# Patient Record
Sex: MALE | Race: WHITE | Employment: OTHER | URBAN - METROPOLITAN AREA
[De-identification: names, ages, dates, MRNs, and addresses within clinical notes are randomized per-mention and may not be internally consistent; named-entity substitution may affect disease eponyms.]

---

## 2017-01-27 ENCOUNTER — HOSPITAL ENCOUNTER (OUTPATIENT)
Dept: ULTRASOUND IMAGING | Age: 62
Discharge: HOME | End: 2017-01-27
Attending: SPECIALIST | Admitting: SPECIALIST

## 2017-01-27 DIAGNOSIS — K74.60 LIVER CIRRHOSIS SECONDARY TO NASH (HCC): ICD-10-CM

## 2017-01-27 DIAGNOSIS — K75.81 LIVER CIRRHOSIS SECONDARY TO NASH (HCC): ICD-10-CM

## 2017-07-10 ENCOUNTER — HOSPITAL ENCOUNTER (OUTPATIENT)
Dept: OTHER | Age: 62
Discharge: HOME | End: 2017-07-10
Attending: INTERNAL MEDICINE | Admitting: INTERNAL MEDICINE

## 2017-07-10 LAB
ALBUMIN SERPL-MCNC: 3.8 GM/DL (ref 3.4–5)
ALP BLD-CCNC: 83 IU/L (ref 40–129)
ALT SERPL-CCNC: 23 U/L (ref 10–40)
ANION GAP SERPL CALCULATED.3IONS-SCNC: 14 MMOL/L (ref 4–16)
AST SERPL-CCNC: 29 IU/L (ref 15–37)
BILIRUB SERPL-MCNC: 1.6 MG/DL (ref 0–1)
BUN BLDV-MCNC: 12 MG/DL (ref 6–23)
CALCIUM SERPL-MCNC: 9.1 MG/DL (ref 8.3–10.6)
CHLORIDE BLD-SCNC: 106 MMOL/L (ref 99–110)
CO2: 27 MMOL/L (ref 21–32)
CREAT SERPL-MCNC: 0.7 MG/DL (ref 0.9–1.3)
GFR AFRICAN AMERICAN: >60 ML/MIN/1.73M2
GFR NON-AFRICAN AMERICAN: >60 ML/MIN/1.73M2
GLUCOSE BLD-MCNC: 140 MG/DL (ref 70–140)
LACTATE DEHYDROGENASE: 202 IU/L (ref 120–246)
POTASSIUM SERPL-SCNC: 3.7 MMOL/L (ref 3.5–5.1)
SODIUM BLD-SCNC: 147 MMOL/L (ref 135–145)
TOTAL PROTEIN: 6.3 GM/DL (ref 6.4–8.2)

## 2017-07-11 LAB — CEA: 2.4 NG/ML

## 2017-08-07 ENCOUNTER — HOSPITAL ENCOUNTER (OUTPATIENT)
Dept: ULTRASOUND IMAGING | Age: 62
Discharge: HOME | End: 2017-08-07
Attending: SPECIALIST | Admitting: SPECIALIST

## 2017-08-07 DIAGNOSIS — K74.60 CIRRHOSIS OF LIVER (HCC): ICD-10-CM

## 2017-08-07 DIAGNOSIS — K75.81 LIVER CIRRHOSIS SECONDARY TO NASH (NONALCOHOLIC STEATOHEPATITIS) (HCC): ICD-10-CM

## 2017-08-07 DIAGNOSIS — K74.60 LIVER CIRRHOSIS SECONDARY TO NASH (NONALCOHOLIC STEATOHEPATITIS) (HCC): ICD-10-CM

## 2018-01-25 ENCOUNTER — HOSPITAL ENCOUNTER (OUTPATIENT)
Dept: ULTRASOUND IMAGING | Age: 63
Discharge: HOME | End: 2018-01-25
Attending: SPECIALIST | Admitting: SPECIALIST

## 2018-01-25 DIAGNOSIS — K74.60 LIVER CIRRHOSIS SECONDARY TO NASH (HCC): ICD-10-CM

## 2018-01-25 DIAGNOSIS — K75.81 LIVER CIRRHOSIS SECONDARY TO NASH (HCC): ICD-10-CM

## 2018-03-09 ENCOUNTER — HOSPITAL ENCOUNTER (OUTPATIENT)
Dept: SURGERY | Age: 63
Discharge: HOME | End: 2018-03-09
Attending: SPECIALIST | Admitting: SPECIALIST

## 2018-03-09 VITALS
RESPIRATION RATE: 16 BRPM | BODY MASS INDEX: 34.25 KG/M2 | HEART RATE: 56 BPM | TEMPERATURE: 97.1 F | SYSTOLIC BLOOD PRESSURE: 118 MMHG | OXYGEN SATURATION: 98 % | HEIGHT: 68 IN | DIASTOLIC BLOOD PRESSURE: 73 MMHG | WEIGHT: 226 LBS

## 2018-03-09 RX ORDER — SODIUM CHLORIDE, SODIUM LACTATE, POTASSIUM CHLORIDE, CALCIUM CHLORIDE 600; 310; 30; 20 MG/100ML; MG/100ML; MG/100ML; MG/100ML
INJECTION, SOLUTION INTRAVENOUS CONTINUOUS
Status: CANCELLED | OUTPATIENT
Start: 2018-03-09

## 2018-03-09 RX ORDER — SODIUM CHLORIDE 9 MG/ML
INJECTION, SOLUTION INTRAVENOUS CONTINUOUS
Status: DISCONTINUED | OUTPATIENT
Start: 2018-03-09 | End: 2018-03-10 | Stop reason: HOSPADM

## 2018-03-09 RX ADMIN — SODIUM CHLORIDE: 9 INJECTION, SOLUTION INTRAVENOUS at 09:04

## 2018-03-09 ASSESSMENT — PAIN - FUNCTIONAL ASSESSMENT: PAIN_FUNCTIONAL_ASSESSMENT: 0-10

## 2018-03-09 ASSESSMENT — PAIN SCALES - GENERAL
PAINLEVEL_OUTOF10: 0
PAINLEVEL_OUTOF10: 0

## 2018-03-09 NOTE — PROGRESS NOTES
1123 denies pain or nausea. Head of bed up. Call light in reach, wife at bedside  1128 soda provided  1135 denies needs  1146 discharge instructions reviewed.  Verbalized understanding  1152 dr Donna Sandoval provided update at bedside  [phone] discharged to car via wheelchair

## 2018-03-09 NOTE — H&P
Original H &P in soft chart. I have examined the patient immediately before the procedure and there is no change in the previous history  and physical exam, which has been reviewed. There is no history of sleep apnea, snoring, or stridor. There has been no  previous adverse experience with sedation/anesthesia. There is no increased risk for aspiration of gastric contents. The patient has been instructed that all resuscitative measures (during the operative and immediate perioperative period) will be instituted in the unlikely event that they will be needed.     ASA Class: 3  AIRWAY Class: 1

## 2018-09-07 ENCOUNTER — HOSPITAL ENCOUNTER (OUTPATIENT)
Dept: ULTRASOUND IMAGING | Age: 63
Discharge: HOME | End: 2018-09-07
Attending: SPECIALIST | Admitting: SPECIALIST

## 2018-09-07 DIAGNOSIS — K75.81 LIVER CIRRHOSIS SECONDARY TO NASH (NONALCOHOLIC STEATOHEPATITIS) (HCC): ICD-10-CM

## 2018-09-07 DIAGNOSIS — K74.60 LIVER CIRRHOSIS SECONDARY TO NASH (NONALCOHOLIC STEATOHEPATITIS) (HCC): ICD-10-CM

## 2019-03-13 ENCOUNTER — HOSPITAL ENCOUNTER (OUTPATIENT)
Dept: ULTRASOUND IMAGING | Age: 64
Discharge: HOME | End: 2019-03-13
Payer: MEDICARE

## 2019-03-13 DIAGNOSIS — K75.81 LIVER CIRRHOSIS SECONDARY TO NASH (NONALCOHOLIC STEATOHEPATITIS) (HCC): ICD-10-CM

## 2019-03-13 DIAGNOSIS — K74.60 LIVER CIRRHOSIS SECONDARY TO NASH (NONALCOHOLIC STEATOHEPATITIS) (HCC): ICD-10-CM

## 2019-03-13 PROCEDURE — 76705 ECHO EXAM OF ABDOMEN: CPT

## 2019-04-13 PROBLEM — Z95.828 PORT-A-CATH IN PLACE: Status: ACTIVE | Noted: 2019-04-13

## 2019-09-12 ENCOUNTER — HOSPITAL ENCOUNTER (OUTPATIENT)
Age: 64
Discharge: HOME | End: 2019-09-12
Payer: MEDICARE

## 2019-09-12 ENCOUNTER — HOSPITAL ENCOUNTER (OUTPATIENT)
Dept: ULTRASOUND IMAGING | Age: 64
Discharge: HOME | End: 2019-09-12
Payer: MEDICARE

## 2019-09-12 DIAGNOSIS — K75.81 LIVER CIRRHOSIS SECONDARY TO NASH (NONALCOHOLIC STEATOHEPATITIS) (HCC): ICD-10-CM

## 2019-09-12 DIAGNOSIS — K74.60 LIVER CIRRHOSIS SECONDARY TO NASH (NONALCOHOLIC STEATOHEPATITIS) (HCC): ICD-10-CM

## 2019-09-12 LAB
ALBUMIN SERPL-MCNC: 3.5 GM/DL (ref 3.4–5)
ALP BLD-CCNC: 94 IU/L (ref 40–128)
ALT SERPL-CCNC: 18 U/L (ref 10–40)
ANION GAP SERPL CALCULATED.3IONS-SCNC: 8 MMOL/L (ref 4–16)
AST SERPL-CCNC: 28 IU/L (ref 15–37)
BILIRUB SERPL-MCNC: 0.8 MG/DL (ref 0–1)
BUN BLDV-MCNC: 13 MG/DL (ref 6–23)
C-REACTIVE PROTEIN, HIGH SENSITIVITY: 0.3 MG/L
CALCIUM SERPL-MCNC: 9.4 MG/DL (ref 8.3–10.6)
CHLORIDE BLD-SCNC: 105 MMOL/L (ref 99–110)
CO2: 29 MMOL/L (ref 21–32)
CREAT SERPL-MCNC: 0.8 MG/DL (ref 0.9–1.3)
GFR AFRICAN AMERICAN: >60 ML/MIN/1.73M2
GFR NON-AFRICAN AMERICAN: >60 ML/MIN/1.73M2
GLUCOSE BLD-MCNC: 116 MG/DL (ref 70–99)
INR BLD: 1.21 INDEX
POTASSIUM SERPL-SCNC: 4.3 MMOL/L (ref 3.5–5.1)
PROTHROMBIN TIME: 13.8 SECONDS (ref 9.12–12.5)
SODIUM BLD-SCNC: 142 MMOL/L (ref 135–145)
TOTAL PROTEIN: 5.9 GM/DL (ref 6.4–8.2)

## 2019-09-12 PROCEDURE — 85610 PROTHROMBIN TIME: CPT

## 2019-09-12 PROCEDURE — 36415 COLL VENOUS BLD VENIPUNCTURE: CPT

## 2019-09-12 PROCEDURE — 80053 COMPREHEN METABOLIC PANEL: CPT

## 2019-09-12 PROCEDURE — 86140 C-REACTIVE PROTEIN: CPT

## 2019-09-12 PROCEDURE — 82105 ALPHA-FETOPROTEIN SERUM: CPT

## 2019-09-12 PROCEDURE — 76705 ECHO EXAM OF ABDOMEN: CPT

## 2019-09-14 LAB
MS ALPHA-FETOPROTEIN: 3 NG/ML (ref 0–9)
MS ALPHA-FETOPROTEIN: NORMAL NG/ML (ref 0–9)

## 2019-11-05 ENCOUNTER — HOSPITAL ENCOUNTER (OUTPATIENT)
Age: 64
Setting detail: SPECIMEN
Discharge: HOME | End: 2019-11-05
Payer: MEDICARE

## 2019-11-05 LAB
BASOPHILS ABSOLUTE: 0 K/CU MM
BASOPHILS RELATIVE PERCENT: 1 % (ref 0–1)
DIFFERENTIAL TYPE: ABNORMAL
EOSINOPHILS ABSOLUTE: 0 K/CU MM
EOSINOPHILS RELATIVE PERCENT: 1 % (ref 0–3)
FERRITIN: 79 NG/ML (ref 30–400)
FOLATE: 12.8 NG/ML (ref 3.1–17.5)
HCT VFR BLD CALC: 37.3 % (ref 42–52)
HEMOGLOBIN: 12.3 GM/DL (ref 13.5–18)
IRON: 104 UG/DL (ref 59–158)
LYMPHOCYTES ABSOLUTE: 0.7 K/CU MM
LYMPHOCYTES RELATIVE PERCENT: 20 % (ref 24–44)
MCH RBC QN AUTO: 30.9 PG (ref 27–31)
MCHC RBC AUTO-ENTMCNC: 33 % (ref 32–36)
MCV RBC AUTO: 93.7 FL (ref 78–100)
MONOCYTES ABSOLUTE: 0.4 K/CU MM
MONOCYTES RELATIVE PERCENT: 10 % (ref 0–4)
PCT TRANSFERRIN: 38 % (ref 10–44)
PDW BLD-RTO: 13.1 % (ref 11.7–14.9)
PLATELET # BLD: 96 K/CU MM (ref 140–440)
PMV BLD AUTO: 9.8 FL (ref 7.5–11.1)
RBC # BLD: 3.98 M/CU MM (ref 4.6–6.2)
SEGMENTED NEUTROPHILS ABSOLUTE COUNT: 2.5 K/CU MM
SEGMENTED NEUTROPHILS RELATIVE PERCENT: 68 % (ref 36–66)
TOTAL IRON BINDING CAPACITY: 271 UG/DL (ref 250–450)
TSH HIGH SENSITIVITY: 0.61 UIU/ML (ref 0.27–4.2)
UNSATURATED IRON BINDING CAPACITY: 167 UG/DL (ref 110–370)
VITAMIN B-12: >2000 PG/ML (ref 211–911)
WBC # BLD: 3.6 K/CU MM (ref 4–10.5)

## 2019-11-05 PROCEDURE — 85007 BL SMEAR W/DIFF WBC COUNT: CPT

## 2019-11-05 PROCEDURE — 85027 COMPLETE CBC AUTOMATED: CPT

## 2019-11-05 PROCEDURE — 82746 ASSAY OF FOLIC ACID SERUM: CPT

## 2019-11-05 PROCEDURE — 83540 ASSAY OF IRON: CPT

## 2019-11-05 PROCEDURE — 83550 IRON BINDING TEST: CPT

## 2019-11-05 PROCEDURE — 82728 ASSAY OF FERRITIN: CPT

## 2019-11-05 PROCEDURE — 82607 VITAMIN B-12: CPT

## 2019-11-05 PROCEDURE — 84443 ASSAY THYROID STIM HORMONE: CPT

## 2019-11-05 PROCEDURE — 84165 PROTEIN E-PHORESIS SERUM: CPT

## 2019-11-07 LAB
ALBUMIN ELP: 3.6 GM/DL (ref 3.2–5.6)
ALPHA-1-GLOBULIN: 0.3 GM/DL (ref 0.1–0.4)
ALPHA-2-GLOBULIN: 0.5 GM/DL (ref 0.4–1.2)
BETA GLOBULIN: 0.9 GM/DL (ref 0.5–1.3)
GAMMA GLOBULIN: 1.3 GM/DL (ref 0.5–1.6)
SPEP INTERPRETATION: NORMAL
TOTAL PROTEIN: 6.5 GM/DL (ref 6.4–8.2)

## 2020-01-13 ENCOUNTER — OFFICE VISIT (OUTPATIENT)
Dept: ORTHOPEDIC SURGERY | Age: 65
End: 2020-01-13
Payer: MEDICARE

## 2020-01-13 VITALS — BODY MASS INDEX: 33.65 KG/M2 | HEIGHT: 68 IN | WEIGHT: 222 LBS | RESPIRATION RATE: 16 BRPM

## 2020-01-13 PROCEDURE — 99201 PR OFFICE OUTPATIENT NEW 10 MINUTES: CPT | Performed by: PHYSICIAN ASSISTANT

## 2020-01-13 ASSESSMENT — ENCOUNTER SYMPTOMS
EYES NEGATIVE: 1
RESPIRATORY NEGATIVE: 1

## 2020-01-13 NOTE — PROGRESS NOTES
the injured extremity. Gen/Psych:Examination reveals a pleasant individual in no acute distress. The patient is oriented to time, place and person. The patient's mood and affect are appropriate. Patient appears well nourished. Body habitus is overweight     Lymph:  No lymphedema in bilateral lower extremities     Skin intact in bilateral lower extremities with no ulcerations, lesions, rash, erythema. Vascular: There are minimal varicosities in bilateral lower extremities, sensation intact to light touch over bilateral lower extremities. left leg/knee exam:  Leg alignment:     Varus   Quadriceps/hamstring atrophy:   no  Knee effusion:    mild   Knee erythema:   no  ROM:     3-120°  Varus laxity at 0 and 30 deg's: no  Valguslaxity at 0 and 30 deg's: no  Recurvatum:    no  Tenderness at:   Medial joint line     Bilateral Knee strength is 5/5 flexion and extension  There is + L2-S1 motor and sensory function in bilateral lower extremities    Outside record review: historical medical records and ED records     Imaging:  left knee x-rays, four views,were obtained and reviewed and show severe degenerative changes within the left knee with bone-on-bone articulation the medial compartment, subchondral sclerosis, osteophyte formation and varus alignment of the knee. NO fractures or dislocations are seen in the knee      Impression:  left knee DJD-severe      Plan:  Natural history and expected course discussed. Questions answered.   Patient Instructions   Follow-up with Dr. Chano Golden to discuss possible left total knee arthroplasty

## 2020-01-14 ASSESSMENT — ENCOUNTER SYMPTOMS: ABDOMINAL PAIN: 1

## 2020-01-21 ENCOUNTER — OFFICE VISIT (OUTPATIENT)
Dept: ORTHOPEDIC SURGERY | Age: 65
End: 2020-01-21
Payer: MEDICARE

## 2020-01-21 VITALS — RESPIRATION RATE: 16 BRPM | BODY MASS INDEX: 34.4 KG/M2 | HEIGHT: 68 IN | WEIGHT: 227 LBS

## 2020-01-21 PROCEDURE — 99214 OFFICE O/P EST MOD 30 MIN: CPT | Performed by: ORTHOPAEDIC SURGERY

## 2020-01-21 NOTE — PROGRESS NOTES
Patient is here as a new patient follow up per FRANK Martines to discuss left knee pain and is interested in possibly a left total knee arthroplasty as this is a chronic ongoing issue and is just persistently worsening. He rates his pain an 8/10 today. The pain will be severe after increased activity. He does have a history of a left knee arthroscopy a few years ago and a few injections in the past per Dr Edgar Martines with minimal relief. XR LEFT KNEE 1/13/2020  Impression   1. Severe medial and patellofemoral and moderate lateral compartment   degenerative changes with varus angulation.    2. Small effusion.

## 2020-01-21 NOTE — PATIENT INSTRUCTIONS
Will proceed with left total knee arthroplasty   No meds list provided   Consent will be signed day of surgery   Obtain clearance per Dr Navin Oneal hematology -office will send a letter to this office in regards to postoperative blood-thinners   Follow up for surgery as scheduled-as discussed     Call office with any questions ([address] [facility] surgery scheduler)

## 2020-01-23 ENCOUNTER — ANESTHESIA EVENT (OUTPATIENT)
Dept: OPERATING ROOM | Age: 65
DRG: 470 | End: 2020-01-23
Payer: MEDICARE

## 2020-01-23 PROBLEM — M17.0 BILATERAL PRIMARY OSTEOARTHRITIS OF KNEE: Status: ACTIVE | Noted: 2020-01-23

## 2020-01-23 NOTE — PROGRESS NOTES
1/21/2020   Chief Complaint   Patient presents with    Knee Pain     left        History of Present Illness:                             Doreen Nassar is a 59 y.o. male who presents today for bilateral left worse than right knee pain. His symptoms have been getting progressively worse over the last 10 years or more. He is currently very limited on a daily basis because of constant aching pain in bilateral knees and dysfunction with stiffness and difficulty walking because of his knees. He has been through injections in the past which did not provide him any significant relief. He is not interested in any further injections at this time. He has previously had knee arthroscopy on his left knee. He has previously tried anti-inflammatory medications and physical therapy however these have not been successful in dealing with his knee pain and dysfunction. Medical History  Patient's medications, allergies, past medical, surgical, social and family histories were reviewed and updated as appropriate. Patient is here as a new patient follow up per PA Meriden to discuss left knee pain and is interested in possibly a left total knee arthroplasty as this is a chronic ongoing issue and is just persistently worsening. He rates his pain an 8/10 today. The pain will be severe after increased activity. He does have a history of a left knee arthroscopy a few years ago and a few injections in the past per Dr Trev Valdez with minimal relief.             Past Medical History:   Diagnosis Date    Blood transfusion reaction     Cancer ([facility]     whipple    Esophageal varices ([facility] 12-    Hypertension      No family history on file.   Social History     Socioeconomic History    Marital status:      Spouse name: None    Number of children: None    Years of education: None    Highest education level: None   Occupational History    None   Social Needs    Financial resource strain: None   Percy-Mayra insecurity:     Worry: None     Inability: None    Transportation needs:     Medical: None     Non-medical: None   Tobacco Use    Smoking status: Never Smoker    Smokeless tobacco: Never Used   Substance and Sexual Activity    Alcohol use: No    Drug use: No    Sexual activity: None   Lifestyle    Physical activity:     Days per week: None     Minutes per session: None    Stress: None   Relationships    Social connections:     Talks on phone: None     Gets together: None     Attends [employer] service: None     Active member of club or organization: None     Attends meetings of clubs or organizations: None     Relationship status: None    Intimate partner violence:     Fear of current or ex partner: None     Emotionally abused: None     Physically abused: None     Forced sexual activity: None   Other Topics Concern    None   Social History Narrative    None     Current Outpatient Medications   Medication Sig Dispense Refill    lisinopril (PRINIVIL;ZESTRIL) 2.5 MG tablet Take 2.5 mg by mouth daily      nadolol (CORGARD) 40 MG tablet TAKE ONE TABLET BY MOUTH ONCE DAILY 30 tablet 11    vitamin C (ASCORBIC ACID) 500 MG tablet Take 500 mg by mouth daily      ferrous sulfate 325 (65 FE) MG tablet Take 325 mg by mouth daily (with breakfast)      calcium carbonate 600 MG TABS tablet Take 1 tablet by mouth daily.  vitamin B-12 (CYANOCOBALAMIN) 1000 MCG tablet Take 2,000 mcg by mouth daily. No current facility-administered medications for this visit.       Allergies   Allergen Reactions    Erythromycin      vomitting    Adhesive Tape     Lorazepam Itching    Zolpidem Tartrate Itching       Review of Systems:   Review of Systems                                            Examination:  General Exam:  Vitals: Resp 16   Ht 5' 8\" (1.727 m)   Wt 227 lb (103 kg)   BMI 34.52 kg/m²    Physical Exam     Diagnostic testing:  X-rays reviewed in office, I independently reviewed the films in the office

## 2020-01-23 NOTE — ANESTHESIA PRE PROCEDURE
BILITOT 1.0 01/24/2020    ALKPHOS 97 01/24/2020    AST 28 01/24/2020    ALT 20 01/24/2020    LABGLOM >60 01/24/2020    GFRAA >60 01/24/2020         Anesthesia Evaluation  Patient summary reviewed and Nursing notes reviewed history of anesthetic complications (slow to wake): Airway: Mallampati: III  TM distance: >3 FB   Neck ROM: full  Mouth opening: > = 3 FB Dental:          Pulmonary: breath sounds clear to auscultation                            ROS comment: Non smoker      PAT Airway Exam:  Head/Neck movement:  full  Thyromental Distance: >3 FB  History of difficult intubation:  None  Mallampati Classification:  Class II  Teeth: U/L dentures   Able to lie flat     LUNGS:  No increased work of breathing, good air exchange, clear to auscultation bilaterally, no crackles or wheezing   Cardiovascular:  Exercise tolerance: good (>4 METS),   (+) hypertension (on beta blocker ):,       ECG reviewed  Rhythm: regular             Beta Blocker:  Dose within 24 Hrs      ROS comment:  CARDIOVASCULAR:  Normal apical impulse, regular rate and rhythm, normal S1 and S2, no S3 or S4, and no murmur noted    CP or SOB: NO   Exercise: No     EKG:NSR  1/24/2020     Neuro/Psych:   (+) neuromuscular disease (poor balance, weak knees. 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 Esophageal varices, 2014. NL LFT's, 1/24/2020): coagulopathy from hepatic dysfunction, esophageal varices, morbid obesity         ROS comment:  Duodenal cancer s/p pancreaticoduodenectomy, ([place]), 2011    Hx colon polyps, diverticuli. Endo/Other:    (+) blood dyscrasia (Hx leukocytopenia, anemia, thrombopenia. PAT labs: WBC: 3.8, HGB: 13.2, PLT: 82K, 1/24/20202): anemia and thrombocytopenia, arthritis (anson knees, ): OA., malignancy/cancer (duodenal CA). Pt had PAT visit.         ROS comment: S/p hernia repair  S/p rotator cuff repair    Port placed 2011, replaced 2019 Abdominal:   (+) obese, Vascular: negative vascular ROS. Anesthesia Plan      general and regional     ASA 4     (Risks benefits of geta and adductor block discussed with pt and family )  Induction: intravenous. MIPS: Postoperative opioids intended. Anesthetic plan and risks discussed with patient. Plan discussed with CRNA. Attending anesthesiologist reviewed and agrees with Pre Eval content            ROSALVA Diehl - CNP Chart reviewed, pt. Interviewed and examined. Anesthesia Evaluation will follow by a certified practitioner prior to surgery.   1/23/2020

## 2020-01-24 ENCOUNTER — HOSPITAL ENCOUNTER (OUTPATIENT)
Dept: PREADMISSION TESTING | Age: 65
Discharge: HOME | End: 2020-01-28
Payer: MEDICARE

## 2020-01-24 VITALS
BODY MASS INDEX: 33.65 KG/M2 | DIASTOLIC BLOOD PRESSURE: 79 MMHG | RESPIRATION RATE: 16 BRPM | WEIGHT: 222 LBS | HEART RATE: 60 BPM | OXYGEN SATURATION: 97 % | HEIGHT: 68 IN | SYSTOLIC BLOOD PRESSURE: 152 MMHG | TEMPERATURE: 97.6 F

## 2020-01-24 LAB
ALBUMIN SERPL-MCNC: 3.8 GM/DL (ref 3.4–5)
ALP BLD-CCNC: 97 IU/L (ref 40–128)
ALT SERPL-CCNC: 20 U/L (ref 10–40)
ANION GAP SERPL CALCULATED.3IONS-SCNC: 9 MMOL/L (ref 4–16)
AST SERPL-CCNC: 28 IU/L (ref 15–37)
BACTERIA: NEGATIVE /HPF
BASOPHILS ABSOLUTE: 0 K/CU MM
BASOPHILS RELATIVE PERCENT: 0.5 % (ref 0–1)
BILIRUB SERPL-MCNC: 1 MG/DL (ref 0–1)
BILIRUBIN URINE: NEGATIVE MG/DL
BLOOD, URINE: NEGATIVE
BUN BLDV-MCNC: 14 MG/DL (ref 6–23)
CALCIUM OXALATE CRYSTALS: ABNORMAL /HPF
CALCIUM SERPL-MCNC: 9.3 MG/DL (ref 8.3–10.6)
CHLORIDE BLD-SCNC: 103 MMOL/L (ref 99–110)
CLARITY: ABNORMAL
CO2: 28 MMOL/L (ref 21–32)
COLOR: YELLOW
CREAT SERPL-MCNC: 0.8 MG/DL (ref 0.9–1.3)
DIFFERENTIAL TYPE: ABNORMAL
EKG ATRIAL RATE: 60 BPM
EKG DIAGNOSIS: NORMAL
EKG P AXIS: 25 DEGREES
EKG P-R INTERVAL: 174 MS
EKG Q-T INTERVAL: 434 MS
EKG QRS DURATION: 86 MS
EKG QTC CALCULATION (BAZETT): 434 MS
EKG R AXIS: 13 DEGREES
EKG T AXIS: 45 DEGREES
EKG VENTRICULAR RATE: 60 BPM
EOSINOPHILS ABSOLUTE: 0.2 K/CU MM
EOSINOPHILS RELATIVE PERCENT: 3.9 % (ref 0–3)
ERYTHROCYTE SEDIMENTATION RATE: 16 MM/HR (ref 0–20)
GFR AFRICAN AMERICAN: >60 ML/MIN/1.73M2
GFR NON-AFRICAN AMERICAN: >60 ML/MIN/1.73M2
GLUCOSE BLD-MCNC: 167 MG/DL (ref 70–99)
GLUCOSE, URINE: NEGATIVE MG/DL
HCT VFR BLD CALC: 41.6 % (ref 42–52)
HEMOGLOBIN: 13.2 GM/DL (ref 13.5–18)
IMMATURE NEUTROPHIL %: 0.3 % (ref 0–0.43)
KETONES, URINE: NEGATIVE MG/DL
LEUKOCYTE ESTERASE, URINE: NEGATIVE
LYMPHOCYTES ABSOLUTE: 0.6 K/CU MM
LYMPHOCYTES RELATIVE PERCENT: 15.7 % (ref 24–44)
MCH RBC QN AUTO: 30.4 PG (ref 27–31)
MCHC RBC AUTO-ENTMCNC: 31.7 % (ref 32–36)
MCV RBC AUTO: 95.9 FL (ref 78–100)
MONOCYTES ABSOLUTE: 0.3 K/CU MM
MONOCYTES RELATIVE PERCENT: 8.9 % (ref 0–4)
MUCUS: ABNORMAL HPF
NITRITE URINE, QUANTITATIVE: NEGATIVE
NUCLEATED RBC %: 0 %
PDW BLD-RTO: 13.2 % (ref 11.7–14.9)
PH, URINE: 5 (ref 5–8)
PLATELET # BLD: 82 K/CU MM (ref 140–440)
PMV BLD AUTO: 10.1 FL (ref 7.5–11.1)
POTASSIUM SERPL-SCNC: 4.6 MMOL/L (ref 3.5–5.1)
PROTEIN UA: 30 MG/DL
RBC # BLD: 4.34 M/CU MM (ref 4.6–6.2)
RBC URINE: 7 /HPF (ref 0–3)
SEGMENTED NEUTROPHILS ABSOLUTE COUNT: 2.7 K/CU MM
SEGMENTED NEUTROPHILS RELATIVE PERCENT: 70.7 % (ref 36–66)
SODIUM BLD-SCNC: 140 MMOL/L (ref 135–145)
SPECIFIC GRAVITY UA: 1.02 (ref 1–1.03)
SQUAMOUS EPITHELIAL: <1 /HPF
TOTAL IMMATURE NEUTOROPHIL: 0.01 K/CU MM
TOTAL NUCLEATED RBC: 0 K/CU MM
TOTAL PROTEIN: 6.6 GM/DL (ref 6.4–8.2)
TRICHOMONAS: ABNORMAL /HPF
UROBILINOGEN, URINE: 2 MG/DL (ref 0.2–1)
WBC # BLD: 3.8 K/CU MM (ref 4–10.5)
WBC UA: 2 /HPF (ref 0–2)

## 2020-01-24 PROCEDURE — 85652 RBC SED RATE AUTOMATED: CPT

## 2020-01-24 PROCEDURE — 81001 URINALYSIS AUTO W/SCOPE: CPT

## 2020-01-24 PROCEDURE — 93005 ELECTROCARDIOGRAM TRACING: CPT | Performed by: ORTHOPAEDIC SURGERY

## 2020-01-24 PROCEDURE — 36415 COLL VENOUS BLD VENIPUNCTURE: CPT

## 2020-01-24 PROCEDURE — 93010 ELECTROCARDIOGRAM REPORT: CPT | Performed by: INTERNAL MEDICINE

## 2020-01-24 PROCEDURE — 85025 COMPLETE CBC W/AUTO DIFF WBC: CPT

## 2020-01-24 PROCEDURE — 80053 COMPREHEN METABOLIC PANEL: CPT

## 2020-01-24 PROCEDURE — 87086 URINE CULTURE/COLONY COUNT: CPT

## 2020-01-24 ASSESSMENT — PAIN - FUNCTIONAL ASSESSMENT: PAIN_FUNCTIONAL_ASSESSMENT: PREVENTS OR INTERFERES SOME ACTIVE ACTIVITIES AND ADLS

## 2020-01-24 ASSESSMENT — PAIN DESCRIPTION - LOCATION: LOCATION: KNEE

## 2020-01-24 ASSESSMENT — PAIN DESCRIPTION - ORIENTATION: ORIENTATION: LEFT

## 2020-01-24 ASSESSMENT — PAIN DESCRIPTION - DESCRIPTORS: DESCRIPTORS: SHARP

## 2020-01-24 ASSESSMENT — PAIN DESCRIPTION - PROGRESSION: CLINICAL_PROGRESSION: GRADUALLY WORSENING

## 2020-01-24 ASSESSMENT — PAIN DESCRIPTION - ONSET: ONSET: ON-GOING

## 2020-01-24 ASSESSMENT — PAIN SCALES - GENERAL: PAINLEVEL_OUTOF10: 8

## 2020-01-24 ASSESSMENT — PAIN DESCRIPTION - FREQUENCY: FREQUENCY: CONTINUOUS

## 2020-01-24 ASSESSMENT — PAIN DESCRIPTION - PAIN TYPE: TYPE: ACUTE PAIN

## 2020-01-25 LAB
CULTURE: NORMAL
Lab: NORMAL
SPECIMEN: NORMAL

## 2020-01-31 ENCOUNTER — TELEPHONE (OUTPATIENT)
Dept: ORTHOPEDIC SURGERY | Age: 65
End: 2020-01-31

## 2020-01-31 NOTE — TELEPHONE ENCOUNTER
Called patient's insurance.  No prior Raquel Feathers is needed for cpt code: 63603  Ref# [id number]

## 2020-02-03 ENCOUNTER — HOSPITAL ENCOUNTER (OUTPATIENT)
Dept: PHYSICAL THERAPY | Age: 65
Setting detail: THERAPIES SERIES
Discharge: HOME | End: 2020-02-03
Payer: MEDICARE

## 2020-02-03 PROCEDURE — 97535 SELF CARE MNGMENT TRAINING: CPT

## 2020-02-03 PROCEDURE — 97161 PT EVAL LOW COMPLEX 20 MIN: CPT

## 2020-02-03 PROCEDURE — 97110 THERAPEUTIC EXERCISES: CPT

## 2020-02-03 ASSESSMENT — PAIN DESCRIPTION - PROGRESSION: CLINICAL_PROGRESSION: GRADUALLY WORSENING

## 2020-02-03 ASSESSMENT — PAIN DESCRIPTION - DESCRIPTORS: DESCRIPTORS: ACHING;STABBING

## 2020-02-03 ASSESSMENT — PAIN DESCRIPTION - ORIENTATION: ORIENTATION: LEFT

## 2020-02-03 ASSESSMENT — PAIN SCALES - GENERAL: PAINLEVEL_OUTOF10: 8

## 2020-02-03 ASSESSMENT — PAIN - FUNCTIONAL ASSESSMENT: PAIN_FUNCTIONAL_ASSESSMENT: PREVENTS OR INTERFERES WITH MANY ACTIVE NOT PASSIVE ACTIVITIES

## 2020-02-03 ASSESSMENT — PAIN DESCRIPTION - FREQUENCY: FREQUENCY: CONTINUOUS

## 2020-02-03 ASSESSMENT — PAIN DESCRIPTION - LOCATION: LOCATION: NECK

## 2020-02-03 ASSESSMENT — PAIN DESCRIPTION - PAIN TYPE: TYPE: CHRONIC PAIN

## 2020-02-03 NOTE — PROGRESS NOTES
Physical Therapy  Initial Assessment  Date: 2/3/2020  Patient Name: Josafat Barry  MRN: [id number]  : 1955     Treatment Diagnosis: pre op TKA L LE    Restrictions       Subjective   General  Chart Reviewed: Yes  Patient assessed for rehabilitation services?: Yes  Referring Practitioner: Elton Garza  Diagnosis: primary osteoarthritis of left knee  PT Visit Information  PT Insurance Information: med mutual medi  Subjective  Subjective: Pt is a 59year old male with PMH including sx and neuropathy who presents to therapy pre op L TKA. Pt states he has had knee pain about twenty years and is finally qualified for sx. He wanted surgery years ago but not qualified due to weight. States he cannot walk as much a he wants, although he does force himself to be active due to pain. He is able to bike and does this for recreation in summer months. R knee also planned to be replaced but he would like to work as volunteer for [employer] this summer before planning that sx. He is limited in ROM by pain and feels that the knee on left gives out on him due to the pain. He has fallen several times in past year and requires his UE to lift his L LE into a vehicle due to pain. Does not sleep well at night.    Pain Screening  Patient Currently in Pain: Yes  Pain Assessment  Pain Assessment: 0-10  Pain Level: 8  Pain Type: Chronic pain  Pain Location: Neck  Pain Orientation: Left  Pain Descriptors: Aching;Stabbing  Pain Frequency: Continuous  Clinical Progression: Gradually worsening  Functional Pain Assessment: Prevents or interferes with many active not passive activities  Vital Signs  Patient Currently in Pain: Yes    Vision/Hearing  Vision  Vision: Impaired  Hearing  Hearing: Within functional limits    Orientation  Orientation  Overall Orientation Status: Within Functional Limits    Social/Functional History  Social/Functional History  Lives With: Spouse  Type of Home: House  Home Layout: One level  Home Access: Stairs to enter with rails  Entrance Stairs - Number of Steps: 2  ADL Assistance: Independent  Homemaking Assistance: Independent  Homemaking Responsibilities: Yes  Ambulation Assistance: Independent  Transfer Assistance: Independent  Active : Yes  Mode of Transportation: Car    Objective     Observation/Palpation  Posture: Fair  Observation: flexed posture and lacks terminal knee extension B LE    AROM LLE (degrees)  LLE General AROM: missing 3deg knee extension through 94deg flexion     Strength RLE  R Hip Flexion: 4-/5  R Hip ABduction: 4-/5  R Hip ADduction: 4-/5  R Knee Flexion: 4-/5  R Knee Extension: 4-/5  R Ankle Dorsiflexion: 4/5  R Ankle Plantar flexion: 4/5  Strength LLE  L Hip Flexion: 4-/5  L Hip ABduction: 4-/5  L Hip ADduction: 4-/5  L Knee Flexion: 4-/5  L Knee Extension: 4-/5  L Ankle Dorsiflexion: 4/5  L Ankle Plantar Flexion: 4/5  Tone RLE  RLE Tone: Normotonic  Tone LLE  LLE Tone: Normotonic     Sensation  Overall Sensation Status: Impaired  Bed mobility  Supine to Sit: Independent  Sit to Supine: Independent  Transfers  Sit to Stand: Independent  Stand to sit: Independent  Bed to Chair: Independent       Ambulation  Ambulation?: Yes  Ambulation 1  Surface: carpet  Device: No Device  Assistance: Independent  Quality of Gait: antalgic    Distance: 50ft x 2        Assessment   Conditions Requiring Skilled Therapeutic Intervention  Body structures, Functions, Activity limitations: Decreased functional mobility ; Decreased high-level IADLs;Decreased posture;Decreased ADL status; Decreased endurance;Decreased ROM; Decreased strength; Increased pain;Decreased balance  Assessment: Pt is limited by pain for range of motion L LE as well as for strength testing of LE. Pt has poor overall LE strength and range of motion. Presents with genu varum at rest. Will benefit from planned knee replacement to decrease pain and improve function.  Will benefit from HEP this date as pt is d/c from therapy this visit to HEP. Pt will benefit from TE as HEP to go into surgery with good ability to contract muscles. Will benefit from skilled services after sx. Patient agrees with established plan of care and assisted in the development of their short term and long term goals. Patient had no adverse reaction with initial treatment and there are no barriers to learning. Demonstrates no mental or cognitive disorder.    Treatment Diagnosis: pre op TKA L LE  Prognosis: Good  Decision Making: Low Complexity  Barriers to Learning: none   REQUIRES PT FOLLOW UP: No  Activity Tolerance  Activity Tolerance: Patient limited by pain         Plan   Plan  Times per week: 1 visit only  Current Treatment Recommendations: Strengthening, ADL/Self-care Training, Gait Training, Manual Therapy - Joint Manipulation, ROM, Cognitive Reorientation, Balance Training, Neuromuscular Re-education, Modalities, Endurance Training, Functional Mobility Training, Manual Therapy - Soft Tissue Mobilization, Home Exercise Program, Pain Management, Cognitive/Perceptual Training, IADL Training, Stair training    OutComes Score   KOOS-PS 21/28    Goals  Short term goals  Time Frame for Short term goals: eval only  Short term goal 1: verbalize understanding of HEP -MET 2/3/20  Patient Goals   Patient goals : have a successful sx       Therapy Time   Individual Concurrent Group Co-treatment   Time In 1040         Time Out 1142         Minutes 62

## 2020-02-03 NOTE — PLAN OF CARE
Instruction in HEP      [x] Vasopneumatic     [x] Manual Therapy               [] Aquatic Therapy       Other:    ? Frequency/Duration:  # Days per week: [x] 1 day # Weeks: [] 1 week [] 5 weeks     [] 2 days? [] 2 weeks [] 6 weeks     [] 3 days   [] 3 weeks [] 7 weeks     [] 4 days   [] 4 weeks [] 8 weeks         [] 9 weeks [] 10 weeks         [] 11 weeks [] 12 weeks    Rehab Potential/Progress: [] Excellent [x] Good [] Fair  [] Poor     Goals:      Short term goals  Time Frame for Short term goals: eval only  Short term goal 1: verbalize understanding of HEP -MET 2/3/20  Short term goal 2: d/c this visit. Electronically signed by:           2/3/2020, 11:50 AM        If you have any questions or concerns, please don't hesitate to call.   Thank you for your referral.      Physician Signature:________________________________Date:_________ TIME: _____  By signing above, therapists plan is approved by physician

## 2020-02-03 NOTE — FLOWSHEET NOTE
Outpatient Physical Therapy  [place]           [x] Phone: [phone]   Fax: [phone]  [place]           [] Phone: [phone]   Fax: [phone]        Physical Therapy Daily Treatment Note  Date:  2/3/2020    Patient Name:  Buster Stein    :  1955  MRN: [id number]  Restrictions/Precautions:    Diagnosis:   Diagnosis: primary osteoarthritis of left knee  Date of Injury/Surgery:   Treatment Diagnosis: Treatment Diagnosis: pre op TKA L LE    Insurance/Certification information: PT Insurance Information: med mutual medi   Referring Physician:  Referring Practitioner: Caleb Jordan  Next Doctor Visit:    Plan of care signed (Y/N):  pending  Outcome Measure:  KOOS-PS  Visit# / total visits:     Pain level: 8/10   Goals:       Short term goals  Time Frame for Short term goals: eval only  Short term goal 1: verbalize understanding of HEP -MET 2/3/20       Summary of Evaluation: Assessment: Pt is limited by pain for range of motion L LE as well as for strength testing of LE. Pt has poor overall LE strength and range of motion. Presents with genu varum at rest. Will benefit from planned knee replacement to decrease pain and improve function. Will benefit from HEP this date as pt is d/c from therapy this visit to HEP. Pt will benefit from TE as HEP to go into surgery with good ability to contract muscles. Will benefit from skilled services after sx. Subjective:  See eval         Any changes in Ambulatory Summary Sheet?   None        Objective:  See eval           Exercises: (No more than 4 columns)   Exercise/Equipment Date Date Date      2/3/20     WARM UP                     TABLE      Seated knee flexion X 10reps PTB     Supine ball squeeze 2sets x 10reps PTB     Supine abduction 2sets x 10reps PTB     SLR 2sets x 10reps            STANDING      Calf raise X 20reps                                              PROPRIOCEPTION                                    MODALITIES Other Therapeutic Activities/Education:  Patient received education on their current pathology and how their condition effects them with their functional activities. Patient understood discussion and questions were answered. Patient understands their activity limitations and understands rational for treatment progression. Education on PT role, POC, goals. Education on knee replacement, anatomy, physiology. Education on sx and weight bearing status. Education on therapy post surgical. Education on HEP and compliance. education on use of AD. Home Exercise Program:  Issued, practiced and pt demo ability to perform 2/3/20        Manual Treatments:        Modalities:        Communication with other providers:  pending      Assessment:  (Response towards treatment session) (Pain Rating)    Assessment: Pt is limited by pain for range of motion L LE as well as for strength testing of LE. Pt has poor overall LE strength and range of motion. Presents with genu varum at rest. Will benefit from planned knee replacement to decrease pain and improve function. Will benefit from HEP this date as pt is d/c from therapy this visit to HEP. Pt will benefit from TE as HEP to go into surgery with good ability to contract muscles. Will benefit from skilled services after sx. Plan for Next Session:  d/c this visit. Time In / Time Out:     8312-0931      If [facility] Please Indicate Time In/Out  CPT Code Time in Time out                                                              Timed Code/Total Treatment Minutes:  45min/62min. 1 PT eval low 17min. 1 TE 15min. 2 ADL train 30min.        Next Progress Note due:  pending      Plan of Care Interventions:  [x] Therapeutic Exercise  [x] Modalities:  [x] Therapeutic Activity     [] Ultrasound  [] Estim  [x] Gait Training      [] Cervical Traction [] Lumbar Traction  [x] Neuromuscular Re-education    [x] Cold/hotpack [] Iontophoresis   [x] Instruction in HEP      [x] Vasopneumatic   [] Dry Needling    [x] Manual Therapy               [] Aquatic Therapy              Electronically signed by:           2/3/2020, 11:51 AM

## 2020-02-17 ENCOUNTER — HOSPITAL ENCOUNTER (OUTPATIENT)
Age: 65
Setting detail: OBSERVATION
Discharge: HOME HEALTH | DRG: 470 | End: 2020-02-19
Attending: ORTHOPAEDIC SURGERY | Admitting: ORTHOPAEDIC SURGERY
Payer: MEDICARE

## 2020-02-17 ENCOUNTER — ANESTHESIA (OUTPATIENT)
Dept: OPERATING ROOM | Age: 65
DRG: 470 | End: 2020-02-17
Payer: MEDICARE

## 2020-02-17 ENCOUNTER — APPOINTMENT (OUTPATIENT)
Dept: GENERAL RADIOLOGY | Age: 65
DRG: 470 | End: 2020-02-17
Attending: ORTHOPAEDIC SURGERY
Payer: MEDICARE

## 2020-02-17 VITALS
TEMPERATURE: 97.2 F | SYSTOLIC BLOOD PRESSURE: 154 MMHG | DIASTOLIC BLOOD PRESSURE: 93 MMHG | OXYGEN SATURATION: 98 % | RESPIRATION RATE: 14 BRPM

## 2020-02-17 LAB
BASOPHILS ABSOLUTE: 0 K/CU MM
BASOPHILS RELATIVE PERCENT: 1 % (ref 0–1)
DIFFERENTIAL TYPE: ABNORMAL
EOSINOPHILS ABSOLUTE: 0.2 K/CU MM
EOSINOPHILS RELATIVE PERCENT: 4.2 % (ref 0–3)
HCT VFR BLD CALC: 40.4 % (ref 42–52)
HEMOGLOBIN: 13.4 GM/DL (ref 13.5–18)
IMMATURE NEUTROPHIL %: 0.2 % (ref 0–0.43)
INR BLD: 1.1 INDEX
LYMPHOCYTES ABSOLUTE: 0.6 K/CU MM
LYMPHOCYTES RELATIVE PERCENT: 15.6 % (ref 24–44)
MCH RBC QN AUTO: 30.5 PG (ref 27–31)
MCHC RBC AUTO-ENTMCNC: 33.2 % (ref 32–36)
MCV RBC AUTO: 92 FL (ref 78–100)
MONOCYTES ABSOLUTE: 0.4 K/CU MM
MONOCYTES RELATIVE PERCENT: 10.1 % (ref 0–4)
NUCLEATED RBC %: 0 %
PDW BLD-RTO: 13.2 % (ref 11.7–14.9)
PLATELET # BLD: 72 K/CU MM (ref 140–440)
PMV BLD AUTO: 9.4 FL (ref 7.5–11.1)
PROTHROMBIN TIME: 13.3 SECONDS (ref 11.7–14.5)
RBC # BLD: 4.39 M/CU MM (ref 4.6–6.2)
SEGMENTED NEUTROPHILS ABSOLUTE COUNT: 2.8 K/CU MM
SEGMENTED NEUTROPHILS RELATIVE PERCENT: 68.9 % (ref 36–66)
TOTAL IMMATURE NEUTOROPHIL: 0.01 K/CU MM
TOTAL NUCLEATED RBC: 0 K/CU MM
WBC # BLD: 4.1 K/CU MM (ref 4–10.5)

## 2020-02-17 PROCEDURE — 2709999900 HC NON-CHARGEABLE SUPPLY: Performed by: ORTHOPAEDIC SURGERY

## 2020-02-17 PROCEDURE — 2500000003 HC RX 250 WO HCPCS: Performed by: ORTHOPAEDIC SURGERY

## 2020-02-17 PROCEDURE — G0378 HOSPITAL OBSERVATION PER HR: HCPCS

## 2020-02-17 PROCEDURE — 73560 X-RAY EXAM OF KNEE 1 OR 2: CPT

## 2020-02-17 PROCEDURE — 6360000002 HC RX W HCPCS: Performed by: NURSE ANESTHETIST, CERTIFIED REGISTERED

## 2020-02-17 PROCEDURE — C1713 ANCHOR/SCREW BN/BN,TIS/BN: HCPCS | Performed by: ORTHOPAEDIC SURGERY

## 2020-02-17 PROCEDURE — 3700000001 HC ADD 15 MINUTES (ANESTHESIA): Performed by: ORTHOPAEDIC SURGERY

## 2020-02-17 PROCEDURE — 97110 THERAPEUTIC EXERCISES: CPT

## 2020-02-17 PROCEDURE — 27447 TOTAL KNEE ARTHROPLASTY: CPT | Performed by: ORTHOPAEDIC SURGERY

## 2020-02-17 PROCEDURE — 3E0T3BZ INTRODUCTION OF ANESTHETIC AGENT INTO PERIPHERAL NERVES AND PLEXI, PERCUTANEOUS APPROACH: ICD-10-PCS | Performed by: ORTHOPAEDIC SURGERY

## 2020-02-17 PROCEDURE — 2720000010 HC SURG SUPPLY STERILE: Performed by: ORTHOPAEDIC SURGERY

## 2020-02-17 PROCEDURE — 2500000003 HC RX 250 WO HCPCS

## 2020-02-17 PROCEDURE — 1200000000 HC SEMI PRIVATE

## 2020-02-17 PROCEDURE — 97530 THERAPEUTIC ACTIVITIES: CPT

## 2020-02-17 PROCEDURE — 6370000000 HC RX 637 (ALT 250 FOR IP): Performed by: ORTHOPAEDIC SURGERY

## 2020-02-17 PROCEDURE — 3600000005 HC SURGERY LEVEL 5 BASE: Performed by: ORTHOPAEDIC SURGERY

## 2020-02-17 PROCEDURE — 2580000003 HC RX 258: Performed by: ORTHOPAEDIC SURGERY

## 2020-02-17 PROCEDURE — C1776 JOINT DEVICE (IMPLANTABLE): HCPCS | Performed by: ORTHOPAEDIC SURGERY

## 2020-02-17 PROCEDURE — 76942 ECHO GUIDE FOR BIOPSY: CPT | Performed by: ANESTHESIOLOGY

## 2020-02-17 PROCEDURE — 6360000002 HC RX W HCPCS: Performed by: ORTHOPAEDIC SURGERY

## 2020-02-17 PROCEDURE — 3600000015 HC SURGERY LEVEL 5 ADDTL 15MIN: Performed by: ORTHOPAEDIC SURGERY

## 2020-02-17 PROCEDURE — 7100000000 HC PACU RECOVERY - FIRST 15 MIN: Performed by: ORTHOPAEDIC SURGERY

## 2020-02-17 PROCEDURE — 6360000002 HC RX W HCPCS: Performed by: ANESTHESIOLOGY

## 2020-02-17 PROCEDURE — 3700000000 HC ANESTHESIA ATTENDED CARE: Performed by: ORTHOPAEDIC SURGERY

## 2020-02-17 PROCEDURE — 85025 COMPLETE CBC W/AUTO DIFF WBC: CPT

## 2020-02-17 PROCEDURE — 2580000003 HC RX 258: Performed by: ANESTHESIOLOGY

## 2020-02-17 PROCEDURE — 97116 GAIT TRAINING THERAPY: CPT

## 2020-02-17 PROCEDURE — 7100000001 HC PACU RECOVERY - ADDTL 15 MIN: Performed by: ORTHOPAEDIC SURGERY

## 2020-02-17 PROCEDURE — 2580000003 HC RX 258

## 2020-02-17 PROCEDURE — 97162 PT EVAL MOD COMPLEX 30 MIN: CPT

## 2020-02-17 PROCEDURE — 0SRD0J9 REPLACEMENT OF LEFT KNEE JOINT WITH SYNTHETIC SUBSTITUTE, CEMENTED, OPEN APPROACH: ICD-10-PCS | Performed by: ORTHOPAEDIC SURGERY

## 2020-02-17 PROCEDURE — 6360000002 HC RX W HCPCS

## 2020-02-17 PROCEDURE — 85610 PROTHROMBIN TIME: CPT

## 2020-02-17 PROCEDURE — 2500000003 HC RX 250 WO HCPCS: Performed by: NURSE ANESTHETIST, CERTIFIED REGISTERED

## 2020-02-17 DEVICE — SCREW BNE L25MM DIA2.5MM KNEE FULL THRD HEX FEM PERSONA: Type: IMPLANTABLE DEVICE | Site: KNEE | Status: FUNCTIONAL

## 2020-02-17 DEVICE — IMPL KNEE PERSONA LFT FEM PS STND SZ 8: Type: IMPLANTABLE DEVICE | Site: KNEE | Status: FUNCTIONAL

## 2020-02-17 DEVICE — EXTENSION STEM SZ G 5DEG L TIBL KNEE CEM NAT TIB: Type: IMPLANTABLE DEVICE | Site: KNEE | Status: FUNCTIONAL

## 2020-02-17 DEVICE — CEMENT BNE 40GM HI VISC RADPQ FOR REV SURG: Type: IMPLANTABLE DEVICE | Site: KNEE | Status: FUNCTIONAL

## 2020-02-17 DEVICE — COMPONENT PAT DIA35MM THK9MM KNEE POLY CEM CONVENTIONAL: Type: IMPLANTABLE DEVICE | Site: KNEE | Status: FUNCTIONAL

## 2020-02-17 DEVICE — COMPONENT ARTC SURF PS 6-9 GH 10 MM LT TIB FIX BEAR: Type: IMPLANTABLE DEVICE | Site: KNEE | Status: FUNCTIONAL

## 2020-02-17 RX ORDER — LANOLIN ALCOHOL/MO/W.PET/CERES
2000 CREAM (GRAM) TOPICAL DAILY
Status: DISCONTINUED | OUTPATIENT
Start: 2020-02-17 | End: 2020-02-19 | Stop reason: HOSPADM

## 2020-02-17 RX ORDER — ONDANSETRON 2 MG/ML
4 INJECTION INTRAMUSCULAR; INTRAVENOUS
Status: DISCONTINUED | OUTPATIENT
Start: 2020-02-17 | End: 2020-02-17 | Stop reason: HOSPADM

## 2020-02-17 RX ORDER — FENTANYL CITRATE 50 UG/ML
25 INJECTION, SOLUTION INTRAMUSCULAR; INTRAVENOUS EVERY 5 MIN PRN
Status: DISCONTINUED | OUTPATIENT
Start: 2020-02-17 | End: 2020-02-17 | Stop reason: HOSPADM

## 2020-02-17 RX ORDER — PHENYLEPHRINE HYDROCHLORIDE 10 MG/ML
INJECTION INTRAVENOUS PRN
Status: DISCONTINUED | OUTPATIENT
Start: 2020-02-17 | End: 2020-02-17 | Stop reason: SDUPTHER

## 2020-02-17 RX ORDER — ROPIVACAINE HYDROCHLORIDE 5 MG/ML
INJECTION, SOLUTION EPIDURAL; INFILTRATION; PERINEURAL
Status: COMPLETED | OUTPATIENT
Start: 2020-02-17 | End: 2020-02-17

## 2020-02-17 RX ORDER — PROPOFOL 10 MG/ML
INJECTION, EMULSION INTRAVENOUS PRN
Status: DISCONTINUED | OUTPATIENT
Start: 2020-02-17 | End: 2020-02-17 | Stop reason: SDUPTHER

## 2020-02-17 RX ORDER — SENNA AND DOCUSATE SODIUM 50; 8.6 MG/1; MG/1
1 TABLET, FILM COATED ORAL 2 TIMES DAILY
Status: DISCONTINUED | OUTPATIENT
Start: 2020-02-17 | End: 2020-02-19 | Stop reason: HOSPADM

## 2020-02-17 RX ORDER — FENTANYL CITRATE 50 UG/ML
50 INJECTION, SOLUTION INTRAMUSCULAR; INTRAVENOUS EVERY 5 MIN PRN
Status: DISCONTINUED | OUTPATIENT
Start: 2020-02-17 | End: 2020-02-17 | Stop reason: HOSPADM

## 2020-02-17 RX ORDER — KETOROLAC TROMETHAMINE 30 MG/ML
INJECTION, SOLUTION INTRAMUSCULAR; INTRAVENOUS
Status: COMPLETED | OUTPATIENT
Start: 2020-02-17 | End: 2020-02-17

## 2020-02-17 RX ORDER — ASCORBIC ACID 500 MG
500 TABLET ORAL DAILY
Status: DISCONTINUED | OUTPATIENT
Start: 2020-02-17 | End: 2020-02-19 | Stop reason: HOSPADM

## 2020-02-17 RX ORDER — BUPIVACAINE HYDROCHLORIDE AND EPINEPHRINE 5; 5 MG/ML; UG/ML
INJECTION, SOLUTION EPIDURAL; INTRACAUDAL; PERINEURAL
Status: COMPLETED | OUTPATIENT
Start: 2020-02-17 | End: 2020-02-17

## 2020-02-17 RX ORDER — LISINOPRIL 2.5 MG/1
2.5 TABLET ORAL DAILY
Status: DISCONTINUED | OUTPATIENT
Start: 2020-02-17 | End: 2020-02-19 | Stop reason: HOSPADM

## 2020-02-17 RX ORDER — CEFAZOLIN SODIUM 2 G/50ML
2 SOLUTION INTRAVENOUS EVERY 8 HOURS
Status: COMPLETED | OUTPATIENT
Start: 2020-02-17 | End: 2020-02-18

## 2020-02-17 RX ORDER — ROCURONIUM BROMIDE 10 MG/ML
INJECTION, SOLUTION INTRAVENOUS PRN
Status: DISCONTINUED | OUTPATIENT
Start: 2020-02-17 | End: 2020-02-17 | Stop reason: SDUPTHER

## 2020-02-17 RX ORDER — NADOLOL 20 MG/1
40 TABLET ORAL DAILY
Status: DISCONTINUED | OUTPATIENT
Start: 2020-02-17 | End: 2020-02-19 | Stop reason: HOSPADM

## 2020-02-17 RX ORDER — OXYCODONE HYDROCHLORIDE 5 MG/1
5 TABLET ORAL EVERY 4 HOURS PRN
Status: DISCONTINUED | OUTPATIENT
Start: 2020-02-17 | End: 2020-02-19 | Stop reason: HOSPADM

## 2020-02-17 RX ORDER — FERROUS SULFATE 325(65) MG
325 TABLET ORAL
Status: DISCONTINUED | OUTPATIENT
Start: 2020-02-18 | End: 2020-02-19 | Stop reason: HOSPADM

## 2020-02-17 RX ORDER — ONDANSETRON 2 MG/ML
INJECTION INTRAMUSCULAR; INTRAVENOUS PRN
Status: DISCONTINUED | OUTPATIENT
Start: 2020-02-17 | End: 2020-02-17 | Stop reason: SDUPTHER

## 2020-02-17 RX ORDER — CALCIUM CARBONATE 500(1250)
500 TABLET ORAL DAILY
Status: DISCONTINUED | OUTPATIENT
Start: 2020-02-17 | End: 2020-02-19 | Stop reason: HOSPADM

## 2020-02-17 RX ORDER — HYDRALAZINE HYDROCHLORIDE 20 MG/ML
5 INJECTION INTRAMUSCULAR; INTRAVENOUS EVERY 10 MIN PRN
Status: DISCONTINUED | OUTPATIENT
Start: 2020-02-17 | End: 2020-02-17 | Stop reason: HOSPADM

## 2020-02-17 RX ORDER — SODIUM CHLORIDE, SODIUM LACTATE, POTASSIUM CHLORIDE, CALCIUM CHLORIDE 600; 310; 30; 20 MG/100ML; MG/100ML; MG/100ML; MG/100ML
INJECTION, SOLUTION INTRAVENOUS
Status: COMPLETED
Start: 2020-02-17 | End: 2020-02-17

## 2020-02-17 RX ORDER — SODIUM CHLORIDE, SODIUM LACTATE, POTASSIUM CHLORIDE, CALCIUM CHLORIDE 600; 310; 30; 20 MG/100ML; MG/100ML; MG/100ML; MG/100ML
INJECTION, SOLUTION INTRAVENOUS CONTINUOUS
Status: DISCONTINUED | OUTPATIENT
Start: 2020-02-17 | End: 2020-02-19 | Stop reason: HOSPADM

## 2020-02-17 RX ORDER — DEXAMETHASONE SODIUM PHOSPHATE 4 MG/ML
INJECTION, SOLUTION INTRA-ARTICULAR; INTRALESIONAL; INTRAMUSCULAR; INTRAVENOUS; SOFT TISSUE PRN
Status: DISCONTINUED | OUTPATIENT
Start: 2020-02-17 | End: 2020-02-17 | Stop reason: SDUPTHER

## 2020-02-17 RX ORDER — SODIUM CHLORIDE 0.9 % (FLUSH) 0.9 %
10 SYRINGE (ML) INJECTION EVERY 12 HOURS SCHEDULED
Status: DISCONTINUED | OUTPATIENT
Start: 2020-02-17 | End: 2020-02-19 | Stop reason: HOSPADM

## 2020-02-17 RX ORDER — LIDOCAINE HYDROCHLORIDE 20 MG/ML
INJECTION, SOLUTION INTRAVENOUS PRN
Status: DISCONTINUED | OUTPATIENT
Start: 2020-02-17 | End: 2020-02-17 | Stop reason: SDUPTHER

## 2020-02-17 RX ORDER — KETOROLAC TROMETHAMINE 30 MG/ML
INJECTION, SOLUTION INTRAMUSCULAR; INTRAVENOUS PRN
Status: DISCONTINUED | OUTPATIENT
Start: 2020-02-17 | End: 2020-02-17 | Stop reason: SDUPTHER

## 2020-02-17 RX ORDER — SODIUM CHLORIDE 0.9 % (FLUSH) 0.9 %
10 SYRINGE (ML) INJECTION PRN
Status: DISCONTINUED | OUTPATIENT
Start: 2020-02-17 | End: 2020-02-19 | Stop reason: HOSPADM

## 2020-02-17 RX ORDER — SODIUM CHLORIDE, SODIUM LACTATE, POTASSIUM CHLORIDE, CALCIUM CHLORIDE 600; 310; 30; 20 MG/100ML; MG/100ML; MG/100ML; MG/100ML
INJECTION, SOLUTION INTRAVENOUS CONTINUOUS
Status: DISCONTINUED | OUTPATIENT
Start: 2020-02-17 | End: 2020-02-17 | Stop reason: SDUPTHER

## 2020-02-17 RX ORDER — LABETALOL 20 MG/4 ML (5 MG/ML) INTRAVENOUS SYRINGE
5 EVERY 10 MIN PRN
Status: DISCONTINUED | OUTPATIENT
Start: 2020-02-17 | End: 2020-02-17 | Stop reason: HOSPADM

## 2020-02-17 RX ORDER — CEFAZOLIN SODIUM 2 G/50ML
2 SOLUTION INTRAVENOUS ONCE
Status: COMPLETED | OUTPATIENT
Start: 2020-02-17 | End: 2020-02-17

## 2020-02-17 RX ORDER — DOCUSATE SODIUM 100 MG/1
100 CAPSULE, LIQUID FILLED ORAL 2 TIMES DAILY
Status: DISCONTINUED | OUTPATIENT
Start: 2020-02-17 | End: 2020-02-19 | Stop reason: HOSPADM

## 2020-02-17 RX ORDER — FENTANYL CITRATE 50 UG/ML
INJECTION, SOLUTION INTRAMUSCULAR; INTRAVENOUS PRN
Status: DISCONTINUED | OUTPATIENT
Start: 2020-02-17 | End: 2020-02-17 | Stop reason: SDUPTHER

## 2020-02-17 RX ORDER — OXYCODONE HYDROCHLORIDE 10 MG/1
10 TABLET ORAL EVERY 4 HOURS PRN
Status: DISCONTINUED | OUTPATIENT
Start: 2020-02-17 | End: 2020-02-19 | Stop reason: HOSPADM

## 2020-02-17 RX ORDER — ASPIRIN 81 MG/1
81 TABLET ORAL 2 TIMES DAILY
Status: DISCONTINUED | OUTPATIENT
Start: 2020-02-17 | End: 2020-02-18 | Stop reason: ALTCHOICE

## 2020-02-17 RX ORDER — ONDANSETRON 2 MG/ML
4 INJECTION INTRAMUSCULAR; INTRAVENOUS EVERY 6 HOURS PRN
Status: DISCONTINUED | OUTPATIENT
Start: 2020-02-17 | End: 2020-02-19 | Stop reason: HOSPADM

## 2020-02-17 RX ORDER — TRANEXAMIC ACID 100 MG/ML
INJECTION, SOLUTION INTRAVENOUS PRN
Status: DISCONTINUED | OUTPATIENT
Start: 2020-02-17 | End: 2020-02-17 | Stop reason: SDUPTHER

## 2020-02-17 RX ADMIN — CEFAZOLIN SODIUM 2 G: 2 SOLUTION INTRAVENOUS at 07:55

## 2020-02-17 RX ADMIN — SODIUM CHLORIDE, POTASSIUM CHLORIDE, SODIUM LACTATE AND CALCIUM CHLORIDE: 600; 310; 30; 20 INJECTION, SOLUTION INTRAVENOUS at 07:07

## 2020-02-17 RX ADMIN — FENTANYL CITRATE 50 MCG: 50 INJECTION INTRAMUSCULAR; INTRAVENOUS at 08:23

## 2020-02-17 RX ADMIN — TRANEXAMIC ACID 1000 MG: 1 INJECTION, SOLUTION INTRAVENOUS at 08:02

## 2020-02-17 RX ADMIN — FENTANYL CITRATE 50 MCG: 50 INJECTION, SOLUTION INTRAMUSCULAR; INTRAVENOUS at 10:25

## 2020-02-17 RX ADMIN — SENNOSIDES AND DOCUSATE SODIUM 1 TABLET: 8.6; 5 TABLET ORAL at 21:21

## 2020-02-17 RX ADMIN — ROCURONIUM BROMIDE 50 MG: 10 INJECTION INTRAVENOUS at 07:50

## 2020-02-17 RX ADMIN — SODIUM CHLORIDE, POTASSIUM CHLORIDE, SODIUM LACTATE AND CALCIUM CHLORIDE: 600; 310; 30; 20 INJECTION, SOLUTION INTRAVENOUS at 16:04

## 2020-02-17 RX ADMIN — CYANOCOBALAMIN TAB 1000 MCG 2000 MCG: 1000 TAB at 13:48

## 2020-02-17 RX ADMIN — SENNOSIDES AND DOCUSATE SODIUM 1 TABLET: 8.6; 5 TABLET ORAL at 13:47

## 2020-02-17 RX ADMIN — PHENYLEPHRINE HYDROCHLORIDE 100 MCG: 10 INJECTION INTRAVENOUS at 07:59

## 2020-02-17 RX ADMIN — KETOROLAC TROMETHAMINE 15 MG: 30 INJECTION, SOLUTION INTRAMUSCULAR; INTRAVENOUS at 09:48

## 2020-02-17 RX ADMIN — TRANEXAMIC ACID 1000 MG: 1 INJECTION, SOLUTION INTRAVENOUS at 09:36

## 2020-02-17 RX ADMIN — FENTANYL CITRATE 50 MCG: 50 INJECTION INTRAMUSCULAR; INTRAVENOUS at 07:30

## 2020-02-17 RX ADMIN — SODIUM CHLORIDE, POTASSIUM CHLORIDE, SODIUM LACTATE AND CALCIUM CHLORIDE: 600; 310; 30; 20 INJECTION, SOLUTION INTRAVENOUS at 09:51

## 2020-02-17 RX ADMIN — LISINOPRIL 2.5 MG: 2.5 TABLET ORAL at 13:47

## 2020-02-17 RX ADMIN — FENTANYL CITRATE 50 MCG: 50 INJECTION, SOLUTION INTRAMUSCULAR; INTRAVENOUS at 10:19

## 2020-02-17 RX ADMIN — ROPIVACAINE HYDROCHLORIDE 20 ML: 5 INJECTION, SOLUTION EPIDURAL; INFILTRATION; PERINEURAL at 08:02

## 2020-02-17 RX ADMIN — PROPOFOL 150 MG: 10 INJECTION, EMULSION INTRAVENOUS at 07:50

## 2020-02-17 RX ADMIN — CALCIUM 500 MG: 500 TABLET ORAL at 13:46

## 2020-02-17 RX ADMIN — DOCUSATE SODIUM 100 MG: 100 CAPSULE, LIQUID FILLED ORAL at 21:21

## 2020-02-17 RX ADMIN — OXYCODONE HYDROCHLORIDE 5 MG: 5 TABLET ORAL at 13:54

## 2020-02-17 RX ADMIN — DEXAMETHASONE SODIUM PHOSPHATE 8 MG: 4 INJECTION, SOLUTION INTRAMUSCULAR; INTRAVENOUS at 07:55

## 2020-02-17 RX ADMIN — DOCUSATE SODIUM 100 MG: 100 CAPSULE, LIQUID FILLED ORAL at 13:47

## 2020-02-17 RX ADMIN — FENTANYL CITRATE 50 MCG: 50 INJECTION INTRAMUSCULAR; INTRAVENOUS at 07:50

## 2020-02-17 RX ADMIN — ONDANSETRON 4 MG: 2 INJECTION INTRAMUSCULAR; INTRAVENOUS at 09:48

## 2020-02-17 RX ADMIN — CEFAZOLIN SODIUM 2 G: 2 SOLUTION INTRAVENOUS at 16:06

## 2020-02-17 RX ADMIN — LIDOCAINE HYDROCHLORIDE 100 MG: 20 INJECTION, SOLUTION INTRAVENOUS at 07:50

## 2020-02-17 RX ADMIN — SUGAMMADEX 200 MG: 100 INJECTION, SOLUTION INTRAVENOUS at 09:51

## 2020-02-17 RX ADMIN — OXYCODONE HYDROCHLORIDE AND ACETAMINOPHEN 500 MG: 500 TABLET ORAL at 13:48

## 2020-02-17 RX ADMIN — FENTANYL CITRATE 50 MCG: 50 INJECTION INTRAMUSCULAR; INTRAVENOUS at 08:38

## 2020-02-17 ASSESSMENT — PULMONARY FUNCTION TESTS
PIF_VALUE: 22
PIF_VALUE: 22
PIF_VALUE: 23
PIF_VALUE: 22
PIF_VALUE: 23
PIF_VALUE: 20
PIF_VALUE: 22
PIF_VALUE: 15
PIF_VALUE: 21
PIF_VALUE: 22
PIF_VALUE: 23
PIF_VALUE: 21
PIF_VALUE: 21
PIF_VALUE: 18
PIF_VALUE: 22
PIF_VALUE: 20
PIF_VALUE: 25
PIF_VALUE: 19
PIF_VALUE: 20
PIF_VALUE: 22
PIF_VALUE: 23
PIF_VALUE: 22
PIF_VALUE: 22
PIF_VALUE: 5
PIF_VALUE: 22
PIF_VALUE: 23
PIF_VALUE: 11
PIF_VALUE: 21
PIF_VALUE: 22
PIF_VALUE: 22
PIF_VALUE: 3
PIF_VALUE: 23
PIF_VALUE: 22
PIF_VALUE: 23
PIF_VALUE: 22
PIF_VALUE: 23
PIF_VALUE: 22
PIF_VALUE: 0
PIF_VALUE: 23
PIF_VALUE: 1
PIF_VALUE: 22
PIF_VALUE: 23
PIF_VALUE: 23
PIF_VALUE: 21
PIF_VALUE: 22
PIF_VALUE: 21
PIF_VALUE: 0
PIF_VALUE: 22
PIF_VALUE: 23
PIF_VALUE: 21
PIF_VALUE: 22
PIF_VALUE: 22
PIF_VALUE: 15
PIF_VALUE: 23
PIF_VALUE: 23
PIF_VALUE: 20
PIF_VALUE: 22
PIF_VALUE: 23
PIF_VALUE: 23
PIF_VALUE: 18
PIF_VALUE: 18
PIF_VALUE: 23
PIF_VALUE: 22
PIF_VALUE: 22
PIF_VALUE: 23
PIF_VALUE: 19
PIF_VALUE: 22
PIF_VALUE: 23
PIF_VALUE: 20
PIF_VALUE: 23
PIF_VALUE: 10
PIF_VALUE: 23
PIF_VALUE: 23
PIF_VALUE: 20
PIF_VALUE: 23
PIF_VALUE: 22
PIF_VALUE: 20
PIF_VALUE: 22
PIF_VALUE: 22
PIF_VALUE: 1
PIF_VALUE: 24
PIF_VALUE: 23
PIF_VALUE: 22
PIF_VALUE: 21
PIF_VALUE: 21
PIF_VALUE: 19
PIF_VALUE: 23
PIF_VALUE: 21
PIF_VALUE: 23
PIF_VALUE: 6
PIF_VALUE: 24
PIF_VALUE: 22
PIF_VALUE: 22
PIF_VALUE: 12
PIF_VALUE: 22
PIF_VALUE: 22
PIF_VALUE: 23
PIF_VALUE: 4
PIF_VALUE: 0
PIF_VALUE: 23
PIF_VALUE: 20
PIF_VALUE: 23
PIF_VALUE: 22
PIF_VALUE: 23
PIF_VALUE: 23
PIF_VALUE: 22
PIF_VALUE: 1
PIF_VALUE: 20
PIF_VALUE: 22
PIF_VALUE: 21
PIF_VALUE: 2
PIF_VALUE: 22
PIF_VALUE: 23
PIF_VALUE: 23
PIF_VALUE: 19
PIF_VALUE: 22
PIF_VALUE: 15
PIF_VALUE: 23
PIF_VALUE: 20
PIF_VALUE: 23
PIF_VALUE: 20
PIF_VALUE: 0
PIF_VALUE: 23
PIF_VALUE: 22
PIF_VALUE: 24
PIF_VALUE: 22
PIF_VALUE: 23

## 2020-02-17 ASSESSMENT — PAIN SCALES - GENERAL
PAINLEVEL_OUTOF10: 3
PAINLEVEL_OUTOF10: 5
PAINLEVEL_OUTOF10: 7
PAINLEVEL_OUTOF10: 3
PAINLEVEL_OUTOF10: 3
PAINLEVEL_OUTOF10: 2
PAINLEVEL_OUTOF10: 3
PAINLEVEL_OUTOF10: 7

## 2020-02-17 ASSESSMENT — PAIN DESCRIPTION - ORIENTATION
ORIENTATION: ANTERIOR
ORIENTATION: LEFT
ORIENTATION: ANTERIOR
ORIENTATION: LEFT
ORIENTATION: LEFT

## 2020-02-17 ASSESSMENT — PAIN DESCRIPTION - LOCATION
LOCATION: KNEE

## 2020-02-17 ASSESSMENT — PAIN DESCRIPTION - ONSET: ONSET: ON-GOING

## 2020-02-17 ASSESSMENT — PAIN DESCRIPTION - FREQUENCY
FREQUENCY: CONTINUOUS

## 2020-02-17 ASSESSMENT — PAIN - FUNCTIONAL ASSESSMENT
PAIN_FUNCTIONAL_ASSESSMENT: 0-10
PAIN_FUNCTIONAL_ASSESSMENT: ACTIVITIES ARE NOT PREVENTED

## 2020-02-17 ASSESSMENT — PAIN DESCRIPTION - DESCRIPTORS
DESCRIPTORS: DULL;ACHING
DESCRIPTORS: ACHING
DESCRIPTORS: DISCOMFORT
DESCRIPTORS: DISCOMFORT
DESCRIPTORS: ACHING

## 2020-02-17 ASSESSMENT — PAIN DESCRIPTION - PAIN TYPE
TYPE: SURGICAL PAIN

## 2020-02-17 ASSESSMENT — ENCOUNTER SYMPTOMS
NAUSEA: 0
VOMITING: 0
SHORTNESS OF BREATH: 0
COUGH: 0
ABDOMINAL PAIN: 0

## 2020-02-17 ASSESSMENT — PAIN DESCRIPTION - PROGRESSION: CLINICAL_PROGRESSION: GRADUALLY WORSENING

## 2020-02-17 NOTE — PROGRESS NOTES
1008: Arrived to PACU from OR. Monitors applied, alarms on. Report obtained from [facility] and Lesa Wood CRNA. 1019: Medicated for left knee discomfort. Reassurance given. 1040: X-rays taken. Turned and repositioned in bed. Warm blankets on. Heels off bed, ice chips given, wife updated. 1050: Dozing. 1115: Transported to room 4017. Wife at bedside.

## 2020-02-17 NOTE — PROGRESS NOTES
front wheeled walker)      Gait Training:  Cues were given for safety, sequence, device management, balance, posture, awareness, path. Therapeutic Activity Training:   Therapeutic activity training was instructed today. Cues were given for safety, sequence, UE/LE placement, awareness, and balance. Activities performed today included bed mobility training, sup-sit, sit-stand. Therapeutic Exercise:  Cues were given for technique, safety, recruitment, and rationale. Cues were verbal and/or tactile. Pt completed 2 sets of 10 reps of BLE ankle pumps and quad sets. Plan   Plan  Times per week: 7 BID  Current Treatment Recommendations: Strengthening, ROM, Balance Training, Functional Mobility Training, Transfer Training, ADL/Self-care Training, Gait Training, IADL Training, Stair training, Patient/Caregiver Education & Training, Equipment Evaluation, Education, & procurement, Pain Management, Neuromuscular Re-education, Endurance Training, Home Exercise Program, Positioning, Safety Education & Training  Safety Devices  Type of devices: All fall risk precautions in place, Left in chair, Call light within reach, Chair alarm in place, Nurse notified, Gait belt      AM-PAC Score  AM-PAC Inpatient Mobility Raw Score : 22 (02/17/20 1601)  AM-PAC Inpatient T-Scale Score : 53.28 (02/17/20 1601)  Mobility Inpatient CMS 0-100% Score: 20.91 (02/17/20 1601)  Mobility Inpatient CMS G-Code Modifier : Maya Peres (02/17/20 1601)          Goals  Long term goals  Long term goal 1: In one week, pt will complete all transfers independently  Long term goal 2: In one week, pt will ambulate 400 feet with modified independence with LRAD  Long term goal 3: In one week, pt will independently ascend/descend 3 steps with a handrail   Long term goal 4:  In one week, pt will independently complete 3 sets of 10 reps of BLE AROM exercises in available and allowed ROM       Time In: 1435  Time Out: 1530  Total Treatment Time: 55  Timed Code

## 2020-02-17 NOTE — OP NOTE
femoral  components were impacted in place. Excess cement was removed, the trial  10 mm PS liner was inserted. The knee was brought out into full  extension while the cement hardened and then the 35 mm polyethylene  patella was cemented into place as well. After the cement fully _____  the knee was taken through a full range of motion and trialed again and  there was excellent stability, range of motion with a 10 mm liner and  the 10 mm PS articular surface was then implanted and seated well in the  tibial tray. The knee was thoroughly irrigated with pulse lavage. The tourniquet was  deflated. Bleeding was well controlled. The medial parapatellar  arthrotomy was closed with running #1 Stratafix suture and augmented  with interrupted #1 Ethibond sutures and deep subcutaneous layers were  closed with buried 2-0 Vicryl and skin closed with staples. Sterile  dressing was applied with Xeroform, 4x8's, ABD, the ice therapy machine  and an Ace wrap. He was extubated and taken to PACU in stable  condition. All sponge and needle counts were correct. POSTOPERATIVE PLAN:  He will be admitted to hospital for pain control,  physical therapy and medical monitoring. He will be on aspirin for DVT  prophylaxis.         Rodney Dobbs MD    D: 02/17/2020 10:29:45       T: 02/17/2020 10:35:06     [email or web address]  Job#: [id number]     Doc#: [id number]    CC:

## 2020-02-18 LAB
BASOPHILS ABSOLUTE: 0 K/CU MM
BASOPHILS RELATIVE PERCENT: 0.1 % (ref 0–1)
DIFFERENTIAL TYPE: ABNORMAL
EOSINOPHILS ABSOLUTE: 0 K/CU MM
EOSINOPHILS RELATIVE PERCENT: 0 % (ref 0–3)
ESTIMATED AVERAGE GLUCOSE: 120 MG/DL
HBA1C MFR BLD: 5.8 % (ref 4.2–6.3)
HCT VFR BLD CALC: 33.8 % (ref 42–52)
HEMOGLOBIN: 11.2 GM/DL (ref 13.5–18)
IMMATURE NEUTROPHIL %: 0.2 % (ref 0–0.43)
LYMPHOCYTES ABSOLUTE: 0.7 K/CU MM
LYMPHOCYTES RELATIVE PERCENT: 7.9 % (ref 24–44)
MCH RBC QN AUTO: 30.9 PG (ref 27–31)
MCHC RBC AUTO-ENTMCNC: 33.1 % (ref 32–36)
MCV RBC AUTO: 93.1 FL (ref 78–100)
MONOCYTES ABSOLUTE: 0.8 K/CU MM
MONOCYTES RELATIVE PERCENT: 9.6 % (ref 0–4)
NUCLEATED RBC %: 0 %
PDW BLD-RTO: 13.2 % (ref 11.7–14.9)
PLATELET # BLD: 69 K/CU MM (ref 140–440)
PMV BLD AUTO: 10.2 FL (ref 7.5–11.1)
RBC # BLD: 3.63 M/CU MM (ref 4.6–6.2)
SEGMENTED NEUTROPHILS ABSOLUTE COUNT: 7.1 K/CU MM
SEGMENTED NEUTROPHILS RELATIVE PERCENT: 82.2 % (ref 36–66)
TOTAL IMMATURE NEUTOROPHIL: 0.02 K/CU MM
TOTAL NUCLEATED RBC: 0 K/CU MM
WBC # BLD: 8.6 K/CU MM (ref 4–10.5)

## 2020-02-18 PROCEDURE — 85025 COMPLETE CBC W/AUTO DIFF WBC: CPT

## 2020-02-18 PROCEDURE — 99024 POSTOP FOLLOW-UP VISIT: CPT | Performed by: ORTHOPAEDIC SURGERY

## 2020-02-18 PROCEDURE — 1200000000 HC SEMI PRIVATE

## 2020-02-18 PROCEDURE — 83036 HEMOGLOBIN GLYCOSYLATED A1C: CPT

## 2020-02-18 PROCEDURE — 97110 THERAPEUTIC EXERCISES: CPT

## 2020-02-18 PROCEDURE — 97166 OT EVAL MOD COMPLEX 45 MIN: CPT

## 2020-02-18 PROCEDURE — 97116 GAIT TRAINING THERAPY: CPT

## 2020-02-18 PROCEDURE — 2580000003 HC RX 258: Performed by: ORTHOPAEDIC SURGERY

## 2020-02-18 PROCEDURE — 97530 THERAPEUTIC ACTIVITIES: CPT

## 2020-02-18 PROCEDURE — 6360000002 HC RX W HCPCS: Performed by: ORTHOPAEDIC SURGERY

## 2020-02-18 PROCEDURE — 6370000000 HC RX 637 (ALT 250 FOR IP): Performed by: ORTHOPAEDIC SURGERY

## 2020-02-18 PROCEDURE — 97535 SELF CARE MNGMENT TRAINING: CPT

## 2020-02-18 PROCEDURE — G0378 HOSPITAL OBSERVATION PER HR: HCPCS

## 2020-02-18 RX ORDER — ASPIRIN 81 MG/1
81 TABLET, CHEWABLE ORAL 2 TIMES DAILY
Status: DISCONTINUED | OUTPATIENT
Start: 2020-02-18 | End: 2020-02-19 | Stop reason: HOSPADM

## 2020-02-18 RX ADMIN — SENNOSIDES AND DOCUSATE SODIUM 1 TABLET: 8.6; 5 TABLET ORAL at 09:52

## 2020-02-18 RX ADMIN — SODIUM CHLORIDE, POTASSIUM CHLORIDE, SODIUM LACTATE AND CALCIUM CHLORIDE: 600; 310; 30; 20 INJECTION, SOLUTION INTRAVENOUS at 02:52

## 2020-02-18 RX ADMIN — OXYCODONE HYDROCHLORIDE 10 MG: 10 TABLET ORAL at 13:03

## 2020-02-18 RX ADMIN — OXYCODONE HYDROCHLORIDE AND ACETAMINOPHEN 500 MG: 500 TABLET ORAL at 09:51

## 2020-02-18 RX ADMIN — DOCUSATE SODIUM 100 MG: 100 CAPSULE, LIQUID FILLED ORAL at 09:51

## 2020-02-18 RX ADMIN — SODIUM CHLORIDE, PRESERVATIVE FREE 10 ML: 5 INJECTION INTRAVENOUS at 09:52

## 2020-02-18 RX ADMIN — CYANOCOBALAMIN TAB 1000 MCG 2000 MCG: 1000 TAB at 09:51

## 2020-02-18 RX ADMIN — ASPIRIN 81 MG 81 MG: 81 TABLET ORAL at 09:52

## 2020-02-18 RX ADMIN — SENNOSIDES AND DOCUSATE SODIUM 1 TABLET: 8.6; 5 TABLET ORAL at 20:40

## 2020-02-18 RX ADMIN — SODIUM CHLORIDE, PRESERVATIVE FREE 10 ML: 5 INJECTION INTRAVENOUS at 20:40

## 2020-02-18 RX ADMIN — ASPIRIN 81 MG 81 MG: 81 TABLET ORAL at 20:40

## 2020-02-18 RX ADMIN — CALCIUM 500 MG: 500 TABLET ORAL at 09:51

## 2020-02-18 RX ADMIN — FERROUS SULFATE TAB 325 MG (65 MG ELEMENTAL FE) 325 MG: 325 (65 FE) TAB at 09:51

## 2020-02-18 RX ADMIN — DOCUSATE SODIUM 100 MG: 100 CAPSULE, LIQUID FILLED ORAL at 20:39

## 2020-02-18 RX ADMIN — CEFAZOLIN SODIUM 2 G: 2 SOLUTION INTRAVENOUS at 00:00

## 2020-02-18 RX ADMIN — LISINOPRIL 2.5 MG: 2.5 TABLET ORAL at 09:51

## 2020-02-18 RX ADMIN — NADOLOL 40 MG: 20 TABLET ORAL at 09:51

## 2020-02-18 ASSESSMENT — PAIN SCALES - GENERAL
PAINLEVEL_OUTOF10: 7
PAINLEVEL_OUTOF10: 1

## 2020-02-18 NOTE — PROGRESS NOTES
Physical Therapy    Physical Therapy Treatment Note  Name: Crosby Councilman MRN: [id number] :   1955   Date:  2020   Admission Date: 2020 Room:  [address] [place] [address]   Restrictions/Precautions:  Restrictions/Precautions  Restrictions/Precautions: General Precautions, Weight Bearing Lower Extremity Weight Bearing Restrictions  Left Lower Extremity Weight Bearing: Weight Bearing As Tolerated     Communication with other providers:  Per nurse ok to tx  Subjective:  Patient states: Motivated and agreeable to tx  Pain:   Location, Type, Intensity (0/10 to 10/10):  4  Objective:    Observation:  Alert and oriented. Wife present and supportive. Treatment, including education/measures:  Sup to sit sba  Sit<=>stand from bed, commode, wc sba and cues for hand placement. Pt was independent with mark anthony care in sitting and super vision to stand at sink to wash hands. amb with rw 200' sba  Up/down 5 steps with both hands on  right hand rail cga and cues for safety. Up/down curb steps with cga and cues. Ex in sup:  10 reps x 2 quad sets  10 reps x 2 heel slides  10 reps x 2 abd/add  10 reps x 2 saqs. Safety  Patient left safely in the sup, with call light/phone in reach with alarm applied. Gait belt was used for transfers and gait. Assessment / Impression:       Patient's tolerance of treatment:  good  Adverse Reaction: na  Significant change in status and impact:  na  Barriers to improvement:  Strength and safety  Plan for Next Session:    Cont.  POC  Time in:  1330  Time out:  1440  Timed treatment minutes:  70  Total treatment time:  79    Previously filed items:  Social/Functional History  Lives With: Spouse  Type of Home: House  Home Layout: One level  Home Access: Stairs to enter with rails  Entrance Stairs - Number of Steps: 2  Bathroom Shower/Tub: Tub/Shower unit  Bathroom Toilet: Standard  Home Equipment: Rolling walker  ADL Assistance: Independent  Homemaking Assistance: Independent  Ambulation Assistance: Independent  Transfer Assistance: Independent  Active : Yes  Occupation: Retired  Leisure & Hobbies: walk; yardwork; ride bike     Long term goals  Long term goal 1: In one week, pt will complete all transfers independently  Long term goal 2: In one week, pt will ambulate 400 feet with modified independence with LRAD  Long term goal 3: In one week, pt will independently ascend/descend 3 steps with a handrail   Long term goal 4:  In one week, pt will independently complete 3 sets of 10 reps of BLE AROM exercises in available and allowed ROM    Electronically signed by:    Shonda Nicolas PTA  2/18/2020, 1:26 PM

## 2020-02-18 NOTE — PROGRESS NOTES
Occupational [address] [place] [address] CARE OCCUPATIONAL THERAPY EVALUATION    Davion Foot, 1955, 4017/4017-A, 2/18/2020    Discharge Recommendation: Home with initial 24 hour supervision/assistance      History:  [place]:  There were no encounter diagnoses. Subjective:  Patient states: \"I want to go for a walk! \"  Pain: Pt denied pain this date  Restrictions: General Precautions, Fall Risk, WBAT Lt LE    Home Setup/Prior level of function:  Social/Functional History  Lives With: Spouse  Type of Home: House  Home Layout: One level  Home Access: Stairs to enter with rails  Entrance Stairs - Number of Steps: 2  Bathroom Shower/Tub: Tub/Shower unit  Bathroom Toilet: Standard  Home Equipment: Rolling walker  ADL Assistance: Independent  Homemaking Assistance: Independent  Ambulation Assistance: Independent  Transfer Assistance: Independent  Active : Yes  Occupation: Retired  Leisure & Hobbies: walk; yardwork; ride bike    Examination:  · Observation: Standing up with [address] upon OT arrival. Pt pleasant and agreeable to OT evaluation.   · Vision: WFL (Glasses)   · Hearing: [employer]  · Vitals: Stable vitals throughout session    Body Systems and functions:  · ROM: WFL BL UEs  · Strength: 5/5 BL UEs all major muscle groups    · Sensation: WFL  · Tone: Normal  · Coordination: WFL  · Perception: WNL    Activities of Daily Living (ADLs):  · Feeding: Independent   · Grooming: SBA (Pt performed oral hygiene task of brushing standing at sink, Pt performed grooming task of washing face standing at sink, Pt performed grooming task of combing hair standing at sink)   · UB bathing: SBA (Seated)   · LB bathing: SBA (Seated)   · UB dressing: Independent   · LB dressing: Min A (Pt donned BL socks SBA by leaning forward and flexing at hips, Anticipate some assistance threading Lt LE due to swelling/bandage, Anticipate light dynamic standing balance during pant management to hips)  · Toileting: SBA     Cognitive

## 2020-02-19 VITALS
BODY MASS INDEX: 33.65 KG/M2 | OXYGEN SATURATION: 93 % | WEIGHT: 222 LBS | HEIGHT: 68 IN | HEART RATE: 62 BPM | RESPIRATION RATE: 18 BRPM | TEMPERATURE: 98.5 F | SYSTOLIC BLOOD PRESSURE: 127 MMHG | DIASTOLIC BLOOD PRESSURE: 62 MMHG

## 2020-02-19 PROCEDURE — 6370000000 HC RX 637 (ALT 250 FOR IP): Performed by: ORTHOPAEDIC SURGERY

## 2020-02-19 PROCEDURE — 97116 GAIT TRAINING THERAPY: CPT

## 2020-02-19 PROCEDURE — 99024 POSTOP FOLLOW-UP VISIT: CPT | Performed by: ORTHOPAEDIC SURGERY

## 2020-02-19 PROCEDURE — 97530 THERAPEUTIC ACTIVITIES: CPT

## 2020-02-19 PROCEDURE — 97110 THERAPEUTIC EXERCISES: CPT

## 2020-02-19 PROCEDURE — G0378 HOSPITAL OBSERVATION PER HR: HCPCS

## 2020-02-19 RX ORDER — ASPIRIN 81 MG/1
81 TABLET, CHEWABLE ORAL 2 TIMES DAILY
Qty: 60 TABLET | Refills: 0 | Status: SHIPPED | OUTPATIENT
Start: 2020-02-19 | End: 2021-03-01 | Stop reason: ALTCHOICE

## 2020-02-19 RX ORDER — OXYCODONE HYDROCHLORIDE 5 MG/1
5 TABLET ORAL EVERY 4 HOURS PRN
Qty: 30 TABLET | Refills: 0 | Status: SHIPPED | OUTPATIENT
Start: 2020-02-19 | End: 2020-02-26

## 2020-02-19 RX ORDER — PSEUDOEPHEDRINE HCL 30 MG
100 TABLET ORAL 2 TIMES DAILY PRN
Qty: 30 CAPSULE | Refills: 1 | Status: SHIPPED | OUTPATIENT
Start: 2020-02-19 | End: 2021-03-01

## 2020-02-19 RX ADMIN — ASPIRIN 81 MG 81 MG: 81 TABLET ORAL at 13:18

## 2020-02-19 RX ADMIN — FERROUS SULFATE TAB 325 MG (65 MG ELEMENTAL FE) 325 MG: 325 (65 FE) TAB at 13:19

## 2020-02-19 RX ADMIN — LISINOPRIL 2.5 MG: 2.5 TABLET ORAL at 13:19

## 2020-02-19 RX ADMIN — OXYCODONE HYDROCHLORIDE 10 MG: 10 TABLET ORAL at 07:47

## 2020-02-19 RX ADMIN — NADOLOL 40 MG: 20 TABLET ORAL at 13:18

## 2020-02-19 RX ADMIN — CALCIUM 500 MG: 500 TABLET ORAL at 13:18

## 2020-02-19 RX ADMIN — OXYCODONE HYDROCHLORIDE AND ACETAMINOPHEN 500 MG: 500 TABLET ORAL at 13:19

## 2020-02-19 RX ADMIN — OXYCODONE HYDROCHLORIDE 10 MG: 10 TABLET ORAL at 13:27

## 2020-02-19 RX ADMIN — CYANOCOBALAMIN TAB 1000 MCG 2000 MCG: 1000 TAB at 13:20

## 2020-02-19 RX ADMIN — DOCUSATE SODIUM 100 MG: 100 CAPSULE, LIQUID FILLED ORAL at 13:19

## 2020-02-19 ASSESSMENT — PAIN DESCRIPTION - ONSET: ONSET: SUDDEN

## 2020-02-19 ASSESSMENT — PAIN DESCRIPTION - FREQUENCY: FREQUENCY: INTERMITTENT

## 2020-02-19 ASSESSMENT — PAIN DESCRIPTION - ORIENTATION: ORIENTATION: LEFT

## 2020-02-19 ASSESSMENT — PAIN SCALES - GENERAL
PAINLEVEL_OUTOF10: 7
PAINLEVEL_OUTOF10: 7

## 2020-02-19 ASSESSMENT — PAIN DESCRIPTION - PROGRESSION: CLINICAL_PROGRESSION: RAPIDLY WORSENING

## 2020-02-19 ASSESSMENT — PAIN DESCRIPTION - LOCATION: LOCATION: KNEE

## 2020-02-19 ASSESSMENT — PAIN DESCRIPTION - PAIN TYPE: TYPE: ACUTE PAIN

## 2020-02-19 ASSESSMENT — PAIN DESCRIPTION - DESCRIPTORS: DESCRIPTORS: ACHING

## 2020-02-19 NOTE — PROGRESS NOTES
Patient discharged per orders, education follow up and meds reviewed, paper work signed and sent with patient, wife at bedside all questions answered. Iv out.

## 2020-02-19 NOTE — PROGRESS NOTES
INTERNAL MEDICINE PROGRESS NOTE        Claudis Silence   [birth year]   Primary Care Physician:  Rico Simpson MD  Admit Date: 2/17/2020     Subjective:   Pt is doing better today. Denies chest pain, SOB, nausea, vomiting, abdominal pain. Remainder of ROS is unremarkable. Meds, labs and other notes reviewed. Objective:   /62   Pulse 62   Temp 98.5 °F (36.9 °C) (Oral)   Resp 18   Ht 5' 8\" (1.727 m)   Wt 222 lb (100.7 kg)   SpO2 93%   BMI 33.75 kg/m²  No results for input(s): POCGLU in the last 72 hours. I/O last 3 completed shifts: In: 370 [P.O.:360; I.V.:10]  Out: 1850 [Urine:1850]  No intake/output data recorded. Neck: no adenopathy and supple, symmetrical, trachea midline  Lungs: clear to auscultation bilaterally  Heart: regular rate and rhythm and S1, S2 normal  Abdomen: soft, non-tender; bowel sounds normal; no masses,  no organomegaly  Extremities: extremities normal, atraumatic, no cyanosis or edema. Post op dressing left knee,  No S/S DVT. Neurologic: Grossly normal    Data Review  CBC with Differential:    Recent Labs     02/17/20  0655 02/18/20  0550   WBC 4.1 8.6   RBC 4.39* 3.63*   HGB 13.4* 11.2*   HCT 40.4* 33.8*   PLT 72* 69*   MCV 92.0 93.1   MCH 30.5 30.9   MCHC 33.2 33.1   RDW 13.2 13.2   SEGSPCT 68.9* 82.2*   LYMPHOPCT 15.6* 7.9*   MONOPCT 10.1* 9.6*   BASOPCT 1.0 0.1   MONOSABS 0.4 0.8   LYMPHSABS 0.6 0.7   EOSABS 0.2 0.0   BASOSABS 0.0 0.0   DIFFTYPE AUTOMATED DIFFERENTIAL AUTOMATED DIFFERENTIAL     CMP:  No results for input(s): NA, K, CL, CO2, BUN, CREATININE, GFRAA, AGRATIO, LABGLOM, GLUCOSE, PROT, LABALBU, CALCIUM, BILITOT, ALKPHOS, AST, ALT in the last 72 hours.     Invalid input(s): GLU  PT/INR:    Recent Labs     02/17/20  0655   PROTIME 13.3   INR 1.10     Meds:    aspirin  81 mg Oral BID    calcium elemental  500 mg Oral Daily    ferrous sulfate  325 mg Oral Daily with breakfast    lisinopril  2.5 mg Oral Daily    nadolol  40 mg Oral Daily    vitamin B-12

## 2020-02-19 NOTE — PROGRESS NOTES
Physical Therapy    Physical Therapy Treatment Note  Name: Shelly Raza MRN: [id number] :   1955   Date:  2020   Admission Date: 2020 Room:  [address] [place] [address]-   Restrictions/Precautions:  Restrictions/Precautions  Restrictions/Precautions: General Precautions, Weight Bearing Lower Extremity Weight Bearing Restrictions  Left Lower Extremity Weight Bearing: Weight Bearing As Tolerated     Communication with other providers: per nurse ok to tx  Subjective:  Patient states: Motivated and agreeable to tx  Pain:   Location, Type, Intensity (0/10 to 10/10):  Rates pain at end of tx 2-3  Objective:    Observation:  Alert and oriented. Wife present and very supportive. Treatment, including education/measures:  Sit<=>stand sba  amb with rw 200' with sba  Ex in long sitting and sitting:  10 reps quad sets. 15 degrees of getting knee straight  10 reps aps  10 reps heel slides in sup   10 reps laqs 15 degrees from getting knee straight  10 reps heel slides in sitting AROM knee flex 84 degrees  Safety  Patient left safely in the chair, with call light/phone in reach with alarm applied. Gait belt was used for transfers and gait.   Assessment / Impression:       Patient's tolerance of treatment:  good   Adverse Reaction: na  Significant change in status and impact:  na  Barriers to improvement:  none  Plan for Next Session:    Pt reports discharging home soon but will continue ex program.  Time in:  0930  Time out:  1010  Timed treatment minutes:  40  Total treatment time:  40    Previously filed items:  Social/Functional History  Lives With: Spouse  Type of Home: House  Home Layout: One level  Home Access: Stairs to enter with rails  Entrance Stairs - Number of Steps: 2  Bathroom Shower/Tub: Tub/Shower unit  Bathroom Toilet: Standard  Home Equipment: Rolling walker  ADL Assistance: Independent  Homemaking Assistance: Independent  Ambulation Assistance: Independent  Transfer Assistance: Independent  Active :

## 2020-02-19 NOTE — PROGRESS NOTES
Physical Therapy  Pt declined afternoon tx reports discharging home soon and just received pain meds.

## 2020-02-20 NOTE — ANESTHESIA POSTPROCEDURE EVALUATION
Department of Anesthesiology  Postprocedure Note    Patient: Can Jessica  MRN: [id number]  YOB: 1955  Date of evaluation: 2/20/2020  Time:  8:46 AM     Procedure Summary     Date:  02/17/20 Room / Location:  [address]    Anesthesia Start:  0740 Anesthesia Stop:  3875    Procedure:  KNEE TOTAL ARTHROPLASTY LEFT (Left Knee) Diagnosis:  (primary osteoarthritis of left knee)    Surgeon:  Mary Dove MD Responsible Provider:  Judith Ocampo MD    Anesthesia Type:  general, regional ASA Status:  4          Anesthesia Type: general, regional    Joel Phase I: Joel Score: 9    Joel Phase II:      Last vitals: Reviewed and per EMR flowsheets.        Anesthesia Post Evaluation    Patient location during evaluation: PACU  Patient participation: complete - patient participated  Level of consciousness: awake  Pain score: 2  Airway patency: patent  Nausea & Vomiting: no nausea and no vomiting  Complications: no  Cardiovascular status: hemodynamically unstable  Respiratory status: acceptable  Hydration status: euvolemic

## 2020-02-23 ENCOUNTER — HOSPITAL ENCOUNTER (EMERGENCY)
Age: 65
Discharge: HOME | End: 2020-02-23
Attending: EMERGENCY MEDICINE
Payer: MEDICARE

## 2020-02-23 ENCOUNTER — APPOINTMENT (OUTPATIENT)
Dept: ULTRASOUND IMAGING | Age: 65
End: 2020-02-23
Payer: MEDICARE

## 2020-02-23 VITALS
DIASTOLIC BLOOD PRESSURE: 73 MMHG | WEIGHT: 220 LBS | HEART RATE: 63 BPM | SYSTOLIC BLOOD PRESSURE: 135 MMHG | BODY MASS INDEX: 33.34 KG/M2 | HEIGHT: 68 IN | OXYGEN SATURATION: 96 % | RESPIRATION RATE: 18 BRPM | TEMPERATURE: 98.1 F

## 2020-02-23 LAB
ANION GAP SERPL CALCULATED.3IONS-SCNC: 11 MMOL/L (ref 4–16)
BASOPHILS ABSOLUTE: 0 K/CU MM
BASOPHILS RELATIVE PERCENT: 0.6 % (ref 0–1)
BUN BLDV-MCNC: 14 MG/DL (ref 6–23)
CALCIUM SERPL-MCNC: 9.6 MG/DL (ref 8.3–10.6)
CHLORIDE BLD-SCNC: 100 MMOL/L (ref 99–110)
CO2: 26 MMOL/L (ref 21–32)
CREAT SERPL-MCNC: 0.7 MG/DL (ref 0.9–1.3)
DIFFERENTIAL TYPE: ABNORMAL
EOSINOPHILS ABSOLUTE: 0.2 K/CU MM
EOSINOPHILS RELATIVE PERCENT: 3.7 % (ref 0–3)
GFR AFRICAN AMERICAN: >60 ML/MIN/1.73M2
GFR NON-AFRICAN AMERICAN: >60 ML/MIN/1.73M2
GLUCOSE BLD-MCNC: 120 MG/DL (ref 70–99)
HCT VFR BLD CALC: 38.6 % (ref 42–52)
HEMOGLOBIN: 12.3 GM/DL (ref 13.5–18)
IMMATURE NEUTROPHIL %: 0.4 % (ref 0–0.43)
LYMPHOCYTES ABSOLUTE: 0.6 K/CU MM
LYMPHOCYTES RELATIVE PERCENT: 11.2 % (ref 24–44)
MCH RBC QN AUTO: 30.8 PG (ref 27–31)
MCHC RBC AUTO-ENTMCNC: 31.9 % (ref 32–36)
MCV RBC AUTO: 96.7 FL (ref 78–100)
MONOCYTES ABSOLUTE: 0.7 K/CU MM
MONOCYTES RELATIVE PERCENT: 13 % (ref 0–4)
NUCLEATED RBC %: 0 %
PDW BLD-RTO: 13.8 % (ref 11.7–14.9)
PLATELET # BLD: 116 K/CU MM (ref 140–440)
PMV BLD AUTO: 9.8 FL (ref 7.5–11.1)
POTASSIUM SERPL-SCNC: 4.5 MMOL/L (ref 3.5–5.1)
RBC # BLD: 3.99 M/CU MM (ref 4.6–6.2)
SEGMENTED NEUTROPHILS ABSOLUTE COUNT: 3.6 K/CU MM
SEGMENTED NEUTROPHILS RELATIVE PERCENT: 71.1 % (ref 36–66)
SODIUM BLD-SCNC: 137 MMOL/L (ref 135–145)
TOTAL IMMATURE NEUTOROPHIL: 0.02 K/CU MM
TOTAL NUCLEATED RBC: 0 K/CU MM
WBC # BLD: 5.1 K/CU MM (ref 4–10.5)

## 2020-02-23 PROCEDURE — 93971 EXTREMITY STUDY: CPT

## 2020-02-23 PROCEDURE — 36415 COLL VENOUS BLD VENIPUNCTURE: CPT

## 2020-02-23 PROCEDURE — 85025 COMPLETE CBC W/AUTO DIFF WBC: CPT

## 2020-02-23 PROCEDURE — 96365 THER/PROPH/DIAG IV INF INIT: CPT

## 2020-02-23 PROCEDURE — 2580000003 HC RX 258: Performed by: PHYSICIAN ASSISTANT

## 2020-02-23 PROCEDURE — 6370000000 HC RX 637 (ALT 250 FOR IP): Performed by: PHYSICIAN ASSISTANT

## 2020-02-23 PROCEDURE — 80048 BASIC METABOLIC PNL TOTAL CA: CPT

## 2020-02-23 PROCEDURE — 99284 EMERGENCY DEPT VISIT MOD MDM: CPT

## 2020-02-23 PROCEDURE — 87040 BLOOD CULTURE FOR BACTERIA: CPT

## 2020-02-23 PROCEDURE — 6360000002 HC RX W HCPCS: Performed by: PHYSICIAN ASSISTANT

## 2020-02-23 RX ORDER — CEPHALEXIN 500 MG/1
500 CAPSULE ORAL 4 TIMES DAILY
Qty: 40 CAPSULE | Refills: 0 | Status: SHIPPED | OUTPATIENT
Start: 2020-02-23 | End: 2020-03-04

## 2020-02-23 RX ORDER — OXYCODONE HYDROCHLORIDE AND ACETAMINOPHEN 5; 325 MG/1; MG/1
2 TABLET ORAL ONCE
Status: COMPLETED | OUTPATIENT
Start: 2020-02-23 | End: 2020-02-23

## 2020-02-23 RX ADMIN — OXYCODONE HYDROCHLORIDE AND ACETAMINOPHEN 2 TABLET: 5; 325 TABLET ORAL at 15:22

## 2020-02-23 RX ADMIN — CEFTRIAXONE SODIUM 1 G: 1 INJECTION, POWDER, FOR SOLUTION INTRAMUSCULAR; INTRAVENOUS at 17:12

## 2020-02-23 ASSESSMENT — PAIN DESCRIPTION - LOCATION: LOCATION: LEG

## 2020-02-23 ASSESSMENT — PAIN DESCRIPTION - PAIN TYPE: TYPE: ACUTE PAIN

## 2020-02-23 ASSESSMENT — PAIN SCALES - GENERAL
PAINLEVEL_OUTOF10: 7
PAINLEVEL_OUTOF10: 7

## 2020-02-23 ASSESSMENT — PAIN DESCRIPTION - ORIENTATION: ORIENTATION: LEFT

## 2020-02-23 NOTE — ED PROVIDER NOTES
[place]      PCP: Leida Santos MD    [facility]    Chief Complaint   Patient presents with    Leg Pain    Leg Swelling     pt had TKR to L on monday. c/o redness and swelling to LLE. swelling and redness noted. area warm to touch. Of note, this patient was also evaluated by the attending physician, Laure Hunter. HPI    Halima Paiz is a 59 y.o. male who presents with complaints of left lower leg pain. Patient had total knee replacement on Monday and now reports that he today is having increased pain and swelling with erythema to the left lower leg. He denies fever. REVIEW OF SYSTEMS    Constitutional:  Denies fever, chills, weight loss or weakness   HENT:  Denies sore throat or ear pain   Cardiovascular:  Denies chest pain, palpitations   Respiratory:  Denies cough or shortness of breath    GI:  Denies abdominal pain, nausea, vomiting, or diarrhea  :  Denies any urinary symptoms or vaginal symptoms.    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 Imelda Teran  2052    umbilical x2    OTHER SURGICAL HISTORY  Drug use: No    Sexual activity: Yes     Partners: Female   Lifestyle    Physical activity:     Days per week: None     Minutes per session: None    Stress: None   Relationships    Social connections:     Talks on phone: None     Gets together: None     Attends [employer] service: None     Active member of club or organization: None     Attends meetings of clubs or organizations: None     Relationship status: None    Intimate partner violence:     Fear of current or ex partner: None     Emotionally abused: None     Physically abused: None     Forced sexual activity: None   Other Topics Concern    None   Social History Narrative    None     Family History   Problem Relation Age of Onset    Vision Loss Mother     High Blood Pressure Mother     Arthritis Mother     Asthma Mother     Cancer Father     No Known Problems Sister     Arthritis Brother     High Blood Pressure Brother     Asthma Brother     Other Sister         Rare Blood disease    No Known Problems Sister     No Known Problems Daughter     No Known Problems Daughter          PHYSICAL EXAM    VITAL SIGNS: /73   Pulse 63   Temp 98.1 °F (36.7 °C) (Oral)   Resp 18   Ht 5' 8\" (1.727 m)   Wt 220 lb (99.8 kg)   SpO2 96%   BMI 33.45 kg/m²    Constitutional:  Well developed, Well nourished  HENT:  Normocephalic, Atraumatic, PERRL. EOMI. Sclera clear. Conjunctiva normal, No discharge. Neck/Lymphatics: supple, no JVD, no swollen nodes  Cardiovascular:  regular 23rd rate and rhythm no murmurs/rubs/gallops. Respiratory:  Nonlabored breathing. Normal breath sounds, No wheezing  Abdomen: Bowel sounds normal, Soft, No tenderness, no masses. Musculoskeletal:    There thigh tenderness mild erythema and swelling warm to touch b  No cyanosis. No cool or pale-appearing limb. Distal cap refill and pulses intact bilateral upper and lower extremities  Bilateral upper  extremity ROM intact without pain or obvious deficit.   Left lower ED COURSE & MEDICAL DECISION MAKING       Patient remained stable condition throughout his ED course. Laboratory data revealed mild anemia otherwise normal.  Chemistry mild elevation in his glucose and his creatinine was a little low. The ultrasound revealed no evidence of DVT in the left lower extremity. He was given a gram of Rocephin at this time. I spoke with Dr. Cheryl Green, orthopedic surgeon who did his total knee replacement and we went over his labs and he agreed that the Rocephin was good. He will be discharged with a prescription for Keflex at this time. He is to follow-up with Dr. Cheryl Green on Tuesday and return to the ED should he spike a temperature or any other problems. I discussed discharge plans with he and his wife they understood and agreed. Patient agrees to return emergency department if symptoms worsen or any new symptoms develop. Vital signs and nursing notes reviewed during ED course. Clinical  IMPRESSION    1. Leg swelling    2. Postoperative pain              Comment: Please note this report has been produced using speech recognition software and may contain errors related to that system including errors in grammar, punctuation, and spelling, as well as words and phrases that may be inappropriate. If there are any questions or concerns please feel free to contact the dictating provider for clarification.         [address] [facility] [place]  02/23/20 9556

## 2020-02-23 NOTE — ED NOTES
171 paged Dr Krishna Maher. [place]  02/23/20 171  1723 Dr Krishna Maher returned call.      [place]  02/23/20 1728

## 2020-02-23 NOTE — ED NOTES
Reviewed discharge instructions and follow up care appointments with patient. Answered all questions and concerns. Patient in wheelchair to car out of ED.      Sharmila Addison RN  02/23/20 0227

## 2020-02-26 ENCOUNTER — HOSPITAL ENCOUNTER (OUTPATIENT)
Dept: PHYSICAL THERAPY | Age: 65
Setting detail: THERAPIES SERIES
Discharge: HOME | End: 2020-02-26
Payer: MEDICARE

## 2020-02-26 PROCEDURE — 97016 VASOPNEUMATIC DEVICE THERAPY: CPT

## 2020-02-26 PROCEDURE — 97110 THERAPEUTIC EXERCISES: CPT

## 2020-02-26 PROCEDURE — 97161 PT EVAL LOW COMPLEX 20 MIN: CPT

## 2020-02-26 NOTE — FLOWSHEET NOTE
Outpatient Physical Therapy  [place]           [x] Phone: [phone]   Fax: [phone]  [place]           [] Phone: [phone]   Fax: [phone]        Physical Therapy Daily Treatment Note  Date:  2020    Patient Name:  Lyle Kan    :  1955  MRN: [id number]  Restrictions/Precautions:    Diagnosis:     L TKA  Date of Injury/Surgery:  20  Treatment Diagnosis:    same  Insurance/Certification information:     Referring Physician:   Dr. Donna Stone  Next Doctor Visit:    Plan of care signed (Y/N):  n  Outcome Measure:   Visit# / total visits:  2 /10 initially  Pain level: 6/10   Goals:           6 weeks  1. I in home program.  2.  Up/down stair reciprocal pattern  3.  amb without assistive device x 30mins, no more than trace antalgia  4. Knee ROM supine 0-120  5. SLR without lag. Summary of Evaluation    Pt is s/p L TKA 20, he is healing and recovering pretty well. Some knee stiffness present, Knee ROM supine -7deg extension, 60 flexion. He went to ER on  and was prescribed some antibiotics. Amb is with FWW, using a step to pattern. Subjective:  See eval         Any changes in Ambulatory Summary Sheet?   None        Objective:  See eval     Exercises:  Exercise/Equipment Date 20 Date Date           WARM UP         Nu step      Bike      TABLE      Heel slides      Extension prop      SLR      QS      bridge         STANDING      Step up/down      Hip 4 way      Stair knee flexion stretch/lunge      Partial squats or GTS                             PROPRIOCEPTION      March on foam      SLS                        MODALITIES                        Other Therapeutic Activities/Education:        Home Exercise Program:        Manual Treatments:        Modalities:        Communication with other providers:        Assessment:       End session pain: /10      Plan for Next Session:        Time In / Time Out:     1360/1521        Timed Code/Total Treatment Minutes:    25/55      Next Progress Note due:        Plan of Care Interventions:  [x] Therapeutic Exercise  [] Modalities:  [x] Therapeutic Activity     [] Ultrasound  [] Estim  [x] Gait Training      [] Cervical Traction [] Lumbar Traction  [x] Neuromuscular Re-education    [] Cold/hotpack [] Iontophoresis   [x] Instruction in HEP      [x] Vasopneumatic   [] Dry Needling    [x] Manual Therapy               [] Aquatic Therapy              Electronically signed by:  Shannan Alvarez PT 2/26/2020, 3:28 PM

## 2020-02-28 ENCOUNTER — HOSPITAL ENCOUNTER (OUTPATIENT)
Dept: PHYSICAL THERAPY | Age: 65
Setting detail: THERAPIES SERIES
Discharge: HOME | End: 2020-02-28
Payer: MEDICARE

## 2020-02-28 LAB
CULTURE: NORMAL
CULTURE: NORMAL
Lab: NORMAL
Lab: NORMAL
SPECIMEN: NORMAL
SPECIMEN: NORMAL

## 2020-02-28 PROCEDURE — 97110 THERAPEUTIC EXERCISES: CPT

## 2020-02-28 PROCEDURE — 97016 VASOPNEUMATIC DEVICE THERAPY: CPT

## 2020-02-28 PROCEDURE — 97112 NEUROMUSCULAR REEDUCATION: CPT

## 2020-02-28 NOTE — FLOWSHEET NOTE
Outpatient Physical Therapy  [place]           [x] Phone: [phone]   Fax: [phone]  [place]           [] Phone: [phone]   Fax: [phone]        Physical Therapy Daily Treatment Note  Date:  2020    Patient Name:  Quique Gregory    :  1955  MRN: [id number]  Restrictions/Precautions:    Diagnosis:     L TKA  Date of Injury/Surgery:  20  Treatment Diagnosis:    same  Insurance/Certification information:     Referring Physician:   Dr. Cheryl Green  Next Doctor Visit:    Plan of care signed (Y/N):  n  Outcome Measure:   Visit# / total visits:  2 /10 initially  Pain level: 7/10   Goals:           6 weeks  1. I in home program.  2.  Up/down stair reciprocal pattern  3.  amb without assistive device x 30mins, no more than trace antalgia  4. Knee ROM supine 0-120  5. SLR without lag. Summary of Evaluation    Pt is s/p L TKA 20, he is healing and recovering pretty well. Some knee stiffness present, Knee ROM supine -7deg extension, 60 flexion. He went to ER on  and was prescribed some antibiotics. Amb is with FWW, using a step to pattern. Subjective:  See eval         Any changes in Ambulatory Summary Sheet?   None        Objective:  AAROM 76°     Exercises:  Exercise/Equipment Date 20 #2 Date Date           WARM UP         Nu step Seat 13 arms13 lv1 5 min     Bike      TABLE      Heel slides 2x5 reps      Extension prop 3 min on bolster     SLR 2 5 reps     QS 10 reps     bridge         STANDING      Step up/down      Hip 4 way      Stair knee flexion stretch/lunge      Partial squats or GTS                             PROPRIOCEPTION      March on foam      SLS                        MODALITIES      Vaso L knee 15 min                 Other Therapeutic Activities/Education:        Home Exercise Program:        Manual Treatments:        Modalities:        Communication with other providers:        Assessment: pt reports feeling better after Vaso pain down to 6 from 7/10      Plan for Next Session: Cont PT per above interventions      Time In / Time Out:     145-230        Timed Code/Total Treatment Minutes:    30/45  TE15, NE15, Vaso15       Next Progress Note due:        Plan of Care Interventions:  [x] Therapeutic Exercise  [] Modalities:  [x] Therapeutic Activity     [] Ultrasound  [] Estim  [x] Gait Training      [] Cervical Traction [] Lumbar Traction  [x] Neuromuscular Re-education    [] Cold/hotpack [] Iontophoresis   [x] Instruction in HEP      [x] Vasopneumatic   [] Dry Needling    [x] Manual Therapy               [] Aquatic Therapy              Electronically signed by:  Yesenia Morfin PTA 2/28/2020, 10:39 AM

## 2020-03-02 RX ORDER — OXYCODONE HYDROCHLORIDE 5 MG/1
5 TABLET ORAL EVERY 4 HOURS PRN
Qty: 30 TABLET | Refills: 0 | OUTPATIENT
Start: 2020-03-02 | End: 2020-03-09

## 2020-03-02 NOTE — TELEPHONE ENCOUNTER
Patient calling asking for another refill on pain medication, he is doing physical therapy and has infection with his wound.

## 2020-03-03 ENCOUNTER — OFFICE VISIT (OUTPATIENT)
Dept: ORTHOPEDIC SURGERY | Age: 65
End: 2020-03-03

## 2020-03-03 VITALS — BODY MASS INDEX: 33.49 KG/M2 | WEIGHT: 221 LBS | RESPIRATION RATE: 16 BRPM | HEIGHT: 68 IN

## 2020-03-03 PROCEDURE — 99024 POSTOP FOLLOW-UP VISIT: CPT | Performed by: ORTHOPAEDIC SURGERY

## 2020-03-03 RX ORDER — OXYCODONE HYDROCHLORIDE 5 MG/1
5 TABLET ORAL EVERY 6 HOURS PRN
Qty: 28 TABLET | Refills: 0 | Status: SHIPPED | OUTPATIENT
Start: 2020-03-03 | End: 2020-03-10

## 2020-03-03 NOTE — PROGRESS NOTES
Patient here for a 2 week postop check on his left total knee arthroplasty, DOS: 2/17/2020. He has been in OT/PT and been doing HEP as well. He comes in using a walker to help ambulate and rates his pain a 5/10 but pain is mainly in his shin due to an infection. He was seen at the ED for pain and swelling in LLE and an US was completed to r/o a DVT. US was negative, see below. He was treated with keflex and is still taking as directed. No new issues.      ED VL DUP LLE 2/23/2020  Impression   No evidence of DVT in the left lower extremity.

## 2020-03-04 ENCOUNTER — HOSPITAL ENCOUNTER (OUTPATIENT)
Dept: PHYSICAL THERAPY | Age: 65
Setting detail: THERAPIES SERIES
Discharge: HOME | End: 2020-03-04
Payer: MEDICARE

## 2020-03-04 PROCEDURE — 97110 THERAPEUTIC EXERCISES: CPT

## 2020-03-04 PROCEDURE — 97016 VASOPNEUMATIC DEVICE THERAPY: CPT

## 2020-03-04 PROCEDURE — 97112 NEUROMUSCULAR REEDUCATION: CPT

## 2020-03-04 NOTE — FLOWSHEET NOTE
Stair knee flexion stretch/lunge      Partial squats or GTS                             PROPRIOCEPTION      March on foam      SLS                        MODALITIES      Vaso L knee 15 min L knee 15 min                Other Therapeutic Activities/Education:        Home Exercise Program:        Manual Treatments:        Modalities:        Communication with other providers:        Assessment:4/10 pain after [facility] for Next Session: Cont PT per above interventions      Time In / Time Out:     100-155        Timed Code/Total Treatment Minutes:    40/55  TE25, NE15, Vaso15      Next Progress Note due:        Plan of Care Interventions:  [x] Therapeutic Exercise  [] Modalities:  [x] Therapeutic Activity     [] Ultrasound  [] Estim  [x] Gait Training      [] Cervical Traction [] Lumbar Traction  [x] Neuromuscular Re-education    [] Cold/hotpack [] Iontophoresis   [x] Instruction in HEP      [x] Vasopneumatic   [] Dry Needling    [x] Manual Therapy               [] Aquatic Therapy              Electronically signed by:  Tamika Ashley, HONORIO 3/4/2020, 9:59 AM

## 2020-03-06 ENCOUNTER — HOSPITAL ENCOUNTER (OUTPATIENT)
Dept: PHYSICAL THERAPY | Age: 65
Setting detail: THERAPIES SERIES
Discharge: HOME | End: 2020-03-06
Payer: MEDICARE

## 2020-03-06 PROCEDURE — 97110 THERAPEUTIC EXERCISES: CPT

## 2020-03-06 PROCEDURE — 97116 GAIT TRAINING THERAPY: CPT

## 2020-03-06 PROCEDURE — 97112 NEUROMUSCULAR REEDUCATION: CPT

## 2020-03-06 PROCEDURE — 97016 VASOPNEUMATIC DEVICE THERAPY: CPT

## 2020-03-06 NOTE — FLOWSHEET NOTE
done with str cane                    PROPRIOCEPTION      March on foam   2x10   SLS                        MODALITIES      Vaso L knee 15 min L knee 15 min L knee 15 min               Other Therapeutic Activities/Education:        Home Exercise Program:        Manual Treatments:        Modalities:        Communication with other providers:        Assessment:4/10 pain after [facility] for Next Session: Cont PT per above interventions      Time In / Time Out:145-145-240       Timed Code/Total Treatment Minutes:    40/55  TE15, NE10, Vaso15, gt15      Next Progress Note due:        Plan of Care Interventions:  [x] Therapeutic Exercise  [] Modalities:  [x] Therapeutic Activity     [] Ultrasound  [] Estim  [x] Gait Training      [] Cervical Traction [] Lumbar Traction  [x] Neuromuscular Re-education    [] Cold/hotpack [] Iontophoresis   [x] Instruction in HEP      [x] Vasopneumatic   [] Dry Needling    [x] Manual Therapy               [] Aquatic Therapy              Electronically signed by:  Blaine Cm PTA 3/6/2020, 8:07 AM

## 2020-03-11 ENCOUNTER — HOSPITAL ENCOUNTER (OUTPATIENT)
Dept: PHYSICAL THERAPY | Age: 65
Setting detail: THERAPIES SERIES
Discharge: HOME | End: 2020-03-11
Payer: MEDICARE

## 2020-03-11 PROCEDURE — 97112 NEUROMUSCULAR REEDUCATION: CPT

## 2020-03-11 PROCEDURE — 97110 THERAPEUTIC EXERCISES: CPT

## 2020-03-11 PROCEDURE — 97016 VASOPNEUMATIC DEVICE THERAPY: CPT

## 2020-03-13 ENCOUNTER — HOSPITAL ENCOUNTER (OUTPATIENT)
Dept: PHYSICAL THERAPY | Age: 65
Setting detail: THERAPIES SERIES
Discharge: HOME | End: 2020-03-13
Payer: MEDICARE

## 2020-03-13 PROCEDURE — 97110 THERAPEUTIC EXERCISES: CPT

## 2020-03-13 PROCEDURE — 97530 THERAPEUTIC ACTIVITIES: CPT

## 2020-03-13 PROCEDURE — 97016 VASOPNEUMATIC DEVICE THERAPY: CPT

## 2020-03-13 NOTE — PROGRESS NOTES
3/3/2020   Chief Complaint   Patient presents with    Post-Op Check     s/p L-total knee arthroplasty 2/17/2020        History of Present Illness:                             Rahul See is a 59 y.o. male who presents today for follow-up of his left knee. He was seen in the emergency room because of pain and swelling but was found to be negative for DVT. He has noticed some redness in the lower calf region and has been treated with Keflex for a cellulitis. He denies any swelling or redness or drainage from the incision site. He has been doing his physical therapy and states that his knee is improving      Patient here for a 2 week postop check on his left total knee arthroplasty, DOS: 2/17/2020. He has been in OT/PT and been doing HEP as well. He comes in using a walker to help ambulate and rates his pain a 5/10 but pain is mainly in his shin due to an infection. He was seen at the ED for pain and swelling in LLE and an US was completed to r/o a DVT. US was negative, see below. He was treated with keflex and is still taking as directed. No new issues.      ED VL DUP LLE 2/23/2020  Impression   No evidence of DVT in the left lower extremity.           Medical History  Patient's medications, allergies, past medical, surgical, social and family histories were reviewed and updated as appropriate.     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      Sulfamethoxazole-Trimethoprim (BACTRIM PO) Take by mouth 2 times daily      nadolol (CORGARD) 40 MG tablet TAKE ONE TABLET BY MOUTH ONCE DAILY 30 tablet 11     No current facility-administered medications for this visit. Allergies   Allergen Reactions    Erythromycin      vomitting    Adhesive Tape     Lorazepam Itching    Zolpidem Itching    Zolpidem Tartrate Itching       Review of Systems:   Review of Systems                                            Examination:  General Exam:  Vitals: Resp 16   Ht 5' 8\" (1.727 m)   Wt 221 lb (100.2 kg)   BMI 33.60 kg/m²    Physical Exam   Left lower extremity:  Incision is intact with no drainage or induration or redness. Staples were removed. Range of motion is present from full extension to 95 degrees of flexion. He is ambulating well today. There is erythema and tenderness overlying the soft tissues of the lower one third of the leg most significant anteriorly over the tibia. This is consistent with a mild cellulitis. Diagnostic testing:  X-rays reviewed in office, I independently reviewed the films in the office today:   X-ray images show a well positioned and aligned total knee replacement with no complications    Office Procedures:  No orders of the defined types were placed in this encounter. Assessment and Plan  1. Left knee replacement    I explained the patient that it appears his a surgical site is healing well with no evidence of infection however there is some redness and tenderness lower in his leg consistent with possible cellulitis. I recommended that he continue with his antibiotic therapy and was he was recently prescribed Bactrim by his PCP. I have recommended that he continue with his physical therapy for rehabilitation of the knee and we will keep a close eye on his incision. He will call if there are any concerns of fevers, chills, or drainage or increased swelling of the knee.     I would like him to

## 2020-03-13 NOTE — FLOWSHEET NOTE
10ct x10 reps 10ct towel under calf x15 reps 10ct towel under calf   bridge     2x10 2x10 3x10 3x10   STANDING        Step up/down  4\" step 2x10 reps 4\" step 2x10 4\" step 2x10 4\" step 2x15   Hip 4 way   x10 each way x10 each way x10 each way   Stair knee flexion stretch/lunge        Partial squats or GTS           Gt with str cane 150ft. All walking in dept done with str cane                          PROPRIOCEPTION        March on foam   2x10 2x10 one uE 2x10 one uE   SLS                                MODALITIES        Vaso L knee 15 min L knee 15 min L knee 15 min L knee 15 min L knee 15 min                 Other Therapeutic Activities/Education:        Home Exercise Program:        Manual Treatments:        Modalities:        Communication with other providers:        Assessment:4/10 pain after [facility]: Cont PT per above interventions      Time In / Time Out:100-200      Timed Code/Total Treatment Minutes:    [address], TA[address].  Vaso15      Next Progress Note due:        Plan of Care Interventions:  [x] Therapeutic Exercise  [] Modalities:  [x] Therapeutic Activity     [] Ultrasound  [] Estim  [x] Gait Training      [] Cervical Traction [] Lumbar Traction  [x] Neuromuscular Re-education    [] Cold/hotpack [] Iontophoresis   [x] Instruction in HEP      [x] Vasopneumatic   [] Dry Needling    [x] Manual Therapy               [] Aquatic Therapy              Electronically signed by:  Taco Azul PTA 3/13/2020, 10:32 AM

## 2020-03-16 ENCOUNTER — HOSPITAL ENCOUNTER (OUTPATIENT)
Dept: ULTRASOUND IMAGING | Age: 65
Discharge: HOME | End: 2020-03-16
Payer: MEDICARE

## 2020-03-16 PROCEDURE — 76705 ECHO EXAM OF ABDOMEN: CPT

## 2020-03-17 ENCOUNTER — OFFICE VISIT (OUTPATIENT)
Dept: ORTHOPEDIC SURGERY | Age: 65
End: 2020-03-17

## 2020-03-17 VITALS
HEIGHT: 68 IN | RESPIRATION RATE: 15 BRPM | HEART RATE: 93 BPM | BODY MASS INDEX: 32.88 KG/M2 | OXYGEN SATURATION: 96 % | WEIGHT: 216.93 LBS

## 2020-03-17 PROCEDURE — 99024 POSTOP FOLLOW-UP VISIT: CPT | Performed by: ORTHOPAEDIC SURGERY

## 2020-03-17 NOTE — PROGRESS NOTES
3/17/2020   Chief Complaint   Patient presents with    Post-Op Check     post op left TKA DOS 2/17/2020        History of Present Illness:                             Eliza Bean is a 59 y.o. male returns today for follow-up of his left total knee replacement. He is noticed resolution of the pain and redness at the distal aspect of his anterior shin. He states that there is been no issues with his healing incision at the left knee. He has been doing physical therapy and feels that his knee has responded well with good strength and range of motion. He denies any fevers or chills. His therapist is pleased with his range of motion and he is walking well today with a cane. Patient is here today for Post op check of the Left TKA DOS 2/17/2020. Patient states pain today is 2/10. Patient states pain is stiff still but is doing well. He is currently doing physical therapy which has helped him. Patient states that he has been using his cane from time to time to help him with his balance. Patient denies any new injuries or accidents     Medical History  Patient's medications, allergies, past medical, surgical, social and family histories were reviewed and updated as appropriate. Review of Systems:   Review of Systems                                            Examination:  General Exam:  Vitals: Pulse 93   Resp 15   Ht 5' 7.99\" (1.727 m)   Wt 216 lb 14.9 oz (98.4 kg)   SpO2 96%   BMI 32.99 kg/m²    Physical Exam   Left lower extremity:  Well-healed surgical incision. No erythema or drainage or induration. Mild global swelling present.   Range of motion is present and smooth and painless from 3 degrees short of full extension to 120 degrees of flexion    Diagnostic testing:  X-rays reviewed in office, I independently reviewed the films in the office today:     Xr Knee Left (3 Views)    Result Date: 3/17/2020  3 views of the left knee show excellent position and alignment of the total knee replacement with no evidence of complication or fracture. Office Procedures:  No orders of the defined types were placed in this encounter. Assessment and Plan  1. Left total knee replacement    I have recommended he continue with his physical therapy and continue advancing as much as possible at the guidance of his therapist.  We discussed to continue with his home exercise program and stretching to maximize his range of motion. I would like to see him back in 6 weeks for check of his progress. He will return sooner if there are any concerns or questions.     Electronically signed by Juan A Rocha MD on 3/17/2020 at 10:51 AM

## 2020-03-17 NOTE — PATIENT INSTRUCTIONS
Continue weight bearing as tolerated  continue to work on ROM of the left knee  Follow up in 6 weeks

## 2020-03-18 ENCOUNTER — HOSPITAL ENCOUNTER (OUTPATIENT)
Dept: PHYSICAL THERAPY | Age: 65
Setting detail: THERAPIES SERIES
Discharge: HOME | End: 2020-03-18
Payer: MEDICARE

## 2020-03-18 PROCEDURE — 97110 THERAPEUTIC EXERCISES: CPT

## 2020-03-18 PROCEDURE — 97016 VASOPNEUMATIC DEVICE THERAPY: CPT

## 2020-03-18 NOTE — FLOWSHEET NOTE
step 2x10 4\" step 2x10 4\" step 2x15    Hip 4 way x10 each way x10 each way x10 each way    Stair knee flexion stretch/lunge    Knee flexion stretch on step   Partial squats or GTS        Gt with str cane 150ft. All walking in dept done with str cane   No cane for amb. PROPRIOCEPTION       March on foam 2x10 2x10 one uE 2x10 one uE    SLS                            MODALITIES       Vaso L knee 15 min L knee 15 min L knee 15 min L knee 10 min                Other Therapeutic Activities/Education:        Home Exercise Program:        Manual Treatments:  Gentle patellar and scar mob. Modalities:  Vaso low compression, 10min, 34 deg to knee. Communication with other providers:        Assessment:4/10 pain after [facility] Next Session: Cont PT per above interventions      Time In / Time Out:1302/1352      Timed Code/Total Treatment Minutes:    40/50      Next Progress Note due:  Likely d/c if motion still good.         Plan of Care Interventions:  [x] Therapeutic Exercise  [] Modalities:  [x] Therapeutic Activity     [] Ultrasound  [] Estim  [x] Gait Training      [] Cervical Traction [] Lumbar Traction  [x] Neuromuscular Re-education    [] Cold/hotpack [] Iontophoresis   [x] Instruction in HEP      [x] Vasopneumatic   [] Dry Needling    [x] Manual Therapy               [] Aquatic Therapy              Electronically signed by:  Geannie Severs, PT 3/18/2020, 1:02 PM

## 2020-03-20 ENCOUNTER — HOSPITAL ENCOUNTER (OUTPATIENT)
Dept: PHYSICAL THERAPY | Age: 65
Setting detail: THERAPIES SERIES
Discharge: HOME | End: 2020-03-20
Payer: MEDICARE

## 2020-03-20 PROCEDURE — 97530 THERAPEUTIC ACTIVITIES: CPT

## 2020-03-20 PROCEDURE — 97110 THERAPEUTIC EXERCISES: CPT

## 2020-03-20 PROCEDURE — 97112 NEUROMUSCULAR REEDUCATION: CPT

## 2020-03-20 NOTE — FLOWSHEET NOTE
Outpatient Physical Therapy  [place]           [x] Phone: [phone]   Fax: [phone]  [place]           [] Phone: [phone]   Fax: [phone]        Physical Therapy Daily Treatment Note  Date:  3/20/2020    Patient Name:  Senait Rachel    :  1955  MRN: [id number]  Restrictions/Precautions:    Diagnosis:     L TKA  Date of Injury/Surgery:  20  Treatment Diagnosis:    same  Insurance/Certification information:     Referring Physician:   Dr. HOFFMAN [facility]  Next Doctor Visit:    Plan of care signed (Y/N):  yes  Outcome Measure:  at eval and  at discharge  Visit# / total visits:  9 /10 re-count 3/   Pain level: 1-2/10   Goals:           6 weeks  1. I in home program. MET  2. Up/down stair reciprocal pattern Progressing w/ B HR  3.  amb without assistive device x 30mins, no more than trace antalgia MET  4. Knee ROM supine 0-120 Progressing ( 0-115°)  5. SLR without lag. MET       Summary of Evaluation    Pt is s/p L TKA 20, he is healing and recovering pretty well. Some knee stiffness present, Knee ROM supine -7deg extension, 60 flexion. He went to ER on  and was prescribed some antibiotics. Amb is with FWW, using a step to pattern. Subjective: Pt stated that his pain was about 1-2/10 today. Pt stated that today was his last visit. Any changes in Ambulatory Summary Sheet?           Objective: Goal check      L knee flex AROM 115 °, L knee ext -2°  4/5 Quads  4+/5 hamstrings  Hip abd 4-/5     Exercises:  Exercise/Equipment Date 3/6/20 #4 Date 3/11/20 #5 Date 3/13/20 #6 3/18/20 #7 3/20/2020 # 9              WARM UP           Nu step Seat 13 arms13 lv1 8 min Seat 13 arms13 lv1 8 min Seat 11 arms13 lv1 9 min  L-3 x 10'    Bike   [employer]  bike 4 min, seat16 X 10min progressively moving seat closer X 10min progressively moving seat closer   TABLE        Heel slides 2x10 2x10 2x10 To tolerance 10x2   Extension prop 3 min on bolster 3 min o bolster 4min on bolster With gentle mobilization 2 mins x 3    SLR 2x10 2x10 2x10  10x2   QS x10 10ct x10 reps 10ct towel under calf x15 reps 10ct towel under calf  10x2 5\"    bridge    2x10 3x10 3x10  10x2   STANDING        Step up/down 4\" step 2x10 4\" step 2x10 4\" step 2x15  4\" 10x2   Hip 4 way x10 each way x10 each way x10 each way     Stair knee flexion stretch/lunge    Knee flexion stretch on step    Partial squats or GTS         Gt with str cane 150ft. All walking in dept done with str cane   No cane for amb. Sit to stand      23\" x 10 no UEs    16\" chair 2x                 PROPRIOCEPTION        March on foam 2x10 2x10 one uE 2x10 one uE     SLS                                MODALITIES        Vaso L knee 15 min L knee 15 min L knee 15 min L knee 10 min                  Other Therapeutic Activities/Education:  Ambulated up and down 1 flight of steps needed 2 hand rails due to weakness in R knee. Home Exercise Program:        Manual Treatments:  Gentle patellar and scar mob. Modalities:  Vaso low compression, 10min, 34 deg to knee. Communication with other providers:        Assessment:   Pt tolerated treatment well. Pt is I with HEP and will be having R TKR as soon as he is able. Pt to continue on his own at this time. Pt will be discharged. Plan for Next Session: Cont PT per above interventions      Time In / Time Out: 1300/1455      Timed Code/Total Treatment Minutes:  2TE ( 25') 1 TA ( 15') 1 neuro (15')       Next Progress Note due:  Likely d/c if motion still good.         Plan of Care Interventions:  [x] Therapeutic Exercise  [] Modalities:  [x] Therapeutic Activity     [] Ultrasound  [] Estim  [x] Gait Training      [] Cervical Traction [] Lumbar Traction  [x] Neuromuscular Re-education    [] Cold/hotpack [] Iontophoresis   [x] Instruction in HEP      [x] Vasopneumatic   [] Dry Needling    [x] Manual Therapy               [] Aquatic Therapy              Electronically signed by:

## 2020-04-21 ENCOUNTER — OFFICE VISIT (OUTPATIENT)
Dept: ORTHOPEDIC SURGERY | Age: 65
End: 2020-04-21

## 2020-04-21 VITALS — WEIGHT: 216.93 LBS | HEIGHT: 68 IN | BODY MASS INDEX: 32.88 KG/M2

## 2020-04-21 PROCEDURE — 99024 POSTOP FOLLOW-UP VISIT: CPT | Performed by: ORTHOPAEDIC SURGERY

## 2020-04-21 NOTE — PATIENT INSTRUCTIONS
Follow up in one year for reevaluation of the left knee and to discuss the right knee  continue to work on ROM of the left knee.

## 2020-04-21 NOTE — PROGRESS NOTES
Davion Laguna is a 59 y.o. male evaluated via telephone on 4/21/2020. Consent:  He and/or health care decision maker is aware that that he may receive a bill for this telephone service, depending on his insurance coverage, and has provided verbal consent to proceed: Yes      Documentation:  I communicated with the patient and/or health care decision maker about left knee replacement. Details of this discussion including any medical advice provided: He is doing well with his recovery process. He has no complaints of pain at this time. He feels that his knee has good strength and range of motion and he is pleased with his activity level and his home exercise program.    Virtual phone visit with the patient today. We are doing a post op check of the left total knee arthroplasty DOS 2/17/2020. Patient states pain today is 0/10. Patient states that he has been doing his own physical therapy at home. He states that he has been riding his bike with no pain. Patient states that the incision is healed and has no drainage, redness or swelling of the knee. Patient denies any new injury or accident     I recommended that he advance his activities as tolerated. He will follow-up 1 year following his surgery  for repeat x-rays. I affirm this is a Patient Initiated Episode with an Established Patient who has not had a related appointment within my department in the past 7 days or scheduled within the next 24 hours.     Total Time: minutes: 5-10 minutes    Note: not billable if this call serves to triage the patient into an appointment for the relevant concern      Savanna Qiu

## 2020-04-21 NOTE — PROGRESS NOTES
Virtual phone visit with the patient today. We are doing a post op check of the left total knee arthroplasty DOS 2/17/2020. Patient states pain today is 0/10. Patient states that he has been doing his own physical therapy at home. He states that he has been riding his bike with no pain. Patient states that the incision is healed and has no drainage, redness or swelling of the knee.  Patient denies any new injury or accident

## 2020-04-23 PROBLEM — D72.819 DECREASED WHITE BLOOD CELL COUNT, UNSPECIFIED: Status: ACTIVE | Noted: 2020-04-23

## 2020-04-23 PROBLEM — R53.83 OTHER FATIGUE: Status: ACTIVE | Noted: 2020-04-23

## 2020-04-23 PROBLEM — C17.0 MALIGNANT NEOPLASM OF DUODENUM (HCC): Status: ACTIVE | Noted: 2020-04-23

## 2020-04-23 PROBLEM — D69.59 OTHER SECONDARY THROMBOCYTOPENIA: Status: ACTIVE | Noted: 2020-04-23

## 2020-05-08 NOTE — DISCHARGE SUMMARY
Outpatient Physical Therapy        [x] Phone: [phone]   Fax: [phone]  Physician:          From: Manolo Mansfield PT     Patient: Liliana Oden                    : 1955    Diagnosis:     L TKA  Date of Injury/Surgery:  20  Treatment Diagnosis:    same     Date: 2020    []  Progress Note                [x]  Discharge Note    Total Visits to date:9       Cancels/No-shows to date:       Subjective:3/20/20:   Pt stated that his pain was about 1-2/10 today. Pt stated that today was his last visit. Prominent Objective Findings:   L knee flex AROM 115 °, L knee ext -2°  4/5 Quads  4+/5 hamstrings  Hip abd 4-/5            Goal Status:  [] Achieved [x] Partially Achieved  [] Not Achieved      I in home program. MET  2.  Up/down stair reciprocal pattern Progressing w/ B HR  3.  amb without assistive device x 30mins, no more than trace antalgia MET  4.  Knee ROM supine 0-120 Progressing ( 0-115°)  5.  SLR without lag. MET     Changes to goals:    Planned Services:  []Plan of Care Interventions:  [x]? Therapeutic Exercise                     []? Modalities:  [x]? Therapeutic Activity                                   []? Ultrasound              []? Estim  [x]? Gait Training                                              []? Cervical Traction    []? Lumbar Traction  [x]? Neuromuscular Re-education                   []? Cold/hotpack          []? Iontophoresis           [x]? Instruction in HEP                                      [x]? Vasopneumatic      []? Dry Needling    [x]? Manual Therapy                                                  []? Aquatic Therapy              [x] Patient  discharged- 3/20/20- per patient request    Electronically signed by:  Manolo Mansfield PT, 2020, 1:34 PM    If you have any questions or concerns, please don't hesitate to call.   Thank you for your referral.

## 2020-06-12 ENCOUNTER — HOSPITAL ENCOUNTER (OUTPATIENT)
Dept: INFUSION THERAPY | Age: 65
Discharge: HOME | End: 2020-06-12
Payer: MEDICARE

## 2020-06-12 LAB
ALBUMIN SERPL-MCNC: 3.5 GM/DL (ref 3.4–5)
ALP BLD-CCNC: 99 IU/L (ref 40–128)
ALT SERPL-CCNC: 18 U/L (ref 10–40)
ANION GAP SERPL CALCULATED.3IONS-SCNC: 8 MMOL/L (ref 4–16)
AST SERPL-CCNC: 30 IU/L (ref 15–37)
BASOPHILS ABSOLUTE: 0 K/CU MM
BASOPHILS RELATIVE PERCENT: 0.3 % (ref 0–1)
BILIRUB SERPL-MCNC: 0.8 MG/DL (ref 0–1)
BUN BLDV-MCNC: 12 MG/DL (ref 6–23)
CALCIUM SERPL-MCNC: 9.2 MG/DL (ref 8.3–10.6)
CHLORIDE BLD-SCNC: 108 MMOL/L (ref 99–110)
CO2: 23 MMOL/L (ref 21–32)
CREAT SERPL-MCNC: 0.7 MG/DL (ref 0.9–1.3)
DIFFERENTIAL TYPE: ABNORMAL
EOSINOPHILS ABSOLUTE: 0.1 K/CU MM
EOSINOPHILS RELATIVE PERCENT: 2.6 % (ref 0–3)
GFR AFRICAN AMERICAN: >60 ML/MIN/1.73M2
GFR NON-AFRICAN AMERICAN: >60 ML/MIN/1.73M2
GLUCOSE BLD-MCNC: 129 MG/DL (ref 70–99)
HCT VFR BLD CALC: 38.9 % (ref 42–52)
HEMOGLOBIN: 13 GM/DL (ref 13.5–18)
LYMPHOCYTES ABSOLUTE: 0.5 K/CU MM
LYMPHOCYTES RELATIVE PERCENT: 17 % (ref 24–44)
MCH RBC QN AUTO: 31.8 PG (ref 27–31)
MCHC RBC AUTO-ENTMCNC: 33.4 % (ref 32–36)
MCV RBC AUTO: 95.1 FL (ref 78–100)
MONOCYTES ABSOLUTE: 0.3 K/CU MM
MONOCYTES RELATIVE PERCENT: 10.8 % (ref 0–4)
PDW BLD-RTO: 13.8 % (ref 11.7–14.9)
PLATELET # BLD: 71 K/CU MM (ref 140–440)
PMV BLD AUTO: 9.6 FL (ref 7.5–11.1)
POTASSIUM SERPL-SCNC: 4.4 MMOL/L (ref 3.5–5.1)
RBC # BLD: 4.09 M/CU MM (ref 4.6–6.2)
SEGMENTED NEUTROPHILS ABSOLUTE COUNT: 2.1 K/CU MM
SEGMENTED NEUTROPHILS RELATIVE PERCENT: 69.3 % (ref 36–66)
SODIUM BLD-SCNC: 139 MMOL/L (ref 135–145)
TOTAL PROTEIN: 6.2 GM/DL (ref 6.4–8.2)
WBC # BLD: 3.1 K/CU MM (ref 4–10.5)

## 2020-06-12 PROCEDURE — 85025 COMPLETE CBC W/AUTO DIFF WBC: CPT

## 2020-06-12 PROCEDURE — 80053 COMPREHEN METABOLIC PANEL: CPT

## 2020-06-12 PROCEDURE — 36415 COLL VENOUS BLD VENIPUNCTURE: CPT

## 2020-06-19 ENCOUNTER — HOSPITAL ENCOUNTER (OUTPATIENT)
Dept: INFUSION THERAPY | Age: 65
Discharge: HOME | End: 2020-06-19
Payer: MEDICARE

## 2020-06-19 PROCEDURE — 99211 OFF/OP EST MAY X REQ PHY/QHP: CPT

## 2020-07-09 LAB
ALBUMIN/GLOBULIN RATIO: 1.5 RATIO (ref 0.8–2.6)
ALBUMIN: 3.3 G/DL (ref 3.5–5.2)
ALP BLD-CCNC: 97 U/L (ref 23–144)
ALT SERPL-CCNC: 18 U/L (ref 0–60)
AST SERPL-CCNC: 23 U/L (ref 0–55)
BASOPHILS ABSOLUTE: 0 K/MM3 (ref 0–0.3)
BASOPHILS RELATIVE PERCENT: 0.9 % (ref 0–2)
BILIRUB SERPL-MCNC: 0.8 MG/DL (ref 0–1.2)
BUN BLDV-MCNC: 12 MG/DL (ref 3–29)
BUN/CREAT BLD: 17 (ref 7–25)
CALCIUM SERPL-MCNC: 9.3 MG/DL (ref 8.5–10.5)
CHLORIDE BLD-SCNC: 102 MEQ/L (ref 96–110)
CO2: 27 MEQ/L (ref 19–32)
CREAT SERPL-MCNC: 0.7 MG/DL (ref 0.5–1.4)
DIFFERENTIAL TYPE: ABNORMAL
EOSINOPHILS ABSOLUTE: 0.1 K/MM3 (ref 0–0.6)
EOSINOPHILS RELATIVE PERCENT: 3.2 % (ref 0–7)
GFR AFRICAN AMERICAN: 116 MLS/MIN/1.73M2
GFR NON-AFRICAN AMERICAN: 100 MLS/MIN/1.73M2
GLOBULIN: 2.2 G/DL (ref 1.9–3.6)
GLUCOSE BLD-MCNC: 134 MG/DL (ref 70–99)
HCT VFR BLD CALC: 37.3 % (ref 41–50)
HEMOGLOBIN: 12.7 G/DL (ref 13.8–17.2)
LYMPHOCYTES ABSOLUTE: 0.5 K/MM3 (ref 0.9–4.1)
LYMPHOCYTES RELATIVE PERCENT: 16.6 % (ref 18–47)
MCH RBC QN AUTO: 32 PG (ref 27–33)
MCHC RBC AUTO-ENTMCNC: 34.1 G/DL (ref 32–36)
MCV RBC AUTO: 94 FL (ref 80–100)
MONOCYTES ABSOLUTE: 0.3 K/MM3 (ref 0.2–1.1)
MONOCYTES RELATIVE PERCENT: 10.7 % (ref 0–14)
NEUTROPHILS ABSOLUTE: 2.1 K/MM3 (ref 1.5–7.8)
NEUTROPHILS RELATIVE PERCENT: 68.6 % (ref 40–75)
PDW BLD-RTO: 14 % (ref 9–15)
PLATELET # BLD: 78 K/MM3 (ref 130–400)
POTASSIUM SERPL-SCNC: 4.2 MEQ/L (ref 3.4–5.3)
RBC # BLD: 3.97 M/MM3 (ref 4.4–5.8)
SODIUM BLD-SCNC: 141 MEQ/L (ref 135–148)
STATUS: ABNORMAL
TOTAL PROTEIN: 5.5 G/DL (ref 6–8.3)
WBC: 3.1 K/MM3 (ref 3.8–10.8)

## 2020-08-21 ENCOUNTER — CLINICAL DOCUMENTATION (OUTPATIENT)
Dept: ONCOLOGY | Age: 65
End: 2020-08-21

## 2020-09-23 NOTE — PROGRESS NOTES
Patient Name: Carie Montoya  Patient : 1955  Patient MRN: [id number]     Primary Oncologist: Miguel Rodas MD  Referring Provider: Viridiana Hines MD     Date of Service: 2020      Chief Complaint:   Chief Complaint   Patient presents with    Follow-up     He came in for follow-up visit. Patient Active Problem List:     Splenomegaly     NAFLD (nonalcoholic fatty liver disease)     Carcinoma of duodenum (HCC)     Liver cirrhosis secondary to MONGE (nonalcoholic steatohepatitis) (HCC)     Port-A-Cath in place     Bilateral primary osteoarthritis of knee     Arthritis of both knees     Leukopenia     Primary malignant neoplasm of small intestine     Thrombocytopenic disorder     Fatigue     HPI:  Mr. Maikol Pate is a pleasant 72year old  male patient with underlying liver cirrhosis/MONGE with esophageal varices followed by Dr Jane Valderrama, who was referred back for evaluation of pancytopenia. 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 He had mismatch MMR. He received adjuvant FOLFOX from 2011 through 2012 followed by RT till 2012. CT scan in May 2014 showed no recurrent disease. US in 2014 showed 17 cm spleen, previously 16 cm with hepatic steatosis. Platelet was 80 - 566 since  through . CEA was 2.4. He was seen by Dr Jane Valderrama in 2019. AFP in 2019 was 4.6. On 2019, calcium 9.4, LFTs were unremarkable . Creatinine was 0.8. Total bilirubin was 0.8. Alpha-fetoprotein was 3. INR was 1.21. In 2019 CMP was grossly unremarkable except for glucose 125. WBC was 2.1, RBC count 3.68, hemoglobin 11.7, hematocrit 34.1, MCV 92.6, platelets 64. At the time he just got over flu. Due to slight worsening pancytopenia I offered bone marrow study.   For the time being he is agreeable to have anemic work-up and repeat blood test.  I will see him back in office in 6 weeks. If pancytopenia continues to get worse, he may consider bone marrow study. Labs in November 2019 were reviewed. He may have left knee surgery. I advise to discuss with surgeon about pancytopenia. He may need platelet transfusion before surgery. Labs in June 2020 were reviewed. He had left TKR in 9526 without complication. He did not receive blood product transfusion. He plans to have right knee replacement in fall 2020. On September 25, 2020 he came in for follow up visit. He will follow up with Dr Álvaro Bella in February 2021, and Right TKR will be in 2021. CBC in June and July 2020 was reviewed. He has stable thrombocytopenia. I will recheck labs in February 2021. He denied any bruises. He denies any chest pain or shortness of breath. He denied NVD. No melena or hematuria. No headache or dizziness. No depression. PAST MEDICAL HISTORY:  Hypertension  obstructive sleep apnea  GERD  hiatal hernia  hyperlipidemia  hemorrhoids  renal stones  history of colonic polyps  low B12 levels on B12 shots. Colonoscopy in March 2014 showed multiple polyps, Bx showed tubular adenoma. EGD on 12/11/14 by Dr. Jovana Babb showed esophageal varices, suggestive of Cirrhosis no other positive findings. c/w [place]  Abdominal ultrasound on November 11, 2015, and compared with the CT of 2014 and ultrasound July 2015 confirmed the enlargement of spleen at 16.6 cm, evidence of cholecystectomy, and nonvisualization of the pancreas because of bowel gas, otherwise negative. The liver showed a normal echogenicity without evidence of any intrahepatic biliary dilatation. PAST SURGICAL HISTORY:  Right knee surgery in 2002  arthroscopic surgery in 2003 on the left knee  umbilical hernia surgery in 2006  rotator cuff surgery in September of 2009  Garald Lapine surgery in September of 2011. FAMILY HISTORY:  Brother had lung cancer, twin brother had skin cancer.   One uncle had lung cancer, one aunt had lung cancer, father had lung cancer. SOCIAL HISTORY:  He is  for the past 25 years. He has five children, two of his own and three stepchildren. He is a nonsmoker, nondrinker. MEDICATION:  Reviewed as per chart. ALLERGIES:  Reviewed as per chart. Review of Systems: \"Per interval history; otherwise 10 point ROS is negative. \"     Vital Signs:  BP (!) 152/65 (Site: Left Upper Arm, Position: Sitting, Cuff Size: Medium Adult)   Pulse 60   Temp 97.4 °F (36.3 °C) (Temporal)   Ht 5' 7\" (1.702 m)   Wt 212 lb 12.8 oz (96.5 kg)   SpO2 98%   BMI 33.33 kg/m²     Physical Exam:  CONSTITUTIONAL: awake, alert, cooperative, no apparent distress   EYES: pupils equal, round and reactive to light, sclera clear and conjunctiva normal  ENT: Normocephalic, without obvious abnormality, atraumatic  NECK: supple, symmetrical, no jugular venous distension and no carotid bruits   HEMATOLOGIC/LYMPHATIC: no cervical, supraclavicular or axillary lymphadenopathy   LUNGS: no increased work of breathing and clear to auscultation   CARDIOVASCULAR: regular rate and rhythm, normal S1 and S2, no murmur noted  ABDOMEN: normal bowel sounds x 4, soft, non-distended, non-tender, no masses palpated, no hepatosplenomegaly   MUSCULOSKELETAL: full range of motion noted, tone is normal  NEUROLOGIC: awake, alert, oriented to name, place and time. Motor skills grossly intact. SKIN: No jaundice. appears intact. No petechial rash. EXTREMITIES: no LE edema. No cyanosis.     Labs:  Hematology:  Lab Results   Component Value Date    WBC 3.1 (L) 07/09/2020    RBC 3.97 (L) 07/09/2020    HGB 12.7 (L) 07/09/2020    HCT 37.3 (L) 07/09/2020    MCV 94.0 07/09/2020    MCH 32.0 07/09/2020    MCHC 34.1 07/09/2020    RDW 14.0 07/09/2020    PLT 78 (L) 07/09/2020    MPV 9.6 06/12/2020    BANDSPCT 18 (H) 03/14/2016    SEGSPCT 69.3 (H) 06/12/2020    EOSRELPCT 3.2 07/09/2020    BASOPCT 0.9 07/09/2020    LYMPHOPCT 16.6 (L) 07/09/2020    MONOT Platelet was 71. CBC in June and July 2020 was reviewed. I will monitor CBC for now. If pancytopenia continues to get worse, I would consider bone marrow study. 2.  Anemic work-up in November 2019 was grossly unremarkable. 3. I recommend to keep age-appropriate cancer screening up-to-date. We discussed about healthy diet and lifestyle. Colonoscopy was in 2017. 4.  He had left knee replacement in 0737 without complication. He plans to have right knee replacement in 2021. I recommend to have CBC checked prior to the right knee replacement. Return to clinic in 5 months or sooner. All of his questions have been answered for today. Recent imaging and labs were reviewed and discussed with the patient.       Electronically signed by Min Hauser MD on 9/23/20 at 10:30 AM EDT

## 2020-09-25 ENCOUNTER — HOSPITAL ENCOUNTER (OUTPATIENT)
Dept: INFUSION THERAPY | Age: 65
Discharge: HOME | End: 2020-09-25
Payer: MEDICARE

## 2020-09-25 ENCOUNTER — OFFICE VISIT (OUTPATIENT)
Dept: ONCOLOGY | Age: 65
End: 2020-09-25
Payer: MEDICARE

## 2020-09-25 VITALS
SYSTOLIC BLOOD PRESSURE: 152 MMHG | HEART RATE: 60 BPM | WEIGHT: 212.8 LBS | TEMPERATURE: 97.4 F | HEIGHT: 67 IN | BODY MASS INDEX: 33.4 KG/M2 | OXYGEN SATURATION: 98 % | DIASTOLIC BLOOD PRESSURE: 65 MMHG

## 2020-09-25 PROCEDURE — 99213 OFFICE O/P EST LOW 20 MIN: CPT | Performed by: INTERNAL MEDICINE

## 2020-09-25 ASSESSMENT — PATIENT HEALTH QUESTIONNAIRE - PHQ9
SUM OF ALL RESPONSES TO PHQ QUESTIONS 1-9: 0
1. LITTLE INTEREST OR PLEASURE IN DOING THINGS: 0
SUM OF ALL RESPONSES TO PHQ9 QUESTIONS 1 & 2: 0
2. FEELING DOWN, DEPRESSED OR HOPELESS: 0
SUM OF ALL RESPONSES TO PHQ QUESTIONS 1-9: 0

## 2020-09-25 NOTE — PROGRESS NOTES
MA Rooming Questions  Patient: Daron Wong  MRN: [id number]    Date: 9/25/2020        1. Do you have any new issues?   no         2. Do you need any refills on medications?    no    3. Have you had any imaging done since your last visit?   no    4. Have you been hospitalized or seen in the emergency room since your last visit here?   no    5. Did the patient have a depression screening completed today?  Yes    PHQ-9 Total Score: 0 (9/25/2020  9:52 AM)       PHQ-9 Given to (if applicable):               PHQ-9 Score (if applicable):                     [] Positive     []  Negative              Does question #9 need addressed (if applicable)                     [] Yes    []  No               Kedar Cao

## 2020-10-15 ENCOUNTER — HOSPITAL ENCOUNTER (OUTPATIENT)
Dept: ULTRASOUND IMAGING | Age: 65
Discharge: HOME | End: 2020-10-15
Payer: MEDICARE

## 2020-10-15 LAB
INR BLD: 1.2 (ref 0.9–1.1)
PT: 15 SEC (ref 11.7–13.9)

## 2020-10-15 PROCEDURE — 76705 ECHO EXAM OF ABDOMEN: CPT

## 2021-01-22 NOTE — PROGRESS NOTES
Patient Name: Chantell Montes  Patient : 1955  Patient MRN: [id number]     Primary Oncologist: Negrita Garcia MD  Referring Provider: Princess Elpidio MD     Date of Service: 2021      Chief Complaint:   Chief Complaint   Patient presents with    Follow-up     He came in for follow-up visit. Patient Active Problem List:     Splenomegaly     NAFLD (nonalcoholic fatty liver disease)     Carcinoma of duodenum (HCC)     Liver cirrhosis secondary to MONGE (nonalcoholic steatohepatitis) ([facility] [address]     Port-A-Cath in place     Bilateral primary osteoarthritis of knee     Arthritis of both knees     Leukopenia     Primary malignant neoplasm of small intestine     Thrombocytopenic disorder     Fatigue     HPI:  Wing Cancino is a pleasant 72year old  male patient with underlying liver cirrhosis/MONGE with esophageal varices followed by Dr Katie House, who was referred back for evaluation of pancytopenia. 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 He had mismatch MMR. He received adjuvant FOLFOX from 2011 through 2012 followed by RT till 2012. CT scan in May 2014 showed no recurrent disease. US in 2014 showed 17 cm spleen, previously 16 cm with hepatic steatosis. Platelet was 80 - 394 since  through . CEA was 2.4. He was seen by Dr Katie House in 2019. AFP in 2019 was 4.6. On 2019, calcium 9.4, LFTs were unremarkable . Creatinine was 0.8. Total bilirubin was 0.8. Alpha-fetoprotein was 3. INR was 1.21. In 2019 CMP was grossly unremarkable except for glucose 125. WBC was 2.1, RBC count 3.68, hemoglobin 11.7, hematocrit 34.1, MCV 92.6, platelets 64. At the time he just got over flu. If pancytopenia continues to get worse, he may consider bone marrow study. Labs in 2019 were reviewed.   Labs in 2020 were reviewed. He had left TKR in 0387 without complication. He did not receive blood product transfusion. He plans to have right knee replacement in fall 2020. On September 25, 2020 he came in for follow up visit. He will follow up with Dr Marianne Rasmussen in February 2021, and Right TKR will be in 2021. CBC in June and July 2020 was reviewed. He has stable thrombocytopenia. On February 2, 2021 when he came in for follow-up visit. Follow-up visit with orthopedic surgeon Dr. Bobo Mari on February 23, 2021 to discuss about right knee surgery. He had flu shot in 2020. He will also follow-up with GI every 6 months. I will recheck labs today. He denied any bruises. He denies any chest pain or shortness of breath. He denied nausea, vomiting or diarrhea. No melena or hematuria. No headache or dizziness. No depression. PAST MEDICAL HISTORY:  Hypertension  obstructive sleep apnea  GERD  hiatal hernia  hyperlipidemia  hemorrhoids  renal stones  history of colonic polyps  low B12 levels on B12 shots. Colonoscopy in March 2014 showed multiple polyps, Bx showed tubular adenoma. EGD on 12/11/14 by Dr. Jannie Patel showed esophageal varices, suggestive of Cirrhosis no other positive findings. c/w [place]  Abdominal ultrasound on November 11, 2015, and compared with the CT of 2014 and ultrasound July 2015 confirmed the enlargement of spleen at 16.6 cm, evidence of cholecystectomy, and nonvisualization of the pancreas because of bowel gas, otherwise negative. The liver showed a normal echogenicity without evidence of any intrahepatic biliary dilatation. PAST SURGICAL HISTORY:  Right knee surgery in 2002  arthroscopic surgery in 2003 on the left knee  umbilical hernia surgery in 2006  rotator cuff surgery in September of 2009  Ashley Pare surgery in September of 2011. FAMILY HISTORY:  Brother had lung cancer, twin brother had skin cancer. One uncle had lung cancer, one aunt had lung cancer, father had lung cancer.      SOCIAL HISTORY:  He is  for the past 25 years. He has five children, two of his own and three stepchildren. He is a nonsmoker, nondrinker. Review of Systems: \"Per interval history; otherwise 10 point ROS is negative. \"     Vital Signs:  /71 (Site: Right Upper Arm, Position: Sitting, Cuff Size: Medium Adult)   Pulse 62   Temp 97.3 °F (36.3 °C) (Infrared)   Resp 16   Ht 5' 7\" (1.702 m)   Wt 217 lb 6.4 oz (98.6 kg)   SpO2 97%   BMI 34.05 kg/m²     Physical Exam:  CONSTITUTIONAL: awake, alert, cooperative, no apparent distress   EYES: pupils equal, round and reactive to light, sclera clear and conjunctiva normal  ENT: Normocephalic, without obvious abnormality, atraumatic  NECK: supple, symmetrical, no jugular venous distension and no carotid bruits   HEMATOLOGIC/LYMPHATIC: no cervical, supraclavicular or axillary lymphadenopathy   LUNGS: no increased work of breathing and clear to auscultation   CARDIOVASCULAR: regular rate and rhythm, normal S1 and S2, no murmur noted  ABDOMEN: normal bowel sounds x 4, soft, non-distended, non-tender, no masses palpated, no hepatosplenomegaly   MUSCULOSKELETAL: full range of motion noted, tone is normal  NEUROLOGIC: awake, alert, oriented to name, place and time. Motor skills grossly intact. SKIN: No jaundice. appears intact. No petechial rash. EXTREMITIES: + LE edema. No cyanosis.     Labs:  Hematology:  Lab Results   Component Value Date    WBC 3.1 (L) 07/09/2020    RBC 3.97 (L) 07/09/2020    HGB 12.7 (L) 07/09/2020    HCT 37.3 (L) 07/09/2020    MCV 94.0 07/09/2020    MCH 32.0 07/09/2020    MCHC 34.1 07/09/2020    RDW 14.0 07/09/2020    PLT 78 (L) 07/09/2020    MPV 9.6 06/12/2020    BANDSPCT 18 (H) 03/14/2016    SEGSPCT 69.3 (H) 06/12/2020    EOSRELPCT 3.2 07/09/2020    BASOPCT 0.9 07/09/2020    LYMPHOPCT 16.6 (L) 07/09/2020    MONOPCT 10.7 07/09/2020    BANDABS 0.59 03/14/2016    SEGSABS 2.1 06/12/2020    EOSABS 0.1 07/09/2020    BASOSABS 0.0 07/09/2020 LYMPHSABS 0.5 (L) 07/09/2020    MONOSABS 0.3 07/09/2020    DIFFTYPE AUTOMATIC METHOD 07/09/2020    ANISOCYTOSIS 2+ 08/02/2011    POLYCHROM 1+ 08/04/2011    PLTM FEW 03/14/2016     Lab Results   Component Value Date    ESR 16 01/24/2020     Chemistry:  Lab Results   Component Value Date     07/09/2020    K 4.2 07/09/2020     07/09/2020    CO2 27 07/09/2020    BUN 12 07/09/2020    CREATININE 0.7 07/09/2020    GLUCOSE 134 (H) 07/09/2020    CALCIUM 9.3 07/09/2020    PROT 5.5 (L) 07/09/2020    LABALBU 3.3 (L) 07/09/2020    BILITOT 0.8 07/09/2020    ALKPHOS 97 07/09/2020    AST 23 07/09/2020    ALT 18 07/09/2020    LABGLOM 100 07/09/2020    GFRAA 116 07/09/2020    GLOB 2.2 07/09/2020    POCGLU 107 (H) 10/14/2011     Lab Results   Component Value Date     07/10/2017     Lab Results   Component Value Date    TSHHS 0.609 11/05/2019     Immunology:  Lab Results   Component Value Date    PROT 5.5 (L) 07/09/2020    SPEP  11/05/2019     INTERPRETATION - Within normal limits. Heather Gant MD    ALBUMINELP 3.6 11/05/2019    LABALPH 0.3 11/05/2019    LABALPH 0.5 11/05/2019    LABBETA 0.9 11/05/2019    GAMGLOB 1.3 11/05/2019     Coagulation Panel:  Lab Results   Component Value Date    PROTIME 15.0 (H) 10/15/2020    INR 1.2 (H) 10/15/2020    APTT 36.8 08/02/2011     Anemia Panel:  Lab Results   Component Value Date    NIUVPOIE97 >2000 (H) 11/05/2019    FOLATE 12.8 11/05/2019     Tumor Markers:  Lab Results   Component Value Date    CEA 2.4 07/10/2017     30.1 11/06/2014    PSA 0.59 02/11/2015      Observations:  PHQ-9 Total Score: 0 (2/2/2021  9:05 AM)      Assessment & Plan:    1. He has pancytopenia which could be related to hypersplenism due to underlying liver cirrhosis/[place]. He is followed by Dr. Chelsey Gallegos. Labs in December 2019 were reviewed. CBC in June 2020 was grossly stable for him. Platelet was 71. CBC in June and July 2020 was reviewed. Check CBC today. I will monitor CBC for now.   If pancytopenia continues to get worse, I would consider bone marrow study. 2.  Anemic work-up in November 2019 was grossly unremarkable. He has anemia of chronic disease. 3. I recommend to keep age-appropriate cancer screening up-to-date. We discussed about healthy diet and lifestyle. Colonoscopy was in 2017. Had flu shot. 4.  He had left knee replacement in 8814 without complication. He plans to have right knee replacement in 2021. I recommend to have CBC checked prior to the right knee replacement. 5.  He has liver cirrhosis. Has been followed by GI. He has mild edema to lower extremity related to liver cirrhosis. Return to clinic in 6 months or sooner. All of his questions have been answered for today. Recent imaging and labs were reviewed and discussed with the patient.       Electronically signed by Deanna Padilla MD on 9/23/20 at 10:30 AM EDT

## 2021-02-02 ENCOUNTER — HOSPITAL ENCOUNTER (OUTPATIENT)
Dept: INFUSION THERAPY | Age: 66
Discharge: HOME | End: 2021-02-02
Payer: MEDICARE

## 2021-02-02 ENCOUNTER — OFFICE VISIT (OUTPATIENT)
Dept: ONCOLOGY | Age: 66
End: 2021-02-02
Payer: MEDICARE

## 2021-02-02 VITALS
RESPIRATION RATE: 16 BRPM | TEMPERATURE: 97.3 F | SYSTOLIC BLOOD PRESSURE: 132 MMHG | DIASTOLIC BLOOD PRESSURE: 71 MMHG | HEART RATE: 62 BPM | HEIGHT: 67 IN | WEIGHT: 217.4 LBS | OXYGEN SATURATION: 97 % | BODY MASS INDEX: 34.12 KG/M2

## 2021-02-02 DIAGNOSIS — D69.59 OTHER SECONDARY THROMBOCYTOPENIA: Primary | ICD-10-CM

## 2021-02-02 DIAGNOSIS — D69.59 OTHER SECONDARY THROMBOCYTOPENIA: ICD-10-CM

## 2021-02-02 LAB
ALBUMIN SERPL-MCNC: 3.3 GM/DL (ref 3.4–5)
ALP BLD-CCNC: 87 IU/L (ref 40–128)
ALT SERPL-CCNC: 19 U/L (ref 10–40)
ANION GAP SERPL CALCULATED.3IONS-SCNC: 7 MMOL/L (ref 4–16)
AST SERPL-CCNC: 31 IU/L (ref 15–37)
BASOPHILS ABSOLUTE: 0 K/CU MM
BASOPHILS RELATIVE PERCENT: 0.3 % (ref 0–1)
BILIRUB SERPL-MCNC: 1.4 MG/DL (ref 0–1)
BUN BLDV-MCNC: 13 MG/DL (ref 6–23)
CALCIUM SERPL-MCNC: 8.5 MG/DL (ref 8.3–10.6)
CHLORIDE BLD-SCNC: 107 MMOL/L (ref 99–110)
CO2: 26 MMOL/L (ref 21–32)
CREAT SERPL-MCNC: 0.8 MG/DL (ref 0.9–1.3)
DIFFERENTIAL TYPE: ABNORMAL
EOSINOPHILS ABSOLUTE: 0.1 K/CU MM
EOSINOPHILS RELATIVE PERCENT: 3.2 % (ref 0–3)
GFR AFRICAN AMERICAN: >60 ML/MIN/1.73M2
GFR NON-AFRICAN AMERICAN: >60 ML/MIN/1.73M2
GLUCOSE BLD-MCNC: 179 MG/DL (ref 70–99)
HCT VFR BLD CALC: 38.3 % (ref 42–52)
HEMOGLOBIN: 13 GM/DL (ref 13.5–18)
LYMPHOCYTES ABSOLUTE: 0.5 K/CU MM
LYMPHOCYTES RELATIVE PERCENT: 13 % (ref 24–44)
MCH RBC QN AUTO: 32.5 PG (ref 27–31)
MCHC RBC AUTO-ENTMCNC: 33.9 % (ref 32–36)
MCV RBC AUTO: 95.8 FL (ref 78–100)
MONOCYTES ABSOLUTE: 0.4 K/CU MM
MONOCYTES RELATIVE PERCENT: 10.1 % (ref 0–4)
PDW BLD-RTO: 13.9 % (ref 11.7–14.9)
PLATELET # BLD: 72 K/CU MM (ref 140–440)
PMV BLD AUTO: 9.2 FL (ref 7.5–11.1)
POTASSIUM SERPL-SCNC: 4.1 MMOL/L (ref 3.5–5.1)
RBC # BLD: 4 M/CU MM (ref 4.6–6.2)
SEGMENTED NEUTROPHILS ABSOLUTE COUNT: 2.5 K/CU MM
SEGMENTED NEUTROPHILS RELATIVE PERCENT: 73.4 % (ref 36–66)
SODIUM BLD-SCNC: 140 MMOL/L (ref 135–145)
TOTAL PROTEIN: 5.7 GM/DL (ref 6.4–8.2)
WBC # BLD: 3.5 K/CU MM (ref 4–10.5)

## 2021-02-02 PROCEDURE — 99213 OFFICE O/P EST LOW 20 MIN: CPT | Performed by: INTERNAL MEDICINE

## 2021-02-02 PROCEDURE — 36415 COLL VENOUS BLD VENIPUNCTURE: CPT

## 2021-02-02 PROCEDURE — 85025 COMPLETE CBC W/AUTO DIFF WBC: CPT

## 2021-02-02 PROCEDURE — 80053 COMPREHEN METABOLIC PANEL: CPT

## 2021-02-02 PROCEDURE — 99211 OFF/OP EST MAY X REQ PHY/QHP: CPT

## 2021-02-02 ASSESSMENT — PATIENT HEALTH QUESTIONNAIRE - PHQ9: 2. FEELING DOWN, DEPRESSED OR HOPELESS: 0

## 2021-02-11 ENCOUNTER — OFFICE VISIT (OUTPATIENT)
Dept: ORTHOPEDIC SURGERY | Age: 66
End: 2021-02-11
Payer: MEDICARE

## 2021-02-11 VITALS
BODY MASS INDEX: 34.06 KG/M2 | HEART RATE: 79 BPM | WEIGHT: 217 LBS | HEIGHT: 67 IN | OXYGEN SATURATION: 99 % | RESPIRATION RATE: 15 BRPM

## 2021-02-11 DIAGNOSIS — M17.11 PRIMARY OSTEOARTHRITIS OF RIGHT KNEE: Primary | ICD-10-CM

## 2021-02-11 DIAGNOSIS — Z96.652 S/P TOTAL KNEE ARTHROPLASTY, LEFT: ICD-10-CM

## 2021-02-11 PROCEDURE — 99214 OFFICE O/P EST MOD 30 MIN: CPT | Performed by: ORTHOPAEDIC SURGERY

## 2021-02-11 NOTE — PROGRESS NOTES
Patient presents to the office today for his 1 year post op check of the left TKA DOS 2/17/20 and Pre-op of the right TKA. Pt states his left knee is a 0/10. Pt states his right knee is 9/10. Pt states he is in constant pain in the right knee. Pain is globally in the knee. Pt states he has tried a knee brace, ibuprofen, tylenol, ice and heat with no relief. Pt states he is ready to discuss a total knee replacement of the right knee.

## 2021-02-11 NOTE — PATIENT INSTRUCTIONS
Continue weight-bearing as tolerated. Continue range of motion exercises as instructed. Ice and elevate as needed. Tylenol or Motrin for pain. We will schedule surgery at soonest convenience. If you have any questions regarding your surgery please call our office and ask to speak with Dyan.  [phone]

## 2021-02-12 ASSESSMENT — ENCOUNTER SYMPTOMS
VOMITING: 0
WHEEZING: 0
COLOR CHANGE: 0
EYE PAIN: 0
SHORTNESS OF BREATH: 0
CHEST TIGHTNESS: 0
EYE REDNESS: 0

## 2021-02-12 NOTE — PROGRESS NOTES
2/11/2021   Chief Complaint   Patient presents with    1 Year Follow Up     left TKA DOS 2/17/20    Pre-op Exam     right TKA         History of Present Illness:                             Dara Beavers is a 72 y.o. male who presents today for evaluation of his worsening right knee pain and follow-up of his left total knee replacement. He is very pleased with the progress and improvement he is seen in the left knee following knee replacement surgery. He has no problems or complaints and has excellent strength and range of motion at the left knee. He feels that his alignment is greatly improved as well. He has now had progression of his symptoms in the right knee which are similar to what he experienced in the left knee prior to surgery. He has deep aching stiffness in the knee and a progressive worsening varus alignment. He gets a swelling and stiffness and catching and popping. He is afraid that the knee may give out and cause him to fall. He would like to proceed with knee replacement surgery on the right. Patient presents to the office today for his 1 year post op check of the left TKA DOS 2/17/20 and Pre-op of the right TKA. Pt states his left knee is a 0/10. Pt states his right knee is 9/10. Pt states he is in constant pain in the right knee. Pain is globally in the knee. Pt states he has tried a knee brace, ibuprofen, tylenol, ice and heat with no relief. Pt states he is ready to discuss a total knee replacement of the right knee. Medical History  Patient's medications, allergies, past medical, surgical, social and family histories were reviewed and updated as appropriate.     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    Esophageal varices ([facility] [address] 12/11/2014    HLD (hyperlipidemia)     Hypertension     Follows with PCP    Neuropathy     Bilat feet    Osteoarthritis     Bilat knees     Family History   Problem Relation Age of Onset    Vision Loss Mother     High Blood Pressure Mother     Arthritis Mother     Asthma Mother     Cancer Father     No Known Problems Sister     Arthritis Brother     High Blood Pressure Brother     Asthma Brother     Other Sister         Rare Blood disease    No Known Problems Sister     No Known Problems Daughter     No Known Problems Daughter      Social History     Socioeconomic History    Marital status:      Spouse name: None    Number of children: None    Years of education: None    Highest education level: None   Occupational History    None   Social Needs    Financial resource strain: None    Food insecurity     Worry: None     Inability: None    Transportation needs     Medical: None     Non-medical: None   Tobacco Use    Smoking status: Never Smoker    Smokeless tobacco: Never Used   Substance and Sexual Activity    Alcohol use: No    Drug use: No    Sexual activity: Yes     Partners: Female   Lifestyle    Physical activity     Days per week: None     Minutes per session: None    Stress: None   Relationships    Social connections     Talks on phone: None     Gets together: None     Attends L[employer] service: None     Active member of club or organization: None     Attends meetings of clubs or organizations: None     Relationship status: None    Intimate partner violence     Fear of current or ex partner: None     Emotionally abused: None     Physically abused: None     Forced sexual activity: None   Other Topics Concern    None   Social History Narrative    None     Current Outpatient Medications   Medication Sig Dispense Refill    nadolol (CORGARD) 40 MG tablet TAKE ONE TABLET BY MOUTH ONCE DAILY 30 tablet 11    Sulfamethoxazole-Trimethoprim (BACTRIM PO) Take by mouth 2 times daily      aspirin 81 MG chewable tablet Take 1 tablet by mouth 2 times daily 60 tablet 0    docusate sodium (COLACE, DULCOLAX) 100 MG CAPS Take 100 mg by mouth 2 times daily as needed for Constipation 30 capsule 1    lisinopril (PRINIVIL;ZESTRIL) 2.5 MG tablet Take 2.5 mg by mouth daily      vitamin C (ASCORBIC ACID) 500 MG tablet Take 500 mg by mouth daily      ferrous sulfate 325 (65 FE) MG tablet Take 325 mg by mouth daily (with breakfast)      calcium carbonate 600 MG TABS tablet Take 1 tablet by mouth daily.  vitamin B-12 (CYANOCOBALAMIN) 1000 MCG tablet Take 2,000 mcg by mouth daily. No current facility-administered medications for this visit. Allergies   Allergen Reactions    Erythromycin      vomitting    Adhesive Tape     Lorazepam Itching    Macrolides And Ketolides     Zolpidem Itching    Zolpidem Tartrate Itching       Review of Systems:   Review of Systems   Constitutional: Negative for chills and fever. HENT: Negative for congestion and sneezing. Eyes: Negative for pain and redness. Respiratory: Negative for chest tightness, shortness of breath and wheezing. Cardiovascular: Negative for chest pain and palpitations. Gastrointestinal: Negative for vomiting. Musculoskeletal: Positive for arthralgias. Skin: Negative for color change and rash. Neurological: Negative for weakness and numbness. Psychiatric/Behavioral: Negative for agitation. The patient is not nervous/anxious. Examination:  General Exam:  Vitals: Pulse 79   Resp 15   Ht 5' 7\" (1.702 m)   Wt 217 lb (98.4 kg)   SpO2 99%   BMI 33.99 kg/m²    Physical Exam  Vitals signs and nursing note reviewed. Constitutional:       Appearance: Normal appearance. HENT:      Head: Normocephalic and atraumatic. Eyes:      Conjunctiva/sclera: Conjunctivae normal.      Pupils: Pupils are equal, round, and reactive to light. Neck:      Musculoskeletal: Normal range of motion.    Pulmonary:      Effort: Pulmonary effort is normal.   Musculoskeletal:      Right hip: He exhibits normal range of motion, normal strength, no tenderness, no bony tenderness, no swelling, no crepitus and no deformity. Left hip: Normal.      Left knee: He exhibits normal range of motion, no swelling, no effusion, no ecchymosis, no deformity, no laceration, normal alignment, no LCL laxity, normal meniscus and no MCL laxity. No tenderness found. No medial joint line and no lateral joint line tenderness noted. Comments: Right Lower Extremity:    There is moderate to severe tenderness to palpation diffusely throughout the knee, most significant along the medial joint line. There is a moderate knee joint effusion present and mild global swelling anteriorly. Mild restricted range of motion at the knee with approximately 5 degrees lack of full extension and knee flexion up to 120 degrees with pain at the extremes of motion. There is mild crepitation during active range of motion. There is moderate to severe varus knee alignment. There is 5 out of 5 strength with knee flexion and extension. There is no instability to varus or valgus stress testing and anterior and posterior drawer testing. Sensation is intact to light touch throughout the lower extremity. There is positive medial Kobi's test with tenderness to palpation and pain along the medial joint line. Skin is intact. Pulses are intact    No pain with active range of motion of the hip. Strength and range of motion of the hip are intact. No tenderness to palpation at the hip. Left lower extremity:  Well-healed midline scar at the knee. Range of motion present 0 to 125 degrees. Excellent alignment and stability at the knee. Strength is 5 out of 5 with flexion and extension. No tenderness to palpation. No swelling. Skin:     General: Skin is warm and dry. Neurological:      Mental Status: He is alert and oriented to person, place, and time.    Psychiatric:         Mood and Affect: Mood normal.         Behavior: Behavior normal. Diagnostic testing:  X-rays reviewed in office, I independently reviewed the films in the office today:   Xr Knee Left (3 Views)    Result Date: 2/11/2021  3 views of the left knee show well aligned total knee replacement with good bone cement interface and no complications or fractures or loosening. Xr Knee Right (3 Views)    Result Date: 2/11/2021  4 views of the Right Knee: There is evidence of severe degenerative changes present in all 3 compartments of the knee most significant along the medial compartment where there is advanced joint space collapse and bone on bone articulation with prominent osteophyte formation. There is subchondral sclerosis present in medial compartment with cortical irregularities on both sides of the joint and mild tibial erosion. There is a moderately severe varus alignment present. There are prominent arthritic changes in the patellofemoral joint with joint space narrowing and osteophyte formation. Normal bone density is present. Mild soft tissue swelling present at the knee joint. No fractures or loose bodies identified. Impression: Severe varus tricompartmental arthritis       Office Procedures:  No orders of the defined types were placed in this encounter. Assessment and Plan  1. Right knee advanced primary osteoarthritis    2. History of previous left total knee replacement    He has done extremely well following left total knee replacement surgery. The left knee is functioning well and he is interested now in addressing his right knee. We discussed the severity of the degenerative findings on both on the x-rays and physical exam.  The patient feels that they have exhausted conservative measures. The patient has previously tried injections, physical therapy, over-the-counter pain medications, and activity modification. The pain level is severe and bothers the patient on a daily basis, including interrupting sleep at night.   Activities of daily living are difficult to perform. The patient has trouble getting dressed, getting up from a seated position, climbing stairs, and has fear of falling. The pain and dysfunction at the knee are severely limiting at this stage and the patient would like to consider surgical intervention. I have recommended surgical intervention for a total knee replacement. We discussed the risks, benefits, and alternatives to surgery. We discussed the intended benefits from a well-functioning replacement and the anticipated recovery process. We discussed potential risks and complications including but not limited to DVT/PE, infection, stiffness, loosening, and fracture. We discussed pain control, physical therapy, and anticoagulation during the perioperative timeframe. The patient would like to proceed with knee replacement surgery and will be enrolled in the joint replacement class    Plan will be to proceed with a robotic total knee replacement. And he will be on aspirin postoperatively for DVT prophylaxis. The patient was counseled at length about the risks of lizbet Covid-19 during their perioperative period and any recovery window from their procedure. The patient was made aware that lizbet Covid-19  may worsen their prognosis for recovering from their procedure  and lend to a higher morbidity and/or mortality risk. All material risks, benefits, and reasonable alternatives including postponing the procedure were discussed. The patient does wish to proceed with the procedure at this time.         Electronically signed by Gray Palacios MD on 2/12/2021 at 8:15 AM

## 2021-02-18 ENCOUNTER — TELEPHONE (OUTPATIENT)
Dept: ORTHOPEDIC SURGERY | Age: 66
End: 2021-02-18

## 2021-02-18 NOTE — TELEPHONE ENCOUNTER
Nuris Garcia called and left message that he has not heard from scheduling for his CT scan prior to robotic TKA that is scheduled on 3/8/2021. Can you please put in order and contact patient.

## 2021-02-19 DIAGNOSIS — M17.11 PRIMARY OSTEOARTHRITIS OF RIGHT KNEE: Primary | ICD-10-CM

## 2021-02-22 ENCOUNTER — TELEPHONE (OUTPATIENT)
Dept: ORTHOPEDIC SURGERY | Age: 66
End: 2021-02-22

## 2021-02-22 ENCOUNTER — HOSPITAL ENCOUNTER (OUTPATIENT)
Dept: CT IMAGING | Age: 66
Discharge: HOME | End: 2021-02-22
Payer: MEDICARE

## 2021-02-22 DIAGNOSIS — M17.11 PRIMARY OSTEOARTHRITIS OF RIGHT KNEE: ICD-10-CM

## 2021-02-22 PROCEDURE — 73700 CT LOWER EXTREMITY W/O DYE: CPT

## 2021-02-22 NOTE — TELEPHONE ENCOUNTER
Called patient's insurance spoke with Quincy Christine no prior Giovanna Sapp is needed for cpt code 77517 as long as it is a 23 hour observation.  It has a 1 and 1 day allowed  Ref# [id number]

## 2021-02-23 ENCOUNTER — HOSPITAL ENCOUNTER (OUTPATIENT)
Dept: INFUSION THERAPY | Age: 66
Discharge: HOME | End: 2021-02-23
Payer: MEDICARE

## 2021-02-23 ENCOUNTER — CLINICAL DOCUMENTATION (OUTPATIENT)
Dept: ONCOLOGY | Age: 66
End: 2021-02-23

## 2021-02-23 DIAGNOSIS — D69.59 OTHER SECONDARY THROMBOCYTOPENIA: Primary | ICD-10-CM

## 2021-02-23 DIAGNOSIS — D69.59 OTHER SECONDARY THROMBOCYTOPENIA: ICD-10-CM

## 2021-02-23 LAB
ALBUMIN SERPL-MCNC: 3.3 GM/DL (ref 3.4–5)
ALP BLD-CCNC: 98 IU/L (ref 40–128)
ALT SERPL-CCNC: 22 U/L (ref 10–40)
ANION GAP SERPL CALCULATED.3IONS-SCNC: 7 MMOL/L (ref 4–16)
AST SERPL-CCNC: 32 IU/L (ref 15–37)
BASOPHILS ABSOLUTE: 0 K/CU MM
BASOPHILS RELATIVE PERCENT: 0.5 % (ref 0–1)
BILIRUB SERPL-MCNC: 0.7 MG/DL (ref 0–1)
BUN BLDV-MCNC: 8 MG/DL (ref 6–23)
CALCIUM SERPL-MCNC: 8.6 MG/DL (ref 8.3–10.6)
CHLORIDE BLD-SCNC: 106 MMOL/L (ref 99–110)
CO2: 27 MMOL/L (ref 21–32)
CREAT SERPL-MCNC: 0.7 MG/DL (ref 0.9–1.3)
DIFFERENTIAL TYPE: ABNORMAL
EOSINOPHILS ABSOLUTE: 0.1 K/CU MM
EOSINOPHILS RELATIVE PERCENT: 3.1 % (ref 0–3)
GFR AFRICAN AMERICAN: >60 ML/MIN/1.73M2
GFR NON-AFRICAN AMERICAN: >60 ML/MIN/1.73M2
GLUCOSE BLD-MCNC: 186 MG/DL (ref 70–99)
HCT VFR BLD CALC: 38.1 % (ref 42–52)
HEMOGLOBIN: 12.8 GM/DL (ref 13.5–18)
LYMPHOCYTES ABSOLUTE: 0.5 K/CU MM
LYMPHOCYTES RELATIVE PERCENT: 13.5 % (ref 24–44)
MCH RBC QN AUTO: 32.6 PG (ref 27–31)
MCHC RBC AUTO-ENTMCNC: 33.6 % (ref 32–36)
MCV RBC AUTO: 96.9 FL (ref 78–100)
MONOCYTES ABSOLUTE: 0.4 K/CU MM
MONOCYTES RELATIVE PERCENT: 10.5 % (ref 0–4)
PDW BLD-RTO: 14.5 % (ref 11.7–14.9)
PLATELET # BLD: 78 K/CU MM (ref 140–440)
PMV BLD AUTO: 9.5 FL (ref 7.5–11.1)
POC INR: 1 INDEX
POTASSIUM SERPL-SCNC: 4.3 MMOL/L (ref 3.5–5.1)
PROTHROMBIN TIME, POC: 12.5 SECONDS (ref 10–14.3)
RBC # BLD: 3.93 M/CU MM (ref 4.6–6.2)
SEGMENTED NEUTROPHILS ABSOLUTE COUNT: 2.8 K/CU MM
SEGMENTED NEUTROPHILS RELATIVE PERCENT: 72.4 % (ref 36–66)
SODIUM BLD-SCNC: 140 MMOL/L (ref 135–145)
TOTAL PROTEIN: 5.7 GM/DL (ref 6.4–8.2)
WBC # BLD: 3.9 K/CU MM (ref 4–10.5)

## 2021-02-23 PROCEDURE — 80053 COMPREHEN METABOLIC PANEL: CPT

## 2021-02-23 PROCEDURE — 85025 COMPLETE CBC W/AUTO DIFF WBC: CPT

## 2021-02-23 PROCEDURE — 99999 POCT PT/INR: CPT | Performed by: INTERNAL MEDICINE

## 2021-02-23 PROCEDURE — 85610 PROTHROMBIN TIME: CPT

## 2021-02-23 PROCEDURE — 36415 COLL VENOUS BLD VENIPUNCTURE: CPT

## 2021-02-24 ENCOUNTER — CLINICAL DOCUMENTATION (OUTPATIENT)
Dept: ONCOLOGY | Age: 66
End: 2021-02-24

## 2021-02-24 ENCOUNTER — ANESTHESIA EVENT (OUTPATIENT)
Dept: OPERATING ROOM | Age: 66
End: 2021-02-24
Payer: MEDICARE

## 2021-02-24 NOTE — ANESTHESIA PRE PROCEDURE
Department of Anesthesiology  Preprocedure Note       Name:  Cristian Mendoza   Age:  72 y.o.  :  1955                                          MRN:  [id number]         Date:  2021      Surgeon: Soheila Doan):  Rosa Harmon MD    Procedure: RIGHT KNEE TOTAL ARTHROPLASTY ROBOTIC (Right )    Medications prior to admission:   Prior to Admission medications    Medication Sig Start Date End Date Taking? Authorizing Provider   nadolol (CORGARD) 40 MG tablet TAKE ONE TABLET BY MOUTH ONCE DAILY 10/5/20   Iman Baez MD   Sulfamethoxazole-Trimethoprim (BACTRIM PO) Take by mouth 2 times daily    Historical Provider, MD   aspirin 81 MG chewable tablet Take 1 tablet by mouth 2 times daily 20   Rosa Harmon MD   docusate sodium (COLACE, DULCOLAX) 100 MG CAPS Take 100 mg by mouth 2 times daily as needed for Constipation 20   Rosa Harmon MD   lisinopril (PRINIVIL;ZESTRIL) 2.5 MG tablet Take 2.5 mg by mouth daily    Historical Provider, MD   vitamin C (ASCORBIC ACID) 500 MG tablet Take 500 mg by mouth daily    Historical Provider, MD   ferrous sulfate 325 (65 FE) MG tablet Take 325 mg by mouth daily (with breakfast)    Historical Provider, MD   calcium carbonate 600 MG TABS tablet Take 1 tablet by mouth daily. Historical Provider, MD   vitamin B-12 (CYANOCOBALAMIN) 1000 MCG tablet Take 2,000 mcg by mouth daily.     Historical Provider, MD       Current medications:    Current Outpatient Medications   Medication Sig Dispense Refill    nadolol (CORGARD) 40 MG tablet TAKE ONE TABLET BY MOUTH ONCE DAILY 30 tablet 11    Sulfamethoxazole-Trimethoprim (BACTRIM PO) Take by mouth 2 times daily      aspirin 81 MG chewable tablet Take 1 tablet by mouth 2 times daily 60 tablet 0    docusate sodium (COLACE, DULCOLAX) 100 MG CAPS Take 100 mg by mouth 2 times daily as needed for Constipation 30 capsule 1    lisinopril (PRINIVIL;ZESTRIL) 2.5 MG tablet Take 2.5 mg by mouth daily      vitamin C (ASCORBIC ACID) 500 MG tablet Take 500 mg by mouth daily      ferrous sulfate 325 (65 FE) MG tablet Take 325 mg by mouth daily (with breakfast)      calcium carbonate 600 MG TABS tablet Take 1 tablet by mouth daily.  vitamin B-12 (CYANOCOBALAMIN) 1000 MCG tablet Take 2,000 mcg by mouth daily. No current facility-administered medications for this visit. 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 steatohepatitis) (HCC) K75.81, K74.60    Port-A-Cath in place Z95.828    Bilateral primary osteoarthritis of knee M17.0    Arthritis of both knees M17.0    Leukopenia D72.819    Primary malignant neoplasm of small intestine C17.0    Thrombocytopenic disorder D69.59    Fatigue R53.83       Past Medical History:        Diagnosis Date    Anemia 2011    Cancer ([address]     whipple    Cirrhosis ([facility] [address] Dx: 2011    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 Alvia Sandhoff  [id number]    umbilical x2    OTHER SURGICAL HISTORY      pacreaticoduodenectomy (whipple)    TOTAL KNEE ARTHROPLASTY Left 2/17/2020    KNEE TOTAL ARTHROPLASTY LEFT performed by Kimmie Castro MD at [address]  12/11/2014    Normal exam per pt       Social History:    Social History     Tobacco Use    Smoking status: Never Smoker    Smokeless tobacco: Never Used   Substance Use Topics    Alcohol use: No                                Counseling given: Not Answered      Vital Signs (Current): There were no vitals filed for this visit. BP Readings from Last 3 Encounters:   02/02/21 132/71   09/25/20 (!) 152/65   03/09/20 114/74       NPO Status:                                                                                 BMI:   Wt Readings from Last 3 Encounters:   02/11/21 217 lb (98.4 kg)   02/02/21 217 lb 6.4 oz (98.6 kg)   10/05/20 217 lb (98.4 kg)     There is no height or weight on file to calculate BMI. CBC  Lab Results   Component Value Date/Time    WBC 3.5 (L) 03/01/2021 10:00 AM    HGB 13.1 (L) 03/01/2021 10:00 AM    HCT 41.0 (L) 03/01/2021 10:00 AM    PLT 87 (L) 03/01/2021 10:00 AM       CMP  Lab Results   Component Value Date     03/01/2021    K 3.9 03/01/2021     03/01/2021    CO2 30 03/01/2021    BUN 13 03/01/2021    CREATININE 0.8 (L) 03/01/2021    GLUCOSE 117 (H) 03/01/2021    CALCIUM 8.9 03/01/2021    PROT 5.6 (L) 03/01/2021    LABALBU 3.2 (L) 03/01/2021    BILITOT 1.3 (H) 03/01/2021    ALKPHOS 93 03/01/2021    AST 24 03/01/2021    ALT 18 03/01/2021    LABGLOM >60 03/01/2021    GFRAA >60 03/01/2021    GLOB 2.2 07/09/2020         Anesthesia Evaluation  Patient summary reviewed and Nursing notes reviewed history of anesthetic complications (slow to wake):    Airway: Mallampati: III  TM distance: >3 FB   Neck ROM: full  Mouth opening: > = 3 FB Dental:    (+) edentulous      Pulmonary: breath sounds clear to auscultation                            ROS comment: Non smoker      PAT Airway Exam:  Head/Neck movement:  full  Thyromental Distance: >3 FB  History of difficult intubation:  None  Mallampati Classification:  Class II  Teeth: U/L dentures   Able to lie flat      COVID 19 screening  Denies recent fever or known COVID 19 exposure. Instructed to quarantine until surgery and report any new respiratory or fever symptoms to surgeon. Aware COVID testing must be completed before DOS. COVID infection:  NO    COVID vaccination: NO   COVID swab:  3/1/2021   Cardiovascular:  Exercise tolerance: good (>4 METS),   (+) hypertension (on beta blocker ):,       ECG reviewed  Rhythm: regular                   ROS comment:  CARDIOVASCULAR:  Normal apical impulse, regular rate and rhythm, normal S1 and S2, no S3 or S4, and no murmur noted    CP or SOB: NO   Exercise: No     EKG: 3/1/2021  Sinus bradycardia, HR 53  Otherwise normal ECG   When compared with ECG of 24-JAN-2020 11:00,      Neuro/Psych:   (+) neuromuscular disease (poor balance, weak knees. FALL RISK ):,              ROS comment: Mini-cog evaluation performed per anesthesia protocol,    > age 72. Scored: 4/5  Copy on chart for review. GI/Hepatic/Renal:   (+) liver disease (Cirrohosis 2/2 MONGE. Esophageal varices, 2014. last EGD 2019 ): coagulopathy from hepatic dysfunction, esophageal varices, morbid obesity         ROS comment:  Duodenal cancer s/p pancreaticoduodenectomy, (Whipple), 2011. Treated with chemo, followed by Dr. Efrain Jo. Hx colon polyps, diverticuli    Followed by Dr. Brian Bucio  Last AFP:  9/`19/19--- 3.0        Last liver imaging:   3/16/20-- US-- cirrhosis w/o mass   Hepatitis A immunity: Yes - vaccine 2015  Hepatitis B immunity: Yes- vaccine 2015  . Endo/Other:    (+) blood dyscrasia (Hx leukocytopenia, anemia, thrombopenia. Evaluated and monitored by Dr. Efrain Jo. last visit 2/2021, cleared for sugery. PAT labs, 3/1/2021  WBC: 3.5, HGB: 13.1, HCT: 41.0, PLT 87K  stable when comparied to 2/23/2021 labs. PLT recheck DOS ordered. ): arthritis (anson knees, s/p left TKA 2/2020): OA., malignancy/cancer (duodenal CA). Pt had PAT visit.         ROS comment: S/p hernia repair  S/p rotator cuff repair    Port placed 2011, replaced 2019 Abdominal:   (+) obese,         Vascular: negative vascular ROS. Anesthesia Plan      general     ASA 4     (Risks benefits of geta and adductor block discussed with pt and family )  Induction: intravenous. MIPS: Postoperative opioids intended. Anesthetic plan and risks discussed with patient. Plan discussed with CRNA. Attending anesthesiologist reviewed and agrees with Pre Eval content            ROSALVA Diehl - CNP Chart reviewed, pt. Interviewed and examined. Anesthesia Evaluation will follow by a certified practitioner prior to surgery.   2/24/2021

## 2021-02-24 NOTE — PROGRESS NOTES
INR results    Lab Results   Component Value Date    PROTIME 12.5 02/23/2021    INR 1.0 02/23/2021       Lakhwinder Evens Martinez is currently on no mg of Coumadin daily. Per Dr. Bismark Chavez in [place] for knee replacement surgery   Patient verbally understood.

## 2021-03-01 ENCOUNTER — HOSPITAL ENCOUNTER (OUTPATIENT)
Dept: PREADMISSION TESTING | Age: 66
Discharge: HOME | End: 2021-03-05
Payer: MEDICARE

## 2021-03-01 VITALS
OXYGEN SATURATION: 96 % | WEIGHT: 217 LBS | DIASTOLIC BLOOD PRESSURE: 84 MMHG | BODY MASS INDEX: 32.89 KG/M2 | SYSTOLIC BLOOD PRESSURE: 129 MMHG | RESPIRATION RATE: 16 BRPM | HEIGHT: 68 IN | HEART RATE: 62 BPM | TEMPERATURE: 97 F

## 2021-03-01 DIAGNOSIS — M17.11 PRIMARY OSTEOARTHRITIS OF RIGHT KNEE: ICD-10-CM

## 2021-03-01 LAB
ALBUMIN SERPL-MCNC: 3.2 GM/DL (ref 3.4–5)
ALP BLD-CCNC: 93 IU/L (ref 40–129)
ALT SERPL-CCNC: 18 U/L (ref 10–40)
ANION GAP SERPL CALCULATED.3IONS-SCNC: 5 MMOL/L (ref 4–16)
AST SERPL-CCNC: 24 IU/L (ref 15–37)
BACTERIA: NEGATIVE /HPF
BASOPHILS ABSOLUTE: 0 K/CU MM
BASOPHILS RELATIVE PERCENT: 0.6 % (ref 0–1)
BILIRUB SERPL-MCNC: 1.3 MG/DL (ref 0–1)
BILIRUBIN URINE: NEGATIVE MG/DL
BLOOD, URINE: NEGATIVE
BUN BLDV-MCNC: 13 MG/DL (ref 6–23)
CALCIUM OXALATE CRYSTALS: ABNORMAL /HPF
CALCIUM SERPL-MCNC: 8.9 MG/DL (ref 8.3–10.6)
CHLORIDE BLD-SCNC: 105 MMOL/L (ref 99–110)
CLARITY: ABNORMAL
CO2: 30 MMOL/L (ref 21–32)
COLOR: ABNORMAL
CREAT SERPL-MCNC: 0.8 MG/DL (ref 0.9–1.3)
DIFFERENTIAL TYPE: ABNORMAL
EKG ATRIAL RATE: 53 BPM
EKG DIAGNOSIS: NORMAL
EKG P AXIS: 24 DEGREES
EKG P-R INTERVAL: 184 MS
EKG Q-T INTERVAL: 456 MS
EKG QRS DURATION: 88 MS
EKG QTC CALCULATION (BAZETT): 427 MS
EKG R AXIS: 14 DEGREES
EKG T AXIS: 29 DEGREES
EKG VENTRICULAR RATE: 53 BPM
EOSINOPHILS ABSOLUTE: 0.1 K/CU MM
EOSINOPHILS RELATIVE PERCENT: 4 % (ref 0–3)
ERYTHROCYTE SEDIMENTATION RATE: 14 MM/HR (ref 0–20)
GFR AFRICAN AMERICAN: >60 ML/MIN/1.73M2
GFR NON-AFRICAN AMERICAN: >60 ML/MIN/1.73M2
GLUCOSE BLD-MCNC: 117 MG/DL (ref 70–99)
GLUCOSE, URINE: NEGATIVE MG/DL
HCT VFR BLD CALC: 41 % (ref 42–52)
HEMOGLOBIN: 13.1 GM/DL (ref 13.5–18)
HYALINE CASTS: 0 /LPF
IMMATURE NEUTROPHIL %: 0.3 % (ref 0–0.43)
KETONES, URINE: NEGATIVE MG/DL
LEUKOCYTE ESTERASE, URINE: NEGATIVE
LYMPHOCYTES ABSOLUTE: 0.5 K/CU MM
LYMPHOCYTES RELATIVE PERCENT: 13.8 % (ref 24–44)
MCH RBC QN AUTO: 31.6 PG (ref 27–31)
MCHC RBC AUTO-ENTMCNC: 32 % (ref 32–36)
MCV RBC AUTO: 99 FL (ref 78–100)
MONOCYTES ABSOLUTE: 0.4 K/CU MM
MONOCYTES RELATIVE PERCENT: 10.6 % (ref 0–4)
MUCUS: ABNORMAL HPF
NITRITE URINE, QUANTITATIVE: NEGATIVE
NUCLEATED RBC %: 0 %
PDW BLD-RTO: 13.8 % (ref 11.7–14.9)
PH, URINE: 5 (ref 5–8)
PLATELET # BLD: 87 K/CU MM (ref 140–440)
PMV BLD AUTO: 9.6 FL (ref 7.5–11.1)
POTASSIUM SERPL-SCNC: 3.9 MMOL/L (ref 3.5–5.1)
PROTEIN UA: NEGATIVE MG/DL
RBC # BLD: 4.14 M/CU MM (ref 4.6–6.2)
RBC URINE: 9 /HPF (ref 0–3)
SEGMENTED NEUTROPHILS ABSOLUTE COUNT: 2.5 K/CU MM
SEGMENTED NEUTROPHILS RELATIVE PERCENT: 70.7 % (ref 36–66)
SODIUM BLD-SCNC: 140 MMOL/L (ref 135–145)
SPECIFIC GRAVITY UA: 1.02 (ref 1–1.03)
TOTAL IMMATURE NEUTOROPHIL: 0.01 K/CU MM
TOTAL NUCLEATED RBC: 0 K/CU MM
TOTAL PROTEIN: 5.6 GM/DL (ref 6.4–8.2)
TRICHOMONAS: ABNORMAL /HPF
UROBILINOGEN, URINE: NEGATIVE MG/DL (ref 0.2–1)
WBC # BLD: 3.5 K/CU MM (ref 4–10.5)
WBC UA: 2 /HPF (ref 0–2)

## 2021-03-01 PROCEDURE — 85025 COMPLETE CBC W/AUTO DIFF WBC: CPT

## 2021-03-01 PROCEDURE — 81001 URINALYSIS AUTO W/SCOPE: CPT

## 2021-03-01 PROCEDURE — 85652 RBC SED RATE AUTOMATED: CPT

## 2021-03-01 PROCEDURE — U0002 COVID-19 LAB TEST NON-CDC: HCPCS

## 2021-03-01 PROCEDURE — 80053 COMPREHEN METABOLIC PANEL: CPT

## 2021-03-01 PROCEDURE — 87086 URINE CULTURE/COLONY COUNT: CPT

## 2021-03-01 PROCEDURE — 93005 ELECTROCARDIOGRAM TRACING: CPT | Performed by: ORTHOPAEDIC SURGERY

## 2021-03-01 PROCEDURE — 36415 COLL VENOUS BLD VENIPUNCTURE: CPT

## 2021-03-01 PROCEDURE — 93010 ELECTROCARDIOGRAM REPORT: CPT | Performed by: INTERNAL MEDICINE

## 2021-03-01 PROCEDURE — C9803 HOPD COVID-19 SPEC COLLECT: HCPCS

## 2021-03-01 RX ORDER — SODIUM CHLORIDE, SODIUM LACTATE, POTASSIUM CHLORIDE, CALCIUM CHLORIDE 600; 310; 30; 20 MG/100ML; MG/100ML; MG/100ML; MG/100ML
INJECTION, SOLUTION INTRAVENOUS CONTINUOUS
Status: CANCELLED | OUTPATIENT
Start: 2021-03-08

## 2021-03-02 ENCOUNTER — HOSPITAL ENCOUNTER (OUTPATIENT)
Dept: PHYSICAL THERAPY | Age: 66
Setting detail: THERAPIES SERIES
Discharge: HOME | End: 2021-03-02
Payer: MEDICARE

## 2021-03-02 LAB
CULTURE: NORMAL
Lab: NORMAL
SARS-COV-2: NOT DETECTED
SOURCE: NORMAL
SPECIMEN: NORMAL

## 2021-03-02 PROCEDURE — 97110 THERAPEUTIC EXERCISES: CPT

## 2021-03-02 PROCEDURE — 97161 PT EVAL LOW COMPLEX 20 MIN: CPT

## 2021-03-02 ASSESSMENT — PAIN SCALES - GENERAL: PAINLEVEL_OUTOF10: 6

## 2021-03-02 NOTE — FLOWSHEET NOTE
Outpatient Physical Therapy  [place]           [x] Phone: [phone]   Fax: [phone]  [facility]           [] Phone: [phone]   Fax: [phone]        Physical Therapy Daily Treatment Note  Date:  3/2/2021    Patient Name:  Bassem Stewart    :  1955  MRN: [id number]  Restrictions/Precautions:    Diagnosis:   Diagnosis: R DJD/OA  Date of Injury/Surgery:   Treatment Diagnosis: Treatment Diagnosis: R knee DJD/OA    Insurance/Certification information: PT Insurance Information: Med mutual   Referring Physician:  Referring Practitioner: Dr. Alejandra De Los Santos  Next Doctor Visit:    Plan of care signed (Y/N):  n  Outcome Measure:   Visit# / total visits:   1/  Pain level: 8/10   Goals:        set after surgery. Summary of Evaluation: Assessment: Pt presents to PT preoperatively for R TKA scheduled 3/8/21. History of L TKA approximately one year ago. His plans are to d/c to home following surgery and participate in outpatient therpay. At home, he has a cane, FWW, shower chair. Entry is via 2 steps with a handrail to a single floor layout, bathroom has standard height toilet and tub/shower. He was instructed in basic post op exercises, all questions answered. Pt is comfortable and confident given his past LTKA and from today's appointment. Subjective:  See eval         Any changes in Ambulatory Summary Sheet? None        Objective:  See eval     Prior to today's treatment session, patient was screened for signs and symptoms related to COVID-19 including but not limited to verbally answering questions related to feeling ill, cough, or SOB, along with taking temperature via forehead thermometer. Patient presented with all negative signs and symptoms and had no fever >100 degrees Fahrenheit this date.        Exercises: (No more than 4 columns)   Exercise/Equipment Date Date Date           WARM UP                     TABLE                                       STANDING PROPRIOCEPTION                                    MODALITIES                      Other Therapeutic Activities/Education:        Home Exercise Program:  Instruct in basic post op exercise, QS, ankle pump, gluteal sets, SLR, heel slides sitting and supine, extension prop sitting and supine. Manual Treatments:        Modalities:        Communication with other providers:        Assessment:  (Response towards treatment session) (Pain Rating)    Assessment: Pt presents to PT preoperatively for R TKA scheduled 3/8/21. History of L TKA approximately one year ago. His plans are to d/c to home following surgery and participate in outpatient therpay. At home, he has a cane, FWW, shower chair. Entry is via 2 steps with a handrail to a single floor layout, bathroom has standard height toilet and tub/shower. He was instructed in basic post op exercises, all questions answered. Pt is comfortable and confident given his past LTKA and from today's appointment. Pt viewed joint camp.      Plan for Next Session:        Time In / Time Out:  1008/1055           Timed Code/Total Treatment Minutes:        Next Progress Note due:        Plan of Care Interventions:  [x] Therapeutic Exercise  [] Modalities:  [x] Therapeutic Activity     [] Ultrasound  [] Estim  [x] Gait Training      [] Cervical Traction [] Lumbar Traction  [x] Neuromuscular Re-education    [] Cold/hotpack [] Iontophoresis   [x] Instruction in HEP      [] Vasopneumatic   [] Dry Needling    [] Manual Therapy               [] Aquatic Therapy              Electronically signed by:  Clarisa Simpson PT 3/2/2021, 12:43 PM

## 2021-03-02 NOTE — PROGRESS NOTES
Physical Therapy  Initial Assessment  Date: 3/2/2021  Patient Name: Marcial Collins  MRN: [id number]  : 1955     Treatment Diagnosis: R knee DJD/OA    Restrictions       Subjective   General  Additional Pertinent Hx: R TKA scheduled 3/8/21. L TKA done about a year ago. Referring Practitioner: Dr. Connie Gates  Referral Date : 21  Diagnosis: R DJD/OA  PT Visit Information  PT Insurance Information: [employer]  Subjective  Subjective: R knee pain, constantly  Pain Screening  Patient Currently in Pain: Yes  Pain Assessment  Pain Assessment: 0-10  Pain Level: 6(up to 9)  Vital Signs  Patient Currently in Pain: Yes    Vision/Hearing  Vision  Vision: Impaired  Hearing  Hearing: Within functional limits    Orientation  Orientation  Overall Orientation Status: Within Functional Limits    Social/Functional History  Social/Functional History  Lives With: Spouse  Type of Home: House  Home Layout: One level  Home Access: Stairs to enter with rails  Entrance Stairs - Number of Steps: 2  Bathroom Shower/Tub: Tub/Shower unit; Shower chair with back  Bathroom Toilet: Standard  Home Equipment: Rolling walker;Cane  ADL Assistance: Independent  Homemaking Assistance: Independent  Homemaking Responsibilities: Yes  Ambulation Assistance: Independent  Transfer Assistance: Independent  Active : Yes  Mode of Transportation: Car  Occupation: Retired  Leisure & Hobbies: fishing,    Objective     Observation/Palpation  Observation: R>L varus    AROM RLE (degrees)  RLE General AROM: lacking 5 deg ext, ~100 deg flexion    Strength RLE  Comment: myotomes intact  Strength LLE  Comment: myotomes intact             Assessment   Conditions Requiring Skilled Therapeutic Intervention  Assessment: Pt presents to PT preoperatively for R TKA scheduled 3/8/21. History of L TKA approximately one year ago. His plans are to d/c to home following surgery and participate in outpatient therpay. At home, he has a cane, FWW, shower chair. Entry is via 2 steps with a handrail to a single floor layout, bathroom has standard height toilet and tub/shower. He was instructed in basic post op exercises, all questions answered. Pt is comfortable and confident given his past LTKA and from today's appointment.   Treatment Diagnosis: R knee DJD/OA  REQUIRES PT FOLLOW UP: Yes(after surgery)            Goals   set after surgery       Chandler Hope, PT

## 2021-03-02 NOTE — CARE COORDINATION
CM attempted to call pt to discuss discharge plan regarding upcoming surgery with no answer at this time. No answer left due to no identification on voicemail.

## 2021-03-08 ENCOUNTER — HOSPITAL ENCOUNTER (OUTPATIENT)
Age: 66
Discharge: HOME | End: 2021-03-10
Attending: ORTHOPAEDIC SURGERY | Admitting: ORTHOPAEDIC SURGERY
Payer: MEDICARE

## 2021-03-08 ENCOUNTER — APPOINTMENT (OUTPATIENT)
Dept: GENERAL RADIOLOGY | Age: 66
End: 2021-03-08
Attending: ORTHOPAEDIC SURGERY
Payer: MEDICARE

## 2021-03-08 ENCOUNTER — ANESTHESIA (OUTPATIENT)
Dept: OPERATING ROOM | Age: 66
End: 2021-03-08
Payer: MEDICARE

## 2021-03-08 VITALS
RESPIRATION RATE: 17 BRPM | DIASTOLIC BLOOD PRESSURE: 81 MMHG | TEMPERATURE: 96.6 F | OXYGEN SATURATION: 99 % | SYSTOLIC BLOOD PRESSURE: 141 MMHG

## 2021-03-08 DIAGNOSIS — Z96.651 S/P TOTAL KNEE ARTHROPLASTY, RIGHT: Primary | ICD-10-CM

## 2021-03-08 PROBLEM — M17.11 PRIMARY OSTEOARTHRITIS OF RIGHT KNEE: Status: ACTIVE | Noted: 2021-03-08

## 2021-03-08 LAB — PLATELET # BLD: 75 K/CU MM (ref 140–440)

## 2021-03-08 PROCEDURE — C1713 ANCHOR/SCREW BN/BN,TIS/BN: HCPCS | Performed by: ORTHOPAEDIC SURGERY

## 2021-03-08 PROCEDURE — 97166 OT EVAL MOD COMPLEX 45 MIN: CPT

## 2021-03-08 PROCEDURE — 97530 THERAPEUTIC ACTIVITIES: CPT

## 2021-03-08 PROCEDURE — 85049 AUTOMATED PLATELET COUNT: CPT

## 2021-03-08 PROCEDURE — 3600000019 HC SURGERY ROBOT ADDTL 15MIN: Performed by: ORTHOPAEDIC SURGERY

## 2021-03-08 PROCEDURE — 6360000002 HC RX W HCPCS: Performed by: NURSE ANESTHETIST, CERTIFIED REGISTERED

## 2021-03-08 PROCEDURE — 6360000002 HC RX W HCPCS: Performed by: ANESTHESIOLOGY

## 2021-03-08 PROCEDURE — 73560 X-RAY EXAM OF KNEE 1 OR 2: CPT

## 2021-03-08 PROCEDURE — 97162 PT EVAL MOD COMPLEX 30 MIN: CPT

## 2021-03-08 PROCEDURE — S2900 ROBOTIC SURGICAL SYSTEM: HCPCS | Performed by: ORTHOPAEDIC SURGERY

## 2021-03-08 PROCEDURE — 2580000003 HC RX 258: Performed by: ORTHOPAEDIC SURGERY

## 2021-03-08 PROCEDURE — 2500000003 HC RX 250 WO HCPCS: Performed by: NURSE ANESTHETIST, CERTIFIED REGISTERED

## 2021-03-08 PROCEDURE — 7100000000 HC PACU RECOVERY - FIRST 15 MIN: Performed by: ORTHOPAEDIC SURGERY

## 2021-03-08 PROCEDURE — 3700000000 HC ANESTHESIA ATTENDED CARE: Performed by: ORTHOPAEDIC SURGERY

## 2021-03-08 PROCEDURE — 6360000002 HC RX W HCPCS: Performed by: ORTHOPAEDIC SURGERY

## 2021-03-08 PROCEDURE — 2500000003 HC RX 250 WO HCPCS: Performed by: ORTHOPAEDIC SURGERY

## 2021-03-08 PROCEDURE — 2709999900 HC NON-CHARGEABLE SUPPLY: Performed by: ORTHOPAEDIC SURGERY

## 2021-03-08 PROCEDURE — 27447 TOTAL KNEE ARTHROPLASTY: CPT | Performed by: PHYSICIAN ASSISTANT

## 2021-03-08 PROCEDURE — 3700000001 HC ADD 15 MINUTES (ANESTHESIA): Performed by: ORTHOPAEDIC SURGERY

## 2021-03-08 PROCEDURE — 7100000001 HC PACU RECOVERY - ADDTL 15 MIN: Performed by: ORTHOPAEDIC SURGERY

## 2021-03-08 PROCEDURE — 6370000000 HC RX 637 (ALT 250 FOR IP): Performed by: ORTHOPAEDIC SURGERY

## 2021-03-08 PROCEDURE — C1776 JOINT DEVICE (IMPLANTABLE): HCPCS | Performed by: ORTHOPAEDIC SURGERY

## 2021-03-08 PROCEDURE — 3600000009 HC SURGERY ROBOT BASE: Performed by: ORTHOPAEDIC SURGERY

## 2021-03-08 PROCEDURE — 27447 TOTAL KNEE ARTHROPLASTY: CPT | Performed by: ORTHOPAEDIC SURGERY

## 2021-03-08 PROCEDURE — 97116 GAIT TRAINING THERAPY: CPT

## 2021-03-08 PROCEDURE — 2720000010 HC SURG SUPPLY STERILE: Performed by: ORTHOPAEDIC SURGERY

## 2021-03-08 PROCEDURE — 97535 SELF CARE MNGMENT TRAINING: CPT

## 2021-03-08 DEVICE — CEMENT BNE 40GM W/ GENT HI VISC RADPQ FOR REV SURG: Type: IMPLANTABLE DEVICE | Status: FUNCTIONAL

## 2021-03-08 DEVICE — BASEPLATE TIB SZ 5 KNEE TRITANIUM CEM PRI LO PROF TRIATHLON: Type: IMPLANTABLE DEVICE | Status: FUNCTIONAL

## 2021-03-08 DEVICE — IMPL PATELLA COMP TRIATH ASYMMETRC X3: Type: IMPLANTABLE DEVICE | Status: FUNCTIONAL

## 2021-03-08 DEVICE — TRIATHLON CR X3 TIBIAL INSERT: Type: IMPLANTABLE DEVICE | Status: FUNCTIONAL

## 2021-03-08 DEVICE — IMPLANTABLE DEVICE: Type: IMPLANTABLE DEVICE | Status: FUNCTIONAL

## 2021-03-08 RX ORDER — BUPIVACAINE HYDROCHLORIDE AND EPINEPHRINE 5; 5 MG/ML; UG/ML
INJECTION, SOLUTION EPIDURAL; INTRACAUDAL; PERINEURAL
Status: COMPLETED | OUTPATIENT
Start: 2021-03-08 | End: 2021-03-08

## 2021-03-08 RX ORDER — SODIUM CHLORIDE, SODIUM LACTATE, POTASSIUM CHLORIDE, CALCIUM CHLORIDE 600; 310; 30; 20 MG/100ML; MG/100ML; MG/100ML; MG/100ML
INJECTION, SOLUTION INTRAVENOUS CONTINUOUS
Status: DISCONTINUED | OUTPATIENT
Start: 2021-03-08 | End: 2021-03-08

## 2021-03-08 RX ORDER — NADOLOL 20 MG/1
40 TABLET ORAL DAILY
Status: DISCONTINUED | OUTPATIENT
Start: 2021-03-09 | End: 2021-03-10 | Stop reason: HOSPADM

## 2021-03-08 RX ORDER — SODIUM CHLORIDE 0.9 % (FLUSH) 0.9 %
10 SYRINGE (ML) INJECTION PRN
Status: DISCONTINUED | OUTPATIENT
Start: 2021-03-08 | End: 2021-03-10 | Stop reason: HOSPADM

## 2021-03-08 RX ORDER — ROCURONIUM BROMIDE 10 MG/ML
INJECTION, SOLUTION INTRAVENOUS PRN
Status: DISCONTINUED | OUTPATIENT
Start: 2021-03-08 | End: 2021-03-08 | Stop reason: SDUPTHER

## 2021-03-08 RX ORDER — KETOROLAC TROMETHAMINE 30 MG/ML
INJECTION, SOLUTION INTRAMUSCULAR; INTRAVENOUS
Status: COMPLETED | OUTPATIENT
Start: 2021-03-08 | End: 2021-03-08

## 2021-03-08 RX ORDER — HYDRALAZINE HYDROCHLORIDE 20 MG/ML
5 INJECTION INTRAMUSCULAR; INTRAVENOUS EVERY 10 MIN PRN
Status: DISCONTINUED | OUTPATIENT
Start: 2021-03-08 | End: 2021-03-08 | Stop reason: HOSPADM

## 2021-03-08 RX ORDER — FERROUS SULFATE 325(65) MG
325 TABLET ORAL
Status: DISCONTINUED | OUTPATIENT
Start: 2021-03-09 | End: 2021-03-10 | Stop reason: HOSPADM

## 2021-03-08 RX ORDER — DEXAMETHASONE SODIUM PHOSPHATE 4 MG/ML
INJECTION, SOLUTION INTRA-ARTICULAR; INTRALESIONAL; INTRAMUSCULAR; INTRAVENOUS; SOFT TISSUE PRN
Status: DISCONTINUED | OUTPATIENT
Start: 2021-03-08 | End: 2021-03-08 | Stop reason: SDUPTHER

## 2021-03-08 RX ORDER — PROPOFOL 10 MG/ML
INJECTION, EMULSION INTRAVENOUS PRN
Status: DISCONTINUED | OUTPATIENT
Start: 2021-03-08 | End: 2021-03-08 | Stop reason: SDUPTHER

## 2021-03-08 RX ORDER — KETOROLAC TROMETHAMINE 30 MG/ML
INJECTION, SOLUTION INTRAMUSCULAR; INTRAVENOUS PRN
Status: DISCONTINUED | OUTPATIENT
Start: 2021-03-08 | End: 2021-03-08 | Stop reason: SDUPTHER

## 2021-03-08 RX ORDER — FENTANYL CITRATE 50 UG/ML
50 INJECTION, SOLUTION INTRAMUSCULAR; INTRAVENOUS EVERY 5 MIN PRN
Status: DISCONTINUED | OUTPATIENT
Start: 2021-03-08 | End: 2021-03-08 | Stop reason: HOSPADM

## 2021-03-08 RX ORDER — ONDANSETRON 2 MG/ML
INJECTION INTRAMUSCULAR; INTRAVENOUS PRN
Status: DISCONTINUED | OUTPATIENT
Start: 2021-03-08 | End: 2021-03-08 | Stop reason: SDUPTHER

## 2021-03-08 RX ORDER — TRANEXAMIC ACID 100 MG/ML
1000 INJECTION, SOLUTION INTRAVENOUS
Status: ACTIVE | OUTPATIENT
Start: 2021-03-08 | End: 2021-03-08

## 2021-03-08 RX ORDER — PROMETHAZINE HYDROCHLORIDE 25 MG/1
12.5 TABLET ORAL EVERY 6 HOURS PRN
Status: DISCONTINUED | OUTPATIENT
Start: 2021-03-08 | End: 2021-03-10 | Stop reason: HOSPADM

## 2021-03-08 RX ORDER — PROMETHAZINE HYDROCHLORIDE 25 MG/ML
6.25 INJECTION, SOLUTION INTRAMUSCULAR; INTRAVENOUS
Status: DISCONTINUED | OUTPATIENT
Start: 2021-03-08 | End: 2021-03-08 | Stop reason: HOSPADM

## 2021-03-08 RX ORDER — ACETAMINOPHEN 325 MG/1
650 TABLET ORAL EVERY 6 HOURS
Status: DISCONTINUED | OUTPATIENT
Start: 2021-03-08 | End: 2021-03-10 | Stop reason: HOSPADM

## 2021-03-08 RX ORDER — FENTANYL CITRATE 50 UG/ML
INJECTION, SOLUTION INTRAMUSCULAR; INTRAVENOUS PRN
Status: DISCONTINUED | OUTPATIENT
Start: 2021-03-08 | End: 2021-03-08 | Stop reason: SDUPTHER

## 2021-03-08 RX ORDER — OXYCODONE HYDROCHLORIDE AND ACETAMINOPHEN 5; 325 MG/1; MG/1
2 TABLET ORAL EVERY 4 HOURS PRN
Status: DISCONTINUED | OUTPATIENT
Start: 2021-03-08 | End: 2021-03-10 | Stop reason: HOSPADM

## 2021-03-08 RX ORDER — DIPHENHYDRAMINE HYDROCHLORIDE 50 MG/ML
12.5 INJECTION INTRAMUSCULAR; INTRAVENOUS
Status: DISCONTINUED | OUTPATIENT
Start: 2021-03-08 | End: 2021-03-08 | Stop reason: HOSPADM

## 2021-03-08 RX ORDER — SODIUM CHLORIDE, SODIUM LACTATE, POTASSIUM CHLORIDE, CALCIUM CHLORIDE 600; 310; 30; 20 MG/100ML; MG/100ML; MG/100ML; MG/100ML
INJECTION, SOLUTION INTRAVENOUS CONTINUOUS
Status: DISCONTINUED | OUTPATIENT
Start: 2021-03-08 | End: 2021-03-09

## 2021-03-08 RX ORDER — LANOLIN ALCOHOL/MO/W.PET/CERES
2000 CREAM (GRAM) TOPICAL DAILY
Status: DISCONTINUED | OUTPATIENT
Start: 2021-03-08 | End: 2021-03-10 | Stop reason: HOSPADM

## 2021-03-08 RX ORDER — ONDANSETRON 2 MG/ML
4 INJECTION INTRAMUSCULAR; INTRAVENOUS
Status: DISCONTINUED | OUTPATIENT
Start: 2021-03-08 | End: 2021-03-08 | Stop reason: HOSPADM

## 2021-03-08 RX ORDER — OXYCODONE HYDROCHLORIDE AND ACETAMINOPHEN 5; 325 MG/1; MG/1
1 TABLET ORAL EVERY 4 HOURS PRN
Status: DISCONTINUED | OUTPATIENT
Start: 2021-03-08 | End: 2021-03-10 | Stop reason: HOSPADM

## 2021-03-08 RX ORDER — ASCORBIC ACID 500 MG
500 TABLET ORAL DAILY
Status: DISCONTINUED | OUTPATIENT
Start: 2021-03-08 | End: 2021-03-10 | Stop reason: HOSPADM

## 2021-03-08 RX ORDER — LIDOCAINE HYDROCHLORIDE 20 MG/ML
INJECTION, SOLUTION INTRAVENOUS PRN
Status: DISCONTINUED | OUTPATIENT
Start: 2021-03-08 | End: 2021-03-08 | Stop reason: SDUPTHER

## 2021-03-08 RX ORDER — HYDROCODONE BITARTRATE AND ACETAMINOPHEN 5; 325 MG/1; MG/1
2 TABLET ORAL
Status: DISCONTINUED | OUTPATIENT
Start: 2021-03-08 | End: 2021-03-08 | Stop reason: HOSPADM

## 2021-03-08 RX ORDER — SENNA PLUS 8.6 MG/1
1 TABLET ORAL DAILY PRN
Status: DISCONTINUED | OUTPATIENT
Start: 2021-03-08 | End: 2021-03-10 | Stop reason: HOSPADM

## 2021-03-08 RX ORDER — 0.9 % SODIUM CHLORIDE 0.9 %
500 INTRAVENOUS SOLUTION INTRAVENOUS
Status: DISCONTINUED | OUTPATIENT
Start: 2021-03-08 | End: 2021-03-08 | Stop reason: HOSPADM

## 2021-03-08 RX ORDER — CALCIUM CARBONATE 500(1250)
1 TABLET ORAL DAILY
Status: DISCONTINUED | OUTPATIENT
Start: 2021-03-08 | End: 2021-03-10 | Stop reason: HOSPADM

## 2021-03-08 RX ORDER — KETAMINE HCL 50MG/ML(1)
SYRINGE (ML) INTRAVENOUS PRN
Status: DISCONTINUED | OUTPATIENT
Start: 2021-03-08 | End: 2021-03-08 | Stop reason: SDUPTHER

## 2021-03-08 RX ORDER — HYDROCODONE BITARTRATE AND ACETAMINOPHEN 5; 325 MG/1; MG/1
1 TABLET ORAL
Status: DISCONTINUED | OUTPATIENT
Start: 2021-03-08 | End: 2021-03-08 | Stop reason: HOSPADM

## 2021-03-08 RX ORDER — LABETALOL HYDROCHLORIDE 5 MG/ML
5 INJECTION, SOLUTION INTRAVENOUS EVERY 10 MIN PRN
Status: DISCONTINUED | OUTPATIENT
Start: 2021-03-08 | End: 2021-03-08 | Stop reason: HOSPADM

## 2021-03-08 RX ORDER — PHENYLEPHRINE HCL IN 0.9% NACL 1 MG/10 ML
SYRINGE (ML) INTRAVENOUS PRN
Status: DISCONTINUED | OUTPATIENT
Start: 2021-03-08 | End: 2021-03-08 | Stop reason: SDUPTHER

## 2021-03-08 RX ORDER — TRANEXAMIC ACID 10 MG/ML
INJECTION, SOLUTION INTRAVENOUS PRN
Status: DISCONTINUED | OUTPATIENT
Start: 2021-03-08 | End: 2021-03-08 | Stop reason: SDUPTHER

## 2021-03-08 RX ORDER — MEPERIDINE HYDROCHLORIDE 25 MG/ML
12.5 INJECTION INTRAMUSCULAR; INTRAVENOUS; SUBCUTANEOUS EVERY 5 MIN PRN
Status: DISCONTINUED | OUTPATIENT
Start: 2021-03-08 | End: 2021-03-08 | Stop reason: HOSPADM

## 2021-03-08 RX ORDER — LISINOPRIL 2.5 MG/1
2.5 TABLET ORAL DAILY
Status: DISCONTINUED | OUTPATIENT
Start: 2021-03-08 | End: 2021-03-10 | Stop reason: HOSPADM

## 2021-03-08 RX ORDER — ONDANSETRON 2 MG/ML
4 INJECTION INTRAMUSCULAR; INTRAVENOUS EVERY 6 HOURS PRN
Status: DISCONTINUED | OUTPATIENT
Start: 2021-03-08 | End: 2021-03-10 | Stop reason: HOSPADM

## 2021-03-08 RX ORDER — GLYCOPYRROLATE 1 MG/5 ML
SYRINGE (ML) INTRAVENOUS PRN
Status: DISCONTINUED | OUTPATIENT
Start: 2021-03-08 | End: 2021-03-08 | Stop reason: SDUPTHER

## 2021-03-08 RX ORDER — HYDROMORPHONE HCL 110MG/55ML
0.5 PATIENT CONTROLLED ANALGESIA SYRINGE INTRAVENOUS EVERY 5 MIN PRN
Status: DISCONTINUED | OUTPATIENT
Start: 2021-03-08 | End: 2021-03-08 | Stop reason: HOSPADM

## 2021-03-08 RX ORDER — SODIUM CHLORIDE 0.9 % (FLUSH) 0.9 %
10 SYRINGE (ML) INJECTION EVERY 12 HOURS SCHEDULED
Status: DISCONTINUED | OUTPATIENT
Start: 2021-03-08 | End: 2021-03-10 | Stop reason: HOSPADM

## 2021-03-08 RX ADMIN — Medication 100 MCG: at 08:02

## 2021-03-08 RX ADMIN — SODIUM CHLORIDE, POTASSIUM CHLORIDE, SODIUM LACTATE AND CALCIUM CHLORIDE: 600; 310; 30; 20 INJECTION, SOLUTION INTRAVENOUS at 23:25

## 2021-03-08 RX ADMIN — LIDOCAINE HYDROCHLORIDE 100 MG: 20 INJECTION, SOLUTION INTRAVENOUS at 07:38

## 2021-03-08 RX ADMIN — CYANOCOBALAMIN TAB 1000 MCG 2000 MCG: 1000 TAB at 16:00

## 2021-03-08 RX ADMIN — ONDANSETRON 4 MG: 2 INJECTION INTRAMUSCULAR; INTRAVENOUS at 09:47

## 2021-03-08 RX ADMIN — ASPIRIN 325 MG: 325 TABLET, COATED ORAL at 16:00

## 2021-03-08 RX ADMIN — SODIUM CHLORIDE, PRESERVATIVE FREE 10 ML: 5 INJECTION INTRAVENOUS at 21:34

## 2021-03-08 RX ADMIN — KETOROLAC TROMETHAMINE 15 MG: 30 INJECTION, SOLUTION INTRAMUSCULAR; INTRAVENOUS at 09:58

## 2021-03-08 RX ADMIN — SODIUM CHLORIDE, POTASSIUM CHLORIDE, SODIUM LACTATE AND CALCIUM CHLORIDE: 600; 310; 30; 20 INJECTION, SOLUTION INTRAVENOUS at 10:46

## 2021-03-08 RX ADMIN — Medication 10 MG: at 08:50

## 2021-03-08 RX ADMIN — CALCIUM 500 MG: 500 TABLET ORAL at 16:00

## 2021-03-08 RX ADMIN — Medication 10 MG: at 09:25

## 2021-03-08 RX ADMIN — LISINOPRIL 2.5 MG: 2.5 TABLET ORAL at 16:00

## 2021-03-08 RX ADMIN — SUGAMMADEX 100 MG: 100 INJECTION, SOLUTION INTRAVENOUS at 09:51

## 2021-03-08 RX ADMIN — FENTANYL CITRATE 50 MCG: 50 INJECTION INTRAMUSCULAR; INTRAVENOUS at 08:15

## 2021-03-08 RX ADMIN — PROPOFOL 30 MG: 10 INJECTION, EMULSION INTRAVENOUS at 09:56

## 2021-03-08 RX ADMIN — ROCURONIUM BROMIDE 20 MG: 10 INJECTION INTRAVENOUS at 08:15

## 2021-03-08 RX ADMIN — Medication 0.2 MG: at 09:51

## 2021-03-08 RX ADMIN — HYDROMORPHONE HYDROCHLORIDE 0.5 MG: 2 INJECTION, SOLUTION INTRAMUSCULAR; INTRAVENOUS; SUBCUTANEOUS at 10:39

## 2021-03-08 RX ADMIN — HYDROMORPHONE HYDROCHLORIDE 0.5 MG: 2 INJECTION, SOLUTION INTRAMUSCULAR; INTRAVENOUS; SUBCUTANEOUS at 10:27

## 2021-03-08 RX ADMIN — CEFAZOLIN SODIUM 2 G: 10 INJECTION, POWDER, FOR SOLUTION INTRAVENOUS at 07:44

## 2021-03-08 RX ADMIN — SODIUM CHLORIDE, POTASSIUM CHLORIDE, SODIUM LACTATE AND CALCIUM CHLORIDE: 600; 310; 30; 20 INJECTION, SOLUTION INTRAVENOUS at 06:39

## 2021-03-08 RX ADMIN — Medication 100 MCG: at 07:52

## 2021-03-08 RX ADMIN — DEXAMETHASONE SODIUM PHOSPHATE 8 MG: 4 INJECTION, SOLUTION INTRAMUSCULAR; INTRAVENOUS at 07:44

## 2021-03-08 RX ADMIN — ROCURONIUM BROMIDE 50 MG: 10 INJECTION INTRAVENOUS at 07:40

## 2021-03-08 RX ADMIN — OXYCODONE HYDROCHLORIDE AND ACETAMINOPHEN 2 TABLET: 5; 325 TABLET ORAL at 21:34

## 2021-03-08 RX ADMIN — FENTANYL CITRATE 25 MCG: 50 INJECTION INTRAMUSCULAR; INTRAVENOUS at 09:53

## 2021-03-08 RX ADMIN — ACETAMINOPHEN 650 MG: 325 TABLET ORAL at 16:00

## 2021-03-08 RX ADMIN — Medication 0.3 MG: at 08:02

## 2021-03-08 RX ADMIN — TRANEXAMIC ACID 1000 MG: 10 INJECTION, SOLUTION INTRAVENOUS at 07:52

## 2021-03-08 RX ADMIN — CEFAZOLIN SODIUM 2000 MG: 10 INJECTION, POWDER, FOR SOLUTION INTRAVENOUS at 16:05

## 2021-03-08 RX ADMIN — Medication 20 MG: at 07:34

## 2021-03-08 RX ADMIN — OXYCODONE HYDROCHLORIDE AND ACETAMINOPHEN 1 TABLET: 5; 325 TABLET ORAL at 18:05

## 2021-03-08 RX ADMIN — PROPOFOL 120 MG: 10 INJECTION, EMULSION INTRAVENOUS at 07:38

## 2021-03-08 RX ADMIN — ACETAMINOPHEN 650 MG: 325 TABLET ORAL at 21:34

## 2021-03-08 RX ADMIN — SUGAMMADEX 100 MG: 100 INJECTION, SOLUTION INTRAVENOUS at 09:46

## 2021-03-08 RX ADMIN — FENTANYL CITRATE 50 MCG: 50 INJECTION INTRAMUSCULAR; INTRAVENOUS at 07:34

## 2021-03-08 RX ADMIN — CEFAZOLIN SODIUM 2000 MG: 10 INJECTION, POWDER, FOR SOLUTION INTRAVENOUS at 23:36

## 2021-03-08 RX ADMIN — OXYCODONE HYDROCHLORIDE AND ACETAMINOPHEN 500 MG: 500 TABLET ORAL at 16:00

## 2021-03-08 RX ADMIN — FENTANYL CITRATE 25 MCG: 50 INJECTION INTRAMUSCULAR; INTRAVENOUS at 10:00

## 2021-03-08 RX ADMIN — SODIUM CHLORIDE, POTASSIUM CHLORIDE, SODIUM LACTATE AND CALCIUM CHLORIDE: 600; 310; 30; 20 INJECTION, SOLUTION INTRAVENOUS at 09:35

## 2021-03-08 RX ADMIN — ROCURONIUM BROMIDE 20 MG: 10 INJECTION INTRAVENOUS at 08:52

## 2021-03-08 ASSESSMENT — PULMONARY FUNCTION TESTS
PIF_VALUE: 22
PIF_VALUE: 21
PIF_VALUE: 1
PIF_VALUE: 22
PIF_VALUE: 20
PIF_VALUE: 22
PIF_VALUE: 21
PIF_VALUE: 21
PIF_VALUE: 22
PIF_VALUE: 21
PIF_VALUE: 22
PIF_VALUE: 14
PIF_VALUE: 21
PIF_VALUE: 22
PIF_VALUE: 23
PIF_VALUE: 21
PIF_VALUE: 18
PIF_VALUE: 22
PIF_VALUE: 22
PIF_VALUE: 23
PIF_VALUE: 14
PIF_VALUE: 22
PIF_VALUE: 22
PIF_VALUE: 23
PIF_VALUE: 0
PIF_VALUE: 22
PIF_VALUE: 21
PIF_VALUE: 21
PIF_VALUE: 16
PIF_VALUE: 15
PIF_VALUE: 21
PIF_VALUE: 18
PIF_VALUE: 22
PIF_VALUE: 21
PIF_VALUE: 21
PIF_VALUE: 22
PIF_VALUE: 14
PIF_VALUE: 21
PIF_VALUE: 22
PIF_VALUE: 21
PIF_VALUE: 22
PIF_VALUE: 21
PIF_VALUE: 16
PIF_VALUE: 23
PIF_VALUE: 23
PIF_VALUE: 21
PIF_VALUE: 22
PIF_VALUE: 16
PIF_VALUE: 16
PIF_VALUE: 20
PIF_VALUE: 23
PIF_VALUE: 22
PIF_VALUE: 14
PIF_VALUE: 22
PIF_VALUE: 22
PIF_VALUE: 14
PIF_VALUE: 0
PIF_VALUE: 20
PIF_VALUE: 22
PIF_VALUE: 21
PIF_VALUE: 1
PIF_VALUE: 22
PIF_VALUE: 21
PIF_VALUE: 22
PIF_VALUE: 22
PIF_VALUE: 0
PIF_VALUE: 21
PIF_VALUE: 22
PIF_VALUE: 22
PIF_VALUE: 21
PIF_VALUE: 20
PIF_VALUE: 21
PIF_VALUE: 18

## 2021-03-08 ASSESSMENT — PAIN SCALES - GENERAL
PAINLEVEL_OUTOF10: 7
PAINLEVEL_OUTOF10: 4
PAINLEVEL_OUTOF10: 6
PAINLEVEL_OUTOF10: 7
PAINLEVEL_OUTOF10: 7
PAINLEVEL_OUTOF10: 3

## 2021-03-08 ASSESSMENT — PAIN DESCRIPTION - DESCRIPTORS: DESCRIPTORS: THROBBING

## 2021-03-08 ASSESSMENT — PAIN DESCRIPTION - FREQUENCY: FREQUENCY: CONTINUOUS

## 2021-03-08 ASSESSMENT — PAIN - FUNCTIONAL ASSESSMENT: PAIN_FUNCTIONAL_ASSESSMENT: 0-10

## 2021-03-08 ASSESSMENT — PAIN DESCRIPTION - ORIENTATION: ORIENTATION: RIGHT

## 2021-03-08 ASSESSMENT — PAIN DESCRIPTION - LOCATION: LOCATION: KNEE

## 2021-03-08 ASSESSMENT — PAIN DESCRIPTION - PAIN TYPE: TYPE: SURGICAL PAIN

## 2021-03-08 NOTE — H&P
2/11/2021        Chief Complaint   Patient presents with    1 Year Follow Up       left TKA DOS 2/17/20    Pre-op Exam       right TKA          History of Present Illness:                             Dre Reynolds is a 72 y.o. male who presents today for evaluation of his worsening right knee pain and follow-up of his left total knee replacement. He is very pleased with the progress and improvement he is seen in the left knee following knee replacement surgery. He has no problems or complaints and has excellent strength and range of motion at the left knee. He feels that his alignment is greatly improved as well.     He has now had progression of his symptoms in the right knee which are similar to what he experienced in the left knee prior to surgery. He has deep aching stiffness in the knee and a progressive worsening varus alignment. He gets a swelling and stiffness and catching and popping. He is afraid that the knee may give out and cause him to fall. He would like to proceed with knee replacement surgery on the right.     Patient presents to the office today for his 1 year post op check of the left TKA DOS 2/17/20 and Pre-op of the right TKA. Pt states his left knee is a 0/10. Pt states his right knee is 9/10. Pt states he is in constant pain in the right knee. Pain is globally in the knee. Pt states he has tried a knee brace, ibuprofen, tylenol, ice and heat with no relief.  Pt states he is ready to discuss a total knee replacement of the right knee.      Medical History  Patient's medications, allergies, past medical, surgical, social and family histories were reviewed and updated as appropriate.     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 Liliya Soto  1688    umbilical x2    OTHER SURGICAL HISTORY      pacreaticoduodenectomy (whipple)    TOTAL KNEE ARTHROPLASTY Left 02/17/2020    KNEE TOTAL ARTHROPLASTY LEFT performed by Axel Rodriguez MD at [address]  12/11/2014    Normal exam per pt         Family History         Family History   Problem Relation Age of Onset    Vision Loss Mother      High Blood Pressure Mother      Arthritis Mother      Asthma Mother      Cancer Father      No Known Problems Sister      Arthritis Brother      High Blood Pressure Brother      Asthma Brother      Other Sister           Rare Blood disease    No Known Problems Sister      No Known Problems Daughter      No Known Problems Daughter           Social History   Social History            Socioeconomic History    Marital status:        Spouse name: None    Number of children: None    Years of education: None    Highest education level: None   Occupational History    None   Social Needs    Financial resource strain: None    Food insecurity       Worry: None       Inability: None    Transportation needs       Medical: None       Non-medical: None   Tobacco Use    Smoking status: Never Smoker    Smokeless tobacco: Never Used   Substance and Sexual Activity    Alcohol use: No    Drug use: No    Sexual activity: Yes       Partners: Female   Lifestyle    Physical activity       Days per week: None       Minutes per session: None    Stress: None   Relationships    Social connections       Talks on phone: None       Gets together: None       Attends Buddhist service: None       Active member of club or organization: None       Attends meetings of clubs or organizations: None       Relationship status: None    Intimate partner violence       Fear of current or ex partner: None       Emotionally abused: None       Physically abused: None       Forced sexual activity: None   Other Topics Concern    None   Social History Narrative    None         Current Facility-Administered Medications          Current Outpatient Medications   Medication Sig Dispense Refill    nadolol (CORGARD) 40 MG tablet TAKE ONE TABLET BY MOUTH ONCE DAILY 30 tablet 11    Sulfamethoxazole-Trimethoprim (BACTRIM PO) Take by mouth 2 times daily        aspirin 81 MG chewable tablet Take 1 tablet by mouth 2 times daily 60 tablet 0    docusate sodium (COLACE, DULCOLAX) 100 MG CAPS Take 100 mg by mouth 2 times daily as needed for Constipation 30 capsule 1    lisinopril (PRINIVIL;ZESTRIL) 2.5 MG tablet Take 2.5 mg by mouth daily        vitamin C (ASCORBIC ACID) 500 MG tablet Take 500 mg by mouth daily        ferrous sulfate 325 (65 FE) MG tablet Take 325 mg by mouth daily (with breakfast)        calcium carbonate 600 MG TABS tablet Take 1 tablet by mouth daily.        vitamin B-12 (CYANOCOBALAMIN) 1000 MCG tablet Take 2,000 mcg by mouth daily.          No current facility-administered medications for this visit.                Allergies   Allergen Reactions    Erythromycin         vomitting    Adhesive Tape      Lorazepam Itching    Macrolides And Ketolides      Zolpidem Itching    Zolpidem Tartrate Itching         Review of Systems:   Review of Systems   Constitutional: Negative for chills and fever. HENT: Negative for congestion and sneezing. Eyes: Negative for pain and redness. Respiratory: Negative for chest tightness, shortness of breath and wheezing. Cardiovascular: Negative for chest pain and palpitations. Gastrointestinal: Negative for vomiting. Musculoskeletal: Positive for arthralgias.    Skin: Negative for color change and rash. Neurological: Negative for weakness and numbness. Psychiatric/Behavioral: Negative for agitation. The patient is not nervous/anxious.                                                  Examination:  General Exam:  Vitals: Pulse 79   Resp 15   Ht 5' 7\" (1.702 m)   Wt 217 lb (98.4 kg)   SpO2 99%   BMI 33.99 kg/m²    Physical Exam  Vitals signs and nursing note reviewed. Constitutional:       Appearance: Normal appearance. HENT:      Head: Normocephalic and atraumatic. Eyes:      Conjunctiva/sclera: Conjunctivae normal.      Pupils: Pupils are equal, round, and reactive to light. Neck:      Musculoskeletal: Normal range of motion. Pulmonary:      Effort: Pulmonary effort is normal.   Musculoskeletal:      Right hip: He exhibits normal range of motion, normal strength, no tenderness, no bony tenderness, no swelling, no crepitus and no deformity. Left hip: Normal.      Left knee: He exhibits normal range of motion, no swelling, no effusion, no ecchymosis, no deformity, no laceration, normal alignment, no LCL laxity, normal meniscus and no MCL laxity. No tenderness found. No medial joint line and no lateral joint line tenderness noted. Comments: Right Lower Extremity:    There is moderate to severe tenderness to palpation diffusely throughout the knee, most significant along the medial joint line. There is a moderate knee joint effusion present and mild global swelling anteriorly. Mild restricted range of motion at the knee with approximately 5 degrees lack of full extension and knee flexion up to 120 degrees with pain at the extremes of motion. There is mild crepitation during active range of motion. There is moderate to severe varus knee alignment. There is 5 out of 5 strength with knee flexion and extension. There is no instability to varus or valgus stress testing and anterior and posterior drawer testing.   Sensation is intact to light touch throughout the lower extremity. There is positive medial Kobi's test with tenderness to palpation and pain along the medial joint line. Skin is intact. Pulses are intact    No pain with active range of motion of the hip. Strength and range of motion of the hip are intact. No tenderness to palpation at the hip. Left lower extremity:  Well-healed midline scar at the knee. Range of motion present 0 to 125 degrees. Excellent alignment and stability at the knee. Strength is 5 out of 5 with flexion and extension. No tenderness to palpation. No swelling. Skin:     General: Skin is warm and dry. Neurological:      Mental Status: He is alert and oriented to person, place, and time. Psychiatric:         Mood and Affect: Mood normal.         Behavior: Behavior normal.               Diagnostic testing:  X-rays reviewed in office, I independently reviewed the films in the office today:   Xr Knee Left (3 Views)     Result Date: 2/11/2021  3 views of the left knee show well aligned total knee replacement with good bone cement interface and no complications or fractures or loosening.     Xr Knee Right (3 Views)     Result Date: 2/11/2021  4 views of the Right Knee: There is evidence of severe degenerative changes present in all 3 compartments of the knee most significant along the medial compartment where there is advanced joint space collapse and bone on bone articulation with prominent osteophyte formation. There is subchondral sclerosis present in medial compartment with cortical irregularities on both sides of the joint and mild tibial erosion. There is a moderately severe varus alignment present. There are prominent arthritic changes in the patellofemoral joint with joint space narrowing and osteophyte formation. Normal bone density is present. Mild soft tissue swelling present at the knee joint. No fractures or loose bodies identified.  Impression: Severe varus tricompartmental arthritis         Office Procedures:  No orders of the defined types were placed in this encounter.        Assessment and Plan  1. Right knee advanced primary osteoarthritis     2. History of previous left total knee replacement     He has done extremely well following left total knee replacement surgery. The left knee is functioning well and he is interested now in addressing his right knee.     We discussed the severity of the degenerative findings on both on the x-rays and physical exam.  The patient feels that they have exhausted conservative measures. The patient has previously tried injections, physical therapy, over-the-counter pain medications, and activity modification. The pain level is severe and bothers the patient on a daily basis, including interrupting sleep at night. Activities of daily living are difficult to perform. The patient has trouble getting dressed, getting up from a seated position, climbing stairs, and has fear of falling.     The pain and dysfunction at the knee are severely limiting at this stage and the patient would like to consider surgical intervention.     I have recommended surgical intervention for a total knee replacement. We discussed the risks, benefits, and alternatives to surgery. We discussed the intended benefits from a well-functioning replacement and the anticipated recovery process. We discussed potential risks and complications including but not limited to DVT/PE, infection, stiffness, loosening, and fracture. We discussed pain control, physical therapy, and anticoagulation during the perioperative timeframe.     The patient would like to proceed with knee replacement surgery and will be enrolled in the joint replacement class     Plan will be to proceed with a robotic total knee replacement.   And he will be on aspirin postoperatively for DVT prophylaxis.     The patient was counseled at length about the risks of lizbet Covid-19 during their perioperative period and any recovery window from their procedure.  The patient was made aware that lizbet Covid-19  may worsen their prognosis for recovering from their procedure  and lend to a higher morbidity and/or mortality risk.  All material risks, benefits, and reasonable alternatives including postponing the procedure were discussed. The patient does wish to proceed with the procedure at this time.     History and Physical exam note from the office visit is copied above from epic. I have reviewed the note and examined the patient and find no relevant changes.      I have reviewed with the patient and/or family the risks, benefits, and alternatives to the procedure as well as expected postoperative course    Nya Purdy MD

## 2021-03-08 NOTE — OP NOTE
Operative Note      Patient: Leonor Damian  YOB: 1955  MRN: [id number]    Date of Procedure: 3/8/2021    Pre-Op Diagnosis: PRIMARY OSTEOARTHRITIS OF RIGHT KNEE    Post-Op Diagnosis: Same       Procedure(s):  RIGHT KNEE TOTAL ARTHROPLASTY ROBOTIC    Surgeon(s):  Chelsi Kauffman MD    Assistant:   FRANK Monzon    Anesthesia: General    Estimated Blood Loss (mL): 008    Complications: None    Specimens:   * No specimens in log *    Implants:  Implant Name Type Inv. Item Serial No.  Lot No. LRB No. Used Action   CEMENT BNE 40GM W/ GENT HI VISC RADPQ FOR REV SURG  CEMENT BNE 40GM W/ GENT HI VISC RADPQ FOR REV SURG  [employer]- [id number] Right 1 Implanted   CEMENT BNE 40GM W/ GENT HI VISC RADPQ FOR REV SURG  CEMENT BNE 40GM W/ GENT HI VISC RADPQ FOR REV SURG  [employer]- [id number] Right 1 Implanted   IMPL PATELLA COMP TRIATH ASYMMETRC X3 Knee IMPL PATELLA COMP TRIATH ASYMMETRC X3  [employer]ITA: ORTHOPAEDICS-PMM [id number] Right 1 Implanted   BASEPLATE TIB SZ 5 KNEE TRITANIUM SHAE ALONSO LO PROF TRIATHLON  BASEPLATE TIB SZ 5 KNEE TRITANIUM SHAE ALONSO LO PROF TRIATHLON  [employer] [facility] [id number] Right 1 Implanted   COMPONENT FEM SZ 4 R ANT KNEE CRUCE RET SHAE REFERENCING  COMPONENT FEM SZ 4 R ANT KNEE CRUCE RET SHAE REFERENCING  [employer] [facility] [id number] Right 1 Implanted   TRIATHLON CR X3 TIBIAL INSERT  TRIATHLON CR X3 TIBIAL INSERT  [employer] [facility] [id number] Right 1 Implanted         Drains:   Closed/Suction Drain Right; Anterior Knee Accordion 10 Armenian (Active)   Dressing Status Clean;Dry; Intact 03/08/21 0954       Findings:     Detailed Description of Procedure:   Implants:  Namita triathlon cemented total knee replacement size 4  CR femoral component, size 5 tibial baseplate, size 10 CR articular surface liner, and a size 35 asymmetric polyethylene patella    The patient was identified in the preoperative area and the site was marked.   The patient was taken back to the operating room placed supine on the operative table. The above anesthesia was initiated. The lower extremity was prepped and draped in sterile fashion with a thigh tourniquet. Timeout was performed and preoperative antibiotics were given. Leg was exsanguinated with an Esmarch and tourniquet was inflated to 325 mmHg and deflated at the conclusion of the case during wound closure after less than 100 minutes of tourniquet time. A midline incision was made over the knee and dissection was carried down through subcutaneous tissues and bleeding was controlled with the Bovie. A medial parapatellar arthrotomy was performed to gain access to the knee joint. There was evidence of tricompartmental extensive degenerative joint disease. Release was performed along the deep fibers of the MCL at the medial tibia. ACL was resected. Portions of the fat pad were excised. Pins were placed at the femur and tibia for the robotic arrays. The center of the hip and ankle were identified with the robotic system. The blue probe was used to identify appropriate data points for identifying the anatomy of the femur and tibia. Using the robotic information, the knee was balanced in flexion and extension with appropriate positioning of the trial components using the computer program.    The robotic arm was brought into the field and bony cuts were made at the distal femur and proximal tibia. Excess bone was removed and the medial lateral meniscus were excised. The PCL was protected and intact. Trial of tibial component was placed and pinned in the appropriate rotation. Trial CR femoral component was impacted into place as well. Trial CR articular surface liners were placed in the knee and the knee was brought through range of motion and stress examination. The size 10 CR articular surface liner provided the knee with excellent stability full range of motion with no tightness in flexion.     The patella was resurfaced with a cutting guide. A size 35 asymmetric patella trial component was inserted. The knee was again taken through range of motion and there was excellent patellofemoral tracking. The trial components were removed. The knee was thoroughly irrigated with the pulse lavage. Cement was mixed and applied to the proximal tibia and distal femur. The size 5 tibial component was impacted into place along with the size 4 femoral component, and excess cement was removed. The trial liner was inserted and the knee was brought into full extension while the cement hardened. The size 35 asymmetric polyethylene patella was cemented into place as well. The knee was trialed again and the size 10 CR articular surface liner provided excellent stability and range of motion with improved alignment of the knee. The liner was then implanted. The knee was injected with 30 mL of half percent Marcaine with epinephrine and 30 mg of Toradol at the arthrotomy site and joint capsule. The tourniquet was deflated and bleeding was well controlled with the Bovie. The medial parapatellar arthrotomy was closed with a #1 strata fix suture. Deep subcutaneous layers were closed with buried 2-0 Vicryl. Skin was then closed with a running 3-0 strata fix subcuticular stitch followed by a Prineo Dermabond dressing. A sterile bandage was applied with 4 x 8's, ABD, sterile web roll, ice therapy pad and an Ace wrap. The patient was awakened and taken to PACU in stable condition. All sponge and needle counts were correct. The patient will be admitted to the hospital for pain control, physical therapy, and medical monitoring. The patient will be on chemical DVT prophylaxis and will be weightbearing as tolerated.     Electronically signed by Lisa Stafford MD on 3/8/2021 at 10:21 AM

## 2021-03-08 NOTE — PROGRESS NOTES
donning Rt sock, able to don Lt sock SBA seated EOB)  · Toileting: SBA (pt urinated in regular toilet while standing; min cues for safe body positioning and RW placement over toilet, able to manage clothing without assist and aim appropriately)    Cognitive and Psychosocial Functioning:  · Overall cognitive status: WNL   · Affect: Normal     Balance:   · Sitting: SBA in unsupported sitting EOB  · Standing: SBA with RW and with ADLs at sink    Functional Mobility:  · Bed Mobility: SBA supine to sitting EOB (HOB elevated, min cues for technique/utilizing bed rail)  · Transfers: CGA to and from bed and recliner (min cues for safe hand placement/extending Rt LE with controlled descent)  · Ambulation: CGA progressing to SBA with RW to/from bathroom for toileting + 100 ft in hallway; mildly antalgic gait, step-to pattern used, but safe and steady throughout      AM-PAC 6 click short form for inpatient daily activity:   How much help from another person does the patient currently need. .. Unable  Dep A Lot  Max A A Lot   Mod A A Little  Min A A Little   CGA  SBA None   Mod I  Indep  Sup   1. Putting on and taking off regular lower body clothing? [] 1    [] 2   [x] 2   [] 3   [] 3   [] 4      2. Bathing (including washing, rinsing, drying)? [] 1   [] 2   [] 2 [x] 3 [] 3 [] 4   3. Toileting, which includes using toilet, bedpan, or urinal? [] 1    [] 2   [] 2   [] 3   [] 3   [x] 4     4. Putting on and taking off regular upper body clothing? [] 1   [] 2   [] 2   [] 3   [] 3    [x] 4      5. Taking care of personal grooming such as brushing teeth? [] 1   [] 2    [] 2 [] 3    [] 3   [x] 4      6. Eating meals? [] 1   [] 2   [] 2   [] 3   [] 3   [x] 4      Raw Score:  21     [24=0% impaired(CH), 23=1-19%(CI), 20-22=20-39%(CJ), 15-19=40-59%(CK), 10-14=60-79%(CL), 7-9=80-99%(CM), 6=100%(CN)]     Treatment:  Self Care Training:   Cues were given for safety, sequence, UE/LE placement, visual cues, and balance.     Activities performed today included UB/LB dressing tasks, toileting, hand hygiene, facial hygiene at sink, education on safe RW placement at various locations in bathroom      Safety Measures: Gait belt used, Left in Chair, Alarm in place    Assessment:  Pt is a 72year old male with a past medical history of Anemia, Cancer ([facility] [address] Cirrhosis ([facility] [address] Duodenal cancer ([facility] [address] Esophageal varices ([facility] [address] History of blood transfusion, HLD (hyperlipidemia), Hypertension, Neuropathy, and Osteoarthritis. Pt admitted with Rt knee OA and underwent elective Rt TKA on 3-8 in the AM. Pt lives at home with his wife and at baseline is fully independent with ADLs, IADLs, mobility, and driving. Pt performed well this date, and from a self-care and mobility standpoint, is safe to return home at discharge with initial 24 hour supervision. Complexity: Moderate  Prognosis: Good  Plan: 2+x/week      Goals:  1. Pt will complete all aspects of bed mobility for EOB/OOB ADLs mod I with HOB flat  2. Pt will complete UB/LB bathing with supervision  3. Pt will complete all aspects of LB dressing with supervision  4. Pt will complete all functional transfers to and from bed, chair, toilet, shower chair mod I with use of grab bars PRN  5. Pt will ambulate HH distance to bathroom for toileting mod I with RW  6. Pt will complete all aspects of toileting task with supervision   7.  Pt will complete oral hygiene/grooming routine in standing at sink with supervision       Time:   Time in: 1526  Time out: 1559  Timed treatment minutes: 13  Total time: 33      Electronically signed by:    INES Ortiz/L, [place], [id number]

## 2021-03-08 NOTE — PROGRESS NOTES
Physical Therapy    Facility/Department: [address] [place] [address]  Initial Assessment    NAME: Estefania Graham  : 1955  MRN: [id number]    Date of Service: 3/8/2021    Discharge Recommendations:  24 hour supervision or assist, Outpatient PT       Functional Outcome Measure:    Acute Care Index of Function (ACIF):    Score: 0.82 (0.71 or greater = appropriate for home with outpatient PT and 24 hour supervision/assist)      Assessment   Assessment: Pt is a 72 y.o. male with medical history, surgical history, co-morbidities, and personal factors including Anemia, Cancer, Cirrhosis, Duodenal cancer, Esophageal varices, History of blood transfusion, HLD (hyperlipidemia), Hypertension, Neuropathy, Osteoarthritis, surgical history; Upper gastrointestinal endoscopy (2014); Cholecystectomy (); hernia repair (); Total knee arthroplasty (Left, 2020); Colonoscopy (2014); and Colonoscopy (2016) with admission for primary OA of right knee and s/p R TKA. Prior to admission, pt was independent with functional mobility and ADLs. Examination of body systems reveals decreased strength and decreased independence with functional mobility (requiring use of a front wheeled walker at this time)      Prognosis: Good  Decision Making: Medium Complexity  Clinical Presentation: evolving  PT Education: Goals;PT Role;General Safety;Weight-bearing Education;Plan of Care;Gait Training;Equipment; Functional Mobility Training;Transfer Training  REQUIRES PT FOLLOW UP: Yes  Activity Tolerance  Activity Tolerance: Patient Tolerated treatment well         Restrictions  Restrictions/Precautions  Restrictions/Precautions: General Precautions, Weight Bearing  Lower Extremity Weight Bearing Restrictions  Right Lower Extremity Weight Bearing: Weight Bearing As Tolerated  Vision/Hearing  Vision: Within Functional Limits  Hearing: Within functional limits     Subjective  General  Chart Reviewed: Yes  Patient assessed for rehabilitation services?: Yes  Family / Caregiver Present: Yes  Follows Commands: Within Functional Limits  Pain Screening  Patient Currently in Pain: Denies  Vital Signs  Patient Currently in Pain: Denies       Orientation  Orientation  Overall Orientation Status: Within Normal Limits  Social/Functional History  Social/Functional History  Lives With: Spouse  Type of Home: House  Home Layout: One level  Home Access: Stairs to enter with rails  Entrance Stairs - Number of Steps: 2  Bathroom Shower/Tub: Tub/Shower unit  Bathroom Toilet: Standard  Bathroom Equipment: Shower chair  Home Equipment: Rolling walker  ADL Assistance: Independent  Homemaking Assistance: Independent  Homemaking Responsibilities: Yes  Ambulation Assistance: (uses no AD)  Transfer Assistance: Independent  Active : Yes  Occupation: Retired  Type of occupation: ,   Leisure & Hobbies: Fishing  Cognition   Cognition  Overall Cognitive Status: WNL    Objective             Strength RLE  Comment: knee extension: at least 3+/5 observed functionally  Strength LLE  Strength LLE: WFL     Sensation  Overall Sensation Status: WNL  Bed mobility  Supine to Sit: Independent  Sit to Supine: Independent  Transfers  Sit to Stand: Independent  Stand to sit:  Independent  Ambulation  Ambulation?: Yes  Ambulation 1  Surface: level tile  Device: Rolling Walker  Assistance: Supervision;Stand by assistance  Quality of Gait: decreased gait speed, decreased step length bilaterally, decreased stance time on RLE, antalgic  Distance: 100 feet + 100 feet  Comments: with initial verbal and tactile cues for BLE placement, walker placement, and sequence throughout ambulation; with initial verbal cues for directions in order to successfully navigate hallway and return to correct room     Balance  Posture: Fair  Sitting - Static: Good  Sitting - Dynamic: Good  Standing - Static: Good  Standing - Dynamic: Good(with a front wheeled walker)      Gait Training:  Cues were given for safety, sequence, device management, balance, posture, awareness, path. Therapeutic Activity Training:   Therapeutic activity training was instructed today. Cues were given for safety, sequence, UE/LE placement, awareness, and balance. Activities performed today included bed mobility training, sup-sit, sit-stand. Plan   Plan  Times per week: 7 BID  Current Treatment Recommendations: Strengthening, ROM, Functional Mobility Training, Balance Training, Transfer Training, ADL/Self-care Training, Gait Training, Patient/Caregiver Education & Training, IADL Training, Stair training, Equipment Evaluation, Education, & procurement, Neuromuscular Re-education, Pain Management, Home Exercise Program, Positioning, Endurance Training, Safety Education & Training  Safety Devices  Type of devices: All fall risk precautions in place, Left in chair, Call light within reach, Chair alarm in place, Nurse notified, Gait belt      AM-PAC Score  AM-PAC Inpatient Mobility Raw Score : 22 (03/08/21 1656)  AM-PAC Inpatient T-Scale Score : 53.28 (03/08/21 1656)  Mobility Inpatient CMS 0-100% Score: 20.91 (03/08/21 1656)  Mobility Inpatient CMS G-Code Modifier : Ethyl Edwar (03/08/21 1656)          Goals  Long term goals  Time Frame for Long term goals :  In one week:  Long term goal 1: Pt will ambulate 400 feet with modified independence with LRAD  Long term goal 2: Pt will independently ascend/descend 6 steps with a handrail  Long term goal 3: Pt will independently complete 3 sets of 10 reps of BLE AROM exercises in available and allowed ROM       Time In: 1525  Time Out: 1605  Total Treatment Time: 40  Timed Code Treatment Minutes: [address] Marcelo, PT, DPT  License #: [id number]

## 2021-03-08 NOTE — PROGRESS NOTES
1015- pt rec'd from the OR and placed on pacu monitor with alarms on. [facility] and [facility] nurse. Pt arousing and following commands. Pt re-oriented to surroundings. resps even and unlabored. Pt able to flex with right foot and wiggle all right toes upon command. Pt denies pain. 1050- xrays done per orders. Pt tolerated well. 1115- pt resting with eyes closed. resps even and unlabored. Waiting on room assignment. Repositioned in bed.   1343- pt voiced a decrease in right knee pain. VSS. Pt transferred to room 1110 via bed and transporter. All valuables transferred with pt.

## 2021-03-09 PROBLEM — Z96.651 S/P TOTAL KNEE ARTHROPLASTY, RIGHT: Status: ACTIVE | Noted: 2021-03-09

## 2021-03-09 PROBLEM — M17.11 PRIMARY OSTEOARTHRITIS OF RIGHT KNEE: Status: RESOLVED | Noted: 2021-03-08 | Resolved: 2021-03-09

## 2021-03-09 PROBLEM — M17.0 BILATERAL PRIMARY OSTEOARTHRITIS OF KNEE: Status: RESOLVED | Noted: 2020-01-23 | Resolved: 2021-03-09

## 2021-03-09 LAB
BASOPHILS ABSOLUTE: 0 K/CU MM
BASOPHILS RELATIVE PERCENT: 0.2 % (ref 0–1)
DIFFERENTIAL TYPE: ABNORMAL
EOSINOPHILS ABSOLUTE: 0 K/CU MM
EOSINOPHILS RELATIVE PERCENT: 0.2 % (ref 0–3)
HCT VFR BLD CALC: 35.7 % (ref 42–52)
HEMOGLOBIN: 11.7 GM/DL (ref 13.5–18)
IMMATURE NEUTROPHIL %: 1 % (ref 0–0.43)
LYMPHOCYTES ABSOLUTE: 0.6 K/CU MM
LYMPHOCYTES RELATIVE PERCENT: 5.7 % (ref 24–44)
MCH RBC QN AUTO: 32.7 PG (ref 27–31)
MCHC RBC AUTO-ENTMCNC: 32.8 % (ref 32–36)
MCV RBC AUTO: 99.7 FL (ref 78–100)
MONOCYTES ABSOLUTE: 0.7 K/CU MM
MONOCYTES RELATIVE PERCENT: 6.6 % (ref 0–4)
NUCLEATED RBC %: 0 %
PDW BLD-RTO: 13.4 % (ref 11.7–14.9)
PLATELET # BLD: 101 K/CU MM (ref 140–440)
PMV BLD AUTO: 9.8 FL (ref 7.5–11.1)
RBC # BLD: 3.58 M/CU MM (ref 4.6–6.2)
SEGMENTED NEUTROPHILS ABSOLUTE COUNT: 9.1 K/CU MM
SEGMENTED NEUTROPHILS RELATIVE PERCENT: 86.3 % (ref 36–66)
TOTAL IMMATURE NEUTOROPHIL: 0.11 K/CU MM
TOTAL NUCLEATED RBC: 0 K/CU MM
WBC # BLD: 10.6 K/CU MM (ref 4–10.5)

## 2021-03-09 PROCEDURE — 97530 THERAPEUTIC ACTIVITIES: CPT

## 2021-03-09 PROCEDURE — 97535 SELF CARE MNGMENT TRAINING: CPT

## 2021-03-09 PROCEDURE — 6370000000 HC RX 637 (ALT 250 FOR IP): Performed by: ORTHOPAEDIC SURGERY

## 2021-03-09 PROCEDURE — 97116 GAIT TRAINING THERAPY: CPT

## 2021-03-09 PROCEDURE — 97110 THERAPEUTIC EXERCISES: CPT

## 2021-03-09 PROCEDURE — 94150 VITAL CAPACITY TEST: CPT

## 2021-03-09 PROCEDURE — 85025 COMPLETE CBC W/AUTO DIFF WBC: CPT

## 2021-03-09 PROCEDURE — 2580000003 HC RX 258: Performed by: ORTHOPAEDIC SURGERY

## 2021-03-09 PROCEDURE — 6360000002 HC RX W HCPCS: Performed by: ORTHOPAEDIC SURGERY

## 2021-03-09 PROCEDURE — 36415 COLL VENOUS BLD VENIPUNCTURE: CPT

## 2021-03-09 RX ORDER — OXYCODONE HYDROCHLORIDE AND ACETAMINOPHEN 5; 325 MG/1; MG/1
1 TABLET ORAL EVERY 6 HOURS PRN
Qty: 28 TABLET | Refills: 0 | Status: SHIPPED | OUTPATIENT
Start: 2021-03-09 | End: 2021-03-16

## 2021-03-09 RX ORDER — DOCUSATE SODIUM 100 MG/1
100 CAPSULE, LIQUID FILLED ORAL DAILY PRN
Qty: 30 CAPSULE | Refills: 0 | Status: SHIPPED | OUTPATIENT
Start: 2021-03-09 | End: 2021-08-02

## 2021-03-09 RX ADMIN — OXYCODONE HYDROCHLORIDE AND ACETAMINOPHEN 2 TABLET: 5; 325 TABLET ORAL at 08:28

## 2021-03-09 RX ADMIN — SODIUM CHLORIDE, PRESERVATIVE FREE 10 ML: 5 INJECTION INTRAVENOUS at 10:00

## 2021-03-09 RX ADMIN — ACETAMINOPHEN 650 MG: 325 TABLET ORAL at 02:16

## 2021-03-09 RX ADMIN — CYANOCOBALAMIN TAB 1000 MCG 2000 MCG: 1000 TAB at 10:00

## 2021-03-09 RX ADMIN — OXYCODONE HYDROCHLORIDE AND ACETAMINOPHEN 500 MG: 500 TABLET ORAL at 10:01

## 2021-03-09 RX ADMIN — HYDROMORPHONE HYDROCHLORIDE 0.5 MG: 1 INJECTION, SOLUTION INTRAMUSCULAR; INTRAVENOUS; SUBCUTANEOUS at 17:50

## 2021-03-09 RX ADMIN — CALCIUM 500 MG: 500 TABLET ORAL at 10:01

## 2021-03-09 RX ADMIN — SODIUM CHLORIDE, PRESERVATIVE FREE 10 ML: 5 INJECTION INTRAVENOUS at 21:17

## 2021-03-09 RX ADMIN — LISINOPRIL 2.5 MG: 2.5 TABLET ORAL at 10:01

## 2021-03-09 RX ADMIN — NADOLOL 40 MG: 20 TABLET ORAL at 10:01

## 2021-03-09 RX ADMIN — OXYCODONE HYDROCHLORIDE AND ACETAMINOPHEN 2 TABLET: 5; 325 TABLET ORAL at 12:58

## 2021-03-09 RX ADMIN — FERROUS SULFATE TAB 325 MG (65 MG ELEMENTAL FE) 325 MG: 325 (65 FE) TAB at 10:01

## 2021-03-09 RX ADMIN — ASPIRIN 325 MG: 325 TABLET, COATED ORAL at 10:01

## 2021-03-09 ASSESSMENT — PAIN DESCRIPTION - PAIN TYPE: TYPE: SURGICAL PAIN

## 2021-03-09 ASSESSMENT — PAIN SCALES - GENERAL
PAINLEVEL_OUTOF10: 3
PAINLEVEL_OUTOF10: 7
PAINLEVEL_OUTOF10: 7
PAINLEVEL_OUTOF10: 3

## 2021-03-09 ASSESSMENT — PAIN DESCRIPTION - FREQUENCY: FREQUENCY: CONTINUOUS

## 2021-03-09 ASSESSMENT — PAIN DESCRIPTION - DESCRIPTORS: DESCRIPTORS: THROBBING

## 2021-03-09 ASSESSMENT — PAIN - FUNCTIONAL ASSESSMENT: PAIN_FUNCTIONAL_ASSESSMENT: ACTIVITIES ARE NOT PREVENTED

## 2021-03-09 ASSESSMENT — PAIN DESCRIPTION - LOCATION: LOCATION: KNEE

## 2021-03-09 NOTE — CARE COORDINATION
Chart reviewed and call to pt room to initiate discharge plan. CM introduced self and explained role. Pt is alert and oriented. Pt lives at home w/ his wife who is able to drive and will be providing transport. Pt lives in single level home w/ 2 steps to enter. Pt states he has worked with PT while in the hospital on the steps and will be able to manage them when returning home. Pt states he has the following DME at home: cane, rolling walker and shower chair at home. Pt is scheduled for OP therapy on Thursday and plans to continue with therapy there. Pt declined any needs from CM at this time. CM available should any needs present at discharge.

## 2021-03-09 NOTE — ANESTHESIA POSTPROCEDURE EVALUATION
Department of Anesthesiology  Postprocedure Note    Patient: Ham Zavala  MRN: [id number]  YOB: 1955  Date of evaluation: 3/9/2021  Time:  6:49 AM     Procedure Summary     Date: 03/08/21 Room / Location: [address]    Anesthesia Start: 0730 Anesthesia Stop: 5275    Procedure: RIGHT KNEE TOTAL ARTHROPLASTY ROBOTIC (Right Knee) Diagnosis: (PRIMARY OSTEOARTHRITIS OF RIGHT KNEE)    Surgeons: Shreya Nation MD Responsible Provider: Zoran Hernandez MD    Anesthesia Type: general ASA Status: 4          Anesthesia Type: general    Joel Phase I: Joel Score: 9    Joel Phase II:      Last vitals: Reviewed and per EMR flowsheets.        Anesthesia Post Evaluation    Patient location during evaluation: PACU  Patient participation: complete - patient participated  Level of consciousness: awake  Pain score: 3  Airway patency: patent  Nausea & Vomiting: no vomiting and no nausea  Complications: no  Cardiovascular status: blood pressure returned to baseline and hemodynamically stable  Respiratory status: acceptable  Hydration status: stable

## 2021-03-09 NOTE — PROGRESS NOTES
Occupational Therapy    Attempted at [phone] 330-670 hrs c pt currently working c PT. Plan is to re-attempt later this AM.     Occupational Therapy Treatment Note  Name: Senait Ku MRN: [id number] :   1955   Date:  3/9/2021   Admission Date: 3/8/2021 Room:  [address] [place] [address]   Restrictions/Precautions:  Restrictions/Precautions  Restrictions/Precautions: General Precautions, Weight Bearing WBAT Rt LE    Communication with other providers:  Per chart review and Nurse Moni Cook, patient is appropriate for therapeutic intervention. RN Moni Cook notified of pt's breakthrough bleeding on gauze dressing at site of former Hemovac drain. Nurse arrived and replaced dressing. Subjective:  Patient states:  Pt agreeable to OT Tx session. Pain:   Location, Type, Intensity (0/10 to 10/10):  4/10, R knee    Objective:    Observation:  Pt received seated in bedside chair, spouse present. Objective Measures:  Dressing to R knee    Treatment, including education:  Therapeutic Activity Training:   Therapeutic activity training was instructed today. Cues were given for safety, sequence, UE/LE placement, awareness, and balance. Activities performed today included sit-stand, SPT. Sit<>stands: SBA c RW  Standing balance / tolerance; Sup static / SBA dynamic c RW, tolerated two stands >5 minutes  SPT: See Toilet Transfer  Functional Mobility: SBA c RW inside room and to / from bathroom, ~22 ft. Therapeutic Exercise:  Cues were given for technique, safety, recruitment, and rationale. Cues were verbal and/or tactile. Pt Sup + vc's for technique to perform BUE AROM exercises c 3# weight to include: shoulder flex/extension, internal/external rotation, chest presses, bicep curls, rowing, and supination/pronation x20 reps each + additional sets c pt self-pacing c rest breaks PRN. Self Care Training:   Cues were given for safety, sequence, UE/LE placement, visual cues, and balance.     Activities performed today included toileting, hand hygiene, and grooming. Toilet Transfer: SBA c RW + cue for safe body positioning / use of grab bar. Toileting: SBA for clothing mgmt before / after  Grooming: SBA c RW in stance at sink to perform hand hygiene. All therapeutic intervention performed c emphasis on BUE therapeutic exercises, toileting, dynamic balance / standing tolerance to inc strength, endurance and act tolerance for inc Indep c ADL tasks, func transfers / mobility. Safety  Patient safely in bedside chair at end of session, with call light/phone in reach, and nursing aware. Gait belt was used for func transfers / mobility. Spouse present. Pt declined use of alarm, verbalized understanding for call to staff for assistance when getting up. Assessment / Impression:        Patient's tolerance of treatment:  Well   Adverse Reaction: None  Significant change in status and impact:  Improving mobility  Barriers to improvement:  Pain, decreased balance / endurance    Plan for Next Session:    Continue per OT POC per patient's tolerance    Time in:  1030  Time out:  1123  Timed treatment minutes:  53  Total treatment time:  53    Electronically signed by:    FREDO Drew  3/9/2021, 8:57 AM    Previously filed values:    Goals:  1. Pt will complete all aspects of bed mobility for EOB/OOB ADLs mod I with HOB flat  2. Pt will complete UB/LB bathing with supervision  3. Pt will complete all aspects of LB dressing with supervision  4. Pt will complete all functional transfers to and from bed, chair, toilet, shower chair mod I with use of grab bars PRN  5. Pt will ambulate HH distance to bathroom for toileting mod I with RW  6. Pt will complete all aspects of toileting task with supervision   7.  Pt will complete oral hygiene/grooming routine in standing at sink with supervision

## 2021-03-09 NOTE — PROGRESS NOTES
INTERNAL MEDICINE PROGRESS NOTE        [facility]   [birth year]   Primary Care Physician:  Tommy Balderrama MD  Admit Date: 3/8/2021     Subjective:   Pt is doing better today. Denies chest pain, SOB, nausea, vomiting, abdominal pain. Remainder of ROS is unremarkable. Meds, labs and other notes reviewed. Objective:   BP (!) 113/56   Pulse 50   Temp 97.8 °F (36.6 °C) (Oral)   Resp 16   Ht 5' 8\" (1.727 m)   Wt 228 lb 11.2 oz (103.7 kg)   SpO2 96%   BMI 34.77 kg/m²  No results for input(s): POCGLU in the last 72 hours. I/O last 3 completed shifts: In: 8460 [P.O.:1400; I.V.:2888]  Out: 145 [Drains:95; Blood:50]  I/O this shift:  In: -   Out: 200 [Drains:200]    Neck: no adenopathy and supple, symmetrical, trachea midline  Lungs: clear to auscultation bilaterally  Heart: regular rate and rhythm and S1, S2 normal  Abdomen: soft, non-tender; bowel sounds normal; no masses,  no organomegaly  Extremities: extremities normal, atraumatic, no cyanosis or edema. Post op dressing on Rt lower ext  Neurologic: Grossly normal    Data Review  CBC with Differential:    Recent Labs     03/08/21  0637   PLT 75*     CMP:  No results for input(s): NA, K, CL, CO2, BUN, CREATININE, GFRAA, AGRATIO, LABGLOM, GLUCOSE, PROT, LABALBU, CALCIUM, BILITOT, ALKPHOS, AST, ALT in the last 72 hours. Invalid input(s): GLU  PT/INR:  No results for input(s): PROTIME, INR in the last 72 hours. Meds:    calcium elemental  1 tablet Oral Daily    ferrous sulfate  325 mg Oral Daily with breakfast    lisinopril  2.5 mg Oral Daily    nadolol  40 mg Oral Daily    vitamin B-12  2,000 mcg Oral Daily    vitamin C  500 mg Oral Daily    sodium chloride flush  10 mL Intravenous 2 times per day    acetaminophen  650 mg Oral Q6H    aspirin  325 mg Oral BID     PRN Meds: sodium chloride flush, HYDROmorphone **OR** HYDROmorphone, senna, promethazine **OR** ondansetron, oxyCODONE-acetaminophen, oxyCODONE-acetaminophen    Assessment/Plan:   1.

## 2021-03-09 NOTE — PROGRESS NOTES
Doing well postoperatively. Pain controlled  Ambulated well yesterday    Objective:     Patient Vitals for the past 4 hrs:   BP Temp Temp src Pulse Resp SpO2   03/09/21 0345 (!) 113/56 97.8 °F (36.6 °C) Oral 50 16 96 %         Physical Exam:     The patient is awake and alert  Resting comfortably in bed    Operative extremity:    The dressing is clean dry and intact. Incision is intact with Dermabond dressing. No erythema, drainage, or induration. Calf is soft and nontender. Sensation and motor function intact distally  Drain removed. LABS   CBC:   Recent Labs     03/08/21  0637   PLT 75*       Postoperative x-rays show well aligned prosthesis with no complications. Assessment and Plan     1. POD # 1 total knee replacement    1:  Physical therapy consult for mobilization   -Weight-bear as tolerated, progress as tolerated  2:  DVT prophylaxis   -Aspirin twice a day for 2 weeks  3:  Continue Pain Control   -Oral medications as needed, IV medication only for breakthrough pain  4. Medical management per hospitalist service  5:  D/C Planning:     -replace dressing over drain site, continue thigh high tedhose  -Discharge to home later today if patient is mobilizing well with physical therapy and pain is well controlled. -Follow-up in 2 weeks for x-rays and wound check.          Alka Vazquez MD

## 2021-03-09 NOTE — CONSULTS
[address] [place] [address], [address] [place] [address]                                  CONSULTATION    PATIENT NAME: Karen Okeefe                   :        1955  MED REC NO:   [id number]                          ROOM:       1110  ACCOUNT NO:   [de-identified]                           ADMIT DATE: 2021  PROVIDER:     Shyla Gayle MD    CONSULT DATE:  2021    CONSULTING PROVIDER:  Nataliia Munguia MD    REASON FOR CONSULTATION:  Medical followup. CHIEF COMPLAINT AND HISTORY OF PRESENT ILLNESS:  The patient is a  80-year-old  gentleman who underwent right total knee  arthroplasty by Dr. Kathrin Molina. I am seeing the patient postoperatively. At the time of my examination, he is sitting in the chair. He was  having his dinner. Postoperative pain is well controlled. Denies any  fever, chills, chest pain, shortness of breath, nausea, vomiting, or  abdominal pain. ALLERGIES:  ERYTHROMYCIN, LORAZEPAM, AMBIEN, and ADHESIVE TAPE. MEDICATIONS:  Prior to surgery include Corgard 40 mg daily, lisinopril  2.5 mg daily, vitamin C 500 mg, ferrous sulfate 325 mg, calcium 600 mg,  and vitamin B12. PAST MEDICAL HISTORY:  Hypertension, cirrhosis, hyperlipidemia,  esophageal varices, osteoarthritis, and duodenal cancer. PAST SURGICAL HISTORY:  Include umbilical hernia repair,  cholecystectomy, Whipple's procedure, and left total knee arthroplasty. SOCIAL HISTORY:  Negative for smoking, alcohol use, or drug use. FAMILY HISTORY:  Significant for hypertension and arthritis. PHYSICAL EXAMINATION:  VITAL SIGNS:  Temperature 97.7 degrees Fahrenheit, blood pressure  135/64, pulse 49, respiratory rate 18, oxygen saturation 95% on room  air. HEENT:  Conjunctivae normal.  Sclerae nonicteric. Oropharynx is moist.  NECK:  Supple. CHEST:  Clear to auscultation bilaterally. There is no wheezing, rales,  crackles, or rhonchi.   HEART: Rate and rhythm are regular. Normal S1 and S2.  ABDOMEN:  Soft and nontender. Positive bowel sounds. EXTREMITIES:  Postoperative dressing on the right lower extremity. No  calf tenderness. No swelling. NEUROLOGIC:  Nonfocal.    LABORATORY STUDIES AND X-RAY FINDINGS:  Sodium 140, potassium 3.9,  chloride 105, CO2 30, BUN 13, creatinine 0.8, glucose 117, and calcium  8.9. Bilirubin 1.3. Remainder of the liver function panel is normal.   On the CBC, white cell count is 3.5, hemoglobin 13.1, hematocrit 41.0,  platelet count 69,854. Differential count is normal.    Right knee x-ray, severe varus tricompartmental arthritis. Electrocardiogram:  Sinus bradycardia, rate up to 53 beats per minute. COVID testing prior to surgery was negative. Urine culture prior to surgery was negative. IMPRESSION:  1. Status post right total knee arthroplasty. 2.  Hypertension. 3.  Hyperlipidemia. 4.  Cirrhosis, esophageal varices, and thrombocytopenia. 5.  History of duodenal cancer with previous Whipple's procedure in  2011. PLAN:  This patient was seen and evaluated postoperatively. The patient  is medically stable. Continue current medications. The patient is  advised for incentive spirometry _____ while awake. He will have  aspirin and SCDs for DVT prophylaxis. All medicines reviewed and  reconciled. I will follow up with labs in the morning. I will continue  to follow the patient during this hospitalization.         Stephanie Cardona MD    D: 03/08/2021 18:32:27       T: 03/08/2021 21:31:35     [email or web address]  Job#: [id number]     Doc#: [id number]    CC:  MD Nya Lange MD

## 2021-03-09 NOTE — PROGRESS NOTES
Physical Therapy    Physical Therapy Treatment Note  Name: Tania Garcia MRN: [id number] :   1955   Date:  3/9/2021   Admission Date: 3/8/2021 Room:  [address] [place] [address]   Restrictions/Precautions:  Restrictions/Precautions  Restrictions/Precautions: General Precautions, Weight Bearing Lower Extremity Weight Bearing Restrictions  Right Lower Extremity Weight Bearing: Weight Bearing As Tolerated      s/p R TKA  Communication with other providers:  Per nurse ok to tx and gave pain meds at start of tx. Nurse notified at end of tx that pt bleeding from drain site. Subjective:  Patient states:  Pt is motivated and agreeable to tx. Pt reports having left knee replaced last feb. and did well. Pain:   Location, Type, Intensity (0/10 to 10/10):  4 at end of tx session. Objective:    Observation:  Alert and oriented  Treatment, including education/measures:  Sup to sit sba  Sit<=>stand from bed, chair, and commode sba  amb with rw 120' sba  Up/down 5 steps with bilateral rail cga/sba  Curb steps with rw cga   Pt given TKA booklet and reviewed safety and ex. Ex in sitting and long sittin reps aps  20 reps quad sets  20 reps glut sets  10 reps heel slides in long sitting with leg  assist  10 reps slrs with leg  assist  10 reps saqs  10 reps laqs  10 reps heel slides with towel under foot in sitting  Safety  Patient left safely in the chair, with call light/phone in reach with alarm applied. Gait belt and mask were used for transfers and gait. Assessment / Impression:       Patient's tolerance of treatment:  good   Adverse Reaction: na  Significant change in status and impact:  na  Barriers to improvement:  Pt having bleeding from drain site. Plan for Next Session:    Cont.  POC  Time in:  820  Time out:  920  Timed treatment minutes:  60  Total treatment time:  61    Previously filed items:  Social/Functional History  Lives With: Spouse  Type of Home: House  Home Layout: One level  Home Access: Stairs to enter with rails  Entrance Stairs - Number of Steps: 2  Bathroom Shower/Tub: Tub/Shower unit  Bathroom Toilet: Standard  Bathroom Equipment: Shower chair  Home Equipment: Rolling walker  ADL Assistance: Independent  Homemaking Assistance: Independent  Homemaking Responsibilities: Yes  Ambulation Assistance: (uses no AD)  Transfer Assistance: Independent  Active : Yes  Occupation: Retired  Type of occupation: ,   Leisure & Hobbies: Fishing     Long term goals  Time Frame for Long term goals :  In one week:  Long term goal 1: Pt will ambulate 400 feet with modified independence with LRAD  Long term goal 2: Pt will independently ascend/descend 6 steps with a handrail  Long term goal 3: Pt will independently complete 3 sets of 10 reps of BLE AROM exercises in available and allowed ROM    Electronically signed by:    Kandy Goetz PTA  3/9/2021, 8:08 AM

## 2021-03-10 VITALS
TEMPERATURE: 98.1 F | OXYGEN SATURATION: 95 % | RESPIRATION RATE: 16 BRPM | SYSTOLIC BLOOD PRESSURE: 120 MMHG | HEART RATE: 66 BPM | HEIGHT: 68 IN | DIASTOLIC BLOOD PRESSURE: 57 MMHG | BODY MASS INDEX: 34.66 KG/M2 | WEIGHT: 228.7 LBS

## 2021-03-10 PROCEDURE — 94761 N-INVAS EAR/PLS OXIMETRY MLT: CPT

## 2021-03-10 PROCEDURE — 6370000000 HC RX 637 (ALT 250 FOR IP): Performed by: ORTHOPAEDIC SURGERY

## 2021-03-10 PROCEDURE — 97116 GAIT TRAINING THERAPY: CPT

## 2021-03-10 PROCEDURE — 94150 VITAL CAPACITY TEST: CPT

## 2021-03-10 PROCEDURE — 2580000003 HC RX 258: Performed by: ORTHOPAEDIC SURGERY

## 2021-03-10 PROCEDURE — 97110 THERAPEUTIC EXERCISES: CPT

## 2021-03-10 RX ORDER — ASPIRIN 81 MG/1
81 TABLET ORAL 2 TIMES DAILY
Qty: 30 TABLET | Refills: 0 | Status: SHIPPED | OUTPATIENT
Start: 2021-03-10 | End: 2021-08-02

## 2021-03-10 RX ADMIN — NADOLOL 40 MG: 20 TABLET ORAL at 12:06

## 2021-03-10 RX ADMIN — LISINOPRIL 2.5 MG: 2.5 TABLET ORAL at 12:02

## 2021-03-10 RX ADMIN — CALCIUM 500 MG: 500 TABLET ORAL at 12:05

## 2021-03-10 RX ADMIN — OXYCODONE HYDROCHLORIDE AND ACETAMINOPHEN 500 MG: 500 TABLET ORAL at 12:04

## 2021-03-10 RX ADMIN — SODIUM CHLORIDE, PRESERVATIVE FREE 10 ML: 5 INJECTION INTRAVENOUS at 12:08

## 2021-03-10 RX ADMIN — FERROUS SULFATE TAB 325 MG (65 MG ELEMENTAL FE) 325 MG: 325 (65 FE) TAB at 12:05

## 2021-03-10 RX ADMIN — CYANOCOBALAMIN TAB 1000 MCG 2000 MCG: 1000 TAB at 12:04

## 2021-03-10 RX ADMIN — OXYCODONE HYDROCHLORIDE AND ACETAMINOPHEN 1 TABLET: 5; 325 TABLET ORAL at 04:40

## 2021-03-10 RX ADMIN — OXYCODONE HYDROCHLORIDE AND ACETAMINOPHEN 1 TABLET: 5; 325 TABLET ORAL at 10:46

## 2021-03-10 ASSESSMENT — PAIN SCALES - GENERAL: PAINLEVEL_OUTOF10: 5

## 2021-03-10 ASSESSMENT — PAIN DESCRIPTION - PROGRESSION: CLINICAL_PROGRESSION: NOT CHANGED

## 2021-03-10 NOTE — PROGRESS NOTES
INTERNAL MEDICINE PROGRESS NOTE        Uri Hearing   [birth year]   Primary Care Physician:  Madelyn Dewey MD  Admit Date: 3/8/2021     Subjective:   Pt is doing better today. Denies chest pain, SOB, nausea, vomiting, abdominal pain. Remainder of ROS is unremarkable. Meds, labs and other notes reviewed. Objective:   /63   Pulse 56   Temp 97.5 °F (36.4 °C) (Oral)   Resp 16   Ht 5' 8\" (1.727 m)   Wt 228 lb 11.2 oz (103.7 kg)   SpO2 96%   BMI 34.77 kg/m²  No results for input(s): POCGLU in the last 72 hours. I/O last 3 completed shifts:  In: -   Out: 200 [Drains:200]  No intake/output data recorded. Neck: no adenopathy and supple, symmetrical, trachea midline  Lungs: clear to auscultation bilaterally  Heart: regular rate and rhythm and S1, S2 normal  Abdomen: soft, non-tender; bowel sounds normal; no masses,  no organomegaly  Extremities: extremities normal, atraumatic, no cyanosis or edema. Post op dressing on Rt lower ext  Neurologic: Grossly normal    Data Review  CBC with Differential:    Recent Labs     03/08/21  0637 03/09/21  0909   WBC  --  10.6*   RBC  --  3.58*   HGB  --  11.7*   HCT  --  35.7*   PLT 75* 101*   MCV  --  99.7   MCH  --  32.7*   MCHC  --  32.8   RDW  --  13.4   SEGSPCT  --  86.3*   LYMPHOPCT  --  5.7*   MONOPCT  --  6.6*   BASOPCT  --  0.2   MONOSABS  --  0.7   LYMPHSABS  --  0.6   EOSABS  --  0.0   BASOSABS  --  0.0   DIFFTYPE  --  AUTOMATED DIFFERENTIAL     CMP:  No results for input(s): NA, K, CL, CO2, BUN, CREATININE, GFRAA, AGRATIO, LABGLOM, GLUCOSE, PROT, LABALBU, CALCIUM, BILITOT, ALKPHOS, AST, ALT in the last 72 hours. Invalid input(s): GLU  PT/INR:  No results for input(s): PROTIME, INR in the last 72 hours.   Meds:    calcium elemental  1 tablet Oral Daily    ferrous sulfate  325 mg Oral Daily with breakfast    lisinopril  2.5 mg Oral Daily    nadolol  40 mg Oral Daily    vitamin B-12  2,000 mcg Oral Daily    vitamin C  500 mg Oral Daily    sodium chloride flush  10 mL Intravenous 2 times per day    acetaminophen  650 mg Oral Q6H    aspirin  325 mg Oral BID     PRN Meds: sodium chloride flush, HYDROmorphone **OR** HYDROmorphone, senna, promethazine **OR** ondansetron, oxyCODONE-acetaminophen, oxyCODONE-acetaminophen    Assessment/Plan:   1. Status post right total knee arthroplasty on 03/08/2021.  2.  Hypertension. 3.  Hyperlipidemia. 4.  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 Plt count is 101k today. 5.  History of duodenal cancer with previous Whipple's procedure in  2011.   6. VTE prophylaxis, Aspirin and SCD.        Vivienne Briceno MD  3/10/2021 7:39 AM

## 2021-03-10 NOTE — PROGRESS NOTES
Physical Therapy    Physical Therapy Treatment Note  Name: Senait Ku MRN: [id number] :   1955   Date:  3/10/2021   Admission Date: 3/8/2021 Room:  [address] [place] [address]   Restrictions/Precautions:  Restrictions/Precautions  Restrictions/Precautions: General Precautions, Weight Bearing Lower Extremity Weight Bearing Restrictions  Right Lower Extremity Weight Bearing: Weight Bearing As Tolerated     Communication with other providers:  per nurse pt is ok to treat  Subjective:  Patient states:  He wants to walk and go home  Pain:   Location, Type, Intensity (0/10 to 10/10):  4-5/10 R knee  Objective:    Observation:  Pt was sitting up in the chair  Treatment, including education/measures:  Pt given total hip booklet and reviewed precautions and safety  AROM of Right knee in sitting 15-90  Sitting Exercises: Ankle pumps x 20  Glute sets x 20  LAQ's x 20 with leg   Marching x 20   Knee flexion with towel under his foot x 20   Therapeutic Exercise:  Therapeutic exercises were instructed today. Cues were given for technique, safety, recruitment, and rationale. Cues were verbal and/or tactile. Transfers  Scooting :Ind  Sit to stand : Ind  Stand to sit : Ind  Gait:  Pt amb with RW for 140 ft with SBA with slow proper sadi  Pt needed VC's for pathway and sequence  Pt amb up and down 3 steps with B HR and SBA using step to pattern   Safety  Patient left safely in the chair, with call light/phone in reach with alarm applied. Gait belt and mask were used for transfers and gait.   Assessment / Impression:     Patient's tolerance of treatment:  Good, very motivated   Adverse Reaction: none  Significant change in status and impact:  none  Barriers to improvement:  pain  Plan for Next Session:    Will cont to work towards pt's goals per his tolerance  Time in:  1036  Time out:  1130  Timed treatment minutes:  54  Total treatment time:  47  Previously filed items:  Social/Functional History  Lives With: Spouse  Type of Home: House  Home Layout: One level  Home Access: Stairs to enter with rails  Entrance Stairs - Number of Steps: 2  Bathroom Shower/Tub: Tub/Shower unit  Bathroom Toilet: Standard  Bathroom Equipment: Shower chair  Home Equipment: Rolling walker  ADL Assistance: Independent  Homemaking Assistance: Independent  Homemaking Responsibilities: Yes  Ambulation Assistance: (uses no AD)  Transfer Assistance: Independent  Active : Yes  Occupation: Retired  Type of occupation: ,   Leisure & Hobbies: Fishing     Long term goals  Time Frame for Long term goals : In one week:  Long term goal 1: Pt will ambulate 400 feet with modified independence with LRAD  Long term goal 2: Pt will independently ascend/descend 6 steps with a handrail  Long term goal 3: Pt will independently complete 3 sets of 10 reps of BLE AROM exercises in available and allowed ROM    Electronically signed by:     Lyle Mohamud PTA  3/10/2021, 12:08 PM

## 2021-03-10 NOTE — PROGRESS NOTES
Physical Therapy  Pt was just seen by Dr. Neida Ryan and is going home soon. Pt would like to do his ex and walking at home and has an appointment for therapy tomorrow afternoon. Thea Greene.  Katarina Arredondo PTA

## 2021-03-11 ENCOUNTER — HOSPITAL ENCOUNTER (OUTPATIENT)
Dept: PHYSICAL THERAPY | Age: 66
Setting detail: THERAPIES SERIES
Discharge: HOME | End: 2021-03-11
Payer: MEDICARE

## 2021-03-11 PROCEDURE — 97016 VASOPNEUMATIC DEVICE THERAPY: CPT

## 2021-03-11 PROCEDURE — 97161 PT EVAL LOW COMPLEX 20 MIN: CPT

## 2021-03-11 PROCEDURE — 97110 THERAPEUTIC EXERCISES: CPT

## 2021-03-11 ASSESSMENT — PAIN SCALES - GENERAL: PAINLEVEL_OUTOF10: 5

## 2021-03-11 ASSESSMENT — PAIN - FUNCTIONAL ASSESSMENT: PAIN_FUNCTIONAL_ASSESSMENT: PREVENTS OR INTERFERES WITH ALL ACTIVE AND SOME PASSIVE ACTIVITIES

## 2021-03-11 ASSESSMENT — PAIN DESCRIPTION - ORIENTATION: ORIENTATION: RIGHT

## 2021-03-11 ASSESSMENT — PAIN DESCRIPTION - PAIN TYPE: TYPE: SURGICAL PAIN

## 2021-03-11 ASSESSMENT — PAIN DESCRIPTION - LOCATION: LOCATION: KNEE

## 2021-03-11 NOTE — PROGRESS NOTES
Physical Therapy  Initial Assessment  Date: 3/11/2021  Patient Name: Veda Espinal  MRN: [id number]  : 1955     Treatment Diagnosis: R knee pain, stiffness, weakness, altered gait    Restrictions  Position Activity Restriction  Other position/activity restrictions: postop    Subjective   General  Chart Reviewed: Yes  Patient assessed for rehabilitation services?: Yes  Additional Pertinent Hx: L TKA 2020, R TKA 3/8/21  Referring Practitioner: Dr. Don Paez  Diagnosis: R TKA  PT Visit Information  PT Insurance Information: [employer]  [employer], #40 copay  Subjective  Subjective: Pain moderate to signif since surgery, hosp 2 nights, drain tube lateral knee came out Tues, not sure why had this, perhaps d/t robotic procedure. Sleep a litttle rough still. Pain Screening  Patient Currently in Pain: Yes  Pain Assessment  Pain Assessment: 0-10  Pain Level: 5(not too bad in hosp either)  Patient's Stated Pain Goal: No pain  Pain Type: Surgical pain  Pain Location: Knee  Pain Orientation: Right  Pain Descriptors: Aching  Pain Frequency: Continuous  Clinical Progression: Not changed  Functional Pain Assessment: Prevents or interferes with all active and some passive activities  Vital Signs  Patient Currently in Pain: Yes    Vision/Hearing  Vision  Vision: Within Functional Limits  Hearing  Hearing: Within functional limits    Orientation  Orientation  Overall Orientation Status: Within Normal Limits    Social/Functional History  Social/Functional History  Lives With: Spouse  Type of Home: House  Home Layout: One level  Home Access: Stairs to enter with rails  Bathroom Shower/Tub: Tub/Shower unit; Shower chair with back  [employer]: Standard  Home Equipment: Rolling walker;Cane  ADL Assistance: Independent  Homemaking Assistance: Independent  Homemaking Responsibilities: Yes  Ambulation Assistance: Independent(w/ walker)  Transfer Assistance: Independent  Active : Yes  Mode of Transportation: Strengthening, ROM, Balance Training, Functional Mobility Training, Gait Training, Stair training, Pain Management, Modalities, Manual Therapy - Joint Manipulation, Neuromuscular Re-education, Home Exercise Program, Patient/Caregiver Education & Training        OutComes Score     KOOS 68%     Goals  Short term goals  Time Frame for Short term goals: 4 weeks  Short term goal 1: Pt will be able to report pain < 4/10 at least 50% of the time  Short term goal 2: Pt will be able to ambulate w/ SPC w/o limp or pain at least 150 ft  Short term goal 3: Pt will have at least 100° flex for gait and stairs  Long term goals  Time Frame for Long term goals : 8 weeks  Long term goal 1: Pt will be able to walk w/o device w/o limp or pain at least 10 mins at a time  Long term goal 2: Pt will be independent w/ HEP and able to progress on his own  Long term goal 3: Pt will have improved KOOS by 10% or more  Long term goal 4: Pt will be able to return to walking on uneven surfaces. Patient Goals   Patient goals : get back to mowing grass, walking normally w/o device and on uneven surfaces.            Carol Manzanares, PT  PT, MPT, ATC      3/11/2021, 4:50 PM

## 2021-03-11 NOTE — PLAN OF CARE
Outpatient Physical Therapy           [facility]           [x] Phone: [phone]   Fax: [phone]  [facility]           [] Phone: [phone]   Fax: [phone]     To: Referring Practitioner: Dr. Cecilia Rachel    From: Barby Guallpa, PT     Patient: Ham Zavala        : 1955  Diagnosis: Diagnosis: R TKA   Treatment Diagnosis: Treatment Diagnosis: R knee pain, stiffness, weakness, altered gait   Date: 3/11/2021    Physical Therapy Certification/Re-Certification Form  Dear Dr. Cecilia Rachel,   The following patient has been evaluated for physical therapy services and for therapy to continue, insurance requires physician review of the treatment plan initially and every 90 days. Please review the attached evaluation and/or summary of the patient's plan of care, and verify that you agree therapy should continue by signing the attached document and sending it back to our office. Assessment:    Assessment: Pt is a 73 yo male who presents 3 days postop R TKA. He has Hx L TKA just over year ago and is doing well w/ this. He arrives using RW w/ altered WB and gait, limited ROM and strength in R LE w/ pain and swelling. These limitaitons are affecting his ability to walk, sleep and perform his usual functional activity. He will benefit from PT to help restore ROM, strength and function w/o pain. He was walking w/o device before surgery but pain has been progressing for a year or more now. Patient agrees with established plan of care and assisted in the development of their short term and long term goals. Patient had no adverse reaction with initial treatment and there are no barriers to learning. Demonstrates no mental or cognitive disorder.       Plan of Care/Treatment to date:  [x] Therapeutic Exercise  [x] Modalities:  [x] Therapeutic Activity     [] Ultrasound  [x] Electrical Stimulation  [x] Gait Training      [] Cervical Traction [] Lumbar Traction  [x] Neuromuscular Re-education    [x] Cold/hotpack [] Iontophoresis   [x] Instruction in HEP      [x] Vasopneumatic    [] Dry Needling  [x] Manual Therapy               [] Aquatic Therapy       Other:          Frequency/Duration:  # Days per week: [] 1 day # Weeks: [] 1 week [] 5 weeks     [x] 2 days   [] 2 weeks [] 6 weeks     [] 3 days   [] 3 weeks [] 7 weeks     [] 4 days   [] 4 weeks [x] 8 weeks         [] 9 weeks [] 10 weeks         [] 11 weeks [] 12 weeks    Rehab Potential/Progress: [] Excellent [x] Good [] Fair  [] Poor     Goals:      Short term goals  Time Frame for Short term goals: 4 weeks  Short term goal 1: Pt will be able to report pain < 4/10 at least 50% of the time  Short term goal 2: Pt will be able to ambulate w/ SPC w/o limp or pain at least 150 ft  Short term goal 3: Pt will have at least 100° flex for gait and stairs  Long term goals  Time Frame for Long term goals : 8 weeks  Long term goal 1: Pt will be able to walk w/o device w/o limp or pain at least 10 mins at a time  Long term goal 2: Pt will be independent w/ HEP and able to progress on his own  Long term goal 3: Pt will have improved KOOS by 10% or more  Long term goal 4: Pt will be able to return to walking on uneven surfaces. Electronically signed by:  Mike Dumont, PT, MPT, ATC  3/11/2021, 4:55 PM    3/11/2021, 4:56 PM  If you have any questions or concerns, please don't hesitate to call.   Thank you for your referral.      Physician Signature:________________________________Date:_________ TIME: _____  By signing above, therapists plan is approved by physician

## 2021-03-11 NOTE — FLOWSHEET NOTE
Outpatient Physical Therapy  [facility]           [x] Phone: [phone]   Fax: [phone]  [facility]           [] Phone: [phone]   Fax: [phone]        Physical Therapy Daily Treatment Note  Date:  3/11/2021    Patient Name:  Mira Rushing    :  1955  MRN: [id number]  Restrictions/Precautions: Other position/activity restrictions: postop  Diagnosis:   Diagnosis: R TKA  Date of Injury/Surgery:   Treatment Diagnosis: Treatment Diagnosis: R knee pain, stiffness, weakness, altered gait    Insurance/Certification information: PT Insurance Information: Med mutual  Chela Deskom, #40 copay   Referring Physician:  Referring Practitioner: Dr. Edwin Davis  Next Doctor Visit:    Plan of care signed (Y/N):  Pending cosign   Outcome Measure: KOOS 68%  Visit# / total visits:   -  Pain level: 5/10   Goals:       Short term goals  Time Frame for Short term goals: 4 weeks  Short term goal 1: Pt will be able to report pain < 4/10 at least 50% of the time  Short term goal 2: Pt will be able to ambulate w/ SPC w/o limp or pain at least 150 ft  Short term goal 3: Pt will have at least 100° flex for gait and stairs  Long term goals  Time Frame for Long term goals : 8 weeks  Long term goal 1: Pt will be able to walk w/o device w/o limp or pain at least 10 mins at a time  Long term goal 2: Pt will be independent w/ HEP and able to progress on his own  Long term goal 3: Pt will have improved KOOS by 10% or more  Long term goal 4: Pt will be able to return to walking on uneven surfaces. Summary of Evaluation: Assessment: Pt is a 73 yo male who presents 3 days postop R TKA. He has Hx L TKA just over year ago and is doing well w/ this. He arrives using RW w/ altered WB and gait, limited ROM and strength in R LE w/ pain and swelling. These limitaitons are affecting his ability to walk, sleep and perform his usual functional activity. He will benefit from PT to help restore ROM, strength and function w/o pain.   He was walking w/o device before surgery but pain has been progressing for a year or more now. Subjective:  See eval         Any changes in Ambulatory Summary Sheet? None        Objective:  See eval   Prior to today's treatment session, patient was screened for signs and symptoms related to COVID-19 including but not limited to verbally answering questions related to feeling ill, cough, or SOB, along with taking temperature via forehead thermometer. Patient presented with all negative signs and symptoms and had no fever >100 degrees Fahrenheit this date. Exercises: (No more than 4 columns)   Exercise/Equipment 3/11/21  #1 Date Date           WARM UP      Nustep     NEXT     Rec bike  ---          TABLE      Manual As below     Quad set 10x5\"     SLR  10x     Heel slide Man 10x     Ext prop Man 30\" x2                 Seated:  Knee ext  Knee flex  A 10x  A 10x              STANDING                                                     PROPRIOCEPTION                                    MODALITIES      Vaso  15'               Other Therapeutic Activities/Education:  3/11/2021: Patient received education on their current pathology and how their condition effects them with their functional activities. Patient understood discussion and questions were answered. Patient understands their activity limitations and understands rational for treatment progression. Home Exercise Program:  Issued, practiced and pt demo ability to perform 3/11/2021         Manual Treatments:  PROM flex and ext       Modalities:  Vaso to R knee, pt recumb w/ leg on pillow 15'       Communication with other providers:  POC set for cosign 3/11/21      Assessment:  Pt is a 71 yo male who presents 3 days postop R TKA. He has Hx L TKA just over year ago and is doing well w/ this. He arrives using RW w/ altered WB and gait, limited ROM and strength in R LE w/ pain and swelling.   These limitaitons are affecting his ability to walk, sleep and perform his usual functional activity. He will benefit from PT to help restore ROM, strength and function w/o pain. He was walking w/o device before surgery but pain has been progressing for a year or more now.       Plan for Next Session: work on ROM, gait, strength to jessica, pain and edema control      Time In / Time Out:   1430/1515          Timed Code/Total Treatment Minutes:  15/45  1 TE 15', 1 Vaso 15', 1 Eval 15'      Next Progress Note due:  visit 10      Plan of Care Interventions:  [x] Therapeutic Exercise  [x] Modalities:  [x] Therapeutic Activity     [] Ultrasound  [x] Estim  [x] Gait Training      [] Cervical Traction [] Lumbar Traction  [x] Neuromuscular Re-education    [x] Cold/hotpack [] Iontophoresis   [x] Instruction in HEP      [x] Vasopneumatic   [] Dry Needling    [x] Manual Therapy               [] Aquatic Therapy              Electronically signed by:  Massimo Carmichael, PT, MPT, ATC  3/11/2021, 4:51 PM     3/11/2021, 4:55 PM

## 2021-03-17 ENCOUNTER — HOSPITAL ENCOUNTER (OUTPATIENT)
Dept: PHYSICAL THERAPY | Age: 66
Setting detail: THERAPIES SERIES
Discharge: HOME | End: 2021-03-17
Payer: MEDICARE

## 2021-03-17 PROCEDURE — 97140 MANUAL THERAPY 1/> REGIONS: CPT

## 2021-03-17 PROCEDURE — 97110 THERAPEUTIC EXERCISES: CPT

## 2021-03-17 PROCEDURE — 97016 VASOPNEUMATIC DEVICE THERAPY: CPT

## 2021-03-17 NOTE — FLOWSHEET NOTE
was walking w/o device before surgery but pain has been progressing for a year or more now. Subjective:  Walked about 250 ft x 2 outside the other day, may have over done it. Any changes in Ambulatory Summary Sheet? None        Objective:  87 deg seated knee flex AROM. -10 deg knee ext. Quad set with minimal patella excursion     Prior to today's treatment session, patient was screened for signs and symptoms related to COVID-19 including but not limited to verbally answering questions related to feeling ill, cough, or SOB, along with taking temperature via forehead thermometer. Patient presented with all negative signs and symptoms and had no fever >100 degrees Fahrenheit this date. Exercises: (No more than 4 columns)   Exercise/Equipment 3/11/21  #1 Date  3-17-21 Date           WARM UP      Nustep    Seat 11 NEXT Level 6 x 10 mins    Rec bike  ---          TABLE      Manual As below Hamstring stretch x 1min    Quad set 10x5\" 10 x 5 sec hold. SLR  10x 10 x 2    Heel slide Man 10x     Ext prop Man 30\" x2 Roll under knee x 1 min                Seated:  Knee ext  Knee flex  A 10x  A 10x X 10             STANDING                                                     PROPRIOCEPTION                                    MODALITIES      Vaso  15' X 15              Other Therapeutic Activities/Education:  3/11/2021: Patient received education on their current pathology and how their condition effects them with their functional activities. Patient understood discussion and questions were answered. Patient understands their activity limitations and understands rational for treatment progression. Educated against pillow/blanket under back of knee to prevent knee flex contracture. Educated on upward patellar movement with quad sets.       Home Exercise Program:  Issued, practiced and pt demo ability to perform 3/11/2021         Manual Treatments:  PROM flex and ext , hamstring stretch      Modalities:  Vaso to R knee, pt recumb w/ leg on pillow 15'       Communication with other providers:  POC set for cosign 3/11/21      Assessment:  Presents with antalgic gait and decreased stance on RLE, tolerated progression of exs and stretches with reports of decreased stiffness jocy after Nustep.     Plan for Next Session: work on ROM, gait, strength to jessica, pain and edema control      Time In / Time Out:    4667-9373=53         Timed Code/Total Treatment Minutes:  38/53= 2/30 te, 1/8 man,  1/15 vaso      Next Progress Note due:  visit 10      Plan of Care Interventions:  [x] Therapeutic Exercise  [x] Modalities:  [x] Therapeutic Activity     [] Ultrasound  [x] Estim  [x] Gait Training      [] Cervical Traction [] Lumbar Traction  [x] Neuromuscular Re-education    [x] Cold/hotpack [] Iontophoresis   [x] Instruction in HEP      [x] Vasopneumatic   [] Dry Needling    [x] Manual Therapy               [] Aquatic Therapy              Electronically signed by:  Marjorie Rodríguez, PTA [id number] 3/17/2021, 12:21 PM

## 2021-03-19 ENCOUNTER — HOSPITAL ENCOUNTER (OUTPATIENT)
Dept: PHYSICAL THERAPY | Age: 66
Setting detail: THERAPIES SERIES
Discharge: HOME | End: 2021-03-19
Payer: MEDICARE

## 2021-03-19 PROCEDURE — 97110 THERAPEUTIC EXERCISES: CPT

## 2021-03-19 PROCEDURE — 97016 VASOPNEUMATIC DEVICE THERAPY: CPT

## 2021-03-19 PROCEDURE — 97140 MANUAL THERAPY 1/> REGIONS: CPT

## 2021-03-19 NOTE — FLOWSHEET NOTE
Outpatient Physical Therapy  [place]           [x] Phone: [phone]   Fax: [phone]  [facility]           [] Phone: [phone]   Fax: [phone]        Physical Therapy Daily Treatment Note  Date:  3/19/2021    Patient Name:  Hailee Irizarry    :  1955  MRN: [id number]  Restrictions/Precautions: Other position/activity restrictions: postop  Diagnosis:   Diagnosis: R TKA  Date of Injury/Surgery:   Treatment Diagnosis: Treatment Diagnosis: R knee pain, stiffness, weakness, altered gait    Insurance/Certification information: PT Insurance Information: [employer]  Flora Runner, #40 copay   Referring Physician:  Referring Practitioner: Dr. Charles Rocha  Next Doctor Visit:    Plan of care signed (Y/N):  Pending cosign   Outcome Measure: KOOS 68%  Visit# / total visits:   3/12-  Pain level: 0/10   Goals:       Short term goals  Time Frame for Short term goals: 4 weeks  Short term goal 1: Pt will be able to report pain < 4/10 at least 50% of the time  Short term goal 2: Pt will be able to ambulate w/ SPC w/o limp or pain at least 150 ft  Short term goal 3: Pt will have at least 100° flex for gait and stairs  Long term goals  Time Frame for Long term goals : 8 weeks  Long term goal 1: Pt will be able to walk w/o device w/o limp or pain at least 10 mins at a time  Long term goal 2: Pt will be independent w/ HEP and able to progress on his own  Long term goal 3: Pt will have improved KOOS by 10% or more  Long term goal 4: Pt will be able to return to walking on uneven surfaces. Summary of Evaluation: Assessment: Pt is a 73 yo male who presents 3 days postop R TKA. He has Hx L TKA just over year ago and is doing well w/ this. He arrives using RW w/ altered WB and gait, limited ROM and strength in R LE w/ pain and swelling. These limitaitons are affecting his ability to walk, sleep and perform his usual functional activity. He will benefit from PT to help restore ROM, strength and function w/o pain.   He was walking w/o device before surgery but pain has been progressing for a year or more now. Subjective:  Pt reports he is having a really good day - no pain currently!!    Any changes in Ambulatory Summary Sheet? None      Objective:  100 deg seated knee flex AROM and 105 deg seated knee flexion PROM - 3/19/21  5 deg lack from full knee extension in supine - 3/19/21     Prior to today's treatment session, patient was screened for signs and symptoms related to COVID-19 including but not limited to verbally answering questions related to feeling ill, cough, or SOB, along with taking temperature via forehead thermometer. Patient presented with all negative signs and symptoms and had no fever >100 degrees Fahrenheit this date. Exercises: (No more than 4 columns)   Exercise/Equipment 3/11/21  #1 Date  3-17-21 Date  3/19/21 #3           WARM UP      Nustep    Seat 11 NEXT Level 6 x 10 mins L6 x 6'   Rec bike  ---          TABLE      Manual As below Hamstring stretch x 1min See below   Quad set 10x5\" 10 x 5 sec hold. 5 x 5\" hold   SLR  10x 10 x 2    Heel slide Man 10x     Ext prop Man 30\" x2 Roll under knee x 1 min 1 X 60\"               Seated:  Knee ext  Knee flex  A 10x  A 10x X 10 AROM 1x5            STANDING                                                     PROPRIOCEPTION                                    MODALITIES      Vaso  15' X 15 15'             Other Therapeutic Activities/Education:  3/11/2021: Patient received education on their current pathology and how their condition effects them with their functional activities. Patient understood discussion and questions were answered. Patient understands their activity limitations and understands rational for treatment progression. Educated against pillow/blanket under back of knee to prevent knee flex contracture. Educated on upward patellar movement with quad sets.       Home Exercise Program:  Issued, practiced and pt demo ability to perform 3/11/2021         Manual Treatments:  PROM/MOBS to the right knee joint complex with TOR/MFR to related hypertonicity      Modalities:  Vaso to R knee, pt supine for 15' at low pressure       Communication with other providers:  POC set for cosign 3/11/21      Assessment:  Pt progressing with his ROM - now up to 100 degrees flexion and only lacking 5 degrees from full extension. Pt continues to ambulate with walker. Pt remains swollen. Pt instructed with the importance of full extension for normal gait pattern. Pt with 4/10 complaints of pain after session today.       Plan for Next Session: work on ROM, gait, strength to jessica, pain and edema control      Time In / Time Out:    1426/1525      Timed Code/Total Treatment Minutes:  MAN X 25' X 2;   TE X 19' X 1;   GR X 15' X 1      Next Progress Note due:  visit 10      Plan of Care Interventions:  [x] Therapeutic Exercise  [x] Modalities:  [x] Therapeutic Activity     [] Ultrasound  [x] Estim  [x] Gait Training      [] Cervical Traction [] Lumbar Traction  [x] Neuromuscular Re-education    [x] Cold/hotpack [] Iontophoresis   [x] Instruction in HEP      [x] Vasopneumatic   [] Dry Needling    [x] Manual Therapy               [] Aquatic Therapy              Electronically signed by:  Bola Blackwell II, PTA [id number]     3/19/2021, 7:47 AM

## 2021-03-22 ENCOUNTER — TELEPHONE (OUTPATIENT)
Dept: ORTHOPEDIC SURGERY | Age: 66
End: 2021-03-22

## 2021-03-22 DIAGNOSIS — Z96.652 S/P TOTAL KNEE ARTHROPLASTY, LEFT: Primary | ICD-10-CM

## 2021-03-22 RX ORDER — OXYCODONE HYDROCHLORIDE AND ACETAMINOPHEN 5; 325 MG/1; MG/1
1 TABLET ORAL EVERY 6 HOURS PRN
Qty: 28 TABLET | Refills: 0 | Status: SHIPPED | OUTPATIENT
Start: 2021-03-22 | End: 2021-03-29

## 2021-03-24 ENCOUNTER — HOSPITAL ENCOUNTER (OUTPATIENT)
Dept: PHYSICAL THERAPY | Age: 66
Setting detail: THERAPIES SERIES
Discharge: HOME | End: 2021-03-24
Payer: MEDICARE

## 2021-03-24 PROCEDURE — 97110 THERAPEUTIC EXERCISES: CPT

## 2021-03-24 PROCEDURE — 97140 MANUAL THERAPY 1/> REGIONS: CPT

## 2021-03-24 PROCEDURE — 97016 VASOPNEUMATIC DEVICE THERAPY: CPT

## 2021-03-24 PROCEDURE — 97530 THERAPEUTIC ACTIVITIES: CPT

## 2021-03-24 NOTE — FLOWSHEET NOTE
Outpatient Physical Therapy  [place]           [x] Phone: [phone]   Fax: [phone]  [place]           [] Phone: [phone]   Fax: [phone]        Physical Therapy Daily Treatment Note  Date:  3/24/2021    Patient Name:  Dhaval Thacker    :  1955  MRN: [id number]  Restrictions/Precautions: Other position/activity restrictions: postop  Diagnosis:   Diagnosis: R TKA  Date of Injury/Surgery:   Treatment Diagnosis: Treatment Diagnosis: R knee pain, stiffness, weakness, altered gait    Insurance/Certification information: PT Insurance Information: Med mutual  Estefany Escalante, #40 copay   Referring Physician:  Referring Practitioner: Dr. Rafael Stratton  Next Doctor Visit:    Plan of care signed (Y/N):  yes  Outcome Measure: KOOS 68%  Visit# / total visits:   -  Pain level: 3/10   Goals:       Short term goals  Time Frame for Short term goals: 4 weeks  Short term goal 1: Pt will be able to report pain < 4/10 at least 50% of the time  Short term goal 2: Pt will be able to ambulate w/ SPC w/o limp or pain at least 150 ft  Short term goal 3: Pt will have at least 100° flex for gait and stairs  Long term goals  Time Frame for Long term goals : 8 weeks  Long term goal 1: Pt will be able to walk w/o device w/o limp or pain at least 10 mins at a time  Long term goal 2: Pt will be independent w/ HEP and able to progress on his own  Long term goal 3: Pt will have improved KOOS by 10% or more  Long term goal 4: Pt will be able to return to walking on uneven surfaces. Summary of Evaluation: Assessment: Pt is a 71 yo male who presents 3 days postop R TKA. He has Hx L TKA just over year ago and is doing well w/ this. He arrives using RW w/ altered WB and gait, limited ROM and strength in R LE w/ pain and swelling. These limitaitons are affecting his ability to walk, sleep and perform his usual functional activity. He will benefit from PT to help restore ROM, strength and function w/o pain.   He was walking w/o device before surgery but pain has been progressing for a year or more now. Subjective:  Pt stated that his knee pain was about 3/10. Pt stated that [place] prior to coming to therapy. Pt stated that he sees Dr. Osmel Pollard tomorrow. Any changes in Ambulatory Summary Sheet? None      Objective:  Prior to today's treatment session, patient was screened for signs and symptoms related to COVID-19 including but not limited to verbally answering questions related to feeling ill, cough, or SOB, along with taking temperature via forehead thermometer. Patient presented with all negative signs and symptoms and had no fever >100 degrees Fahrenheit this date. Ambulated into clinic with SPC in R UE .SPC was too high so PTA adjusted it and instructed pt to use it in his L UE    Noted swelling along entire R LE. Pt even got a indention in thigh from where sweat pants was rolled up. PTA asked Geno Party to check patient. Geno Party informed him to let Dr. Osmel Pollard tomorrow. Ok to continue with PT as per Geno Party. start of treatment   85° of knee flexion   After treatment   100° of knee flexion           Exercises: (No more than 4 columns)   Exercise/Equipment 3/11/21  #1 Date  3-17-21 Date  3/19/21 #3 3/24/2021 #4            WARM UP       Nustep    Seat 11 NEXT Level 6 x 10 mins L6 x 6' X 5'    Rec bike  ---            TABLE       Manual As below Hamstring stretch x 1min See below See below   Quad set 10x5\" 10 x 5 sec hold.  5 x 5\" hold 10x2 5\"    SAQ    10x2 5\"    SLR  10x 10 x 2  10x2   Heel slide Man 10x   RSB 10x2   manually   Ext prop Man 30\" x2 Roll under knee x 1 min 1 X 60\"    LAQ    10x2 5\"           Seated:  Knee ext  Knee flex  A 10x  A 10x X 10 AROM 1x5              STANDING       marching    10x2   Hamstring curl     10x2    mini squat     10x2                                    PROPRIOCEPTION                                          MODALITIES       Vaso  15' X 15 15' 15' Other Therapeutic Activities/Education:  3/11/2021: Patient received education on their current pathology and how their condition effects them with their functional activities. Patient understood discussion and questions were answered. Patient understands their activity limitations and understands rational for treatment progression. Educated against pillow/blanket under back of knee to prevent knee flex contracture. Educated on upward patellar movement with quad sets. Home Exercise Program:  Issued, practiced and pt demo ability to perform 3/11/2021         Manual Treatments:  PROM/MOBS to the right knee, edema massage x 15'       Modalities:  Vaso to R knee, pt supine for 15' at low pressure       Communication with other providers:  POC set for cosign 3/11/21      Assessment:  Pt tolerated treatment fairly well today. Pt was able to get more ROM after manual. Pt able to ambulate with cane better after put in L UE and lowered. Pt rated pain at 4/10 after vaso. Pt will continue to benefit from more ROM and strengthening.        Plan for Next Session: work on ROM, gait, strength to jessica, pain and edema control      Time In / Time Out: 1435/1540      Timed Code/Total Treatment Minutes:  40'/ 54' 1 man (15') 1 TE (15') 1 TA (10') 1 vaso(15')       Next Progress Note due:  visit 10      Plan of Care Interventions:  [x] Therapeutic Exercise  [x] Modalities:  [x] Therapeutic Activity     [] Ultrasound  [x] Estim  [x] Gait Training      [] Cervical Traction [] Lumbar Traction  [x] Neuromuscular Re-education    [x] Cold/hotpack [] Iontophoresis   [x] Instruction in HEP      [x] Vasopneumatic   [] Dry Needling    [x] Manual Therapy               [] Aquatic Therapy              Electronically signed by:  Lowell Lamar PTA     3/24/2021, 12:48 PM        3/24/2021,5:06 PM

## 2021-03-25 ENCOUNTER — OFFICE VISIT (OUTPATIENT)
Dept: ORTHOPEDIC SURGERY | Age: 66
End: 2021-03-25

## 2021-03-25 VITALS
WEIGHT: 228 LBS | HEIGHT: 68 IN | RESPIRATION RATE: 15 BRPM | HEART RATE: 78 BPM | OXYGEN SATURATION: 100 % | BODY MASS INDEX: 34.56 KG/M2

## 2021-03-25 DIAGNOSIS — M17.11 PRIMARY OSTEOARTHRITIS OF RIGHT KNEE: ICD-10-CM

## 2021-03-25 DIAGNOSIS — Z96.652 S/P TOTAL KNEE ARTHROPLASTY, LEFT: Primary | ICD-10-CM

## 2021-03-25 DIAGNOSIS — Z09 POSTOP CHECK: ICD-10-CM

## 2021-03-25 DIAGNOSIS — M25.561 CHRONIC PAIN OF RIGHT KNEE: ICD-10-CM

## 2021-03-25 DIAGNOSIS — G89.29 CHRONIC PAIN OF RIGHT KNEE: ICD-10-CM

## 2021-03-25 PROCEDURE — 99024 POSTOP FOLLOW-UP VISIT: CPT | Performed by: ORTHOPAEDIC SURGERY

## 2021-03-25 NOTE — PATIENT INSTRUCTIONS
Continue weight-bearing as tolerated. Continue range of motion exercises as instructed. Ice and elevate as needed at least 3-4 times a day   Continue to take percocet's for pain.   Follow up in 3 weeks

## 2021-03-28 NOTE — PROGRESS NOTES
Patient returns to the office today for post op check of the right TKA DOS 3/8/21. Pt states pain today is a /10. He presents to the office today using his cane. Pt states pain today is a 4/10. Pt states that he has had a lot of swelling in the right leg and knee. He has an ultrasound done by his PCP to check a blood clot. Pt states the ultrasound came back negative. He has noticed decrease in swelling with in the last few days.
nonpitting edema present throughout the lower leg. No calf tenderness and negative Homans' sign. No tenderness to palpation of the popliteal fossa or posterior thigh. Excellent alignment and range of motion and stability present throughout full range of motion of the knee. Sensation and motor function is intact distally. Diagnostic testing:  X-rays reviewed in office, I independently reviewed the films in the office today:     X-ray images show well aligned total knee replacement no complications or fractures. Office Procedures:  No orders of the defined types were placed in this encounter. Assessment and Plan  1. Right total knee replacement    We discussed the postoperative leg swelling and currently he is noticing daily improvement. We discussed signs symptoms of DVT which would require repeat Doppler ultrasound. Currently he is not having any calf pain or tightness and no chest pain or shortness of breath or trouble breathing. I have recommended that he continue with compressive wraps and Ace wraps today. We discussed elevation of the leg as well and possible cardiac or renal issues that may be responsible for some fluid retention. I recommend he continue to follow-up with his primary care physician regarding this issue. I have given him a refill of his pain medication today. He will continue with physical therapy. Follow-up in 3 to 4 weeks for check of his progress or sooner if there are any concerns.         Electronically signed by Enoc Haynes MD on 3/28/2021 at 3:47 PM

## 2021-03-30 ENCOUNTER — HOSPITAL ENCOUNTER (OUTPATIENT)
Dept: PHYSICAL THERAPY | Age: 66
Setting detail: THERAPIES SERIES
Discharge: HOME | End: 2021-03-30
Payer: MEDICARE

## 2021-03-30 PROCEDURE — 97530 THERAPEUTIC ACTIVITIES: CPT

## 2021-03-30 PROCEDURE — 97016 VASOPNEUMATIC DEVICE THERAPY: CPT

## 2021-03-30 PROCEDURE — 97110 THERAPEUTIC EXERCISES: CPT

## 2021-03-30 PROCEDURE — 97140 MANUAL THERAPY 1/> REGIONS: CPT

## 2021-03-30 NOTE — FLOWSHEET NOTE
restore ROM, strength and function w/o pain. He was walking w/o device before surgery but pain has been progressing for a year or more now. Subjective: Any changes in Ambulatory Summary Sheet? None      Objective:  Prior to today's treatment session, patient was screened for signs and symptoms related to COVID-19 including but not limited to verbally answering questions related to feeling ill, cough, or SOB, along with taking temperature via forehead thermometer. Patient presented with all negative signs and symptoms and had no fever >100 degrees Fahrenheit this date. After treatment   106° of knee flexion. Full extension post stretching. Pt w/ some mod edema still noted at knee. Can ambulate w/o cane w/ slow sadi and min unsteady. Exercises: (No more than 4 columns)   Exercise/Equipment 3/19/21 #3 3/24/2021 #4 3/30/21  #5             WARM UP       Nustep    Seat 11 L6 x 6' X 5'  Lev 6, S11 3', S10 2'    Rec bike              TABLE       Manual See below See below See below    Quad set 5 x 5\" hold 10x2 5\"  W/ SLR    SAQ  10x2 5\"  20x 2-3\" hold    SLR  10x2 10x2    Heel slide  RSB 10x2   manually Strap 10x10\"     Ext prop 1 X 60\"  --    LAQ  10x2 5\"  --    bridge  -- 10x2     Seated:  Knee ext  Knee flex  AROM 1x5  --  Man               STANDING       marching  10x2 1'    Hamstring curl   10x2  Alt 1'     mini squat   10x2 20x at TM     Flex stretch to step   - 10\" step 10x10\"    Anterior step up   - 6\" 15x                        PROPRIOCEPTION                                          MODALITIES       Vaso  15' 15' 10'               Other Therapeutic Activities/Education:  3/11/2021: Patient received education on their current pathology and how their condition effects them with their functional activities. Patient understood discussion and questions were answered. Patient understands their activity limitations and understands rational for treatment progression. Educated against pillow/blanket under back of knee to prevent knee flex contracture. Educated on upward patellar movement with quad sets. Home Exercise Program:  Issued, practiced and pt demo ability to perform 3/11/2021         Manual Treatments:  PROM/MOBS to the right knee, edema massage x 15'       Modalities:  Vaso to R knee, pt supine for 10' at low pressure       Communication with other providers:  POC set for cosign 3/11/21      Assessment:  Pt tolerated treatment fairly well today. Pt is making good progress w/ ROM and functional mobility. He continues w/ limited ROM and functional strength. Pt rated pain at 3.5/10 after vaso. Pt will continue to benefit from continued PT to help restore ROM, strength for PLOF.        Plan for Next Session: work on ROM, gait, strength to jessica, pain and edema control, (3/30: Schedule more Rx soon)      Time In / Time Out:  1400/1455      Timed Code/Total Treatment Minutes:  36/47     1 man (15') 1 TE (20') 1 TA (10') 1 vaso(10')       Next Progress Note due:  visit 10   ST 4/15      Plan of Care Interventions:  [x] Therapeutic Exercise  [x] Modalities:  [x] Therapeutic Activity     [] Ultrasound  [x] Estim  [x] Gait Training      [] Cervical Traction [] Lumbar Traction  [x] Neuromuscular Re-education    [x] Cold/hotpack [] Iontophoresis   [x] Instruction in HEP      [x] Vasopneumatic   [] Dry Needling    [x] Manual Therapy               [] Aquatic Therapy              Electronically signed by:  Carol Manzanares  PT, MPT, ATC     3/30/2021, 7:05 AM     3/30/2021, 3:56 PM

## 2021-04-01 ENCOUNTER — HOSPITAL ENCOUNTER (OUTPATIENT)
Dept: PHYSICAL THERAPY | Age: 66
Setting detail: THERAPIES SERIES
Discharge: HOME | End: 2021-04-01
Payer: MEDICARE

## 2021-04-01 PROCEDURE — 97140 MANUAL THERAPY 1/> REGIONS: CPT

## 2021-04-01 PROCEDURE — 97530 THERAPEUTIC ACTIVITIES: CPT

## 2021-04-01 PROCEDURE — 97110 THERAPEUTIC EXERCISES: CPT

## 2021-04-01 NOTE — FLOWSHEET NOTE
Outpatient Physical Therapy  [place]           [x] Phone: [phone]   Fax: [phone]  [facility]           [] Phone: [phone]   Fax: [phone]        Physical Therapy Daily Treatment Note  Date:  2021    Patient Name:  Day Moreno    :  1955  MRN: [id number]  Restrictions/Precautions: Other position/activity restrictions: postop  Diagnosis:   Diagnosis: R TKA  Date of Injury/Surgery:   Treatment Diagnosis: Treatment Diagnosis: R knee pain, stiffness, weakness, altered gait    Insurance/Certification information: PT Insurance Information: [employer]  [employer], $40 copay   Referring Physician:  Referring Practitioner: Dr. Rosina Patricia  Next Doctor Visit:    Plan of care signed (Y/N):  yes  Outcome Measure: KOOS 68%  Visit# / total visits:  -  Pain level: 3/10   Goals:       Short term goals  Time Frame for Short term goals: 4 weeks  Short term goal 1: Pt will be able to report pain < 4/10 at least 50% of the time  Partially met  Short term goal 2: Pt will be able to ambulate w/ SPC w/o limp or pain at least 150 ft   Met  Short term goal 3: Pt will have at least 100° flex for gait and stairs  Met w/ some stretching and assist   Long term goals  Time Frame for Long term goals : 8 weeks  Long term goal 1: Pt will be able to walk w/o device w/o limp or pain at least 10 mins at a time  Long term goal 2: Pt will be independent w/ HEP and able to progress on his own  Long term goal 3: Pt will have improved KOOS by 10% or more  Long term goal 4: Pt will be able to return to walking on uneven surfaces. Summary of Evaluation: Assessment: Pt is a 73 yo male who presents 3 days postop R TKA. He has Hx L TKA just over year ago and is doing well w/ this. He arrives using RW w/ altered WB and gait, limited ROM and strength in R LE w/ pain and swelling. These limitaitons are affecting his ability to walk, sleep and perform his usual functional activity.   He will benefit from PT to help restore ROM, strength and function w/o pain. He was walking w/o device before surgery but pain has been progressing for a year or more now. Subjective: Pt stated that his pain was about 3/10 today. Pt stated that his knee felt stiff today. Any changes in Ambulatory Summary Sheet? None      Objective:  Prior to today's treatment session, patient was screened for signs and symptoms related to COVID-19 including but not limited to verbally answering questions related to feeling ill, cough, or SOB, along with taking temperature via forehead thermometer. Patient presented with all negative signs and symptoms and had no fever >100 degrees Fahrenheit this date. After treatment    AAROM  Knee flexion =  110°   Full extension after stretching. Pt w/ some mod edema still noted at knee. Difficulty keeping knee straight with prone hip extension.           Exercises: (No more than 4 columns)   Exercise/Equipment 3/19/21 #3 3/24/2021 #4 3/30/21  #5 4/1/2021 #6            WARM UP       Nustep    Seat 11 L6 x 6' X 5'  Lev 6, S11 3', S10 2' Lev 6, S10 3', S9 2'   Rec bike              TABLE       Manual See below See below See below    Quad set 5 x 5\" hold 10x2 5\"  W/ SLR W/ SLR   SAQ  10x2 5\"  20x 2-3\" hold BB 10x2 5\"   Prone hip extension    10x    Prone quad stretch    10x   Man 30\" x4   S/L hip abduction    10x2   SLR  10x2 10x2 10x2   Heel slide  RSB 10x2   manually Strap 10x10\"  Strap 10x2 10\"    Ext prop 1 X 60\"  --    LAQ  10x2 5\"  -- 10x2 5\"    bridge  -- 10x2  10x2   Seated:  Knee ext  Knee flex  AROM 1x5  --  Man        Seated hamstring curl     RTB 10x2   STANDING        marching  10x2 1' 1' w B UE support   Hamstring curl   10x2  Alt 1'     mini squat   10x2 20x at TM  10x2  @ TM   Flex stretch to step   - 10\" step 10x10\"    Anterior step up   - 6\" 15x  6\" 10x2 1 UE support                       PROPRIOCEPTION                                          MODALITIES       Vaso  15' 15' 10' --

## 2021-04-05 ENCOUNTER — HOSPITAL ENCOUNTER (OUTPATIENT)
Dept: PHYSICAL THERAPY | Age: 66
Setting detail: THERAPIES SERIES
Discharge: HOME | End: 2021-04-05
Payer: MEDICARE

## 2021-04-05 PROCEDURE — 97530 THERAPEUTIC ACTIVITIES: CPT

## 2021-04-05 PROCEDURE — 97110 THERAPEUTIC EXERCISES: CPT

## 2021-04-05 PROCEDURE — 97140 MANUAL THERAPY 1/> REGIONS: CPT

## 2021-04-05 NOTE — FLOWSHEET NOTE
Outpatient Physical Therapy  [place]           [x] Phone: [phone]   Fax: [phone]  [place]           [] Phone: [phone]   Fax: [phone]        Physical Therapy Daily Treatment Note  Date:  2021    Patient Name:  Baron Fabian    :  1955  MRN: [id number]  Restrictions/Precautions: Other position/activity restrictions: postop  Diagnosis:   Diagnosis: R TKA  Date of Injury/Surgery:   Treatment Diagnosis: Treatment Diagnosis: R knee pain, stiffness, weakness, altered gait    Insurance/Certification information: PT Insurance Information: [employer]  BOMN, $40 copay   Referring Physician:  Referring Practitioner: Dr. Jacob Arcos  Next Doctor Visit:    Plan of care signed (Y/N):  yes  Outcome Measure: KOOS 68%  Visit# / total visits: -  Pain level: 3/10   Goals:       Short term goals  Time Frame for Short term goals: 4 weeks  Short term goal 1: Pt will be able to report pain < 4/10 at least 50% of the time  Partially met  Short term goal 2: Pt will be able to ambulate w/ SPC w/o limp or pain at least 150 ft   Met  Short term goal 3: Pt will have at least 100° flex for gait and stairs  Met w/ some stretching and assist   Long term goals  Time Frame for Long term goals : 8 weeks  Long term goal 1: Pt will be able to walk w/o device w/o limp or pain at least 10 mins at a time  Long term goal 2: Pt will be independent w/ HEP and able to progress on his own  Long term goal 3: Pt will have improved KOOS by 10% or more  Long term goal 4: Pt will be able to return to walking on uneven surfaces. Summary of Evaluation: Assessment: Pt is a 73 yo male who presents 3 days postop R TKA. He has Hx L TKA just over year ago and is doing well w/ this. He arrives using RW w/ altered WB and gait, limited ROM and strength in R LE w/ pain and swelling. These limitaitons are affecting his ability to walk, sleep and perform his usual functional activity.   He will benefit from PT to help restore ROM, strength and function w/o pain. He was walking w/o device before surgery but pain has been progressing for a year or more now. Subjective:  Pt rated pain at 3/10 today. Pt stated that he decided to stop using his cane. Pt stated that he felt good after his last visit. Any changes in Ambulatory Summary Sheet? None      Objective:  Prior to today's treatment session, patient was screened for signs and symptoms related to COVID-19 including but not limited to verbally answering questions related to feeling ill, cough, or SOB, along with taking temperature via forehead thermometer. Patient presented with all negative signs and symptoms and had no fever >100 degrees Fahrenheit this date. After treatment    AAROM  Knee flexion =  111°   Full extension after stretching.        Full revolutions on Bike    Exercises: (No more than 4 columns)   Exercise/Equipment 3/24/2021 #4 3/30/21  #5 4/1/2021 #6 4/5 2021 #7            WARM UP       Nustep    Seat 11 X 5'  Lev 6, S11 3', S10 2' Lev 6, S10 3', S9 2'    Rec bike    5' full revolutions fwd/ back          TABLE       Manual See below See below  See below   Quad set 10x2 5\"  W/ SLR W/ SLR W/ SLR   SAQ 10x2 5\"  20x 2-3\" hold BB 10x2 5\" BB 10x2 5\"   Prone hip extension   10x  10 x2   Prone quad stretch   10x   Man 30\" x4 15x   Man 30\" x4   S/L hip abduction   10x2 10x2   SLR 10x2 10x2 10x2 10x2   Heel slide RSB 10x2   manually Strap 10x10\"  Strap 10x2 10\"  Strap 10x2 10\"   Ext prop  --     LAQ 10x2 5\"  -- 10x2 5\"  10x2 5\"    bridge -- 10x2  10x2 10x2   Seated:  Knee ext  Knee flex   --  Man         Seated hamstring curl    RTB 10x2    STANDING        marching 10x2 1' 1' w B UE support    Hamstring curl  10x2  Alt 1'      mini squat  10x2 20x at TM  10x2  @ TM 10x2  @ TM   Flex stretch to step  - 10\" step 10x10\"     Anterior step up  - 6\" 15x  6\" 10x2 1 UE support                        PROPRIOCEPTION                                          MODALITIES Vaso  13' 10' --               Other Therapeutic Activities/Education:  3/11/2021: Patient received education on their current pathology and how their condition effects them with their functional activities. Patient understood discussion and questions were answered. Patient understands their activity limitations and understands rational for treatment progression. Educated against pillow/blanket under back of knee to prevent knee flex contracture. Educated on upward patellar movement with quad sets. Home Exercise Program:  Issued, practiced and pt demo ability to perform 3/11/2021         Manual Treatments:  PROM/MOBS to the right knee, edema massage x 10'       Modalities:  declined    Communication with other providers:  POC set for cosign 3/11/21      Assessment:  Pt tolerated treatment fairly well today. Pt is making good progress w/ ROM and strength. Pt rated pain at 2-3/10 after treatment. Pt will continue to benefit from continued PT to help restore ROM, strength for PLOF.        Plan for Next Session: work on ROM, gait, strength to jessica, pain and edema control,       Time In / Time Out:1405/1445      Timed Code/Total Treatment Minutes: 40/40'     1 man (10') 1 TE (20') 1 TA (10')       Next Progress Note due:  visit 10   ST 4/15      Plan of Care Interventions:  [x] Therapeutic Exercise  [x] Modalities:  [x] Therapeutic Activity     [] Ultrasound  [x] Estim  [x] Gait Training      [] Cervical Traction [] Lumbar Traction  [x] Neuromuscular Re-education    [x] Cold/hotpack [] Iontophoresis   [x] Instruction in HEP      [x] Vasopneumatic   [] Dry Needling    [x] Manual Therapy               [] Aquatic Therapy              Electronically signed by:  Mai Toure  PTA    4/5/2021, 1:09 PM         4/5/2021,5:12 PM

## 2021-04-09 ENCOUNTER — HOSPITAL ENCOUNTER (OUTPATIENT)
Dept: PHYSICAL THERAPY | Age: 66
Setting detail: THERAPIES SERIES
Discharge: HOME | End: 2021-04-09
Payer: MEDICARE

## 2021-04-09 PROCEDURE — 97110 THERAPEUTIC EXERCISES: CPT

## 2021-04-09 PROCEDURE — 97140 MANUAL THERAPY 1/> REGIONS: CPT

## 2021-04-09 NOTE — FLOWSHEET NOTE
Outpatient Physical Therapy  [place]           [x] Phone: [phone]   Fax: [phone]  [place]           [] Phone: [phone]   Fax: [phone]        Physical Therapy Daily Treatment Note  Date:  2021    Patient Name:  Day Moreno    :  1955  MRN: [id number]  Restrictions/Precautions: Other position/activity restrictions: postop  Diagnosis:   Diagnosis: R TKA  Date of Injury/Surgery:   Treatment Diagnosis: Treatment Diagnosis: R knee pain, stiffness, weakness, altered gait    Insurance/Certification information: PT Insurance Information: [employer]  BOMN, $40 copay   Referring Physician:  Referring Practitioner: Dr. Rosina Patricia  Next Doctor Visit:    Plan of care signed (Y/N):  yes  Outcome Measure: KOOS 68%  Visit# / total visits: -  Pain level: 3.5/10   Goals:       Short term goals  Time Frame for Short term goals: 4 weeks  Short term goal 1: Pt will be able to report pain < 4/10 at least 50% of the time  Partially met  Short term goal 2: Pt will be able to ambulate w/ SPC w/o limp or pain at least 150 ft   Met  Short term goal 3: Pt will have at least 100° flex for gait and stairs  Met w/ some stretching and assist   Long term goals  Time Frame for Long term goals : 8 weeks  Long term goal 1: Pt will be able to walk w/o device w/o limp or pain at least 10 mins at a time  Long term goal 2: Pt will be independent w/ HEP and able to progress on his own  Long term goal 3: Pt will have improved KOOS by 10% or more  Long term goal 4: Pt will be able to return to walking on uneven surfaces. Summary of Evaluation: Assessment: Pt is a 73 yo male who presents 3 days postop R TKA. He has Hx L TKA just over year ago and is doing well w/ this. He arrives using RW w/ altered WB and gait, limited ROM and strength in R LE w/ pain and swelling. These limitaitons are affecting his ability to walk, sleep and perform his usual functional activity.   He will benefit from PT to help restore ROM, strength and function w/o pain. He was walking w/o device before surgery but pain has been progressing for a year or more now. Subjective:  Pt reports he is feeling a little more sore than normal today. Feels more stiffness than normal - did have his second COVID 19 vaccine yesterday. Any changes in Ambulatory Summary Sheet? None      Objective:  Prior to today's treatment session, patient was screened for signs and symptoms related to COVID-19 including but not limited to verbally answering questions related to feeling ill, cough, or SOB, along with taking temperature via forehead thermometer. Patient presented with all negative signs and symptoms and had no fever >100 degrees Fahrenheit this date.      After treatment   AROM right knee flexion = 110° and AAROM right Knee flexion = 115° - 4/9/2021   AROM right knee extension = 2° hyperextension - 4/9/21       Full revolutions on Bike    Exercises: (No more than 4 columns)   Exercise/Equipment 3/30/21  #5 4/1/2021 #6 4/5 2021 #7 4/9/2021 #8            WARM UP       Nustep    Seat 11 Lev 6, S11 3', S10 2' Lev 6, S10 3', S9 2'     Rec bike   5' full revolutions fwd/ back 5' easy pedaling          TABLE       Manual See below  See below See below   Quad set W/ SLR W/ SLR W/ SLR    SAQ 20x 2-3\" hold BB 10x2 5\" BB 10x2 5\"    Prone hip extension  10x  10 x2    Prone quad stretch  10x   Man 30\" x4 15x   Man 30\" x4    S/L hip abduction  10x2 10x2    SLR 10x2 10x2 10x2    Heel slide Strap 10x10\"  Strap 10x2 10\"  Strap 10x2 10\"    Ext prop --      LAQ -- 10x2 5\"  10x2 5\"     bridge 10x2  10x2 10x2    Seated:  Knee ext  Knee flex  --  Man          Seated hamstring curl   RTB 10x2     STANDING        marching 1' 1' w B UE support     Hamstring curl  Alt 1'       mini squat  20x at TM  10x2  @ TM 10x2  @ TM    Flex stretch to step  10\" step 10x10\"      Anterior step up  6\" 15x  6\" 10x2 1 UE support      gastroc stretch    1x2' B               PROPRIOCEPTION MODALITIES       Vaso  10' --                Other Therapeutic Activities/Education:  3/11/2021: Patient received education on their current pathology and how their condition effects them with their functional activities. Patient understood discussion and questions were answered. Patient understands their activity limitations and understands rational for treatment progression. Educated against pillow/blanket under back of knee to prevent knee flex contracture. Educated on upward patellar movement with quad sets. Home Exercise Program:  Issued, practiced and pt demo ability to perform 3/11/2021       Manual Treatments:  PROM/MOBS to the right knee joint complex with TPR/MFR to related hypertonicity      Modalities:  declined    Communication with other providers:  POC set for cosign 3/11/21      Assessment:  Pt continues to make good progress with his ROM, now up to 110° AROM right knee flexion and 2° hyperextension! Pt able to freely pedal on recumbent bike today at presentation. Pt rated pain at 2-3/10 after treatment today. Pt will continue to benefit from continued PT to help restore ROM, strength for PLOF.        Plan for Next Session: work on ROM, gait, strength to jessica, pain and edema control,       Time In / Time Out:  1347/1427      Timed Code/Total Treatment Minutes:  MAN X 30' X 2;   TE x 10' X 1      Next Progress Note due:  visit 10   ST 4/15      Plan of Care Interventions:  [x] Therapeutic Exercise  [x] Modalities:  [x] Therapeutic Activity     [] Ultrasound  [x] Estim  [x] Gait Training      [] Cervical Traction [] Lumbar Traction  [x] Neuromuscular Re-education    [x] Cold/hotpack [] Iontophoresis   [x] Instruction in HEP      [x] Vasopneumatic   [] Dry Needling    [x] Manual Therapy               [] Aquatic Therapy              Electronically signed by:  Rafa Cam II, PTA [id number]     4/9/2021, 7:26 AM         4/9/2021,7:26 AM

## 2021-04-12 ENCOUNTER — OFFICE VISIT (OUTPATIENT)
Dept: GASTROENTEROLOGY | Age: 66
End: 2021-04-12
Payer: MEDICARE

## 2021-04-12 VITALS
HEART RATE: 64 BPM | HEIGHT: 68 IN | OXYGEN SATURATION: 99 % | SYSTOLIC BLOOD PRESSURE: 130 MMHG | WEIGHT: 188 LBS | DIASTOLIC BLOOD PRESSURE: 84 MMHG | BODY MASS INDEX: 28.49 KG/M2

## 2021-04-12 DIAGNOSIS — K74.60 CIRRHOSIS OF LIVER WITHOUT ASCITES, UNSPECIFIED HEPATIC CIRRHOSIS TYPE (HCC): Primary | ICD-10-CM

## 2021-04-12 PROCEDURE — 99213 OFFICE O/P EST LOW 20 MIN: CPT | Performed by: SPECIALIST

## 2021-04-12 NOTE — PROGRESS NOTES
SUBJECTIVE:  No problems- denies abdominal pain,nausea,vomiting, diarrhea, constipation,melena, hematochezia, or hematemesis. No weight loss. 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 alfts     ROS: non-contributory    OBJECTIVE:   PHYSICAL EXAM:    Vitals:  /84 (Site: Left Upper Arm, Position: Sitting, Cuff Size: Medium Adult)   Pulse 64   Ht 5' 8\" (1.727 m)   Wt 188 lb (85.3 kg)   SpO2 99%   BMI 28.59 kg/m²   CONSTITUTIONAL: alert  EYES:  pupils equal, round and reactive to light and sclera clear  LUNGS:  clear to auscultation  CARDIOVASCULAR:  regular rate and rhythm and no murmur noted  ABDOMEN:  normal bowel sounds, soft, non-distended, non-tender and no masses palpated, no hepatosplenomegally  NEUROLOGIC: no asterixis or focal deficit detected   EXTREMITIES: no clubbing, cyanosis, or edema    ASSESSMENT:    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

## 2021-04-13 ENCOUNTER — HOSPITAL ENCOUNTER (OUTPATIENT)
Dept: PHYSICAL THERAPY | Age: 66
Setting detail: THERAPIES SERIES
Discharge: HOME | End: 2021-04-13
Payer: MEDICARE

## 2021-04-13 PROCEDURE — 97110 THERAPEUTIC EXERCISES: CPT

## 2021-04-13 PROCEDURE — 97140 MANUAL THERAPY 1/> REGIONS: CPT

## 2021-04-13 PROCEDURE — 97530 THERAPEUTIC ACTIVITIES: CPT

## 2021-04-13 NOTE — FLOWSHEET NOTE
Outpatient Physical Therapy  [place]           [x] Phone: [phone]   Fax: [phone]  [place]           [] Phone: [phone]   Fax: [phone]        Physical Therapy Daily Treatment Note  Date:  2021    Patient Name:  Baron Fabian    :  1955  MRN: [id number]  Restrictions/Precautions: Other position/activity restrictions: postop  Diagnosis:   Diagnosis: R TKA  Date of Injury/Surgery:   Treatment Diagnosis: Treatment Diagnosis: R knee pain, stiffness, weakness, altered gait    Insurance/Certification information: PT Insurance Information: [email or web address]  BOMN, $40 copay   Referring Physician:  Referring Practitioner: Dr. Jacob Arcos  Next Doctor Visit:    Plan of care signed (Y/N):  yes  Outcome Measure: KOOS 68%  Visit# / total visits: -  Pain level: 2/10   Goals:       Short term goals  Time Frame for Short term goals: 4 weeks  Short term goal 1: Pt will be able to report pain < 4/10 at least 50% of the time  Partially met  Short term goal 2: Pt will be able to ambulate w/ SPC w/o limp or pain at least 150 ft   Met  Short term goal 3: Pt will have at least 100° flex for gait and stairs  Met w/ some stretching and assist   Long term goals  Time Frame for Long term goals : 8 weeks  Long term goal 1: Pt will be able to walk w/o device w/o limp or pain at least 10 mins at a time  Long term goal 2: Pt will be independent w/ HEP and able to progress on his own  Long term goal 3: Pt will have improved KOOS by 10% or more  Long term goal 4: Pt will be able to return to walking on uneven surfaces. Summary of Evaluation: Assessment: Pt is a 73 yo male who presents 3 days postop R TKA. He has Hx L TKA just over year ago and is doing well w/ this. He arrives using RW w/ altered WB and gait, limited ROM and strength in R LE w/ pain and swelling. These limitaitons are affecting his ability to walk, sleep and perform his usual functional activity.   He will benefit from PT to help restore condition effects them with their functional activities. Patient understood discussion and questions were answered. Patient understands their activity limitations and understands rational for treatment progression. Educated against pillow/blanket under back of knee to prevent knee flex contracture. Educated on upward patellar movement with quad sets. Home Exercise Program:  Issued, practiced and pt demo ability to perform 3/11/2021       Manual Treatments:  PROM/MOBS to the right knee x 10'       Modalities:  declined    Communication with other providers:  POC set for cosign 3/11/21      Assessment:  Pt tolerated treatment well. Pt was able to advance exercises today. Pt is making gains with strength and with ROM. Plan for discharge next visit as per patient re quest. Pt rated pain at 2.5/10 after treatment.    Plan for Next Session: work on ROM, gait, strength to jessica, pain and edema control,       Time In / Time Out:  5424/ 7215       Timed Code/Total Treatment Minutes: 54'/54'  1 TE  (21') 1 man ( 10') 2 TA ( 24')       Next Progress Note due:  visit 10   ST 4/15      Plan of Care Interventions:  [x] Therapeutic Exercise  [x] Modalities:  [x] Therapeutic Activity     [] Ultrasound  [x] Estim  [x] Gait Training      [] Cervical Traction [] Lumbar Traction  [x] Neuromuscular Re-education    [x] Cold/hotpack [] Iontophoresis   [x] Instruction in HEP      [x] Vasopneumatic   [] Dry Needling    [x] Manual Therapy               [] Aquatic Therapy              Electronically signed by:  Archie Nichols PTA     4/13/2021, 10:54 AM          4/13/2021,6:20 PM

## 2021-04-15 ENCOUNTER — HOSPITAL ENCOUNTER (OUTPATIENT)
Dept: PHYSICAL THERAPY | Age: 66
Setting detail: THERAPIES SERIES
Discharge: HOME | End: 2021-04-15
Payer: MEDICARE

## 2021-04-15 NOTE — DISCHARGE SUMMARY
Outpatient Physical Therapy           [place]           [x] Phone: [phone]   Fax: [phone]  [place]           [] Phone: [phone]   Fax: [phone]      To: Leesa Gaona       From: Marshall Dangelo PT     Patient: Trev Arenas                  : 1955  Diagnosis:  R TKA    Date: 4/15/2021  Treatment Diagnosis: R knee pain, stiffness, weakness, altered gait        []  Progress Note                [x]  Discharge Note    Evaluation Date:  3/11/2021  Total Visits to date:   10 Cancels/No-shows to date:  0    Subjective:  Feeling stiff today but pain not too bad. Max pain in last 2 weeks 4/10. Would like to dc PT today. Plan of Care/Treatment to date:  [x] Therapeutic Exercise    [x] Modalities:  [x] Therapeutic Activity     [] Ultrasound  [] Electrical Stimulation  [x] Gait Training      [] Cervical Traction   [] Lumbar Traction  [x] Neuromuscular Re-education  [x] Cold/hotpack [] Iontophoresis  [x] Instruction in HEP      Other:  [x] Manual Therapy       [x]  Vasopneumatic  [] Aquatic Therapy       []   Dry Needle Therapy                      Objective/Significant Findings At Last Visit/Comments:  COVID screening questions were asked and patient attested that there had been no contact or symptoms       After treatment   AROM right knee flexion = 115°   AROM right knee extension = 0°      MMT:  R quad and ham 5-/5 w/ mild pain, hip flex 4+/5 B, hip abd 5/5 R 4+/5 L. Able to perform STS from 17' chair 5x w/o UE's. Doing well on step ups. Assessment:  Pt tolerated treatment well. He has made nice progress overall and met most goals. He has some ROM and strength limitations but w/ continued HEP he should continue to progress. Pain unchanged after Rx.       Goal Status:  [x] Achieved [] Partially Achieved  [] Not Achieved   Short term goals  Time Frame for Short term goals: 4 weeks  Short term goal 1: Pt will be able to report pain < 4/10 at least 50% of the time    Met Short term goal 2: Pt will be able to ambulate w/ SPC w/o limp or pain at least 150 ft   Met  Short term goal 3: Pt will have at least 100° flex for gait and stairs  Met    Long term goals  Time Frame for Long term goals : 8 weeks  Long term goal 1: Pt will be able to walk w/o device w/o limp or pain at least 10 mins at a time  Met   Long term goal 2: Pt will be independent w/ HEP and able to progress on his own   Met   Long term goal 3: Pt will have improved KOOS by 10% or more  Met  Long term goal 4: Pt will be able to return to walking on uneven surfaces. Met  KOOS 68% at [facility], 4/15: 18%      Frequency/Duration:  # Days per week: [] 1 day # Weeks: [] 1 week [] 4 weeks [x] 8 weeks     [x] 2 days   [] 2 weeks [] 5 weeks [] 10 weeks     [] 3 days   [] 3 weeks [x] 6 weeks [] 12 weeks       Rehab Potential: [] Excellent [x] Good [] Fair  [] Poor         Patient Status: [] Continue per initial plan of Care     [x] Patient now discharged     [] Additional visits requested, Please re-certify for additional visits:      Requested frequency/duration:     If we are requesting more visits, we fully anticipate the patient's condition is expected to improve within the treatment timeframe we are requesting. Electronically signed by:  Yandy Oreilly, PT, MPT, ATC  4/15/2021, 7:56 AM    4/15/2021, 3:05 PM  If you have any questions or concerns, please don't hesitate to call.   Thank you for your referral.    Physician Signature:______________________ Date:______ Time: ________  By signing above, therapists plan is approved by physician

## 2021-04-15 NOTE — FLOWSHEET NOTE
to help restore ROM, strength and function w/o pain. He was walking w/o device before surgery but pain has been progressing for a year or more now. Subjective: Feeling stiff today but pain not too bad. Max pain in last 2 weeks 4/10. Any changes in Ambulatory Summary Sheet? None      Objective:  COVID screening questions were asked and patient attested that there had been no contact or symptoms      After treatment   AROM right knee flexion = 115°   AROM right knee extension = 0°     MMT:  R quad and ham 5-/5 w/ mild pain, hip flex 4+/5 B, hip abd 5/5 R 4+/5 L. Able to perform STS from 17' chair 5x w/o UE's. Doing well on step ups. Exercises: (No more than 4 columns)   Exercise/Equipment 4/13/2021 #9 4/15/21  #10          WARM UP     Nustep    Seat 11     Rec bike x5' 5'        TABLE     Manual See below    Quad set     SAQ  2# 10x2   Prone hip extension     Prone quad stretch     S/L hip abduction 2# 10x2    SLR 2# 10x2 2# 10x2   Heel slide 10x2    Ext prop 2'  2'    LAQ     bridge     Seated:  Knee ext  Knee flex         Seated hamstring curl      STANDING     marching     Hamstring curl      mini squat  10x2  @ TM STS from blue chair x 5 no UEs   Flex stretch to step      Anterior step up  8\" 10x2 ant  8\" 10x2 lat   8\" 10x2 ant  8\" 10x2 lat   gastroc stretch 1x2' B    FT  4 way 13# 5 x ea dir 13# 5 x ea dir   FT TKE 7# 10x2  5\"    Fwd step down  4\" x 10    Heel tap on L  4\" x 10         Shuttle leg press   3C DL 10x2  2C 10x2 R only    PROPRIOCEPTION                              MODALITIES     Vaso  --             Other Therapeutic Activities/Education:  3/11/2021: Patient received education on their current pathology and how their condition effects them with their functional activities. Patient understood discussion and questions were answered. Patient understands their activity limitations and understands rational for treatment progression.   Educated against pillow/blanket under back of knee to prevent knee flex contracture. Educated on upward patellar movement with quad sets. Home Exercise Program:  Issued, practiced and pt demo ability to perform 3/11/2021       Manual Treatments:  PROM/MOBS to the right knee x 10'       Modalities:  declined    Communication with other providers:  POC set for cosign 3/11/21, see PN 4/15/21      Assessment:  Pt tolerated treatment well. He has made nice progress overall and met most goals. He has some ROM and strength limitations but w/ continued HEP he should continue to progress. Pain unchanged after Rx.       Plan for Next Session: dc    Time In / Time Out:  1345/ 1430       Timed Code/Total Treatment Minutes:  45'/45' 1 TE  (21') 1 man ( 10') 1 TA ( 15')       Next Progress Note due:   see PN 4/15/21      Plan of Care Interventions:  [x] Therapeutic Exercise  [x] Modalities:  [x] Therapeutic Activity     [] Ultrasound  [x] Estim  [x] Gait Training      [] Cervical Traction [] Lumbar Traction  [x] Neuromuscular Re-education    [x] Cold/hotpack [] Iontophoresis   [x] Instruction in HEP      [x] Vasopneumatic   [] Dry Needling    [x] Manual Therapy               [] Aquatic Therapy              Electronically signed by: Leonor Schofield PTA    4/15/2021, 7:55 AM       4/15/2021,2:51 PM

## 2021-04-16 ENCOUNTER — OFFICE VISIT (OUTPATIENT)
Dept: ORTHOPEDIC SURGERY | Age: 66
End: 2021-04-16

## 2021-04-16 VITALS
BODY MASS INDEX: 28.49 KG/M2 | HEIGHT: 68 IN | WEIGHT: 188 LBS | HEART RATE: 72 BPM | RESPIRATION RATE: 15 BRPM | OXYGEN SATURATION: 98 %

## 2021-04-16 DIAGNOSIS — Z96.652 S/P TOTAL KNEE ARTHROPLASTY, LEFT: ICD-10-CM

## 2021-04-16 DIAGNOSIS — Z09 POSTOP CHECK: Primary | ICD-10-CM

## 2021-04-16 PROCEDURE — 99024 POSTOP FOLLOW-UP VISIT: CPT | Performed by: ORTHOPAEDIC SURGERY

## 2021-04-16 NOTE — PROGRESS NOTES
4/16/2021   Chief Complaint   Patient presents with    Post-Op Check     s/p right TKA DOS 3/8/21        History of Present Illness:                             Kate Goode is a 72 y.o. male who returns today for follow-up of his right total knee replacement. He is doing very well increasing his activities. He has resumed walking for exercise and can go long distances with no problems. He gets mild aching discomfort on occasion but this responds well to ice and Tylenol. His swelling has improved greatly. Patient returns to the office today for S/p right TKA DOS 3/8/21. Patient states pain today is a 3/10. He is still walking daily. Pt will still use ice and take tylenol for the denise at [facility]    Medical History  Patient's medications, allergies, past medical, surgical, social and family histories were reviewed and updated as appropriate. Review of Systems:   Review of Systems                                            Examination:  General Exam:  Vitals: Pulse 72   Resp 15   Ht 5' 8\" (1.727 m)   Wt 188 lb (85.3 kg)   SpO2 98%   BMI 28.59 kg/m²    Physical Exam       Right Lower Extremity:    The incision is well-healed. No erythema, drainage, or induration. Minimal resolving soft tissue swelling present throughout the soft tissues of the knee. Range of motion is present from full extension to 120 degrees of flexion. Calf is soft and nontender. Negative Homans sign. Sensation and motor function is intact at the knee and ankle. Office Procedures:  No orders of the defined types were placed in this encounter. Assessment and Plan  1. Right total knee replacement    He has improved a great deal and is functioning at a high level currently. I recommended that he continue his home exercise program and he can advance as tolerated. He will follow-up at the 1 year anniversary of his surgery for repeat x-rays of bilateral knees. Follow-up sooner if any questions or concerns. Electronically signed by Arlette Benítez MD on 4/16/2021 at 9:59 AM

## 2021-04-16 NOTE — PATIENT INSTRUCTIONS
Continue weight-bearing as tolerated. Continue range of motion exercises as instructed. Ice and elevate as needed. Tylenol or Motrin for pain.   Follow up in one year for surgery

## 2021-04-19 ENCOUNTER — HOSPITAL ENCOUNTER (OUTPATIENT)
Dept: ULTRASOUND IMAGING | Age: 66
Discharge: HOME | End: 2021-04-19
Payer: MEDICARE

## 2021-04-19 ENCOUNTER — HOSPITAL ENCOUNTER (OUTPATIENT)
Age: 66
Discharge: HOME | End: 2021-04-19
Payer: MEDICARE

## 2021-04-19 DIAGNOSIS — K74.60 CIRRHOSIS OF LIVER WITHOUT ASCITES, UNSPECIFIED HEPATIC CIRRHOSIS TYPE (HCC): ICD-10-CM

## 2021-04-19 PROCEDURE — 36415 COLL VENOUS BLD VENIPUNCTURE: CPT

## 2021-04-19 PROCEDURE — 76705 ECHO EXAM OF ABDOMEN: CPT

## 2021-04-19 PROCEDURE — 82105 ALPHA-FETOPROTEIN SERUM: CPT

## 2021-04-20 LAB — MS ALPHA-FETOPROTEIN: 2 NG/ML (ref 0–9)

## 2021-07-05 NOTE — PROGRESS NOTES
Patient Name: Tejinder Wan  Patient : 1955  Patient MRN: [id number]     Primary Oncologist: Chong Singleton MD  Referring Provider: Axel Ngo MD     Date of Service: 2021      Chief Complaint:   Chief Complaint   Patient presents with    Follow-up     He came in for follow-up visit. Patient Active Problem List:     Splenomegaly     NAFLD (nonalcoholic fatty liver disease)     Carcinoma of duodenum (HCC)     Liver cirrhosis secondary to MONGE (nonalcoholic steatohepatitis)     Port-A-Cath in place     Bilateral primary osteoarthritis of knee     Arthritis of both knees     Leukopenia     Primary malignant neoplasm of small intestine     Thrombocytopenic disorder     Fatigue     HPI:  Madolyn Jeans is a pleasant 72year old  male patient with underlying liver cirrhosis/MONGE with esophageal varices followed by Dr Miriam Sawant, who was referred back for evaluation of pancytopenia. 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 He had mismatch MMR. He received adjuvant FOLFOX from 2011 through 2012 followed by RT till 2012. CT scan in May 2014 showed no recurrent disease. US in 2014 showed 17 cm spleen, previously 16 cm with hepatic steatosis. Platelet was 80 - 628 since  through . CEA was 2.4. He was seen by Dr Miriam Sawant in 2019. AFP in 2019 was 4.6. On 2019, calcium 9.4, LFTs were unremarkable . Creatinine was 0.8. Total bilirubin was 0.8. Alpha-fetoprotein was 3. INR was 1.21. In 2019 CMP was grossly unremarkable except for glucose 125. WBC was 2.1, RBC count 3.68, hemoglobin 11.7, hematocrit 34.1, MCV 92.6, platelets 64. At the time he just got over flu. If pancytopenia continues to get worse, he may consider bone marrow study. Labs in 2019 were reviewed. Labs in 2020 were reviewed.   He had left TKR in 1719 without complication. He did not receive blood product transfusion. He plans to have right knee replacement in fall 2020. On September 25, 2020 he came in for follow up visit. He will follow up with Dr Amaya Gross in February 2021, and Right TKR will be in 2021. CBC in June and July 2020 was reviewed. He has stable thrombocytopenia. Follow-up visit with orthopedic surgeon Dr. Tala Roy on February 23, 2021 to discuss about right knee surgery. He had flu shot in 2020. He will also follow-up with GI every 6 months. On August 2, 2021 he came in for follow-up visit. Labs on March 9, 2021 showed WBC 10.6, hemoglobin 11.7, platelet 622. Has been followed by Dr Leila Ravi. He had Covid vaccine. Serum alpha-fetoprotein in April 2021 was 2. Will have follow-up blood tests by family doctor in January 2022. I will obtain the report of the blood test from last month from [facility]. He denies any sign of active bleeding. I do not see any petechial rash or bruises. No chest pain or shortness of breath. He denied nausea, vomiting or diarrhea. No melena or hematuria. No headache or dizziness. No depression. PAST MEDICAL HISTORY:  Hypertension  obstructive sleep apnea  GERD  hiatal hernia  hyperlipidemia  hemorrhoids  renal stones  history of colonic polyps  low B12 levels on B12 shots. Colonoscopy in March 2014 showed multiple polyps, Bx showed tubular adenoma. EGD on 12/11/14 by Dr. Leila Ravi showed esophageal varices, suggestive of Cirrhosis no other positive findings. c/w [place]  Abdominal ultrasound on November 11, 2015, and compared with the CT of 2014 and ultrasound July 2015 confirmed the enlargement of spleen at 16.6 cm, evidence of cholecystectomy, and nonvisualization of the pancreas because of bowel gas, otherwise negative. The liver showed a normal echogenicity without evidence of any intrahepatic biliary dilatation.      PAST SURGICAL HISTORY:  Right knee surgery in 2002  arthroscopic RDW 13.4 03/09/2021     (L) 03/09/2021    MPV 9.8 03/09/2021    BANDSPCT 18 (H) 03/14/2016    SEGSPCT 86.3 (H) 03/09/2021    EOSRELPCT 0.2 03/09/2021    BASOPCT 0.2 03/09/2021    LYMPHOPCT 5.7 (L) 03/09/2021    MONOPCT 6.6 (H) 03/09/2021    BANDABS 0.59 03/14/2016    SEGSABS 9.1 03/09/2021    EOSABS 0.0 03/09/2021    BASOSABS 0.0 03/09/2021    LYMPHSABS 0.6 03/09/2021    MONOSABS 0.7 03/09/2021    DIFFTYPE AUTOMATED DIFFERENTIAL 03/09/2021    ANISOCYTOSIS 2+ 08/02/2011    POLYCHROM 1+ 08/04/2011    PLTM FEW 03/14/2016     Lab Results   Component Value Date    ESR 14 03/01/2021     Chemistry:  Lab Results   Component Value Date     03/01/2021    K 3.9 03/01/2021     03/01/2021    CO2 30 03/01/2021    BUN 13 03/01/2021    CREATININE 0.8 (L) 03/01/2021    GLUCOSE 117 (H) 03/01/2021    CALCIUM 8.9 03/01/2021    PROT 5.6 (L) 03/01/2021    LABALBU 3.2 (L) 03/01/2021    BILITOT 1.3 (H) 03/01/2021    ALKPHOS 93 03/01/2021    AST 24 03/01/2021    ALT 18 03/01/2021    LABGLOM >60 03/01/2021    GFRAA >60 03/01/2021    GLOB 2.2 07/09/2020    POCGLU 107 (H) 10/14/2011     Lab Results   Component Value Date     07/10/2017     Lab Results   Component Value Date    TSHHS 0.609 11/05/2019     Immunology:  Lab Results   Component Value Date    PROT 5.6 (L) 03/01/2021    SPEP  11/05/2019     INTERPRETATION - Within normal limits. Wilder Lynn MD    ALBUMINELP 3.6 11/05/2019    LABALPH 0.3 11/05/2019    LABALPH 0.5 11/05/2019    LABBETA 0.9 11/05/2019    GAMGLOB 1.3 11/05/2019     Coagulation Panel:  Lab Results   Component Value Date    PROTIME 12.5 02/23/2021    INR 1.0 02/23/2021    APTT 36.8 08/02/2011     Anemia Panel:  Lab Results   Component Value Date    GAOGRVQL54 >2000 (H) 11/05/2019    FOLATE 12.8 11/05/2019     Tumor Markers:  Lab Results   Component Value Date    CEA 2.4 07/10/2017     30.1 11/06/2014    PSA 0.59 02/11/2015      Observations:  No data recorded      Assessment & Plan:    1. He has pancytopenia which could be related to hypersplenism due to underlying liver cirrhosis/Adkins. He is followed by Dr. Balbir Crawford. Labs in December 2019 were reviewed. CBC in June 2020 was grossly stable for him. Platelet was 71. CBC in June and July 2020 was reviewed. Recent labs in March 2021 were reviewed. I will obtain the report of the recent blood test from [facility]. I will combine my blood tests order with family's doctor order prior to next office visit. 2.  Anemic work-up in November 2019 was grossly unremarkable. He has anemia of chronic disease. 3. I recommend to keep age-appropriate cancer screening up-to-date. We discussed about healthy diet and lifestyle. Colonoscopy was in 2017. Had flu shot. 4.  He had left knee replacement in 6779 without complication. He plans to have right knee replacement in 2021. I recommend to have CBC checked prior to the right knee replacement. 5.  He has liver cirrhosis. Has been followed by GI. Serum protein alpha-fetoprotein in April 2021 was 2. He has Covid vaccine. Return to clinic in 6 months or sooner. All of his questions have been answered for today. Recent imaging and labs were reviewed and discussed with the patient.       Electronically signed by Kenna Morejon MD on 9/23/20 at 10:30 AM EDT

## 2021-08-02 ENCOUNTER — HOSPITAL ENCOUNTER (OUTPATIENT)
Dept: INFUSION THERAPY | Age: 66
Discharge: HOME | End: 2021-08-02
Payer: MEDICARE

## 2021-08-02 ENCOUNTER — OFFICE VISIT (OUTPATIENT)
Dept: ONCOLOGY | Age: 66
End: 2021-08-02
Payer: MEDICARE

## 2021-08-02 VITALS
SYSTOLIC BLOOD PRESSURE: 132 MMHG | WEIGHT: 208.8 LBS | DIASTOLIC BLOOD PRESSURE: 71 MMHG | OXYGEN SATURATION: 99 % | HEART RATE: 60 BPM | BODY MASS INDEX: 31.64 KG/M2 | TEMPERATURE: 97.9 F | HEIGHT: 68 IN

## 2021-08-02 DIAGNOSIS — D69.59 OTHER SECONDARY THROMBOCYTOPENIA: Primary | ICD-10-CM

## 2021-08-02 PROCEDURE — 99213 OFFICE O/P EST LOW 20 MIN: CPT | Performed by: INTERNAL MEDICINE

## 2021-08-02 PROCEDURE — 99211 OFF/OP EST MAY X REQ PHY/QHP: CPT

## 2021-08-02 NOTE — PROGRESS NOTES
MA Rooming Questions  Patient: Amisha Fenton  MRN: [id number]    Date: 8/2/2021        1. Do you have any new issues?   no         2. Do you need any refills on medications?    no    3. Have you had any imaging done since your last visit?   no    4. Have you been hospitalized or seen in the emergency room since your last visit here?   no    5. Did the patient have a depression screening completed today?  No    No data recorded     PHQ-9 Given to (if applicable):               PHQ-9 Score (if applicable):                     [] Positive     []  Negative              Does question #9 need addressed (if applicable)                     [] Yes    []  No               Matias Campuzano CMA

## 2021-10-11 ENCOUNTER — OFFICE VISIT (OUTPATIENT)
Dept: GASTROENTEROLOGY | Age: 66
End: 2021-10-11
Payer: MEDICARE

## 2021-10-11 VITALS
HEIGHT: 68 IN | SYSTOLIC BLOOD PRESSURE: 120 MMHG | OXYGEN SATURATION: 98 % | BODY MASS INDEX: 30.55 KG/M2 | HEART RATE: 61 BPM | TEMPERATURE: 97.2 F | DIASTOLIC BLOOD PRESSURE: 68 MMHG | WEIGHT: 201.6 LBS

## 2021-10-11 DIAGNOSIS — K74.60 LIVER CIRRHOSIS SECONDARY TO NASH (NONALCOHOLIC STEATOHEPATITIS) (HCC): ICD-10-CM

## 2021-10-11 DIAGNOSIS — K75.81 LIVER CIRRHOSIS SECONDARY TO NASH (NONALCOHOLIC STEATOHEPATITIS) (HCC): ICD-10-CM

## 2021-10-11 PROCEDURE — 99214 OFFICE O/P EST MOD 30 MIN: CPT | Performed by: SPECIALIST

## 2021-10-11 RX ORDER — NADOLOL 40 MG/1
TABLET ORAL
Qty: 30 TABLET | Refills: 11 | Status: SHIPPED | OUTPATIENT
Start: 2021-10-11 | End: 2022-10-03

## 2021-10-11 NOTE — PROGRESS NOTES
SUBJECTIVE:  No problems- denies abdominal pain,nausea,vomiting, constipation,melena, hematochezia, or hematemesis. Denies abdominal swelling, peripheral edema, periods of confusion.  Has occasional diarrhea    Last AFP:  2021--- 2.0        Last liver imagin/21-- US-- cirrhosis w/o mass   Last EGD for varices: 2014- medium to large varices w/o red markings- on NADOLOL 40 MG/D  Hepatitis A immunity: Yes - vaccine   Hepatitis B immunity: Yes- vaccine   Lab Results   Component Value Date    LABALBU 3.2 2021    LABALBU 3.3 2020    AST 24 2021    ALT 18 2021    ALKPHOS 93 2021    BILITOT 1.3 2021          ROS: non-contributory    OBJECTIVE:   PHYSICAL EXAM:    Vitals:  /68 (Site: Left Upper Arm, Position: Sitting, Cuff Size: Medium Adult)   Pulse 61   Temp 97.2 °F (36.2 °C) (Infrared)   Ht 5' 8\" (1.727 m)   Wt 201 lb 9.6 oz (91.4 kg)   SpO2 98%   BMI 30.65 kg/m²   CONSTITUTIONAL: alert  EYES:  pupils equal, round and reactive to light and sclera clear  LUNGS:  clear to auscultation  CARDIOVASCULAR:  regular rate and rhythm and no murmur noted  ABDOMEN:  normal bowel sounds, soft, non-distended, non-tender and no masses palpated, no hepatosplenomegally  NEUROLOGIC: no asterixis or focal deficit detected   EXTREMITIES: no clubbing, cyanosis, has trace edema    ASSESSMENT:    1) cirrhosis- stable    PLAN:   1) CBC,CMP,PT   2) AFP   3) Ultrasound liver for hepatoma surveillance   4) continue nadolol 40 mg/d   5) advised sodium restriction   6) return visit 6 months

## 2021-10-29 ENCOUNTER — HOSPITAL ENCOUNTER (OUTPATIENT)
Dept: ULTRASOUND IMAGING | Age: 66
Discharge: HOME | End: 2021-10-29
Payer: MEDICARE

## 2021-10-29 DIAGNOSIS — K74.60 LIVER CIRRHOSIS SECONDARY TO NASH (NONALCOHOLIC STEATOHEPATITIS) (HCC): ICD-10-CM

## 2021-10-29 DIAGNOSIS — K75.81 LIVER CIRRHOSIS SECONDARY TO NASH (NONALCOHOLIC STEATOHEPATITIS) (HCC): ICD-10-CM

## 2021-10-29 PROCEDURE — 76705 ECHO EXAM OF ABDOMEN: CPT

## 2021-12-20 LAB
AFP-TUMOR MARKER: 3.9 NG/ML
ALBUMIN/GLOBULIN RATIO: 1.3 RATIO (ref 0.8–2.6)
ALBUMIN: 3.1 G/DL (ref 3.5–5.2)
ALP BLD-CCNC: 110 U/L (ref 23–144)
ALT SERPL-CCNC: 26 U/L (ref 0–60)
AST SERPL-CCNC: 37 U/L (ref 0–55)
BILIRUB SERPL-MCNC: 0.8 MG/DL (ref 0–1.2)
BUN BLDV-MCNC: 9 MG/DL (ref 3–29)
BUN/CREAT BLD: 11 (ref 7–25)
CALCIUM SERPL-MCNC: 9.3 MG/DL (ref 8.5–10.5)
CHLORIDE BLD-SCNC: 104 MEQ/L (ref 96–110)
CO2: 30 MEQ/L (ref 19–32)
CREAT SERPL-MCNC: 0.8 MG/DL (ref 0.5–1.4)
GLOBULIN: 2.3 G/DL (ref 1.9–3.6)
GLOMERULAR FILTRATION RATE: 93 MLS/MIN/1.73M2
GLUCOSE BLD-MCNC: 142 MG/DL (ref 70–99)
HCT VFR BLD CALC: 33.8 % (ref 37.5–51)
HEMOGLOBIN: 11.8 G/DL (ref 13–17.7)
INR BLD: 1.3 (ref 0.9–1.1)
MCH RBC QN AUTO: 31.7 PG (ref 26–34)
MCHC RBC AUTO-ENTMCNC: 34.9 G/DL (ref 30.7–35.5)
MCV RBC AUTO: 90.9 FL (ref 80–100)
PDW BLD-RTO: 13 %
PLATELET # BLD: 84 K/UL (ref 140–400)
PMV BLD AUTO: 10 FL (ref 7.2–11.7)
POTASSIUM SERPL-SCNC: 4.6 MEQ/L (ref 3.4–5.3)
PT: 15.1 SEC (ref 11.7–13.9)
RBC # BLD: 3.72 M/UL (ref 4.14–5.8)
SODIUM BLD-SCNC: 138 MEQ/L (ref 135–148)
STATUS: ABNORMAL
TOTAL PROTEIN: 5.4 G/DL (ref 6–8.3)
WBC: 3.9 K/UL (ref 3.5–10.9)

## 2022-01-11 NOTE — PROGRESS NOTES
Patient Name: Parveen Cristobal  Patient : 1955  Patient MRN: [id number]     Primary Oncologist: Chaka Patten MD  Referring Provider: Jose Eduardo Butt MD     Date of Service: 2/10/2022      Chief Complaint:   Chief Complaint   Patient presents with    Follow-up     He came in for follow-up visit. Patient Active Problem List:     Splenomegaly     NAFLD (nonalcoholic fatty liver disease)     Carcinoma of duodenum      Liver cirrhosis secondary to MONGE (nonalcoholic steatohepatitis)     Port-A-Cath in place     Bilateral primary osteoarthritis of knee     Arthritis of both knees     Leukopenia     Primary malignant neoplasm of small intestine     Thrombocytopenic disorder     Fatigue     HPI:  Agustina George is a pleasant 77year old  male patient with underlying liver cirrhosis/MONGE with esophageal varices followed by Dr Anna Chahal, who was referred back for evaluation of pancytopenia. 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 He had mismatch MMR. He received adjuvant FOLFOX from 2011 through 2012 followed by RT till 2012. CT scan in May 2014 showed no recurrent disease. US in 2014 showed 17 cm spleen, previously 16 cm with hepatic steatosis. Platelet was 80 - 825 since  through . CEA was 2.4. He was seen by Dr Anna Chahal in 2019. AFP in 2019 was 4.6. On 2019, calcium 9.4, LFTs were unremarkable . Creatinine was 0.8. Total bilirubin was 0.8. Alpha-fetoprotein was 3. INR was 1.21. In 2019 CMP was grossly unremarkable except for glucose 125. WBC was 2.1, RBC count 3.68, hemoglobin 11.7, hematocrit 34.1, MCV 92.6, platelets 64. At the time he just got over flu. If pancytopenia continues to get worse, he may consider bone marrow study. Labs in 2019 were reviewed. Labs in 2020 were reviewed.   He had left TKR in 5440 without complication. He did not receive blood product transfusion. He plans to have right knee replacement in fall 2020. He will follow up with Dr Ama Mann in February 2021, and Right TKR will be in 2021. CBC in June and July 2020 was reviewed. He has stable thrombocytopenia. He will also follow-up with GI every 6 months. Labs on March 9, 2021 showed WBC 10.6, hemoglobin 11.7, platelet 599. He has been followed by Dr Primo Dawn. He had Covid vaccine. Serum alpha-fetoprotein in April 2021 was 2. On 2/10/2022 he came in for follow up visit. CBC in February 2022 was stable with WBC 4.0, hemoglobin 11.5, platelet 85. He will follow-up with Dr Primo Dawn. He had right knee surgery in 2021 without bleeding complication. He is agreeable that we will continue to monitor his CBC. He has small bruises from recent blood draw. No acute pain. Denied any nausea, vomiting or diarrhea. No fever or chills. No chest pain, shortness of breath or palpitation. No headache or dizzy spell. No specific bone pain. No melena or hematochezia. Denied any dysuria or hematuria. PAST MEDICAL HISTORY:  Hypertension  obstructive sleep apnea  GERD  hiatal hernia  hyperlipidemia  hemorrhoids  renal stones  history of colonic polyps  low B12 levels on B12 shots. Colonoscopy in March 2014 showed multiple polyps, Bx showed tubular adenoma. EGD on 12/11/14 by Dr. Primo Dawn showed esophageal varices, suggestive of Cirrhosis no other positive findings. c/w [place]  Abdominal ultrasound on November 11, 2015, and compared with the CT of 2014 and ultrasound July 2015 confirmed the enlargement of spleen at 16.6 cm, evidence of cholecystectomy, and nonvisualization of the pancreas because of bowel gas, otherwise negative. The liver showed a normal echogenicity without evidence of any intrahepatic biliary dilatation.      PAST SURGICAL HISTORY:  Right knee surgery in 2002  arthroscopic surgery in 2003 on the left knee  umbilical hernia surgery in 2006  rotator cuff surgery in September of 2009  [address] surgery in September of 2011. FAMILY HISTORY:  Brother had lung cancer, twin brother had skin cancer. One uncle had lung cancer, one aunt had lung cancer, father had lung cancer. SOCIAL HISTORY:  He is  for the past 25 years. He has five children, two of his own and three stepchildren. He is a nonsmoker, nondrinker. Review of Systems: \"Per interval history; otherwise 10 point ROS is negative. \"     Vital Signs:  /64 (Site: Left Upper Arm, Position: Sitting, Cuff Size: Large Adult)   Pulse 58   Temp 96.9 °F (36.1 °C) (Temporal)   Resp 16   Ht 5' 8\" (1.727 m)   Wt 215 lb 3.2 oz (97.6 kg)   SpO2 100%   BMI 32.72 kg/m²     Physical Exam:  CONSTITUTIONAL: awake, alert, cooperative, no apparent distress   EYES: pupils equal, round and reactive to light, sclera clear and conjunctiva normal  ENT: Normocephalic, without obvious abnormality, atraumatic  NECK: supple, symmetrical, no jugular venous distension and no carotid bruits   HEMATOLOGIC/LYMPHATIC: no cervical, supraclavicular or axillary lymphadenopathy   LUNGS: no increased work of breathing and clear to auscultation   CARDIOVASCULAR: regular rate and rhythm, normal S1 and S2, no murmur   ABDOMEN: normal bowel sound, soft, non-distended, non-tender, no masses palpated, no hepatosplenomegaly   MUSCULOSKELETAL: full range of motion noted, tone is normal  NEUROLOGIC: Motor skills grossly intact. CN II through XII are grossly intact. SKIN: No jaundice. appears intact. No petechial rash. EXTREMITIES: trace LE edema. No cyanosis.     Labs:  Hematology:  Lab Results   Component Value Date    WBC 4.0 02/07/2022    RBC 3.60 (L) 02/07/2022    HGB 11.5 (L) 02/07/2022    HCT 33.5 (L) 02/07/2022    MCV 93.1 02/07/2022    MCH 31.9 02/07/2022    MCHC 34.3 02/07/2022    RDW 13.8 02/07/2022    PLT 85 (L) 02/07/2022    MPV 10.1 02/07/2022    BANDSPCT 18 (H) 03/14/2016    SEGSPCT 86.3 (H) 03/09/2021    EOSRELPCT 3.3 02/07/2022    BASOPCT 0.5 02/07/2022    LYMPHOPCT 13.9 (L) 02/07/2022    MONOPCT 12.1 (H) 02/07/2022    BANDABS 0.59 03/14/2016    SEGSABS 9.1 03/09/2021    EOSABS 0.1 02/07/2022    BASOSABS 0.0 02/07/2022    LYMPHSABS 0.6 (L) 02/07/2022    MONOSABS 0.5 02/07/2022    DIFFTYPE AUTOMATIC METHOD 02/07/2022    ANISOCYTOSIS 2+ 08/02/2011    POLYCHROM 1+ 08/04/2011    PLTM FEW 03/14/2016     Lab Results   Component Value Date    ESR 14 03/01/2021     Chemistry:  Lab Results   Component Value Date     12/20/2021    K 4.6 12/20/2021     12/20/2021    CO2 30 12/20/2021    BUN 9 12/20/2021    CREATININE 0.8 12/20/2021    GLUCOSE 142 (H) 12/20/2021    CALCIUM 9.3 12/20/2021    PROT 5.4 (L) 12/20/2021    LABALBU 3.1 (L) 12/20/2021    BILITOT 0.8 12/20/2021    ALKPHOS 110 12/20/2021    AST 37 12/20/2021    ALT 26 12/20/2021    LABGLOM >60 03/01/2021    GFRAA >60 03/01/2021    GLOB 2.3 12/20/2021    POCGLU 107 (H) 10/14/2011     Lab Results   Component Value Date     07/10/2017     Lab Results   Component Value Date    TSHHS 0.609 11/05/2019     Immunology:  Lab Results   Component Value Date    PROT 5.4 (L) 12/20/2021    SPEP  11/05/2019     INTERPRETATION - Within normal limits. Lani Charles MD    ALBUMINELP 3.6 11/05/2019    LABALPH 0.3 11/05/2019    LABALPH 0.5 11/05/2019    LABBETA 0.9 11/05/2019    GAMGLOB 1.3 11/05/2019     Coagulation Panel:  Lab Results   Component Value Date    PROTIME 15.1 (H) 12/20/2021    INR 1.3 (H) 12/20/2021    APTT 36.8 08/02/2011     Anemia Panel:  Lab Results   Component Value Date    OIHACARR71 >2000 (H) 11/05/2019    FOLATE 12.8 11/05/2019     Tumor Markers:  Lab Results   Component Value Date    CEA 2.4 07/10/2017     30.1 11/06/2014    PSA 0.59 02/11/2015      Observations:  PHQ-9 Total Score: 0 (2/10/2022  9:04 AM)      Assessment & Plan:  1.  He has pancytopenia which could be related to hypersplenism due to underlying liver cirrhosis/Adkins. He is followed by Dr. Kristin Bullard. Labs in December 2019 were reviewed. CBC in June 2020 was grossly stable for him. Platelet was 71. CBC in June and July 2020 was reviewed. Recent labs were reviewed. Platelet was stable. He is agreeable that we continue to monitor his CBC. 2.  Anemic work-up in November 2019 was grossly unremarkable. He has anemia of chronic disease. 3. I recommend to keep age-appropriate cancer screening up-to-date. We discussed about healthy diet and lifestyle. Colonoscopy was in 2017. Had flu shot. 4.  He had left knee replacement in 7248 without complication. He had right knee replacement in 2021 without bleeding complication. 5.  He has liver cirrhosis. Has been followed by GI. Serum protein alpha-fetoprotein in April 2021 was 2. He will follow-up with GI. He has Covid vaccine. Return to clinic in 6 months or sooner. All of his questions have been answered for today. Recent imaging and labs were reviewed and discussed with the patient.

## 2022-02-10 ENCOUNTER — HOSPITAL ENCOUNTER (OUTPATIENT)
Dept: INFUSION THERAPY | Age: 67
Discharge: HOME | End: 2022-02-10

## 2022-02-10 ENCOUNTER — OFFICE VISIT (OUTPATIENT)
Dept: ONCOLOGY | Age: 67
End: 2022-02-10
Payer: MEDICARE

## 2022-02-10 VITALS
HEIGHT: 68 IN | BODY MASS INDEX: 32.61 KG/M2 | RESPIRATION RATE: 16 BRPM | DIASTOLIC BLOOD PRESSURE: 64 MMHG | OXYGEN SATURATION: 100 % | TEMPERATURE: 96.9 F | WEIGHT: 215.2 LBS | HEART RATE: 58 BPM | SYSTOLIC BLOOD PRESSURE: 135 MMHG

## 2022-02-10 DIAGNOSIS — D69.59 OTHER SECONDARY THROMBOCYTOPENIA: Primary | ICD-10-CM

## 2022-02-10 PROCEDURE — 99213 OFFICE O/P EST LOW 20 MIN: CPT | Performed by: INTERNAL MEDICINE

## 2022-02-10 ASSESSMENT — PATIENT HEALTH QUESTIONNAIRE - PHQ9
2. FEELING DOWN, DEPRESSED OR HOPELESS: 0
SUM OF ALL RESPONSES TO PHQ QUESTIONS 1-9: 0
1. LITTLE INTEREST OR PLEASURE IN DOING THINGS: 0
SUM OF ALL RESPONSES TO PHQ9 QUESTIONS 1 & 2: 0

## 2022-02-10 NOTE — PROGRESS NOTES
MA Rooming Questions  Patient: Jhon Elise  MRN: [id number]    Date: 2/10/2022        1. Do you have any new issues?   no         2. Do you need any refills on medications?    no    3. Have you had any imaging done since your last visit? yes - U/S    4. Have you been hospitalized or seen in the emergency room since your last visit here?   no    5. Did the patient have a depression screening completed today?  Yes    PHQ-9 Total Score: 0 (2/10/2022  9:04 AM)       PHQ-9 Given to (if applicable):               PHQ-9 Score (if applicable):                     [] Positive     [x]  Negative              Does question #9 need addressed (if applicable)                     [] Yes    []  No               Ivory Rolon CMA

## 2022-03-17 ENCOUNTER — OFFICE VISIT (OUTPATIENT)
Dept: ORTHOPEDIC SURGERY | Age: 67
End: 2022-03-17
Payer: MEDICARE

## 2022-03-17 VITALS
HEART RATE: 83 BPM | OXYGEN SATURATION: 99 % | HEIGHT: 68 IN | RESPIRATION RATE: 19 BRPM | WEIGHT: 215 LBS | BODY MASS INDEX: 32.58 KG/M2

## 2022-03-17 DIAGNOSIS — Z96.651 S/P TOTAL KNEE ARTHROPLASTY, RIGHT: ICD-10-CM

## 2022-03-17 DIAGNOSIS — Z96.652 S/P TOTAL KNEE ARTHROPLASTY, LEFT: Primary | ICD-10-CM

## 2022-03-17 PROCEDURE — 99212 OFFICE O/P EST SF 10 MIN: CPT | Performed by: ORTHOPAEDIC SURGERY

## 2022-03-17 NOTE — PROGRESS NOTES
Patient returns to the office with a 1 year post op of a right tka, dos: 3/8/21. Pt stated he is doing fantastic and has no pain today. Pt has good ROM and has no concerns with the knee.

## 2022-03-21 ASSESSMENT — ENCOUNTER SYMPTOMS
COLOR CHANGE: 0
CHEST TIGHTNESS: 0
SHORTNESS OF BREATH: 0

## 2022-03-21 NOTE — PROGRESS NOTES
3/17/2022   Chief Complaint   Patient presents with    Post-Op Check     right TKA, DOS: 3/8/21        History of Present Illness:                             Azalea Juarez is a 77 y.o. male who returns today for follow-up of his right total knee replacement. He is doing extremely well and very pleased with his progress. He has resumed all of his activities and is pleased with his progress. Patient returns to the office with a 1 year post op of a right tka, dos: 3/8/21. Pt stated he is doing fantastic and has no pain today. Pt has good ROM and has no concerns with the knee. Medical History  Patient's medications, allergies, past medical, surgical, social and family histories were reviewed and updated as appropriate. Review of Systems   Constitutional: Negative for activity change and fever. Respiratory: Negative for chest tightness and shortness of breath. Cardiovascular: Negative for chest pain. Skin: Negative for color change. Neurological: Negative for dizziness. Psychiatric/Behavioral: The patient is not nervous/anxious. Examination:  General Exam:  Vitals: Pulse 83   Resp 19   Ht 5' 8\" (1.727 m)   Wt 215 lb (97.5 kg)   SpO2 99%   BMI 32.69 kg/m²    Physical Exam     Right lower extremity:  Well-healed midline scar. No tenderness or swelling throughout the knee. Full painless active range of motion present at the knee 0 to 120 degrees. Strength is intact 5 out of 5 flexion and extension. Diagnostic testing:  X-rays reviewed in office, I independently reviewed the films in the office today:   Narrative   3 views of the right knee show excellent alignment status post total knee    replacement.  Good bone cement interface.  Normal alignment.  No soft    tissue swelling.  No fractures or complications.               Office Procedures:  No orders of the defined types were placed in this encounter. Assessment and Plan  1. Status post bilateral total knee replacements    Patient is doing extremely well and I encouraged him to continue with home exercise program.  Continue activities as tolerated.   Follow-up as needed        Electronically signed by Negra Be MD on 3/21/2022 at 11:16 AM

## 2022-04-11 ENCOUNTER — OFFICE VISIT (OUTPATIENT)
Dept: GASTROENTEROLOGY | Age: 67
End: 2022-04-11
Payer: MEDICARE

## 2022-04-11 VITALS
OXYGEN SATURATION: 92 % | BODY MASS INDEX: 33.37 KG/M2 | HEART RATE: 58 BPM | WEIGHT: 220.2 LBS | HEIGHT: 68 IN | TEMPERATURE: 97.3 F | SYSTOLIC BLOOD PRESSURE: 126 MMHG | DIASTOLIC BLOOD PRESSURE: 74 MMHG

## 2022-04-11 DIAGNOSIS — K74.60 CIRRHOSIS OF LIVER WITHOUT ASCITES, UNSPECIFIED HEPATIC CIRRHOSIS TYPE (HCC): Primary | ICD-10-CM

## 2022-04-11 DIAGNOSIS — K74.60 CIRRHOSIS OF LIVER WITHOUT ASCITES, UNSPECIFIED HEPATIC CIRRHOSIS TYPE (HCC): ICD-10-CM

## 2022-04-11 DIAGNOSIS — Z86.010 HISTORY OF COLON POLYPS: ICD-10-CM

## 2022-04-11 LAB
A/G RATIO: 1.5 (ref 1.1–2.2)
ALBUMIN SERPL-MCNC: 3.5 G/DL (ref 3.4–5)
ALP BLD-CCNC: 109 U/L (ref 40–129)
ALT SERPL-CCNC: 20 U/L (ref 10–40)
ANION GAP SERPL CALCULATED.3IONS-SCNC: 13 MMOL/L (ref 3–16)
AST SERPL-CCNC: 32 U/L (ref 15–37)
BILIRUB SERPL-MCNC: 1 MG/DL (ref 0–1)
BUN BLDV-MCNC: 13 MG/DL (ref 7–20)
CALCIUM SERPL-MCNC: 9.2 MG/DL (ref 8.3–10.6)
CHLORIDE BLD-SCNC: 108 MMOL/L (ref 99–110)
CO2: 23 MMOL/L (ref 21–32)
CREAT SERPL-MCNC: 0.7 MG/DL (ref 0.8–1.3)
GFR AFRICAN AMERICAN: >60
GFR NON-AFRICAN AMERICAN: >60
GLUCOSE BLD-MCNC: 132 MG/DL (ref 70–99)
HCT VFR BLD CALC: 34.9 % (ref 40.5–52.5)
HEMOGLOBIN: 12.1 G/DL (ref 13.5–17.5)
INR BLD: 1.15 (ref 0.88–1.12)
MCH RBC QN AUTO: 32.8 PG (ref 26–34)
MCHC RBC AUTO-ENTMCNC: 34.5 G/DL (ref 31–36)
MCV RBC AUTO: 95 FL (ref 80–100)
PDW BLD-RTO: 14.4 % (ref 12.4–15.4)
PLATELET # BLD: 79 K/UL (ref 135–450)
PLATELET SLIDE REVIEW: ABNORMAL
PMV BLD AUTO: 7.8 FL (ref 5–10.5)
POTASSIUM SERPL-SCNC: 4.3 MMOL/L (ref 3.5–5.1)
PROTHROMBIN TIME: 13.1 SEC (ref 9.9–12.7)
RBC # BLD: 3.67 M/UL (ref 4.2–5.9)
SLIDE REVIEW: ABNORMAL
SODIUM BLD-SCNC: 144 MMOL/L (ref 136–145)
TOTAL PROTEIN: 5.9 G/DL (ref 6.4–8.2)
WBC # BLD: 2.9 K/UL (ref 4–11)

## 2022-04-11 PROCEDURE — 99214 OFFICE O/P EST MOD 30 MIN: CPT | Performed by: SPECIALIST

## 2022-04-11 NOTE — PROGRESS NOTES
CC: Follow up for cirrhosis    SUBJECTIVE:  Has occasional hemorrhoid protrude and sometimes it causes bleeding and incontinence.  no melena, hematochezia    Last AFP:  12/2021--- 3.9        Last liver imaging:   10/21-- US-- cirrhosis w/o mass   Last EGD for varices: 12/2014- medium to large varices w/o red markings- on NADOLOL 40 MG/D  Hepatitis A immunity: Yes - vaccine 2015  Hepatitis B immunity: Yes- vaccine 2015     ROS: non-contributory    OBJECTIVE:   PHYSICAL EXAM:    Vitals:  /74 (Site: Left Upper Arm, Position: Sitting, Cuff Size: Medium Adult)   Pulse 58   Temp 97.3 °F (36.3 °C) (Infrared)   Ht 5' 8\" (1.727 m)   Wt 220 lb 3.2 oz (99.9 kg)   SpO2 92%   BMI 33.48 kg/m²   CONSTITUTIONAL: alert  EYES:  pupils equal, round and reactive to light and sclera clear  LUNGS:  clear to auscultation  CARDIOVASCULAR:  regular rate and rhythm and no murmur noted  ABDOMEN:  normal bowel sounds, soft, non-distended, non-tender and no masses palpated, no hepatosplenomegally  NEUROLOGIC: no asterixis or focal deficit detected   EXTREMITIES: no clubbing, cyanosis, or edema  RECTAL:    ASSESSMENT:    1) cirrhosis- stable  2) history of colon polyps- 3 mm SSP removed 3/2014, negative 3/2018    PLAN:   1) CBC,CMP,PT   2) AFP   3) Ultrasound liver for hepatoma surveillance   4) return visit 6 months   5) try Metamucil or Citrucel   6) repeat colonoscopy 3/2023

## 2022-04-13 LAB — AFP: 2.1 UG/L

## 2022-04-28 ENCOUNTER — HOSPITAL ENCOUNTER (OUTPATIENT)
Dept: ULTRASOUND IMAGING | Age: 67
Discharge: HOME | End: 2022-04-28
Payer: MEDICARE

## 2022-04-28 DIAGNOSIS — K74.60 CIRRHOSIS OF LIVER WITHOUT ASCITES, UNSPECIFIED HEPATIC CIRRHOSIS TYPE (HCC): ICD-10-CM

## 2022-04-28 PROCEDURE — 76705 ECHO EXAM OF ABDOMEN: CPT

## 2022-07-13 NOTE — PROGRESS NOTES
Patient Name: Cleo Keith  Patient : 1955  Patient MRN: [id number]     Primary Oncologist: Juany Hilton MD  Referring Provider: Renetta Garcia MD     Date of Service: 2022      Chief Complaint:   No chief complaint on file. He came in for follow-up visit. Patient Active Problem List:     Splenomegaly     NAFLD (nonalcoholic fatty liver disease)     Carcinoma of duodenum      Liver cirrhosis secondary to MONGE (nonalcoholic steatohepatitis)     Port-A-Cath in place     Bilateral primary osteoarthritis of knee     Arthritis of both knees     Leukopenia     Primary malignant neoplasm of small intestine     Thrombocytopenic disorder     Fatigue     HPI:  Prashanth Le is a pleasant 77year old  male patient with underlying liver cirrhosis/MONGE with esophageal varices followed by Dr Hannah Lind, who was referred back for evaluation of pancytopenia. 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 He had mismatch MMR. He received adjuvant FOLFOX from 2011 through 2012 followed by RT till 2012. CT scan in May 2014 showed no recurrent disease. US in 2014 showed 17 cm spleen, previously 16 cm with hepatic steatosis. Platelet was 80 - 581 since  through . CEA was 2.4. He was seen by Dr Hannah Lind in 2019. AFP in 2019 was 4.6. On 2019, calcium 9.4, LFTs were unremarkable . Creatinine was 0.8. Total bilirubin was 0.8. Alpha-fetoprotein was 3. INR was 1.21. In 2019 CMP was grossly unremarkable except for glucose 125. WBC was 2.1, RBC count 3.68, hemoglobin 11.7, hematocrit 34.1, MCV 92.6, platelets 64. At the time he just got over flu. If pancytopenia continues to get worse, he may consider bone marrow study. Labs in 2019 were reviewed. Labs in 2020 were reviewed.   He had left TKR in  without complication. He did not receive blood product transfusion. He plans to have right knee replacement in fall 2020. He will follow up with Dr Mark Lundy in February 2021, and Right TKR will be in 2021. CBC in June and July 2020 was reviewed. He has stable thrombocytopenia. He will also follow-up with GI every 6 months. Labs on March 9, 2021 showed WBC 10.6, hemoglobin 11.7, platelet 422. He has been followed by Dr Nabila Haider. He had Covid vaccine. Serum alpha-fetoprotein in April 2021 was 2. CBC in February 2022 was stable with WBC 4.0, hemoglobin 11.5, platelet 85. He will follow-up with Dr Nabila Haider. He had right knee surgery in 2021 without bleeding complication. He is agreeable that we will continue to monitor his CBC. He has small bruises from recent blood draw. On 8/12/2022 he came in for follow up visit. In 8/2022 albumin was 3.3. WBC 3.1, Hg 11.8, MCV 94.2, platelet 76. He is otherwise doing pretty well. I recommend to monitor blood pressure. He is scheduled for the cataract surgery next week on August 18, 2022. Likely he will have twilight. He will come back and see me in 6 months. I recommend to keep following with GI. No acute pain. Denied any nausea, vomiting or diarrhea. No fever or chills. No chest pain, shortness of breath or palpitation. No headache or dizzy spell. No specific bone pain. No melena or hematochezia. Denied any dysuria or hematuria. PAST MEDICAL HISTORY:  Hypertension  obstructive sleep apnea  GERD  hiatal hernia  hyperlipidemia  hemorrhoids  renal stones  history of colonic polyps  low B12 levels on B12 shots. Colonoscopy in March 2014 showed multiple polyps, Bx showed tubular adenoma. EGD on 12/11/14 by Dr. Nabila Haider showed esophageal varices, suggestive of Cirrhosis no other positive findings.  c/w MONGE  Abdominal ultrasound on November 11, 2015, and compared with the CT of 2014 and ultrasound July 2015 confirmed the enlargement of spleen at 16.6 cm, evidence of cholecystectomy, and nonvisualization of the pancreas because of bowel gas, otherwise negative. The liver showed a normal echogenicity without evidence of any intrahepatic biliary dilatation. PAST SURGICAL HISTORY:  Right knee surgery in 2002  arthroscopic surgery in 2003 on the left knee  umbilical hernia surgery in 2006  rotator cuff surgery in September of 2009  Geoff Gin surgery in September of 2011. FAMILY HISTORY:  Brother had lung cancer, twin brother had skin cancer. One uncle had lung cancer, one aunt had lung cancer, father had lung cancer. SOCIAL HISTORY:  He is  for the past 25 years. He has five children, two of his own and three stepchildren. He is a nonsmoker, nondrinker. Review of Systems: \"Per interval history; otherwise 10 point ROS is negative. \"     Vital Signs: There were no vitals taken for this visit. Physical Exam:  CONSTITUTIONAL: awake, alert, cooperative, no apparent distress   EYES: pupils equal, round and reactive to light. Sclera clear and conjunctiva normal  ENT: Normocephalic, without obvious abnormality, atraumatic  NECK: supple, symmetrical, no jugular venous distension and no carotid bruits   HEMATOLOGIC/LYMPHATIC: no cervical, supraclavicular or axillary lymphadenopathy   LUNGS: no increased work of breathing and clear to auscultation   CARDIOVASCULAR: regular rate and rhythm, normal S1 and S2, no murmur   ABDOMEN: normal bowel sound, soft, non-distended, non-tender, no masses palpated, no hepatosplenomegaly   MUSCULOSKELETAL: full range of motion noted, tone is normal  NEUROLOGIC: Motor skills grossly intact. CN II - XII grossly intact. SKIN: No jaundice. appears intact. No petechial rash. EXTREMITIES: trace LE edema. No cyanosis.     Labs:  Hematology:  Lab Results   Component Value Date    WBC 2.9 (L) 04/11/2022    RBC 3.67 (L) 04/11/2022    HGB 12.1 (L) 04/11/2022    HCT 34.9 (L) 04/11/2022    MCV 95.0 04/11/2022    MCH 32.8 04/11/2022    MCHC 34.5 04/11/2022    RDW 14.4 04/11/2022    PLT 79 (L) 04/11/2022    MPV 7.8 04/11/2022    BANDSPCT 18 (H) 03/14/2016    SEGSPCT 86.3 (H) 03/09/2021    EOSRELPCT 3.3 02/07/2022    BASOPCT 0.5 02/07/2022    LYMPHOPCT 13.9 (L) 02/07/2022    MONOPCT 12.1 (H) 02/07/2022    BANDABS 0.59 03/14/2016    SEGSABS 9.1 03/09/2021    EOSABS 0.1 02/07/2022    BASOSABS 0.0 02/07/2022    LYMPHSABS 0.6 (L) 02/07/2022    MONOSABS 0.5 02/07/2022    DIFFTYPE AUTOMATIC METHOD 02/07/2022    ANISOCYTOSIS 2+ 08/02/2011    POLYCHROM 1+ 08/04/2011    PLTM FEW 03/14/2016     Lab Results   Component Value Date    ESR 14 03/01/2021     Chemistry:  Lab Results   Component Value Date     04/11/2022    K 4.3 04/11/2022     04/11/2022    CO2 23 04/11/2022    BUN 13 04/11/2022    CREATININE 0.7 (L) 04/11/2022    GLUCOSE 132 (H) 04/11/2022    CALCIUM 9.2 04/11/2022    PROT 5.9 (L) 04/11/2022    LABALBU 3.5 04/11/2022    BILITOT 1.0 04/11/2022    ALKPHOS 109 04/11/2022    AST 32 04/11/2022    ALT 20 04/11/2022    LABGLOM >60 04/11/2022    GFRAA >60 04/11/2022    AGRATIO 1.5 04/11/2022    GLOB 2.3 12/20/2021    POCGLU 107 (H) 10/14/2011     Lab Results   Component Value Date     07/10/2017     Lab Results   Component Value Date    TSHHS 0.609 11/05/2019     Immunology:  Lab Results   Component Value Date    PROT 5.9 (L) 04/11/2022    SPEP  11/05/2019     INTERPRETATION - Within normal limits.   Barbara Rosenthal MD    ALBUMINELP 3.6 11/05/2019    LABALPH 0.3 11/05/2019    LABALPH 0.5 11/05/2019    LABBETA 0.9 11/05/2019    GAMGLOB 1.3 11/05/2019     Coagulation Panel:  Lab Results   Component Value Date    PROTIME 13.1 (H) 04/11/2022    INR 1.15 (H) 04/11/2022    APTT 36.8 08/02/2011     Anemia Panel:  Lab Results   Component Value Date    NFGWIKTT01 >2000 (H) 11/05/2019    FOLATE 12.8 11/05/2019     Tumor Markers:  Lab Results   Component Value Date    CEA 2.4 07/10/2017     30.1 11/06/2014    PSA 0.59 02/11/2015 Observations:  No data recorded      Assessment & Plan:  1. He has pancytopenia which could be related to hypersplenism due to underlying liver cirrhosis/Adkins. He is followed by Dr. Manolo Jean Baptiste. Labs in December 2019 were reviewed. CBC in June 2020 was grossly stable for him. Platelet was 71. CBC in June and July 2020 was reviewed. Recent labs were reviewed. Platelet was stable. He is agreeable that we continue to monitor the CBC. 2.  Anemic work-up in November 2019 was grossly unremarkable. He has anemia of chronic disease. 3. I recommend to keep age-appropriate cancer screening up-to-date. We discussed about healthy diet and lifestyle. Colonoscopy was in 2017. He is scheduled for cataract surgery next week. 4.  He had left knee replacement in 0283 without complication. He had right knee replacement in 2021 without bleeding complication. 5.  He has liver cirrhosis. Has been followed by GI. Serum protein alpha-fetoprotein in April 2021 was 2. He will follow-up with GI. He has Covid vaccine. Return to clinic in 6 months or sooner. All of his questions have been answered for today. Recent imaging and labs were reviewed and discussed with the patient.

## 2022-08-08 LAB
ABSOLUTE IMMATURE GRANULOCYTE: 0 K/UL (ref 0–0.1)
AFP-TUMOR MARKER: 3.9 NG/ML
ALBUMIN/GLOBULIN RATIO: 1.4 RATIO (ref 0.8–2.6)
ALBUMIN: 3.3 G/DL (ref 3.5–5.2)
ALP BLD-CCNC: 108 U/L (ref 23–144)
ALT SERPL-CCNC: 23 U/L (ref 0–60)
AST SERPL-CCNC: 32 U/L (ref 0–55)
BASOPHILS ABSOLUTE: 0 K/UL (ref 0–0.3)
BASOPHILS RELATIVE PERCENT: 0.6 % (ref 0–2)
BILIRUB SERPL-MCNC: 0.8 MG/DL (ref 0–1.2)
BUN BLDV-MCNC: 12 MG/DL (ref 3–29)
BUN/CREAT BLD: 13 (ref 7–25)
CALCIUM SERPL-MCNC: 9.1 MG/DL (ref 8.5–10.5)
CHLORIDE BLD-SCNC: 106 MEQ/L (ref 96–110)
CO2: 26 MEQ/L (ref 19–32)
CREAT SERPL-MCNC: 0.9 MG/DL (ref 0.5–1.4)
DIFFERENTIAL TYPE: ABNORMAL
EOSINOPHILS ABSOLUTE: 0.1 K/UL (ref 0–0.5)
EOSINOPHILS RELATIVE PERCENT: 3.9 % (ref 0–5)
GLOBULIN: 2.3 G/DL (ref 1.9–3.6)
GLOMERULAR FILTRATION RATE: 94 MLS/MIN/1.73M2
GLUCOSE BLD-MCNC: 134 MG/DL (ref 70–99)
HCT VFR BLD CALC: 34 % (ref 37.5–51)
HEMOGLOBIN: 11.8 G/DL (ref 13–17.7)
IMMATURE GRANULOCYTES: 0 %
LYMPHOCYTES ABSOLUTE: 0.4 K/UL (ref 0.9–4.1)
LYMPHOCYTES RELATIVE PERCENT: 13 % (ref 14–51)
MCH RBC QN AUTO: 32.7 PG (ref 26–34)
MCHC RBC AUTO-ENTMCNC: 34.7 G/DL (ref 30.7–35.5)
MCV RBC AUTO: 94.2 FL (ref 80–100)
MONOCYTES ABSOLUTE: 0.3 K/UL (ref 0.2–1)
MONOCYTES RELATIVE PERCENT: 9.1 % (ref 4–12)
NEUTROPHILS ABSOLUTE: 2.3 K/UL (ref 1.8–7.5)
NEUTROPHILS RELATIVE PERCENT: 73.4 % (ref 42–80)
NUCLEATED RBCS: 0 /100 WBC
PDW BLD-RTO: 13.8 %
PLATELET # BLD: 76 K/UL (ref 140–400)
PMV BLD AUTO: 10 FL (ref 7.2–11.7)
POTASSIUM SERPL-SCNC: 4.4 MEQ/L (ref 3.4–5.3)
RBC # BLD: 3.61 M/UL (ref 4.14–5.8)
SODIUM BLD-SCNC: 141 MEQ/L (ref 135–148)
STATUS: ABNORMAL
TOTAL PROTEIN: 5.6 G/DL (ref 6–8.3)
WBC: 3.1 K/UL (ref 3.5–10.9)

## 2022-08-12 ENCOUNTER — OFFICE VISIT (OUTPATIENT)
Dept: ONCOLOGY | Age: 67
End: 2022-08-12
Payer: MEDICARE

## 2022-08-12 VITALS
OXYGEN SATURATION: 95 % | HEIGHT: 68 IN | DIASTOLIC BLOOD PRESSURE: 71 MMHG | BODY MASS INDEX: 31.04 KG/M2 | WEIGHT: 204.8 LBS | HEART RATE: 50 BPM | TEMPERATURE: 97.2 F | SYSTOLIC BLOOD PRESSURE: 153 MMHG

## 2022-08-12 DIAGNOSIS — K76.0 NAFLD (NONALCOHOLIC FATTY LIVER DISEASE): Primary | ICD-10-CM

## 2022-08-12 PROCEDURE — 1123F ACP DISCUSS/DSCN MKR DOCD: CPT | Performed by: INTERNAL MEDICINE

## 2022-08-12 PROCEDURE — 99213 OFFICE O/P EST LOW 20 MIN: CPT | Performed by: INTERNAL MEDICINE

## 2022-08-12 ASSESSMENT — PATIENT HEALTH QUESTIONNAIRE - PHQ9
2. FEELING DOWN, DEPRESSED OR HOPELESS: 0
SUM OF ALL RESPONSES TO PHQ QUESTIONS 1-9: 0
SUM OF ALL RESPONSES TO PHQ9 QUESTIONS 1 & 2: 0
1. LITTLE INTEREST OR PLEASURE IN DOING THINGS: 0
SUM OF ALL RESPONSES TO PHQ QUESTIONS 1-9: 0

## 2022-08-12 NOTE — PROGRESS NOTES
MA Rooming Questions  Patient: Lyubov Bah  MRN: [id number]    Date: 8/12/2022        1. Do you have any new issues?   no         2. Do you need any refills on medications?    no    3. Have you had any imaging done since your last visit? yes - CT scan    4. Have you been hospitalized or seen in the emergency room since your last visit here?   no    5. Did the patient have a depression screening completed today?  Yes    PHQ-9 Total Score: 0 (8/12/2022  9:32 AM)       PHQ-9 Given to (if applicable):               PHQ-9 Score (if applicable):                     [] Positive     [x]  Negative              Does question #9 need addressed (if applicable)                     [] Yes    []  No               Vivienne Vasquez, CMA

## 2022-10-01 DIAGNOSIS — K74.60 LIVER CIRRHOSIS SECONDARY TO NASH (NONALCOHOLIC STEATOHEPATITIS) (HCC): ICD-10-CM

## 2022-10-01 DIAGNOSIS — K75.81 LIVER CIRRHOSIS SECONDARY TO NASH (NONALCOHOLIC STEATOHEPATITIS) (HCC): ICD-10-CM

## 2022-10-03 RX ORDER — NADOLOL 40 MG/1
TABLET ORAL
Qty: 90 TABLET | Refills: 0 | Status: SHIPPED | OUTPATIENT
Start: 2022-10-03

## 2022-10-14 ENCOUNTER — OFFICE VISIT (OUTPATIENT)
Dept: GASTROENTEROLOGY | Age: 67
End: 2022-10-14
Payer: MEDICARE

## 2022-10-14 VITALS
TEMPERATURE: 97.3 F | DIASTOLIC BLOOD PRESSURE: 62 MMHG | BODY MASS INDEX: 31.61 KG/M2 | HEIGHT: 68 IN | OXYGEN SATURATION: 98 % | WEIGHT: 208.6 LBS | SYSTOLIC BLOOD PRESSURE: 136 MMHG | HEART RATE: 55 BPM

## 2022-10-14 DIAGNOSIS — K75.81 LIVER CIRRHOSIS SECONDARY TO NASH (NONALCOHOLIC STEATOHEPATITIS) (HCC): Primary | ICD-10-CM

## 2022-10-14 DIAGNOSIS — K74.60 LIVER CIRRHOSIS SECONDARY TO NASH (NONALCOHOLIC STEATOHEPATITIS) (HCC): Primary | ICD-10-CM

## 2022-10-14 DIAGNOSIS — K62.5 RECTAL BLEEDING: ICD-10-CM

## 2022-10-14 DIAGNOSIS — Z86.010 HISTORY OF COLON POLYPS: ICD-10-CM

## 2022-10-14 PROCEDURE — 99214 OFFICE O/P EST MOD 30 MIN: CPT | Performed by: SPECIALIST

## 2022-10-14 PROCEDURE — 1123F ACP DISCUSS/DSCN MKR DOCD: CPT | Performed by: SPECIALIST

## 2022-10-14 NOTE — PROGRESS NOTES
CC: Follow up for cirrhosis    SUBJECTIVE:  No problems- denies abdominal pain,nausea,vomiting, diarrhea, constipation,melena, hematochezia, or hematemesis.  Denies abdominal swelling, peripheral edema, periods of confusion  Has some BRB on toilet tissue    Last AFP:  8/2022 --- 3.9    Last liver imaging:   -- 4/2022- no mass   Last EGD for varices:12/2014-- medium-to-large varices-- on nadolol 40 mg/d  Hepatitis A immunity: Yes - vaccine 2015  Hepatitis B immunity: Yes- vaccine 2015    Lab Results   Component Value Date/Time    LABALBU 3.3 08/08/2022 07:34 AM    AST 32 08/08/2022 07:34 AM    ALT 23 08/08/2022 07:34 AM    ALKPHOS 108 08/08/2022 07:34 AM    BILITOT 0.8 08/08/2022 07:34 AM          ROS: non-contributory    OBJECTIVE:   PHYSICAL EXAM:    Vitals:  /62 (Site: Left Upper Arm, Position: Sitting, Cuff Size: Medium Adult)   Pulse 55   Temp 97.3 °F (36.3 °C) (Infrared)   Ht 5' 8\" (1.727 m)   Wt 208 lb 9.6 oz (94.6 kg)   SpO2 98%   BMI 31.72 kg/m²   CONSTITUTIONAL: alert  EYES:  pupils equal, round and reactive to light and sclera clear  LUNGS:  clear to auscultation  CARDIOVASCULAR:  regular rate and rhythm and no murmur noted  ABDOMEN:  normal bowel sounds, soft, non-distended, non-tender and no masses palpated, no hepatosplenomegally  NEUROLOGIC: no asterixis or focal deficit detected   EXTREMITIES: no clubbing, cyanosis, or edema  RECTAL:    ASSESSMENT:    1) cirrhosis- stable  2) history of polyps-2 adenomas and one SSP- repeat in 2018 was negative  3) rectal bleeding-- colonoscopy not due until next year but in light of bleeding will have him do it now    PLAN:   1) CBC,CMP,PT   2) Ultrasound liver for hepatoma surveillance   3) AFP due in Feb, 2023   4) colonoscopy   5) return visit 6 months

## 2022-10-18 DIAGNOSIS — K74.60 LIVER CIRRHOSIS SECONDARY TO NASH (NONALCOHOLIC STEATOHEPATITIS) (HCC): ICD-10-CM

## 2022-10-18 DIAGNOSIS — K75.81 LIVER CIRRHOSIS SECONDARY TO NASH (NONALCOHOLIC STEATOHEPATITIS) (HCC): ICD-10-CM

## 2022-10-18 LAB
A/G RATIO: 1.4 (ref 1.1–2.2)
ALBUMIN SERPL-MCNC: 3.6 G/DL (ref 3.4–5)
ALP BLD-CCNC: 128 U/L (ref 40–129)
ALT SERPL-CCNC: 20 U/L (ref 10–40)
ANION GAP SERPL CALCULATED.3IONS-SCNC: 10 MMOL/L (ref 3–16)
AST SERPL-CCNC: 30 U/L (ref 15–37)
BILIRUB SERPL-MCNC: 0.9 MG/DL (ref 0–1)
BUN BLDV-MCNC: 9 MG/DL (ref 7–20)
CALCIUM SERPL-MCNC: 9.3 MG/DL (ref 8.3–10.6)
CHLORIDE BLD-SCNC: 102 MMOL/L (ref 99–110)
CO2: 29 MMOL/L (ref 21–32)
CREAT SERPL-MCNC: 0.7 MG/DL (ref 0.8–1.3)
GFR SERPL CREATININE-BSD FRML MDRD: >60 ML/MIN/{1.73_M2}
GLUCOSE BLD-MCNC: 126 MG/DL (ref 70–99)
HCT VFR BLD CALC: 35.9 % (ref 40.5–52.5)
HEMOGLOBIN: 12.6 G/DL (ref 13.5–17.5)
INR BLD: 1.24 (ref 0.87–1.14)
MCH RBC QN AUTO: 33 PG (ref 26–34)
MCHC RBC AUTO-ENTMCNC: 35 G/DL (ref 31–36)
MCV RBC AUTO: 94.4 FL (ref 80–100)
PDW BLD-RTO: 13.9 % (ref 12.4–15.4)
PLATELET # BLD: 77 K/UL (ref 135–450)
PMV BLD AUTO: 8.1 FL (ref 5–10.5)
POTASSIUM SERPL-SCNC: 4.2 MMOL/L (ref 3.5–5.1)
PROTHROMBIN TIME: 15.6 SEC (ref 11.7–14.5)
RBC # BLD: 3.8 M/UL (ref 4.2–5.9)
SODIUM BLD-SCNC: 141 MMOL/L (ref 136–145)
TOTAL PROTEIN: 6.1 G/DL (ref 6.4–8.2)
WBC # BLD: 3.4 K/UL (ref 4–11)

## 2022-10-21 ENCOUNTER — HOSPITAL ENCOUNTER (OUTPATIENT)
Dept: ULTRASOUND IMAGING | Age: 67
Discharge: HOME | End: 2022-10-21
Payer: MEDICARE

## 2022-10-21 DIAGNOSIS — K74.60 LIVER CIRRHOSIS SECONDARY TO NASH (NONALCOHOLIC STEATOHEPATITIS) (HCC): ICD-10-CM

## 2022-10-21 DIAGNOSIS — K75.81 LIVER CIRRHOSIS SECONDARY TO NASH (NONALCOHOLIC STEATOHEPATITIS) (HCC): ICD-10-CM

## 2022-10-21 PROCEDURE — 76705 ECHO EXAM OF ABDOMEN: CPT

## 2022-11-11 ENCOUNTER — HOSPITAL ENCOUNTER (OUTPATIENT)
Dept: WOMENS IMAGING | Age: 67
Discharge: HOME | End: 2022-11-11
Payer: MEDICARE

## 2022-11-11 DIAGNOSIS — M81.0 AGE-RELATED OSTEOPOROSIS WITHOUT CURRENT PATHOLOGICAL FRACTURE: ICD-10-CM

## 2022-11-11 PROCEDURE — 77080 DXA BONE DENSITY AXIAL: CPT

## 2022-11-16 NOTE — PROGRESS NOTES
Patient will be called with an arrival time on 11/21/2022 for his procedure at [facility] on 11/22/2022. 1. Do not eat or drink anything after 12 midnight (or____hours) unless instructed by your doctor prior to surgery. This includes no water, chewing gum or mints. NO chewing tobacco.  2. Follow your directions as prescribed by the doctor for your procedure and medications. 3.Check with your Doctor regarding stopping Plavix, Coumadin, Lovenox,Effient,Pradaxa,Xarelto, Fragmin or other blood thinners and follow their instructions. 4. Do not smoke, and do not drink any alcoholic beverages 24 hours prior to surgery. This includes NA Beer. 5. You may brush your teeth and gargle the morning of surgery. DO NOT SWALLOW WATER  6. You MUST make arrangements for a responsible adult to take you home after your surgery and be able to check on you every couple hours for the day. You will not be allowed     to leave alone or drive yourself home. It is strongly suggested someone stay with you the first 24 hrs. Your surgery will be cancelled if you do not have a ride home. 7. Please wear simple, loose fitting clothing to the hospital.  Erin Pinoer not bring valuables (money, credit cards, checkbooks, etc.) Do not wear any makeup (including no eye makeup) or nail polish on your fingers or toes. 8. DO NOT wear any jewelry or piercings on day of surgery. All body piercing jewelry must be removed  9. If you have dentures, they will be removed before going to the OR; we will provide you a container. If you wear contact lenses or glasses, they will be removed; please bring a case for them. 10. If you  have a Living Will and Durable Power of  for Healthcare, please bring in a copy. 11 Please bring picture ID,  insurance card, paperwork from the doctors office    (H & P, Consent, & card for implantable devices). Patient will take his nadolol the morning of his procedure, he verbalized understanding concerning colon prep instructions and had no questions at this time.

## 2022-11-21 ENCOUNTER — ANESTHESIA EVENT (OUTPATIENT)
Dept: OPERATING ROOM | Age: 67
End: 2022-11-21
Payer: MEDICARE

## 2022-11-21 NOTE — PROGRESS NOTES
Spoke with patient's  wife Pablo Jackson and patient will arrive at [phone] at [facility] on 11/22/2022 for his procedure at 1130.

## 2022-11-21 NOTE — ANESTHESIA PRE PROCEDURE
Department of Anesthesiology  Preprocedure Note       Name:  Jarek Melgoza   Age:  79 y.o.  :  1955                                          MRN:  [id number]         Date:  2022      Surgeon: Kristel Akins):  Vignesh Cueva MD    Procedure: Procedure(s):  COLONOSCOPY DIAGNOSTIC    Medications prior to admission:   Prior to Admission medications    Medication Sig Start Date End Date Taking? Authorizing Provider   VITAMIN D PO Take by mouth daily   Yes Historical Provider, MD   nadolol (CORGARD) 40 MG tablet Take 1 tablet by mouth once daily 10/3/22   Vignesh Cueva MD   lisinopril (PRINIVIL;ZESTRIL) 2.5 MG tablet Take 2.5 mg by mouth daily    Historical Provider, MD   vitamin C (ASCORBIC ACID) 500 MG tablet Take 500 mg by mouth daily    Historical Provider, MD   ferrous sulfate 325 (65 FE) MG tablet Take 325 mg by mouth daily (with breakfast)    Historical Provider, MD   calcium carbonate 600 MG TABS tablet Take 1 tablet by mouth daily. Historical Provider, MD   vitamin B-12 (CYANOCOBALAMIN) 1000 MCG tablet Take 2,000 mcg by mouth daily. Historical Provider, MD       Current medications:    No current facility-administered medications for this encounter. Current Outpatient Medications   Medication Sig Dispense Refill    VITAMIN D PO Take by mouth daily      nadolol (CORGARD) 40 MG tablet Take 1 tablet by mouth once daily 90 tablet 0    lisinopril (PRINIVIL;ZESTRIL) 2.5 MG tablet Take 2.5 mg by mouth daily      vitamin C (ASCORBIC ACID) 500 MG tablet Take 500 mg by mouth daily      ferrous sulfate 325 (65 FE) MG tablet Take 325 mg by mouth daily (with breakfast)      calcium carbonate 600 MG TABS tablet Take 1 tablet by mouth daily.  vitamin B-12 (CYANOCOBALAMIN) 1000 MCG tablet Take 2,000 mcg by mouth daily. Allergies:     Allergies   Allergen Reactions    Erythromycin      vomitting    Lorazepam Itching    Zolpidem Itching    Adhesive Tape Rash    Macrolides And Ketolides Rash       Problem List:    Patient Active Problem List   Diagnosis Code    Splenomegaly R16.1    NAFLD (nonalcoholic fatty liver disease) K76.0    Carcinoma of duodenum ([address] C17.0    Liver cirrhosis secondary to MONGE (nonalcoholic steatohepatitis) ([address] K75.81, K74.60    Port-A-Cath in place Z95.828    Leukopenia D72.819    Primary malignant neoplasm of small intestine C17.0    Thrombocytopenic disorder D69.59    Fatigue R53.83    S/P total knee arthroplasty, right Z96.651       Past Medical History:        Diagnosis Date    Anemia 2011    Cancer ([address]     whipple    Cirrhosis ([address] Dx: 2011    Duodenal cancer ([address] 2011    Esophageal varices ([address] 12/11/2014    History of blood transfusion 2011    with Whipple procedure    HLD (hyperlipidemia)     Hypertension     Follows with PCP    Neuropathy     Bilat feet    Osteoarthritis     Bilat knees       Past Surgical History:        Procedure Laterality Date    CHOLECYSTECTOMY  2011    COLONOSCOPY  03/19/2014    polpectomy    COLONOSCOPY  03/09/2016    grade 1 int hem, repeat 5 years - Dr. Gerry Blanton  5421    umbilical x2    OTHER SURGICAL HISTORY      pacreaticoduodenectomy (whipple)    TOTAL KNEE ARTHROPLASTY Left 02/17/2020    KNEE TOTAL ARTHROPLASTY LEFT performed by Christiano Hernandez MD at [facility] Right 3/8/2021    RIGHT KNEE TOTAL ARTHROPLASTY ROBOTIC performed by Christiano Hernandez MD at [address] [place] [address]  12/11/2014    Normal exam per pt       Social History:    Social History     Tobacco Use    Smoking status: Never    Smokeless tobacco: Never   Substance Use Topics    Alcohol use:  No                                Counseling given: Not Answered      Vital Signs (Current):   Vitals:    11/16/22 0957   Weight: 208 lb (94.3 kg)   Height: 5' 8\" (1.727 m)                                              BP Readings from Last 3 Encounters:   10/14/22 136/62   08/12/22 (!) 153/71   04/11/22 126/74       NPO Status:                                                                                 BMI:   Wt Readings from Last 3 Encounters:   10/14/22 208 lb 9.6 oz (94.6 kg)   08/12/22 204 lb 12.8 oz (92.9 kg)   04/11/22 220 lb 3.2 oz (99.9 kg)     Body mass index is 31.63 kg/m². CBC:   Lab Results   Component Value Date/Time    WBC 3.4 10/18/2022 08:05 AM    RBC 3.80 10/18/2022 08:05 AM    HGB 12.6 10/18/2022 08:05 AM    HCT 35.9 10/18/2022 08:05 AM    MCV 94.4 10/18/2022 08:05 AM    RDW 13.9 10/18/2022 08:05 AM    PLT 77 10/18/2022 08:05 AM       CMP:   Lab Results   Component Value Date/Time     10/18/2022 08:05 AM    K 4.2 10/18/2022 08:05 AM     10/18/2022 08:05 AM    CO2 29 10/18/2022 08:05 AM    BUN 9 10/18/2022 08:05 AM    CREATININE 0.7 10/18/2022 08:05 AM    GFRAA >60 04/11/2022 09:28 AM    AGRATIO 1.4 10/18/2022 08:05 AM    LABGLOM >60 10/18/2022 08:05 AM    GLUCOSE 126 10/18/2022 08:05 AM    PROT 6.1 10/18/2022 08:05 AM    PROT 7.0 12/12/2012 01:28 PM    CALCIUM 9.3 10/18/2022 08:05 AM    BILITOT 0.9 10/18/2022 08:05 AM    ALKPHOS 128 10/18/2022 08:05 AM    AST 30 10/18/2022 08:05 AM    ALT 20 10/18/2022 08:05 AM       POC Tests: No results for input(s): POCGLU, POCNA, POCK, POCCL, POCBUN, POCHEMO, POCHCT in the last 72 hours.     Coags:   Lab Results   Component Value Date/Time    PROTIME 15.6 10/18/2022 08:05 AM    PROTIME 15.1 12/20/2021 08:12 AM    PROTIME 12.5 02/23/2021 10:25 AM    INR 1.24 10/18/2022 08:05 AM    APTT 36.8 08/02/2011 03:49 PM       HCG (If Applicable): No results found for: PREGTESTUR, PREGSERUM, HCG, HCGQUANT     ABGs: No results found for: PHART, PO2ART, ZWK4TFZ, RRZ0IWF, BEART, F5GALPJU     Type & Screen (If Applicable):  No results found for: LABABO, LABRH    Drug/Infectious Status (If Applicable):  Lab Results   Component Value Date/Time    HEPCAB 0.08 12/08/2014 10:09 AM       COVID-19 Screening (If Applicable):   Lab Results Component Value Date/Time    COVID19 NOT DETECTED 03/01/2021 09:53 AM           Anesthesia Evaluation  Patient summary reviewed  Airway:           Dental:          Pulmonary:Negative Pulmonary ROS                              Cardiovascular:    (+) hypertension:, hyperlipidemia                  Neuro/Psych:   Negative Neuro/Psych ROS              GI/Hepatic/Renal:   (+) liver disease:, bowel prep, morbid obesity         ROS comment: nonalcoholic fatty liver disease  Primary malignant neoplasm of small intestine  Cirrhosis . Endo/Other:    (+) blood dyscrasia: thrombocytopenia:., malignancy/cancer. Abdominal:             Vascular: negative vascular ROS. Other Findings:           Anesthesia Plan      MAC     ASA 3       Induction: intravenous. ROSALVA Clemente - SOCRATES   11/21/2022        Pre Anesthesia Assessment complete.  Chart reviewed on 11/21/2022

## 2022-11-22 ENCOUNTER — HOSPITAL ENCOUNTER (OUTPATIENT)
Age: 67
Setting detail: OUTPATIENT SURGERY
Discharge: HOME | End: 2022-11-22
Attending: SPECIALIST | Admitting: SPECIALIST
Payer: MEDICARE

## 2022-11-22 ENCOUNTER — ANESTHESIA (OUTPATIENT)
Dept: OPERATING ROOM | Age: 67
End: 2022-11-22
Payer: MEDICARE

## 2022-11-22 VITALS
SYSTOLIC BLOOD PRESSURE: 123 MMHG | RESPIRATION RATE: 18 BRPM | TEMPERATURE: 97.4 F | WEIGHT: 202.8 LBS | OXYGEN SATURATION: 100 % | DIASTOLIC BLOOD PRESSURE: 76 MMHG | BODY MASS INDEX: 30.74 KG/M2 | HEIGHT: 68 IN | HEART RATE: 54 BPM

## 2022-11-22 DIAGNOSIS — Z86.010 HX OF COLONIC POLYPS: ICD-10-CM

## 2022-11-22 DIAGNOSIS — K62.5 RECTAL BLEEDING: ICD-10-CM

## 2022-11-22 PROBLEM — D12.3 BENIGN NEOPLASM OF TRANSVERSE COLON: Status: ACTIVE | Noted: 2022-11-22

## 2022-11-22 PROBLEM — D12.2 BENIGN NEOPLASM OF ASCENDING COLON: Status: ACTIVE | Noted: 2022-11-22

## 2022-11-22 PROCEDURE — 3700000001 HC ADD 15 MINUTES (ANESTHESIA): Performed by: SPECIALIST

## 2022-11-22 PROCEDURE — 2580000003 HC RX 258: Performed by: SPECIALIST

## 2022-11-22 PROCEDURE — 6360000002 HC RX W HCPCS

## 2022-11-22 PROCEDURE — 7100000011 HC PHASE II RECOVERY - ADDTL 15 MIN: Performed by: SPECIALIST

## 2022-11-22 PROCEDURE — 88305 TISSUE EXAM BY PATHOLOGIST: CPT | Performed by: PATHOLOGY

## 2022-11-22 PROCEDURE — 2500000003 HC RX 250 WO HCPCS

## 2022-11-22 PROCEDURE — 3609010600 HC COLONOSCOPY POLYPECTOMY SNARE/COLD BIOPSY: Performed by: SPECIALIST

## 2022-11-22 PROCEDURE — 45385 COLONOSCOPY W/LESION REMOVAL: CPT | Performed by: SPECIALIST

## 2022-11-22 PROCEDURE — 3700000000 HC ANESTHESIA ATTENDED CARE: Performed by: SPECIALIST

## 2022-11-22 PROCEDURE — 2709999900 HC NON-CHARGEABLE SUPPLY: Performed by: SPECIALIST

## 2022-11-22 PROCEDURE — 2720000010 HC SURG SUPPLY STERILE: Performed by: SPECIALIST

## 2022-11-22 PROCEDURE — 7100000010 HC PHASE II RECOVERY - FIRST 15 MIN: Performed by: SPECIALIST

## 2022-11-22 RX ORDER — SODIUM CHLORIDE, SODIUM LACTATE, POTASSIUM CHLORIDE, CALCIUM CHLORIDE 600; 310; 30; 20 MG/100ML; MG/100ML; MG/100ML; MG/100ML
INJECTION, SOLUTION INTRAVENOUS CONTINUOUS
Status: DISCONTINUED | OUTPATIENT
Start: 2022-11-22 | End: 2022-11-22 | Stop reason: HOSPADM

## 2022-11-22 RX ORDER — PROPOFOL 10 MG/ML
INJECTION, EMULSION INTRAVENOUS PRN
Status: DISCONTINUED | OUTPATIENT
Start: 2022-11-22 | End: 2022-11-22 | Stop reason: SDUPTHER

## 2022-11-22 RX ORDER — LIDOCAINE HYDROCHLORIDE 20 MG/ML
INJECTION, SOLUTION INTRAVENOUS PRN
Status: DISCONTINUED | OUTPATIENT
Start: 2022-11-22 | End: 2022-11-22 | Stop reason: SDUPTHER

## 2022-11-22 RX ORDER — SODIUM CHLORIDE, SODIUM LACTATE, POTASSIUM CHLORIDE, CALCIUM CHLORIDE 600; 310; 30; 20 MG/100ML; MG/100ML; MG/100ML; MG/100ML
INJECTION, SOLUTION INTRAVENOUS CONTINUOUS
Status: DISCONTINUED | OUTPATIENT
Start: 2022-11-22 | End: 2022-11-22

## 2022-11-22 RX ADMIN — LIDOCAINE HYDROCHLORIDE 100 MG: 20 INJECTION, SOLUTION INTRAVENOUS at 11:33

## 2022-11-22 RX ADMIN — GLUCAGON HYDROCHLORIDE 0.5 MG: KIT at 12:06

## 2022-11-22 RX ADMIN — SODIUM CHLORIDE, POTASSIUM CHLORIDE, SODIUM LACTATE AND CALCIUM CHLORIDE: 600; 310; 30; 20 INJECTION, SOLUTION INTRAVENOUS at 10:55

## 2022-11-22 RX ADMIN — PROPOFOL 350 MG: 10 INJECTION, EMULSION INTRAVENOUS at 11:33

## 2022-11-22 ASSESSMENT — PAIN SCALES - GENERAL
PAINLEVEL_OUTOF10: 0
PAINLEVEL_OUTOF10: 0

## 2022-11-22 ASSESSMENT — PAIN - FUNCTIONAL ASSESSMENT: PAIN_FUNCTIONAL_ASSESSMENT: 0-10

## 2022-11-22 NOTE — PROGRESS NOTES
Received report from [facility]. Patient alert and oriented. Verified patient's name, , allergies and procedure. Took Nadolol on 2022, no blood thinners, has anson knee implants. H&P on chart. Wife, Amauri Bar at bedside.

## 2022-11-22 NOTE — ANESTHESIA POSTPROCEDURE EVALUATION
Department of Anesthesiology  Postprocedure Note    Patient: Alonso Nuno  MRN: [id number]  YOB: 1955  Date of evaluation: 11/22/2022      Procedure Summary     Date: 11/22/22 Room / Location: [facility] 12 05 / [facility]    Anesthesia Start: 8468 Anesthesia Stop: 3411    Procedure: COLONOSCOPY POLYPECTOMY SNARE/COLD BIOPSY with 2 hemiclips placed in proximal transverse colon Diagnosis:       Hx of colonic polyps      Rectal bleeding      (Hx of colonic polyps [Z86.010])      (Rectal bleeding [K62.5])    Surgeons: Kathrin Villegas MD Responsible Provider: ROSALVA Gordillo CRNA    Anesthesia Type: MAC ASA Status: 3          Anesthesia Type: No value filed.     Joel Phase I:      Joel Phase II:        Anesthesia Post Evaluation    Patient location during evaluation: bedside  Patient participation: complete - patient participated  Level of consciousness: awake and alert  Pain score: 0  Airway patency: patent  Nausea & Vomiting: no vomiting and no nausea  Complications: no  Cardiovascular status: hemodynamically stable  Respiratory status: room air  Hydration status: stable

## 2022-11-22 NOTE — PROGRESS NOTES
Patient is back to baseline. Alert and oriented. Report given to Mai Phillips RN. Wife, Rah Lockhart at bedside.

## 2022-11-22 NOTE — DISCHARGE INSTRUCTIONS
COLONOSCOPY    ___Nikolas_______________________________    OFFICE IOSS[facility]_____[phone]___________________    FOLLOW UP APPOINTMENT  AS NEEDED. REPEAT PROCEDURE TO BE DETERMINED BY PATHOLOGY OF POLYPS BIOPSIES. CALL OFFICE NEXT WEEK FOR BIOPSY RESULTS. TEST ORDERED: NONE     What to expect at home: Your Recovery   Your doctor will tell you when you can eat and do your other usual activities Your doctor will talk to you about when you will need your next colonoscopy. Your doctor can help you decide how often you need to be checked. This will depend on the results of your test and your risk for colorectal cancer. After the test, you may be bloated or have gas pains. You may need to pass gas. If a biopsy was done or a polyp was removed, you may have streaks of blood in your stool (feces) for a few days. This care sheet gives you a general idea about how long it will take for you to recover. But each person recovers at a different pace. Follow the steps below to get better as quickly as possible. How can you care for yourself at home? Activity  Rest when you feel tired. Diet  Follow your doctor's directions for eating. Unless your doctor has told you not to, drink plenty of fluids. This helps to replace the fluids that were lost during the colon prep. DO NOT DRINK ALCOHOL. Medicines  Your doctor will tell you if and when you can restart your medicines. He or she will also give you instructions about taking any new medicines. If you take blood thinners, such as warfarin (Coumadin), clopidogrel (Plavix), or aspirin, be sure to talk to your doctor. He or she will tell you if and when to start taking those medicines again. Make sure that you understand exactly what your doctor wants you to do. If polyps were removed or a biopsy was done during the test, your doctor may tell you not to take aspirin or other anti-inflammatory medicines for a few days.  These include ibuprofen (Advil, Motrin) and naproxen (Aleve). Other instructions: Anethesia  For your safety, do not drive or operate machinery for 24 hours. Do not sign legal documents or make major decisions for 24 hours. The anesthesia can make it hard for you to fully understand what you are agreeing to. Follow-up care is a key part of your treatment and safety. Be sure to make and go to all appointments, and call your doctor if you are having problems. It's also a good idea to know your test results and keep a list of the medicines you take. When should you call for help? [address] Madelaine Benz [address] Shalini Wilkins [phone]  Call 911 anytime you think you may need emergency care. For example, call if:  You passed out (lost consciousness). You pass maroon or bloody stools. You have severe belly pain. Call your doctor now or seek immediate medical care if:  Your stools are black and tarlike. Your stools have streaks of blood, but you did not have a biopsy or any polyps removed. You have belly pain, or your belly is swollen and firm. You vomit. You have a fever. You are very dizzy. Watch closely for changes in your health, and be sure to contact your doctor if you have any problems. Where can you learn more? Go to [email or web address] org and sign in to your Tipzu account. Enter E264 in the [facility] box to learn more about Colonoscopy: What to Expect at Home.     If you do not have an account, please click on the Sign Up Now link. © 9772-1941 Healthwise, Incorporated. Care instructions adapted under license by [facility] ([facility]). This care instruction is for use with your licensed healthcare professional. If you have questions about a medical condition or this instruction, always ask your healthcare professional. [address] any warranty or liability for your use of this information.   Content Version: 99.9.626655; Current as of: November 20, 2015             Bill    Do not drive, work around [address] [place], [place] [address] or use equipment. Do not drink any alcoholic beverages. Do not smoke while alone. Avoid making important decisions. Plan to spend a quiet, relaxed evening @ home. Resume normal activities as you begin to feel better. Eat lightly for your first meal, then gradually increase your diet to what is normal for you. In case of nausea, avoid food and drink only clear liquids. Resume food as nausea ceases. Notify your surgeon if you experience fever, chills, large amount of bleeding, difficulty breathing, persistent nausea and vomiting or any other disturbing problem. Call for a follow-up appointment with your surgeon. Advance Care Planning  People with COVID-19 may have no symptoms, mild symptoms, such as fever, cough, and shortness of breath or they may have more severe illness, developing severe and fatal pneumonia. As a result, Advance Care Planning with attention to naming a health care decision maker (someone you trust to make healthcare decisions for you if you could not speak for yourself) and sharing other health care preferences is important BEFORE a possible health crisis. Please contact your Primary Care Provider to discuss Advance Care Planning. Preventing the Spread of Coronavirus Disease 2019 in Homes and Residential Communities  For the most recent information go to Bitglassaners.fi    Prevention steps for People with confirmed or suspected COVID-19 (including persons under investigation) who do not need to be hospitalized  and   People with confirmed COVID-19 who were hospitalized and determined to be medically stable to go home    Your healthcare provider and public health staff will evaluate whether you can be cared for at home.  If it is determined that you do not need to be hospitalized and can be isolated at home, you will be monitored by staff from your local or state health department. You should follow the prevention steps below until a healthcare provider or local or state health department says you can return to your normal activities. Stay home except to get medical care  People who are mildly ill with COVID-19 are able to isolate at home during their illness. You should restrict activities outside your home, except for getting medical care. Do not go to work, school, or public areas. Avoid using public transportation, ride-sharing, or taxis. Separate yourself from other people and animals in your home  People: As much as possible, you should stay in a specific room and away from other people in your home. Also, you should use a separate bathroom, if available. Animals: You should restrict contact with pets and other animals while you are sick with COVID-19, just like you would around other people. Although there have not been reports of pets or other animals becoming sick with COVID-19, it is still recommended that people sick with COVID-19 limit contact with animals until more information is known about the virus. When possible, have another member of your household care for your animals while you are sick. If you are sick with COVID-19, avoid contact with your pet, including petting, snuggling, being kissed or licked, and sharing food. If you must care for your pet or be around animals while you are sick, wash your hands before and after you interact with pets and wear a facemask. Call ahead before visiting your doctor  If you have a medical appointment, call the healthcare provider and tell them that you have or may have COVID-19. This will help the healthcare providers office take steps to keep other people from getting infected or exposed. Wear a facemask  You should wear a facemask when you are around other people (e.g., sharing a room or vehicle) or pets and before you enter a healthcare providers office. If you are not able to wear a facemask (for example, because it causes trouble breathing), then people who live with you should not stay in the same room with you, or they should wear a facemask if they enter your room. Cover your coughs and sneezes  Cover your mouth and nose with a tissue when you cough or sneeze. Throw used tissues in a lined trash can. Immediately wash your hands with soap and water for at least 20 seconds or, if soap and water are not available, clean your hands with an alcohol-based hand  that contains at least 60% alcohol. Clean your hands often  Wash your hands often with soap and water for at least 20 seconds, especially after blowing your nose, coughing, or sneezing; going to the bathroom; and before eating or preparing food. If soap and water are not readily available, use an alcohol-based hand  with at least 60% alcohol, covering all surfaces of your hands and rubbing them together until they feel dry. Soap and water are the best option if hands are visibly dirty. Avoid touching your eyes, nose, and mouth with unwashed hands. Avoid sharing personal household items  You should not share dishes, drinking glasses, cups, eating utensils, towels, or bedding with other people or pets in your home. After using these items, they should be washed thoroughly with soap and water. Clean all high-touch surfaces everyday  High touch surfaces include counters, tabletops, doorknobs, bathroom fixtures, toilets, phones, keyboards, tablets, and bedside tables. Also, clean any surfaces that may have blood, stool, or body fluids on them. Use a household cleaning spray or wipe, according to the label instructions. Labels contain instructions for safe and effective use of the cleaning product including precautions you should take when applying the product, such as wearing gloves and making sure you have good ventilation during use of the product.   Monitor your symptoms  Seek prompt medical attention if your illness is worsening (e.g., difficulty breathing). Before seeking care, call your healthcare provider and tell them that you have, or are being evaluated for, COVID-19. Put on a facemask before you enter the facility. These steps will help the healthcare providers office to keep other people in the office or waiting room from getting infected or exposed. Ask your healthcare provider to call the local or state health department. Persons who are placed under active monitoring or facilitated self-monitoring should follow instructions provided by their local health department or occupational health professionals, as appropriate. When working with your local health department check their available hours. If you have a medical emergency and need to call 911, notify the dispatch personnel that you have, or are being evaluated for COVID-19. If possible, put on a facemask before emergency medical services arrive. Discontinuing home isolation  Patients with confirmed COVID-19 should remain under home isolation precautions until the risk of secondary transmission to others is thought to be low. The decision to discontinue home isolation precautions should be made on a case-by-case basis, in consultation with healthcare providers and state and local health departments.

## 2022-11-22 NOTE — H&P
Original H &P in soft chart. I have examined the patient immediately before the procedure and there is no change in the previous history and physical exam, which has been reviewed. There is no history of sleep apnea, snoring, or stridor. There has been no  previous adverse experience with sedation/anesthesia. There is no increased risk for aspiration of gastric contents. The patient has been instructed that all resuscitative measures (during the operative and immediate perioperative period) will be instituted in the unlikely event that they will be needed. The patient has no pertinent past surgical or family history other than listed in the original H&P. The patient was counseled about the risks of lizbet Covid-19 during their perioperative period and any recovery window from their procedure. The patient was made aware that lizbet Covid-19  may worsen their prognosis for recovering from their procedure  and lend to a higher morbidity and/or mortality risk. All material risks, benefits, and reasonable alternatives including postponing the procedure were discussed. The patient does wish to proceed with the procedure at this time.     ASA Class: 3  AIRWAY Class: 1

## 2022-11-22 NOTE — ANESTHESIA PRE PROCEDURE
Department of Anesthesiology  Preprocedure Note       Name:  Mel Winchester   Age:  79 y.o.  :  1955                                          MRN:  [id number]         Date:  2022      Surgeon: Terese Menezes):  Mildred Navarrete MD    Procedure: Procedure(s):  COLONOSCOPY DIAGNOSTIC    Medications prior to admission:   Prior to Admission medications    Medication Sig Start Date End Date Taking? Authorizing Provider   VITAMIN D PO Take by mouth daily    Historical Provider, MD   nadolol (CORGARD) 40 MG tablet Take 1 tablet by mouth once daily 10/3/22   Mildred Navarrete MD   lisinopril (PRINIVIL;ZESTRIL) 2.5 MG tablet Take 2.5 mg by mouth daily    Historical Provider, MD   vitamin C (ASCORBIC ACID) 500 MG tablet Take 500 mg by mouth daily    Historical Provider, MD   ferrous sulfate 325 (65 FE) MG tablet Take 325 mg by mouth daily (with breakfast)    Historical Provider, MD   calcium carbonate 600 MG TABS tablet Take 1 tablet by mouth daily. Historical Provider, MD   vitamin B-12 (CYANOCOBALAMIN) 1000 MCG tablet Take 2,000 mcg by mouth daily. Historical Provider, MD       Current medications:    No current facility-administered medications for this visit. No current outpatient medications on file. 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 [address] K75.81, K74.60    Port-A-Cath in place Z95.828    Leukopenia D72.819    Primary malignant neoplasm of small intestine C17.0    Thrombocytopenic disorder D69.59    Fatigue R53.83    S/P total knee arthroplasty, right Z96.651       Past Medical History:        Diagnosis Date    Anemia 2011    Cancer ([facility] [address]     whipple    Cirrhosis ([facility] [address] Dx: 2011    Duodenal cancer ([facility] [address] 2011    Esophageal varices ([facility] [address] 12/11/2014    History of blood transfusion 2011    with Whipple procedure    HLD (hyperlipidemia)     Hypertension     Follows with PCP    Neuropathy     Bilat feet    Osteoarthritis     Bilat knees       Past Surgical History:        Procedure Laterality Date    CHOLECYSTECTOMY  2011    COLONOSCOPY  03/19/2014    polpectomy    COLONOSCOPY  03/09/2016    grade 1 int hem, repeat 5 years - Dr. Keiko Guzman  [id number]    umbilical x2    OTHER SURGICAL HISTORY      pacreaticoduodenectomy (whipple)    TOTAL KNEE ARTHROPLASTY Left 02/17/2020    KNEE TOTAL ARTHROPLASTY LEFT performed by Teri Thomas MD at [address] [place] [address] Right 3/8/2021    RIGHT KNEE TOTAL ARTHROPLASTY ROBOTIC performed by [facility]  12/11/2014    Normal exam per pt       Social History:    Social History     Tobacco Use    Smoking status: Never    Smokeless tobacco: Never   Substance Use Topics    Alcohol use: No                                Counseling given: Not Answered      Vital Signs (Current): There were no vitals filed for this visit.                                            BP Readings from Last 3 Encounters:   11/22/22 (!) 147/74   10/14/22 136/62   08/12/22 (!) 153/71       NPO Status:                                                                                 BMI:   Wt Readings from Last 3 Encounters:   11/22/22 202 lb 12.8 oz (92 kg)   10/14/22 208 lb 9.6 oz (94.6 kg)   08/12/22 204 lb 12.8 oz (92.9 kg)     There is no height or weight on file to calculate BMI.    CBC:   Lab Results   Component Value Date/Time    WBC 3.4 10/18/2022 08:05 AM    RBC 3.80 10/18/2022 08:05 AM    HGB 12.6 10/18/2022 08:05 AM    HCT 35.9 10/18/2022 08:05 AM    MCV 94.4 10/18/2022 08:05 AM    RDW 13.9 10/18/2022 08:05 AM    PLT 77 10/18/2022 08:05 AM       CMP:   Lab Results   Component Value Date/Time     10/18/2022 08:05 AM    K 4.2 10/18/2022 08:05 AM     10/18/2022 08:05 AM    CO2 29 10/18/2022 08:05 AM    BUN 9 10/18/2022 08:05 AM    CREATININE 0.7 10/18/2022 08:05 AM    GFRAA >60 04/11/2022 09:28 AM    AGRATIO 1.4 10/18/2022 08:05 AM    LABGLOM >60 10/18/2022 08:05 AM    GLUCOSE 126 10/18/2022 08:05 AM    PROT 6.1 10/18/2022 08:05 AM    PROT 7.0 12/12/2012 01:28 PM    CALCIUM 9.3 10/18/2022 08:05 AM    BILITOT 0.9 10/18/2022 08:05 AM    ALKPHOS 128 10/18/2022 08:05 AM    AST 30 10/18/2022 08:05 AM    ALT 20 10/18/2022 08:05 AM       POC Tests: No results for input(s): POCGLU, POCNA, POCK, POCCL, POCBUN, POCHEMO, POCHCT in the last 72 hours.     Coags:   Lab Results   Component Value Date/Time    PROTIME 15.6 10/18/2022 08:05 AM    PROTIME 15.1 12/20/2021 08:12 AM    PROTIME 12.5 02/23/2021 10:25 AM    INR 1.24 10/18/2022 08:05 AM    APTT 36.8 08/02/2011 03:49 PM       HCG (If Applicable): No results found for: PREGTESTUR, PREGSERUM, HCG, HCGQUANT     ABGs: No results found for: PHART, PO2ART, RKZ8KZR, XIQ3OZL, BEART, M5DJIFCG     Type & Screen (If Applicable):  No results found for: LABABO, LABRH    Drug/Infectious Status (If Applicable):  Lab Results   Component Value Date/Time    HEPCAB 0.08 12/08/2014 10:09 AM       COVID-19 Screening (If Applicable):   Lab Results   Component Value Date/Time    COVID19 NOT DETECTED 03/01/2021 09:53 AM           Anesthesia Evaluation  Patient summary reviewed  Airway: Mallampati: II  TM distance: <3 FB   Neck ROM: full  Mouth opening: > = 3 FB   Dental:    (+) edentulous      Pulmonary:Negative Pulmonary ROS Cardiovascular:    (+) hypertension:, hyperlipidemia                  Neuro/Psych:   Negative Neuro/Psych ROS              GI/Hepatic/Renal:   (+) liver disease:, bowel prep, morbid obesity         ROS comment: nonalcoholic fatty liver disease  Primary malignant neoplasm of small intestine  Cirrhosis . Endo/Other:    (+) blood dyscrasia: thrombocytopenia:., malignancy/cancer. Abdominal:             Vascular: negative vascular ROS. Other Findings:             Anesthesia Plan      MAC     ASA 3       Induction: intravenous. Anesthetic plan and risks discussed with patient. Plan discussed with surgical team.                    ROSALVA Jimenez - CRNA   11/22/2022        Pre Anesthesia Assessment complete.  Chart reviewed on 11/22/2022

## 2022-11-22 NOTE — PROGRESS NOTES
1229 Pt  received from [facility] and report received from [facility]. Pt denies c/o. Pt abdomen soft and non tender. Call light in reach. Wife at bedside. 1245 In to check on pt. Pt denies c/o or needs. Call light in reach. 1258 Discharge instructions given to pt and wife. Both verbalized understanding of instructions. 1300 Pt up to side of bed. Pt tolerated well and ready to get dressed to go home. Wife at bedside. Call light in reach. Pt denies c/o or needs. 1307 Pt discharged to home to wife's awaiting vehicle to drive pt home. Pt denies c/o.

## 2022-11-22 NOTE — BRIEF OP NOTE
SCANNED COLONOSCOPY REPORT:   The original colonoscopy report with photos in higher definition can be found by going to \"chart review\" then \"procedures\" then double click on \"colonoscopy\"

## 2022-12-01 ENCOUNTER — TELEPHONE (OUTPATIENT)
Dept: GASTROENTEROLOGY | Age: 67
End: 2022-12-01

## 2022-12-01 NOTE — TELEPHONE ENCOUNTER
Pt. Called in for cscope results. I informed him of the path results and a recommended 5-7 year repeat. He verbalized understanding and denied further questions.

## 2022-12-26 DIAGNOSIS — K75.81 LIVER CIRRHOSIS SECONDARY TO NASH (NONALCOHOLIC STEATOHEPATITIS) (HCC): ICD-10-CM

## 2022-12-26 DIAGNOSIS — K74.60 LIVER CIRRHOSIS SECONDARY TO NASH (NONALCOHOLIC STEATOHEPATITIS) (HCC): ICD-10-CM

## 2022-12-27 DIAGNOSIS — K75.81 LIVER CIRRHOSIS SECONDARY TO NASH (NONALCOHOLIC STEATOHEPATITIS) (HCC): ICD-10-CM

## 2022-12-27 DIAGNOSIS — K74.60 LIVER CIRRHOSIS SECONDARY TO NASH (NONALCOHOLIC STEATOHEPATITIS) (HCC): ICD-10-CM

## 2022-12-28 DIAGNOSIS — K75.81 LIVER CIRRHOSIS SECONDARY TO NASH (NONALCOHOLIC STEATOHEPATITIS) (HCC): ICD-10-CM

## 2022-12-28 DIAGNOSIS — K74.60 LIVER CIRRHOSIS SECONDARY TO NASH (NONALCOHOLIC STEATOHEPATITIS) (HCC): ICD-10-CM

## 2022-12-28 RX ORDER — NADOLOL 40 MG/1
TABLET ORAL
Qty: 90 TABLET | Refills: 0 | Status: SHIPPED | OUTPATIENT
Start: 2022-12-28 | End: 2022-12-28 | Stop reason: SDUPTHER

## 2022-12-28 RX ORDER — NADOLOL 40 MG/1
TABLET ORAL
Qty: 90 TABLET | Refills: 3 | Status: SHIPPED | OUTPATIENT
Start: 2022-12-28

## 2023-01-03 RX ORDER — NADOLOL 40 MG/1
TABLET ORAL
Qty: 90 TABLET | Refills: 0 | OUTPATIENT
Start: 2023-01-03

## 2023-01-22 NOTE — PROGRESS NOTES
Patient Name: Azalee Shone  Patient : 1955  Patient MRN: [id number]     Primary Oncologist: Tomasa Wray MD  Referring Provider: Gladys Saeed MD     Date of Service: 2023      Chief Complaint:   No chief complaint on file. He came in for follow-up visit. Patient Active Problem List:     Splenomegaly     NAFLD (nonalcoholic fatty liver disease)     Carcinoma of duodenum      Liver cirrhosis secondary to MONGE (nonalcoholic steatohepatitis)     Port-A-Cath in place     Bilateral primary osteoarthritis of knee     Arthritis of both knees     Leukopenia     Primary malignant neoplasm of small intestine     Thrombocytopenic disorder     Fatigue     HPI:  Fox Live is a pleasant 79year old  male patient with underlying liver cirrhosis/MONGE with esophageal varices followed by Dr Bettye Oliver, who was referred back for evaluation of pancytopenia. 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 He had mismatch MMR. He received adjuvant FOLFOX from 2011 through 2012 followed by RT till 2012. CT scan in May 2014 showed no recurrent disease. US in 2014 showed 17 cm spleen, previously 16 cm with hepatic steatosis. Platelet was 80 - 847 since  through . CEA was 2.4. He was seen by Dr Bettye Oliver in 2019. AFP in 2019 was 4.6. 2019, calcium 9.4, LFTs were unremarkable. Creatinine  0.8. Total bilirubin was 0.8. Alpha-fetoprotein 3. INR 1.21. 2019 CMP grossly unremarkable except for glucose 125. WBC  2.1, RBC count 3.68, hemoglobin 11.7, hematocrit 34.1, MCV 92.6, plat 64. At the time he just got over flu. If pancytopenia continues to get worse, he may consider bone marrow study. He had left TKR in 708 without complication. He did not receive blood product transfusion.   He plans to have right knee replacement in fall 2020. He will follow up with Dr Leo Chavez in February 2021, and Right TKR will be in 2021. CBC in June and July 2020 was reviewed. He has stable thrombocytopenia. He will also follow-up with GI every 6 months. March 9, 2021WBC 10.6, hemoglobin 11.7, platelet 858. Serum alpha-fetoprotein in April 2021 was 2. February 2022 WBC 4.0, hemoglobin 11.5, platelet 85. He will follow-up with Dr Lisandra Beatty. He had right knee surgery in 2021 without bleeding complication. He is agreeable that we will continue to monitor his CBC. He has small bruises from recent blood draw. 8/2022 albumin was 3.3. WBC 3.1, Hg 11.8, MCV 94.2, platelet 76. He is otherwise doing pretty well. I recommend to monitor blood pressure. He is scheduled for the cataract surgery next week on August 18, 2022. Likely he will have twilight. He will come back and see me in 6 months. I recommend to keep following with GI.    2/20/2023 he came in for a follow up visit. 10/2022 US abdomen: liver cirrhosis. 2/13/2023 LFTs grossly stable. AFP 3. WBC 4.3, Hg 12.6, HCT 38.2, plat 81.  11/2022 DEXA osteopenia. I recommend to  take vit D and calcium. 11/2022 colonoscopy by Dr Lisandra Beatty. F/u in 5 years. He has few bruises to LUE. No prolonged bleeding. He is agreeable to cont with observation. Will order labs prior to next OV inc flow and anemic w/u. No acute pain. Denied any nausea, vomiting or diarrhea. No fever or chills. No chest pain, shortness of breath or palpitation. No headache or dizzy spell. No specific bone pain. No melena or hematochezia. Denied any dysuria or hematuria. PAST MEDICAL HISTORY:  Hypertension  obstructive sleep apnea  GERD  hiatal hernia  hyperlipidemia  hemorrhoids  renal stones  history of colonic polyps  low B12 levels on B12 shots. Colonoscopy in March 2014 showed multiple polyps, Bx showed tubular adenoma.   EGD on 12/11/14 by Dr. Lisandra Beatty showed esophageal varices, suggestive of Cirrhosis no other positive findings. c/w [place]  Abdominal ultrasound on November 11, 2015, and compared with the CT of 2014 and ultrasound July 2015 confirmed the enlargement of spleen at 16.6 cm, evidence of cholecystectomy, and nonvisualization of the pancreas because of bowel gas, otherwise negative. The liver showed a normal echogenicity without evidence of any intrahepatic biliary dilatation. PAST SURGICAL HISTORY:  Right knee surgery in 2002  arthroscopic surgery in 2003 on the left knee  umbilical hernia surgery in 2006  rotator cuff surgery in September of 2009  Nupur Jose E surgery in September of 2011. FAMILY HISTORY:  Brother had lung cancer, twin brother had skin cancer. One uncle had lung cancer, one aunt had lung cancer, father had lung cancer. SOCIAL HISTORY:  He is  for the past 25 years. He has five children, two of his own and three stepchildren. He is a nonsmoker, nondrinker. Review of Systems: \"Per interval history; otherwise 10 point ROS is negative. \"     Vital Signs: There were no vitals taken for this visit. Physical Exam:  CONSTITUTIONAL: awake, alert, cooperative, no apparent distress   EYES: pupils equal, round and reactive to light. Sclera clear and conjunctiva normal  ENT: Normocephalic, without obvious abnormality, atraumatic  NECK: supple, symmetrical, no jugular venous distension and no carotid bruits   HEMATOLOGIC/LYMPHATIC: no cervical, supraclavicular or axillary lymphadenopathy   LUNGS: no increased work of breathing and clear to auscultation   CARDIOVASCULAR: regular rate and rhythm, normal S1 and S2, no murmur   ABDOMEN: normal bowel sound, soft, non-distended, non-tender, no masses palpated, no rebound or guarding. MUSCULOSKELETAL: full range of motion noted, tone is normal  NEUROLOGIC: Motor skills grossly intact. CN II - XII grossly intact. SKIN: No jaundice. appears intact. No petechial rash. EXTREMITIES: trace LE edema.  No cyanosis. Labs:  Hematology:  Lab Results   Component Value Date    WBC 4.3 02/13/2023    RBC 3.89 (L) 02/13/2023    HGB 12.6 (L) 02/13/2023    HCT 38.2 (L) 02/13/2023    MCV 98.2 02/13/2023    MCH 32.4 (H) 02/13/2023    MCHC 33.0 02/13/2023    RDW 13.9 02/13/2023    PLT 81 (L) 02/13/2023    MPV 9.1 02/13/2023    BANDSPCT 18 (H) 03/14/2016    SEGSPCT 69.4 (H) 02/13/2023    EOSRELPCT 4.4 (H) 02/13/2023    BASOPCT 0.5 02/13/2023    LYMPHOPCT 13.9 (L) 02/13/2023    MONOPCT 11.8 (H) 02/13/2023    BANDABS 0.59 03/14/2016    SEGSABS 3.0 02/13/2023    EOSABS 0.2 02/13/2023    BASOSABS 0.0 02/13/2023    LYMPHSABS 0.6 02/13/2023    MONOSABS 0.5 02/13/2023    DIFFTYPE AUTOMATED DIFFERENTIAL 02/13/2023    ANISOCYTOSIS 2+ 08/02/2011    POLYCHROM 1+ 08/04/2011    PLTM FEW 03/14/2016     Lab Results   Component Value Date    ESR 14 03/01/2021     Chemistry:  Lab Results   Component Value Date     02/13/2023    K 4.8 02/13/2023     02/13/2023    CO2 31 02/13/2023    BUN 16 02/13/2023    CREATININE 0.7 (L) 02/13/2023    GLUCOSE 128 (H) 02/13/2023    CALCIUM 9.5 02/13/2023    PROT 6.2 (L) 02/13/2023    LABALBU 3.5 02/13/2023    BILITOT 1.0 02/13/2023    ALKPHOS 110 02/13/2023    AST 32 02/13/2023    ALT 18 02/13/2023    LABGLOM >60 02/13/2023    GFRAA >60 04/11/2022    AGRATIO 1.4 10/18/2022    GLOB 2.3 08/08/2022    POCGLU 107 (H) 10/14/2011     Lab Results   Component Value Date     07/10/2017     Lab Results   Component Value Date    TSHHS 0.609 11/05/2019     Immunology:  Lab Results   Component Value Date    PROT 6.2 (L) 02/13/2023    SPEP  11/05/2019     INTERPRETATION - Within normal limits.   Josr Skinner MD    ALBUMINELP 3.6 11/05/2019    LABALPH 0.3 11/05/2019    LABALPH 0.5 11/05/2019    LABBETA 0.9 11/05/2019    GAMGLOB 1.3 11/05/2019     Coagulation Panel:  Lab Results   Component Value Date    PROTIME 15.6 (H) 10/18/2022    INR 1.24 (H) 10/18/2022    APTT 36.8 08/02/2011     Anemia Panel:  Lab Results   Component Value Date    OOKAEWCT88 >2000 (H) 11/05/2019    FOLATE 12.8 11/05/2019     Tumor Markers:  Lab Results   Component Value Date    CEA 2.4 07/10/2017     30.1 11/06/2014    PSA 0.59 02/11/2015      Observations:  No data recorded      Assessment & Plan:  1. He has pancytopenia which could be related to hypersplenism due to underlying liver cirrhosis/Adkins. He is followed by Dr. Kenyatta Carranza. Labs in December 2019 were reviewed. CBC in June 2020 was grossly stable for him. Platelet was 71. CBC in June and July 2020 was reviewed. 10/2022 US abdomen: liver cirrhosis. 2/13/2023 LFTs grossly stable. AFP 3. WBC 4.3, Hg 12.6, HCT 38.2, plat 81.  11/2022 DEXA osteopenia. I recommend to  take vit D and calcium. 11/2022 colonoscopy by Dr Kenyatta Carranza. F/u in 5 years. He has few bruises to LUE. No prolonged bleeding. He is agreeable to cont with observation. Will order labs prior to next OV inc flow and anemic w/u.    2.  Anemic work-up in November 2019 was grossly unremarkable. She has anemia of chronic disease. 3. I recommend to keep age-appropriate cancer screening up-to-date. We discussed about healthy diet and lifestyle. Colonoscopy was in 2017. He is scheduled for cataract surgery next week. 4.  He had left knee replacement in 4504 without complication. He had right knee replacement in 2021 without bleeding complication. 5.  He has liver cirrhosis. Has been followed by GI. Serum protein alpha-fetoprotein in April 2021 was 2. He will follow-up with GI. He has Covid vaccine. Return to clinic in 6 months or sooner. All of his questions have been answered for today. Recent imaging and labs were reviewed and discussed with the patient.

## 2023-02-06 LAB — AFP: 1.5 UG/L

## 2023-02-13 ENCOUNTER — HOSPITAL ENCOUNTER (OUTPATIENT)
Dept: INFUSION THERAPY | Age: 68
Discharge: HOME | End: 2023-02-13
Payer: MEDICARE

## 2023-02-13 DIAGNOSIS — D69.59 OTHER SECONDARY THROMBOCYTOPENIA: ICD-10-CM

## 2023-02-13 LAB
ALBUMIN SERPL-MCNC: 3.5 GM/DL (ref 3.4–5)
ALP BLD-CCNC: 110 IU/L (ref 40–128)
ALT SERPL-CCNC: 18 U/L (ref 10–40)
ANION GAP SERPL CALCULATED.3IONS-SCNC: 5 MMOL/L (ref 4–16)
AST SERPL-CCNC: 32 IU/L (ref 15–37)
BASOPHILS ABSOLUTE: 0 K/CU MM
BASOPHILS RELATIVE PERCENT: 0.5 % (ref 0–1)
BILIRUB SERPL-MCNC: 1 MG/DL (ref 0–1)
BUN SERPL-MCNC: 16 MG/DL (ref 6–23)
CALCIUM SERPL-MCNC: 9.5 MG/DL (ref 8.3–10.6)
CHLORIDE BLD-SCNC: 105 MMOL/L (ref 99–110)
CO2: 31 MMOL/L (ref 21–32)
CREAT SERPL-MCNC: 0.7 MG/DL (ref 0.9–1.3)
DIFFERENTIAL TYPE: ABNORMAL
EOSINOPHILS ABSOLUTE: 0.2 K/CU MM
EOSINOPHILS RELATIVE PERCENT: 4.4 % (ref 0–3)
GFR SERPL CREATININE-BSD FRML MDRD: >60 ML/MIN/1.73M2
GLUCOSE SERPL-MCNC: 128 MG/DL (ref 70–99)
HCT VFR BLD CALC: 38.2 % (ref 42–52)
HEMOGLOBIN: 12.6 GM/DL (ref 13.5–18)
LYMPHOCYTES ABSOLUTE: 0.6 K/CU MM
LYMPHOCYTES RELATIVE PERCENT: 13.9 % (ref 24–44)
MCH RBC QN AUTO: 32.4 PG (ref 27–31)
MCHC RBC AUTO-ENTMCNC: 33 % (ref 32–36)
MCV RBC AUTO: 98.2 FL (ref 78–100)
MONOCYTES ABSOLUTE: 0.5 K/CU MM
MONOCYTES RELATIVE PERCENT: 11.8 % (ref 0–4)
PDW BLD-RTO: 13.9 % (ref 11.7–14.9)
PLATELET # BLD: 81 K/CU MM (ref 140–440)
PMV BLD AUTO: 9.1 FL (ref 7.5–11.1)
POTASSIUM SERPL-SCNC: 4.8 MMOL/L (ref 3.5–5.1)
RBC # BLD: 3.89 M/CU MM (ref 4.6–6.2)
SEGMENTED NEUTROPHILS ABSOLUTE COUNT: 3 K/CU MM
SEGMENTED NEUTROPHILS RELATIVE PERCENT: 69.4 % (ref 36–66)
SODIUM BLD-SCNC: 141 MMOL/L (ref 135–145)
TOTAL PROTEIN: 6.2 GM/DL (ref 6.4–8.2)
WBC # BLD: 4.3 K/CU MM (ref 4–10.5)

## 2023-02-13 PROCEDURE — 36415 COLL VENOUS BLD VENIPUNCTURE: CPT

## 2023-02-13 PROCEDURE — 85025 COMPLETE CBC W/AUTO DIFF WBC: CPT

## 2023-02-13 PROCEDURE — 82105 ALPHA-FETOPROTEIN SERUM: CPT

## 2023-02-13 PROCEDURE — 80053 COMPREHEN METABOLIC PANEL: CPT

## 2023-02-15 LAB — AFP-TM SERPL-MCNC: 3 NG/ML (ref 0–9)

## 2023-02-20 ENCOUNTER — HOSPITAL ENCOUNTER (OUTPATIENT)
Dept: INFUSION THERAPY | Age: 68
Discharge: HOME | End: 2023-02-20
Payer: MEDICARE

## 2023-02-20 ENCOUNTER — OFFICE VISIT (OUTPATIENT)
Dept: ONCOLOGY | Age: 68
End: 2023-02-20
Payer: MEDICARE

## 2023-02-20 VITALS
HEART RATE: 55 BPM | OXYGEN SATURATION: 97 % | TEMPERATURE: 97 F | HEIGHT: 68 IN | DIASTOLIC BLOOD PRESSURE: 64 MMHG | SYSTOLIC BLOOD PRESSURE: 142 MMHG | WEIGHT: 213 LBS | BODY MASS INDEX: 32.28 KG/M2

## 2023-02-20 DIAGNOSIS — D69.59 OTHER SECONDARY THROMBOCYTOPENIA: ICD-10-CM

## 2023-02-20 DIAGNOSIS — K76.0 NAFLD (NONALCOHOLIC FATTY LIVER DISEASE): Primary | ICD-10-CM

## 2023-02-20 PROCEDURE — 1123F ACP DISCUSS/DSCN MKR DOCD: CPT | Performed by: INTERNAL MEDICINE

## 2023-02-20 PROCEDURE — 99211 OFF/OP EST MAY X REQ PHY/QHP: CPT

## 2023-02-20 PROCEDURE — 99213 OFFICE O/P EST LOW 20 MIN: CPT | Performed by: INTERNAL MEDICINE

## 2023-02-20 ASSESSMENT — PATIENT HEALTH QUESTIONNAIRE - PHQ9
SUM OF ALL RESPONSES TO PHQ QUESTIONS 1-9: 0
SUM OF ALL RESPONSES TO PHQ QUESTIONS 1-9: 0
SUM OF ALL RESPONSES TO PHQ9 QUESTIONS 1 & 2: 0
1. LITTLE INTEREST OR PLEASURE IN DOING THINGS: 0
2. FEELING DOWN, DEPRESSED OR HOPELESS: 0
SUM OF ALL RESPONSES TO PHQ QUESTIONS 1-9: 0
SUM OF ALL RESPONSES TO PHQ QUESTIONS 1-9: 0

## 2023-02-20 NOTE — PROGRESS NOTES
MA Rooming Questions  Patient: Tiana Bernal  MRN: [id number]    Date: 2/20/2023        1. Do you have any new issues?   no         2. Do you need any refills on medications?    no    3. Have you had any imaging done since your last visit? yes - Dexa 11/11. U/S 10/21    4. Have you been hospitalized or seen in the emergency room since your last visit here?   no    5. Did the patient have a depression screening completed today?  Yes    PHQ-9 Total Score: 0 (2/20/2023  8:57 AM)       PHQ-9 Given to (if applicable):               PHQ-9 Score (if applicable):                     [] Positive     [x]  Negative              Does question #9 need addressed (if applicable)                     [] Yes    []  No               Noel Singleton, CMA

## 2023-04-20 ENCOUNTER — TELEPHONE (OUTPATIENT)
Dept: GASTROENTEROLOGY | Age: 68
End: 2023-04-20

## 2023-04-20 DIAGNOSIS — K74.60 LIVER CIRRHOSIS SECONDARY TO NASH (NONALCOHOLIC STEATOHEPATITIS) (HCC): ICD-10-CM

## 2023-04-20 DIAGNOSIS — K75.81 LIVER CIRRHOSIS SECONDARY TO NASH (NONALCOHOLIC STEATOHEPATITIS) (HCC): ICD-10-CM

## 2023-04-20 RX ORDER — NADOLOL 40 MG/1
TABLET ORAL
Qty: 90 TABLET | Refills: 3 | Status: SHIPPED | OUTPATIENT
Start: 2023-04-20

## 2023-07-12 ENCOUNTER — OFFICE VISIT (OUTPATIENT)
Dept: GASTROENTEROLOGY | Age: 68
End: 2023-07-12
Payer: MEDICARE

## 2023-07-12 VITALS
OXYGEN SATURATION: 98 % | BODY MASS INDEX: 28.61 KG/M2 | SYSTOLIC BLOOD PRESSURE: 122 MMHG | WEIGHT: 188.8 LBS | HEART RATE: 50 BPM | HEIGHT: 68 IN | TEMPERATURE: 97 F | DIASTOLIC BLOOD PRESSURE: 62 MMHG

## 2023-07-12 DIAGNOSIS — K74.60 LIVER CIRRHOSIS SECONDARY TO NASH (NONALCOHOLIC STEATOHEPATITIS) (HCC): Primary | ICD-10-CM

## 2023-07-12 DIAGNOSIS — K75.81 LIVER CIRRHOSIS SECONDARY TO NASH (NONALCOHOLIC STEATOHEPATITIS) (HCC): Primary | ICD-10-CM

## 2023-07-12 DIAGNOSIS — Z86.010 HISTORY OF COLON POLYPS: ICD-10-CM

## 2023-07-12 DIAGNOSIS — K21.9 GASTROESOPHAGEAL REFLUX DISEASE, UNSPECIFIED WHETHER ESOPHAGITIS PRESENT: ICD-10-CM

## 2023-07-12 PROCEDURE — 1123F ACP DISCUSS/DSCN MKR DOCD: CPT | Performed by: INTERNAL MEDICINE

## 2023-07-12 PROCEDURE — 99214 OFFICE O/P EST MOD 30 MIN: CPT | Performed by: INTERNAL MEDICINE

## 2023-07-12 RX ORDER — OMEPRAZOLE 40 MG/1
40 CAPSULE, DELAYED RELEASE ORAL
Qty: 90 CAPSULE | Refills: 0 | Status: SHIPPED | OUTPATIENT
Start: 2023-07-12

## 2023-07-12 RX ORDER — NADOLOL 40 MG/1
TABLET ORAL
Qty: 90 TABLET | Refills: 0 | Status: SHIPPED | OUTPATIENT
Start: 2023-07-12

## 2023-07-12 RX ORDER — CALCIUM CARBONATE 500 MG/1
1 TABLET, CHEWABLE ORAL DAILY
COMMUNITY

## 2023-07-12 RX ORDER — FAMOTIDINE 20 MG/1
20 TABLET, FILM COATED ORAL 2 TIMES DAILY
COMMUNITY
End: 2023-07-12

## 2023-07-12 NOTE — PROGRESS NOTES
[facility] Gastroenterology and Hepatology             MD Yolanda Seth MD Finnell Schilling, APRN-CNP             [address] [facility] [address] [facility], [address] [facility]             [phone] fax [phone]        Gastroenterology Clinic Consultation    Yolanda Ku MD  Encounter Date: 07/12/23     CC: Follow-up (F/u on Liver ), Gastroesophageal Reflux (Pt. States he is having issues with acid reflux during the night and after he eats. Is taking TUMs and pepcid but they only help temporarily ), and Weight Loss (Decrease in appetite )       No referring provider defined for this encounter. History obtained from: patient, family, medical records     Subjective:       Hao Venegas is an 79 y. o.  male with past medical history of Sherleen Labor cirrhosis, duodenal adenocarcinoma status post Whipple surgery in adjuvant chemo and XRT who presents for Follow-up (F/u on Liver ), Gastroesophageal Reflux (Pt. States he is having issues with acid reflux during the night and after he eats. Is taking TUMs and pepcid but they only help temporarily ), and Weight Loss (Decrease in appetite )    Patient has been established patient of Dr. Rafael Soriano. He has history of liver cirrhosis secondary to MONGE. He has had an upper endoscopy done for varices in 2014 with medium to large varices and has been on nadolol 40 mg/day. He has apparently been vaccinated for hepatitis a and B. Last liver imaging was done in October 2022 and no liver mass was noted, cirrhotic appearing liver. aFP done in February 2023 unremarkable. Last MELD score based on labs in October 2022 noted to be around 8. Complains of worsening GERD over the last few months, intermittent pepcid use and TUMS. No nausea or vomiting, no dysphagia, no melena, no abdominal pain, no diarrhea or constipation. Lost 15 lbs.  Has Anemia on iron -- Hx of Whipple surgery     Patient Active Problem List   Diagnosis    Splenomegaly

## 2023-07-17 ENCOUNTER — HOSPITAL ENCOUNTER (OUTPATIENT)
Dept: ULTRASOUND IMAGING | Age: 68
Discharge: HOME | End: 2023-07-17
Attending: INTERNAL MEDICINE
Payer: MEDICARE

## 2023-07-17 DIAGNOSIS — K74.60 LIVER CIRRHOSIS SECONDARY TO NASH (NONALCOHOLIC STEATOHEPATITIS) (HCC): ICD-10-CM

## 2023-07-17 DIAGNOSIS — K75.81 LIVER CIRRHOSIS SECONDARY TO NASH (NONALCOHOLIC STEATOHEPATITIS) (HCC): ICD-10-CM

## 2023-07-17 PROCEDURE — 76705 ECHO EXAM OF ABDOMEN: CPT

## 2023-07-18 NOTE — RESULT ENCOUNTER NOTE
Please inform the patient that her liver ultrasound does show cirrhosis however no mass appreciated. Liver is enlarged to 18 cm.

## 2023-08-14 DIAGNOSIS — R53.83 OTHER FATIGUE: Primary | ICD-10-CM

## 2023-08-15 ENCOUNTER — HOSPITAL ENCOUNTER (OUTPATIENT)
Dept: INFUSION THERAPY | Age: 68
Discharge: HOME | DRG: 379 | End: 2023-08-15
Payer: MEDICARE

## 2023-08-15 DIAGNOSIS — K76.0 NAFLD (NONALCOHOLIC FATTY LIVER DISEASE): ICD-10-CM

## 2023-08-15 DIAGNOSIS — R53.83 OTHER FATIGUE: ICD-10-CM

## 2023-08-15 DIAGNOSIS — R53.83 OTHER FATIGUE: Primary | ICD-10-CM

## 2023-08-15 DIAGNOSIS — D69.59 OTHER SECONDARY THROMBOCYTOPENIA: ICD-10-CM

## 2023-08-15 LAB
BASOPHILS ABSOLUTE: 0 K/CU MM
BASOPHILS RELATIVE PERCENT: 0 % (ref 0–1)
DIFFERENTIAL TYPE: ABNORMAL
EOSINOPHILS ABSOLUTE: 0.1 K/CU MM
EOSINOPHILS RELATIVE PERCENT: 3.1 % (ref 0–3)
FERRITIN: 189 NG/ML (ref 30–400)
FOLATE SERPL-MCNC: 10.3 NG/ML (ref 3.1–17.5)
HCT VFR BLD CALC: 35.6 % (ref 42–52)
HEMOGLOBIN: 11.4 GM/DL (ref 13.5–18)
IRON: 57 UG/DL (ref 59–158)
LYMPHOCYTES ABSOLUTE: 0.4 K/CU MM
LYMPHOCYTES RELATIVE PERCENT: 10.6 % (ref 24–44)
MCH RBC QN AUTO: 32.1 PG (ref 27–31)
MCHC RBC AUTO-ENTMCNC: 32 % (ref 32–36)
MCV RBC AUTO: 100.3 FL (ref 78–100)
MONOCYTES ABSOLUTE: 0.4 K/CU MM
MONOCYTES RELATIVE PERCENT: 9 % (ref 0–4)
PCT TRANSFERRIN: 33 % (ref 10–44)
PDW BLD-RTO: 14.9 % (ref 11.7–14.9)
PLATELET # BLD: 67 K/CU MM (ref 140–440)
PMV BLD AUTO: 8.7 FL (ref 7.5–11.1)
RBC # BLD: 3.55 M/CU MM (ref 4.6–6.2)
SEGMENTED NEUTROPHILS ABSOLUTE COUNT: 3 K/CU MM
SEGMENTED NEUTROPHILS RELATIVE PERCENT: 77.3 % (ref 36–66)
TOTAL IRON BINDING CAPACITY: 174 UG/DL (ref 250–450)
TSH SERPL DL<=0.005 MIU/L-ACNC: 0.97 UIU/ML (ref 0.27–4.2)
UNSATURATED IRON BINDING CAPACITY: 117 UG/DL (ref 110–370)
VITAMIN B-12: >2000 PG/ML (ref 211–911)
WBC # BLD: 3.9 K/CU MM (ref 4–10.5)

## 2023-08-15 PROCEDURE — 82728 ASSAY OF FERRITIN: CPT

## 2023-08-15 PROCEDURE — 82746 ASSAY OF FOLIC ACID SERUM: CPT

## 2023-08-15 PROCEDURE — 88184 FLOWCYTOMETRY/ TC 1 MARKER: CPT

## 2023-08-15 PROCEDURE — 83540 ASSAY OF IRON: CPT

## 2023-08-15 PROCEDURE — 84443 ASSAY THYROID STIM HORMONE: CPT

## 2023-08-15 PROCEDURE — 36415 COLL VENOUS BLD VENIPUNCTURE: CPT

## 2023-08-15 PROCEDURE — 85025 COMPLETE CBC W/AUTO DIFF WBC: CPT

## 2023-08-15 PROCEDURE — 82607 VITAMIN B-12: CPT

## 2023-08-15 PROCEDURE — 83550 IRON BINDING TEST: CPT

## 2023-08-16 ENCOUNTER — ANESTHESIA EVENT (OUTPATIENT)
Dept: ENDOSCOPY | Age: 68
End: 2023-08-16
Payer: MEDICARE

## 2023-08-17 NOTE — PROGRESS NOTES
Spoke with patient and he will arrive at [address] [place] [address] at [facility] on 8/18/2023 for his procedure at 1015. IV order is in epic.

## 2023-08-18 ENCOUNTER — ANESTHESIA (OUTPATIENT)
Dept: ENDOSCOPY | Age: 68
End: 2023-08-18
Payer: MEDICARE

## 2023-08-18 ENCOUNTER — HOSPITAL ENCOUNTER (INPATIENT)
Age: 68
LOS: 1 days | Discharge: ANOTHER HOSPITAL | DRG: 379 | End: 2023-08-18
Attending: INTERNAL MEDICINE | Admitting: STUDENT IN AN ORGANIZED HEALTH CARE EDUCATION/TRAINING PROGRAM
Payer: MEDICARE

## 2023-08-18 VITALS
BODY MASS INDEX: 28.49 KG/M2 | DIASTOLIC BLOOD PRESSURE: 65 MMHG | RESPIRATION RATE: 19 BRPM | HEART RATE: 43 BPM | HEIGHT: 68 IN | TEMPERATURE: 97.5 F | WEIGHT: 188 LBS | SYSTOLIC BLOOD PRESSURE: 140 MMHG | OXYGEN SATURATION: 99 %

## 2023-08-18 DIAGNOSIS — K21.9 GASTROESOPHAGEAL REFLUX DISEASE, UNSPECIFIED WHETHER ESOPHAGITIS PRESENT: ICD-10-CM

## 2023-08-18 DIAGNOSIS — K74.60 CIRRHOSIS OF LIVER WITHOUT ASCITES, UNSPECIFIED HEPATIC CIRRHOSIS TYPE (HCC): ICD-10-CM

## 2023-08-18 PROBLEM — K25.0 ACUTE GASTRIC ULCER WITH HEMORRHAGE: Status: ACTIVE | Noted: 2023-08-18

## 2023-08-18 PROBLEM — K92.2 GI BLEED: Status: ACTIVE | Noted: 2023-08-18

## 2023-08-18 LAB
ALBUMIN SERPL-MCNC: 2.9 GM/DL (ref 3.4–5)
ALP BLD-CCNC: 112 IU/L (ref 40–129)
ALT SERPL-CCNC: 20 U/L (ref 10–40)
ANION GAP SERPL CALCULATED.3IONS-SCNC: 6 MMOL/L (ref 4–16)
AST SERPL-CCNC: 32 IU/L (ref 15–37)
BASOPHILS ABSOLUTE: 0 K/CU MM
BASOPHILS RELATIVE PERCENT: 0.4 % (ref 0–1)
BILIRUB SERPL-MCNC: 1 MG/DL (ref 0–1)
BUN SERPL-MCNC: 12 MG/DL (ref 6–23)
CALCIUM SERPL-MCNC: 9 MG/DL (ref 8.3–10.6)
CHLORIDE BLD-SCNC: 105 MMOL/L (ref 99–110)
CO2: 27 MMOL/L (ref 21–32)
CREAT SERPL-MCNC: 0.7 MG/DL (ref 0.9–1.3)
DIFFERENTIAL TYPE: ABNORMAL
EKG ATRIAL RATE: 43 BPM
EKG DIAGNOSIS: NORMAL
EKG P AXIS: 62 DEGREES
EKG P-R INTERVAL: 194 MS
EKG Q-T INTERVAL: 490 MS
EKG QRS DURATION: 88 MS
EKG QTC CALCULATION (BAZETT): 414 MS
EKG R AXIS: 4 DEGREES
EKG T AXIS: 8 DEGREES
EKG VENTRICULAR RATE: 43 BPM
EOSINOPHILS ABSOLUTE: 0.1 K/CU MM
EOSINOPHILS RELATIVE PERCENT: 3.6 % (ref 0–3)
GFR SERPL CREATININE-BSD FRML MDRD: >60 ML/MIN/1.73M2
GLUCOSE SERPL-MCNC: 95 MG/DL (ref 70–99)
HCT VFR BLD CALC: 34.3 % (ref 42–52)
HEMOGLOBIN: 10.9 GM/DL (ref 13.5–18)
IMMATURE NEUTROPHIL %: 0.4 % (ref 0–0.43)
LYMPHOCYTES ABSOLUTE: 0.5 K/CU MM
LYMPHOCYTES RELATIVE PERCENT: 19.1 % (ref 24–44)
MCH RBC QN AUTO: 32.2 PG (ref 27–31)
MCHC RBC AUTO-ENTMCNC: 31.8 % (ref 32–36)
MCV RBC AUTO: 101.5 FL (ref 78–100)
MONOCYTES ABSOLUTE: 0.4 K/CU MM
MONOCYTES RELATIVE PERCENT: 14 % (ref 0–4)
NUCLEATED RBC %: 0 %
PDW BLD-RTO: 14.5 % (ref 11.7–14.9)
PLATELET # BLD: 63 K/CU MM (ref 140–440)
PMV BLD AUTO: 10 FL (ref 7.5–11.1)
POTASSIUM SERPL-SCNC: 4.4 MMOL/L (ref 3.5–5.1)
RBC # BLD: 3.38 M/CU MM (ref 4.6–6.2)
SEGMENTED NEUTROPHILS ABSOLUTE COUNT: 1.7 K/CU MM
SEGMENTED NEUTROPHILS RELATIVE PERCENT: 62.5 % (ref 36–66)
SODIUM BLD-SCNC: 138 MMOL/L (ref 135–145)
TOTAL IMMATURE NEUTOROPHIL: 0.01 K/CU MM
TOTAL NUCLEATED RBC: 0 K/CU MM
TOTAL PROTEIN: 4.9 GM/DL (ref 6.4–8.2)
WBC # BLD: 2.8 K/CU MM (ref 4–10.5)

## 2023-08-18 PROCEDURE — C1052 HEMOSTATIC AGENT, GI, TOPIC: HCPCS | Performed by: INTERNAL MEDICINE

## 2023-08-18 PROCEDURE — 85025 COMPLETE CBC W/AUTO DIFF WBC: CPT

## 2023-08-18 PROCEDURE — 80053 COMPREHEN METABOLIC PANEL: CPT

## 2023-08-18 PROCEDURE — 0W3P8ZZ CONTROL BLEEDING IN GASTROINTESTINAL TRACT, VIA NATURAL OR ARTIFICIAL OPENING ENDOSCOPIC: ICD-10-PCS | Performed by: INTERNAL MEDICINE

## 2023-08-18 PROCEDURE — 6360000002 HC RX W HCPCS: Performed by: INTERNAL MEDICINE

## 2023-08-18 PROCEDURE — 3700000001 HC ADD 15 MINUTES (ANESTHESIA): Performed by: INTERNAL MEDICINE

## 2023-08-18 PROCEDURE — 36415 COLL VENOUS BLD VENIPUNCTURE: CPT

## 2023-08-18 PROCEDURE — 3700000000 HC ANESTHESIA ATTENDED CARE: Performed by: INTERNAL MEDICINE

## 2023-08-18 PROCEDURE — C9113 INJ PANTOPRAZOLE SODIUM, VIA: HCPCS | Performed by: INTERNAL MEDICINE

## 2023-08-18 PROCEDURE — 6370000000 HC RX 637 (ALT 250 FOR IP): Performed by: STUDENT IN AN ORGANIZED HEALTH CARE EDUCATION/TRAINING PROGRAM

## 2023-08-18 PROCEDURE — 93005 ELECTROCARDIOGRAM TRACING: CPT | Performed by: STUDENT IN AN ORGANIZED HEALTH CARE EDUCATION/TRAINING PROGRAM

## 2023-08-18 PROCEDURE — 2580000003 HC RX 258: Performed by: INTERNAL MEDICINE

## 2023-08-18 PROCEDURE — XW0G886 INTRODUCTION OF MINERAL-BASED TOPICAL HEMOSTATIC AGENT INTO UPPER GI, VIA NATURAL OR ARTIFICIAL OPENING ENDOSCOPIC, NEW TECHNOLOGY GROUP 6: ICD-10-PCS | Performed by: INTERNAL MEDICINE

## 2023-08-18 PROCEDURE — C1889 IMPLANT/INSERT DEVICE, NOC: HCPCS | Performed by: INTERNAL MEDICINE

## 2023-08-18 PROCEDURE — 2709999900 HC NON-CHARGEABLE SUPPLY: Performed by: INTERNAL MEDICINE

## 2023-08-18 PROCEDURE — 6360000002 HC RX W HCPCS

## 2023-08-18 PROCEDURE — 85014 HEMATOCRIT: CPT

## 2023-08-18 PROCEDURE — 85018 HEMOGLOBIN: CPT

## 2023-08-18 PROCEDURE — 7100000011 HC PHASE II RECOVERY - ADDTL 15 MIN: Performed by: INTERNAL MEDICINE

## 2023-08-18 PROCEDURE — 1200000000 HC SEMI PRIVATE

## 2023-08-18 PROCEDURE — 7100000010 HC PHASE II RECOVERY - FIRST 15 MIN: Performed by: INTERNAL MEDICINE

## 2023-08-18 PROCEDURE — 2580000003 HC RX 258: Performed by: ANESTHESIOLOGY

## 2023-08-18 PROCEDURE — 88305 TISSUE EXAM BY PATHOLOGIST: CPT | Performed by: PATHOLOGY

## 2023-08-18 PROCEDURE — 88360 TUMOR IMMUNOHISTOCHEM/MANUAL: CPT | Performed by: PATHOLOGY

## 2023-08-18 PROCEDURE — 3609013000 HC EGD TRANSORAL CONTROL BLEEDING ANY METHOD: Performed by: INTERNAL MEDICINE

## 2023-08-18 PROCEDURE — 88342 IMHCHEM/IMCYTCHM 1ST ANTB: CPT | Performed by: PATHOLOGY

## 2023-08-18 PROCEDURE — 88341 IMHCHEM/IMCYTCHM EA ADD ANTB: CPT | Performed by: PATHOLOGY

## 2023-08-18 PROCEDURE — 93010 ELECTROCARDIOGRAM REPORT: CPT | Performed by: INTERNAL MEDICINE

## 2023-08-18 PROCEDURE — 94761 N-INVAS EAR/PLS OXIMETRY MLT: CPT

## 2023-08-18 RX ORDER — LIDOCAINE HYDROCHLORIDE 20 MG/ML
INJECTION, SOLUTION INTRAVENOUS PRN
Status: DISCONTINUED | OUTPATIENT
Start: 2023-08-18 | End: 2023-08-18 | Stop reason: SDUPTHER

## 2023-08-18 RX ORDER — ONDANSETRON 2 MG/ML
4 INJECTION INTRAMUSCULAR; INTRAVENOUS EVERY 6 HOURS PRN
Status: DISCONTINUED | OUTPATIENT
Start: 2023-08-18 | End: 2023-08-18 | Stop reason: HOSPADM

## 2023-08-18 RX ORDER — FERROUS SULFATE 325(65) MG
325 TABLET ORAL
Status: DISCONTINUED | OUTPATIENT
Start: 2023-08-19 | End: 2023-08-18 | Stop reason: HOSPADM

## 2023-08-18 RX ORDER — CALCIUM CARBONATE 500 MG/1
1 TABLET, CHEWABLE ORAL DAILY
Status: DISCONTINUED | OUTPATIENT
Start: 2023-08-18 | End: 2023-08-18 | Stop reason: HOSPADM

## 2023-08-18 RX ORDER — NADOLOL 20 MG/1
40 TABLET ORAL DAILY
Status: DISCONTINUED | OUTPATIENT
Start: 2023-08-19 | End: 2023-08-18 | Stop reason: HOSPADM

## 2023-08-18 RX ORDER — EPINEPHRINE 1 MG/ML(1)
AMPUL (ML) INJECTION PRN
Status: DISCONTINUED | OUTPATIENT
Start: 2023-08-18 | End: 2023-08-18 | Stop reason: ALTCHOICE

## 2023-08-18 RX ORDER — ENOXAPARIN SODIUM 100 MG/ML
40 INJECTION SUBCUTANEOUS DAILY
Status: DISCONTINUED | OUTPATIENT
Start: 2023-08-18 | End: 2023-08-18 | Stop reason: HOSPADM

## 2023-08-18 RX ORDER — LISINOPRIL 5 MG/1
2.5 TABLET ORAL DAILY
Status: DISCONTINUED | OUTPATIENT
Start: 2023-08-18 | End: 2023-08-18 | Stop reason: HOSPADM

## 2023-08-18 RX ORDER — SODIUM CHLORIDE 0.9 % (FLUSH) 0.9 %
5-40 SYRINGE (ML) INJECTION EVERY 12 HOURS SCHEDULED
Status: DISCONTINUED | OUTPATIENT
Start: 2023-08-18 | End: 2023-08-18 | Stop reason: HOSPADM

## 2023-08-18 RX ORDER — PROPOFOL 10 MG/ML
INJECTION, EMULSION INTRAVENOUS PRN
Status: DISCONTINUED | OUTPATIENT
Start: 2023-08-18 | End: 2023-08-18 | Stop reason: SDUPTHER

## 2023-08-18 RX ORDER — SODIUM CHLORIDE 9 MG/ML
INJECTION, SOLUTION INTRAVENOUS PRN
Status: DISCONTINUED | OUTPATIENT
Start: 2023-08-18 | End: 2023-08-18 | Stop reason: HOSPADM

## 2023-08-18 RX ORDER — ACETAMINOPHEN 325 MG/1
650 TABLET ORAL EVERY 6 HOURS PRN
Status: DISCONTINUED | OUTPATIENT
Start: 2023-08-18 | End: 2023-08-18 | Stop reason: HOSPADM

## 2023-08-18 RX ORDER — SODIUM CHLORIDE, SODIUM LACTATE, POTASSIUM CHLORIDE, CALCIUM CHLORIDE 600; 310; 30; 20 MG/100ML; MG/100ML; MG/100ML; MG/100ML
INJECTION, SOLUTION INTRAVENOUS CONTINUOUS
Status: DISCONTINUED | OUTPATIENT
Start: 2023-08-18 | End: 2023-08-18 | Stop reason: HOSPADM

## 2023-08-18 RX ORDER — POLYETHYLENE GLYCOL 3350 17 G/17G
17 POWDER, FOR SOLUTION ORAL DAILY PRN
Status: DISCONTINUED | OUTPATIENT
Start: 2023-08-18 | End: 2023-08-18 | Stop reason: HOSPADM

## 2023-08-18 RX ORDER — ONDANSETRON 4 MG/1
4 TABLET, ORALLY DISINTEGRATING ORAL EVERY 8 HOURS PRN
Status: DISCONTINUED | OUTPATIENT
Start: 2023-08-18 | End: 2023-08-18 | Stop reason: HOSPADM

## 2023-08-18 RX ORDER — ACETAMINOPHEN 650 MG/1
650 SUPPOSITORY RECTAL EVERY 6 HOURS PRN
Status: DISCONTINUED | OUTPATIENT
Start: 2023-08-18 | End: 2023-08-18 | Stop reason: HOSPADM

## 2023-08-18 RX ORDER — SODIUM CHLORIDE 0.9 % (FLUSH) 0.9 %
5-40 SYRINGE (ML) INJECTION PRN
Status: DISCONTINUED | OUTPATIENT
Start: 2023-08-18 | End: 2023-08-18 | Stop reason: HOSPADM

## 2023-08-18 RX ADMIN — PHENYLEPHRINE HYDROCHLORIDE 100 MCG: 10 INJECTION INTRAVENOUS at 11:05

## 2023-08-18 RX ADMIN — SODIUM CHLORIDE, POTASSIUM CHLORIDE, SODIUM LACTATE AND CALCIUM CHLORIDE: 600; 310; 30; 20 INJECTION, SOLUTION INTRAVENOUS at 09:35

## 2023-08-18 RX ADMIN — SODIUM CHLORIDE 8 MG/HR: 9 INJECTION, SOLUTION INTRAVENOUS at 13:01

## 2023-08-18 RX ADMIN — PROPOFOL 450 MG: 10 INJECTION, EMULSION INTRAVENOUS at 10:33

## 2023-08-18 RX ADMIN — LIDOCAINE HYDROCHLORIDE 100 MG: 20 INJECTION, SOLUTION INTRAVENOUS at 10:33

## 2023-08-18 RX ADMIN — CALCIUM CARBONATE 500 MG: 500 TABLET, CHEWABLE ORAL at 15:45

## 2023-08-18 ASSESSMENT — PAIN - FUNCTIONAL ASSESSMENT: PAIN_FUNCTIONAL_ASSESSMENT: 0-10

## 2023-08-18 ASSESSMENT — PAIN SCALES - GENERAL
PAINLEVEL_OUTOF10: 0

## 2023-08-18 NOTE — PROGRESS NOTES
Received a telephone call from the [facility] with a bed assignment at the [facility], room 6267. Report number is [phone]. Velia Worley on [facility]. [facility] will set up transport and call ETA  to [address] [place] [address]

## 2023-08-18 NOTE — BRIEF OP NOTE
Brief Postoperative Note      Patient: Joselyn Rao  YOB: 1955  MRN: [id number]    Date of Procedure: 8/18/2023    Pre-Op Diagnosis Codes:     * Gastroesophageal reflux disease, unspecified whether esophagitis present [K21.9]     * Cirrhosis of liver without ascites, unspecified hepatic cirrhosis type ([address]) [K74.60]    Post-Op Diagnosis:  See Below        Procedure(s):  EGD CONTROL HEMORRHAGE WITH EPI INJECTION, CLIP PLACEMENT X1, AND HEMOSPRAY    Surgeon(s):  Eulalia Macias MD    Assistant:  * No surgical staff found *    Anesthesia: Monitor Anesthesia Care    Estimated Blood Loss (mL): Minimal    Complications: None    Specimens:   ID Type Source Tests Collected by Time Destination   A : cold biopsy Tissue Stomach SURGICAL PATHOLOGY Eulalia Macias MD 8/18/2023 1109        Implants:  * No implants in log *      Drains: * No LDAs found *    Findings:   Impression:          -  Large (> 5 mm) and grade III esophageal varices. -  A medium amount of food (residue) in the stomach. Not attempted. -  A classic Whipple was found, characterized by ulceration and moderate             stenosis. Biopsied.          - Active bleeding noted at the ulceration.          -  Oozing gastric ulcer with oozing hemorrhage (Hudson Class Ib). Hemostatic             spray applied. Treatment not successful.          -  Normal examined jejunum. Recommendation:          -  Patient has a contact number available for emergencies. The signs and             symptoms of potential delayed complications were discussed with the patient. Return to normal activities tomorrow. Written discharge instructions were             provided to the patient. -  Await pathology results. -  Admit the patient to hospital lanza for observation.          -  Transfer patient to another hospital.          -  The findings and recommendations were discussed with the referring             physician. -  Check hemoglobin q 8 hours for two days. -  The findings and recommendations were discussed with the patient's family. -  Give Protonix (pantoprazole): initiate therapy with 80 mg IV bolus, then 8             mg/hr IV by continuous infusion for 3 days.        Electronically signed by Yolanda Ku MD on 8/18/2023 at 12:44 PM

## 2023-08-18 NOTE — PROGRESS NOTES
1140 In to check on pt. Pt denies c/o or needs. Call light in reach. Wife at bedside. 1155 Pt up to restroom. Pt tolerated well and back to bed. 1158 Dr. Osman Rob in pt room to discuss POC. 1301 Protonix here and started as ordered. Pt denies c/o or needs. CAll light in reach. 1310 Report called to [facility]. 1313 Pt transported to room 1123 per cart per techs with wife.

## 2023-08-18 NOTE — PROGRESS NOTES
4 Eyes Skin Assessment     NAME:  Tee Chung  YOB: 1955  MEDICAL RECORD NUMBER:  [id number]    The patient is being assessed for  Admission    I agree that at least one RN has performed a thorough Head to Toe Skin Assessment on the patient. ALL assessment sites listed below have been assessed. Areas assessed by both nurses:    Head, Face, Ears, Shoulders, Back, Chest, Arms, Elbows, Hands, Sacrum. Buttock, Coccyx, Ischium, and Legs. Feet and Heels        Does the Patient have a Wound?  No noted wound(s)       Mj Prevention initiated by RN: Yes  Wound Care Orders initiated by RN: No    Pressure Injury (Stage 3,4, Unstageable, DTI, NWPT, and Complex wounds) if present, place Wound referral order by RN under : No    New Ostomies, if present place, Ostomy referral order under : No     Nurse 1 eSignature: Electronically signed by Mikayla Paulino LPN on 5/64/39 at 2:96 PM EDT    **SHARE this note so that the co-signing nurse can place an eSignature**    Nurse 2 eSignature: {Esignature:[id number]}

## 2023-08-18 NOTE — ANESTHESIA POSTPROCEDURE EVALUATION
Department of Anesthesiology  Postprocedure Note    Patient: Mekhi Farr  MRN: [id number]  YOB: 1955  Date of evaluation: 8/18/2023      Procedure Summary     Date: 08/18/23 Room / Location: [facility]    Anesthesia Start: 0377 Anesthesia Stop: 1115    Procedure: EGD CONTROL HEMORRHAGE WITH EPI INJECTION, CLIP PLACEMENT X1, AND HEMOSPRAY Diagnosis:       Gastroesophageal reflux disease, unspecified whether esophagitis present      Cirrhosis of liver without ascites, unspecified hepatic cirrhosis type (HCC)      (Gastroesophageal reflux disease, unspecified whether esophagitis present [K21.9])      (Cirrhosis of liver without ascites, unspecified hepatic cirrhosis type ([address]) [K74.60])    Surgeons:  Latisha Conde MD Responsible Provider: ROSALVA Mae CRNA    Anesthesia Type: MAC ASA Status: 3          Anesthesia Type: MAC    Joel Phase I:      Joel Phase II:        Anesthesia Post Evaluation    Patient location during evaluation: bedside  Patient participation: complete - patient participated  Level of consciousness: awake  Pain score: 0  Airway patency: patent  Nausea & Vomiting: no vomiting and no nausea  Complications: no  Cardiovascular status: hemodynamically stable  Respiratory status: acceptable, airway suctioned, spontaneous ventilation and room air  Hydration status: stable  Pain management: adequate

## 2023-08-18 NOTE — PROGRESS NOTES
[facility] arrived to  patient. Patient alert and oriented, states no pain or discomfort. No distress noted. Wife called and made aware of , states she will meet him at [facility]. Belongings accompanying patient.

## 2023-08-18 NOTE — H&P
ENDOSCOPY   Pre-operative History and Physical    Patient: Jeancarlos Garcia  : 1955      History Obtained From:  patient, electronic medical record        HISTORY OF PRESENT ILLNESS:                The patient is a 76 y.o. male with significant past medical history as below who presents for EGD    Indication GERD     Past Medical History:        Diagnosis Date    Anemia     Cancer ([address])     whipple    Cirrhosis ([address]) Dx:     Duodenal cancer ([address])     Esophageal varices ([address]) 2014    History of blood transfusion     with Whipple procedure    HLD (hyperlipidemia)     Hypertension     Follows with PCP    Neuropathy     Bilat feet    Osteoarthritis     Bilat knees       Past Surgical History:        Procedure Laterality Date    CHOLECYSTECTOMY      COLONOSCOPY  2014    polpectomy    COLONOSCOPY  2016    grade 1 int hem, repeat 5 years - Dr. Monica Harris    COLONOSCOPY N/A 2022    COLONOSCOPY POLYPECTOMY SNARE/COLD BIOPSY with 2 hemiclips placed in proximal transverse colon performed by Lana Ragsdale MD at [facility]    umbilical x2    [address]      pacreaticoduodenectomy (whipple)    TOTAL KNEE ARTHROPLASTY Left 2020    KNEE TOTAL ARTHROPLASTY LEFT performed by Selam Huynh MD at [address] [place] [address] Drive Right 3/8/2021    RIGHT KNEE TOTAL ARTHROPLASTY ROBOTIC performed by Selam Huynh MD at [facility]  2014    Normal exam per pt         Current Medications:    Medications    Prior to Admission medications    Medication Sig Start Date End Date Taking?  Authorizing Provider   calcium carbonate (TUMS) 500 MG chewable tablet Take 1 tablet by mouth daily    Historical Provider, MD   omeprazole (PRILOSEC) 40 MG delayed release capsule Take 1 capsule by mouth every morning (before breakfast) 23   Juany Lucas MD   nadolol (CORGARD) 40 MG tablet Take 1 tablet by mouth once daily

## 2023-08-19 NOTE — DISCHARGE SUMMARY
V2.0  Discharge Summary    Name:  Hao Venegas /Age/Sex: 1955 (76 y.o. male)   Admit Date: 2023  Discharge Date: 23    MRN & CSN:  [id number] & [id number] Encounter Date and Time 23 10:05 PM EDT    Attending:  No att. providers found Discharging Provider: Rad Salcedo MD       Hospital Course:     Brief HPI: Hao Venegas is a 76 y.o. male who presented  for elective EGD for GERD was found to have bleeding. Presented for elective EGD for persistent GERD and h/o liver cirrhosis. During the EGD he was found to have Large (> 5 mm) and grade III esophageal varices. A classic Whipple was found, characterized by ulceration and moderate stenosis. Oozing gastric ulcer with oozing hemorrhage (Hudson Class Ib). Hemostatic spray was applied. Treatment  was not successful. Dr Katarina Patel - initiated the transfer to [facility]. Patient was accepted. Pending bed availability. Patient denies any symptoms right now. No sob, dizziness, chest pain. His HR is in mid 40's. Patient asymptomatic. Will obtain EKG. On RL 50 mg/kg. If patient remains here, will obtain Cardiology consult. No hematemesis, nausea or vomiting now. BP is stable    Brief Problem Based Course:     Admitted for GI bleed. Now being transfer to  for further care as recommended by GI. Vitally stable. Acute GI bleed  GERD  H/o Liver cirrhosis              Presented for elective EGD for persistent GERD and h/o liver cirrhosis              Found to have - Large (> 5 mm) and grade III esophageal varices. A classic Whipple was found, characterized by ulceration and moderate stenosis. Biopsied. Active bleeding noted at the ulceration. Oozing gastric ulcer with oozing hemorrhage (Hudson Class Ib). Hemostatic             spray applied. Treatment not successful.                 Being admitted to [place] unit now while patient awaits transfer to                      Check hemoglobin q 11:00 AM    TRICHOMONAS NONE SEEN 03/01/2021 11:00 AM    BACTERIA NEGATIVE 03/01/2021 11:00 AM    CLARITYU HAZY 03/01/2021 11:00 AM    SPECGRAV 1.025 03/01/2021 11:00 AM    LEUKOCYTESUR NEGATIVE 03/01/2021 11:00 AM    UROBILINOGEN NEGATIVE 03/01/2021 11:00 AM    BILIRUBINUR NEGATIVE 03/01/2021 11:00 AM    BLOODU NEGATIVE 03/01/2021 11:00 AM    KETUA NEGATIVE 03/01/2021 11:00 AM     Urine Cultures: No results found for: LABURIN  Blood Cultures: No results found for: BC  No results found for: BLOODCULT2  Organism: No results found for: ORG    Time Spent Discharging patient 50 minutes    Electronically signed by Krzysztof Gurrola MD on 8/18/2023 at 10:05 PM

## 2023-08-23 ENCOUNTER — TELEPHONE (OUTPATIENT)
Dept: GASTROENTEROLOGY | Age: 68
End: 2023-08-23

## 2023-08-23 NOTE — TELEPHONE ENCOUNTER
Dr. Jairon Guzman lvm stating that patient will need repeat EGD in 2-4 weeks for varices banding. He did test positive for h pylori and was sent to the outpatient dept regarding his ulcers. Call  GI dept for further information.

## 2023-08-24 ENCOUNTER — OFFICE VISIT (OUTPATIENT)
Dept: ONCOLOGY | Age: 68
End: 2023-08-24
Payer: MEDICARE

## 2023-08-24 ENCOUNTER — TELEPHONE (OUTPATIENT)
Dept: GASTROENTEROLOGY | Age: 68
End: 2023-08-24

## 2023-08-24 ENCOUNTER — HOSPITAL ENCOUNTER (OUTPATIENT)
Dept: INFUSION THERAPY | Age: 68
Discharge: HOME | End: 2023-08-24
Payer: MEDICARE

## 2023-08-24 VITALS
HEART RATE: 75 BPM | TEMPERATURE: 97.8 F | WEIGHT: 183.2 LBS | BODY MASS INDEX: 27.77 KG/M2 | OXYGEN SATURATION: 95 % | DIASTOLIC BLOOD PRESSURE: 67 MMHG | SYSTOLIC BLOOD PRESSURE: 133 MMHG | HEIGHT: 68 IN

## 2023-08-24 DIAGNOSIS — C17.0 CARCINOMA OF DUODENUM (HCC): Primary | ICD-10-CM

## 2023-08-24 DIAGNOSIS — C16.9 MALIGNANT NEOPLASM OF STOMACH, UNSPECIFIED LOCATION (HCC): Primary | ICD-10-CM

## 2023-08-24 PROCEDURE — 99211 OFF/OP EST MAY X REQ PHY/QHP: CPT

## 2023-08-24 PROCEDURE — 1123F ACP DISCUSS/DSCN MKR DOCD: CPT | Performed by: INTERNAL MEDICINE

## 2023-08-24 PROCEDURE — 99215 OFFICE O/P EST HI 40 MIN: CPT | Performed by: INTERNAL MEDICINE

## 2023-08-24 RX ORDER — METRONIDAZOLE 250 MG/1
TABLET ORAL
COMMUNITY
Start: 2023-08-22

## 2023-08-24 RX ORDER — TETRACYCLINE HYDROCHLORIDE 500 MG/1
500 CAPSULE ORAL 4 TIMES DAILY
COMMUNITY
End: 2023-08-24

## 2023-08-24 RX ORDER — BISMUTH SUBSALICYLATE 262 MG/1
1 TABLET, CHEWABLE ORAL EVERY 6 HOURS
COMMUNITY
Start: 2023-08-22

## 2023-08-24 RX ORDER — TETRACYCLINE HYDROCHLORIDE 500 MG/1
500 CAPSULE ORAL EVERY 6 HOURS
Qty: 48 CAPSULE | Refills: 0 | COMMUNITY
Start: 2023-08-23 | End: 2023-09-04

## 2023-08-24 RX ORDER — METRONIDAZOLE 500 MG/1
500 TABLET ORAL 4 TIMES DAILY
COMMUNITY
End: 2023-08-24

## 2023-08-24 NOTE — PROGRESS NOTES
Physician Progress Note      PATIENT:               Judith Ellis  CSN #:                  [id number]  :                       1955  ADMIT DATE:       2023 8:48 AM  DISCH DATE:        2023 4:15 PM  RESPONDING  PROVIDER #:        Krzysztof Gurrola MD          QUERY TEXT:    Patient admitted with GI bleeding, noted to have gastric ulcer and esophageal   varices. If possible, please document in progress notes and discharge summary   the cause of the GI bleeding: The medical record reflects the following:  Risk Factors: pmh cirrhosis  Clinical Indicators:  EGD revealed: Large (> 5 mm) and grade III esophageal   varices. A classic Whipple was found, characterized by ulceration and moderate   stenosis. Biopsied. Active bleeding noted at the ulceration. Oozing gastric   ulcer with oozing hemorrhage (Hudson Class Ib). Treatment: Hemostatic spray, GI consult, transfer to tertiary care facility    Thank you,  Liz Helton, RN  [phone]  Options provided:  -- GI bleeding due to esophageal varices  -- GI bleeding due to gastric ulcer  -- Other - I will add my own diagnosis  -- Disagree - Not applicable / Not valid  -- Disagree - Clinically unable to determine / Unknown  -- Refer to Clinical Documentation Reviewer    PROVIDER RESPONSE TEXT:    This patient has GI bleeding due to gastric ulcer.     Query created by: Alex Veloz on 2023 8:08 AM      Electronically signed by:  Krzysztof Gurrola MD 2023 9:17 AM

## 2023-08-24 NOTE — PROGRESS NOTES
MA Rooming Questions  Patient: Claudell Camel  MRN: [id number]    Date: 8/24/2023        1. Do you have any new issues? yes -         2. Do you need any refills on medications?    no    3. Have you had any imaging done since your last visit? yes - records in care everywhere    4. Have you been hospitalized or seen in the emergency room since your last visit here?   yes - records in care everywhere    5. Did the patient have a depression screening completed today?  No    No data recorded     PHQ-9 Given to (if applicable):               PHQ-9 Score (if applicable):                     [] Positive     []  Negative              Does question #9 need addressed (if applicable)                     [] Yes    []  No               Ally Samuels MA
08/18/2023    ANISOCYTOSIS 2+ 08/02/2011    POLYCHROM 1+ 08/04/2011    PLTM FEW 03/14/2016     Lab Results   Component Value Date    ESR 14 03/01/2021     Chemistry:  Lab Results   Component Value Date     08/18/2023    K 4.4 08/18/2023     08/18/2023    CO2 27 08/18/2023    BUN 12 08/18/2023    CREATININE 0.7 (L) 08/18/2023    GLUCOSE 95 08/18/2023    CALCIUM 9.0 08/18/2023    PROT 4.9 (L) 08/18/2023    LABALBU 2.9 (L) 08/18/2023    BILITOT 1.0 08/18/2023    ALKPHOS 112 08/18/2023    AST 32 08/18/2023    ALT 20 08/18/2023    LABGLOM >60 08/18/2023    GFRAA >60 04/11/2022    AGRATIO 1.4 10/18/2022    GLOB 2.3 08/08/2022    POCGLU 107 (H) 10/14/2011     Lab Results   Component Value Date     07/10/2017     Lab Results   Component Value Date    TSHHS 0.971 08/15/2023     Immunology:  Lab Results   Component Value Date    PROT 4.9 (L) 08/18/2023    SPEP  11/05/2019     INTERPRETATION - Within normal limits. John Bai MD    ALBUMINELP 3.6 11/05/2019    LABALPH 0.3 11/05/2019    LABALPH 0.5 11/05/2019    GAMGLOB 1.3 11/05/2019     Coagulation Panel:  Lab Results   Component Value Date    PROTIME 15.6 (H) 10/18/2022    INR 1.24 (H) 10/18/2022    APTT 36.8 08/02/2011     Anemia Panel:  Lab Results   Component Value Date    JSGLEYHD30 >2000 (H) 08/15/2023    FOLATE 10.3 08/15/2023     Tumor Markers:  Lab Results   Component Value Date    CEA 2.4 07/10/2017     30.1 11/06/2014    PSA 0.59 02/11/2015      Observations:  No data recorded      Assessment & Plan:  1. He has pancytopenia which could be related to hypersplenism due to underlying liver cirrhosis/Adkins. He is followed by Dr. Ruiz Ramirez. Labs in December 2019 were reviewed. CBC in June 2020 was grossly stable for him. Platelet was 71. CBC in June and July 2020 was reviewed. 10/2022 US abdomen: liver cirrhosis. 2/13/2023 LFTs grossly stable. AFP 3. WBC 4.3, Hg 12.6, HCT 38.2, plat 81.    8/2023 Peripheral blood;  Flow Cytometry Analysis:

## 2023-08-24 NOTE — TELEPHONE ENCOUNTER
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 The pathology final report is not back yet. He has a history of duodenal cancer. He has also been evaluated at [facility] since [address] refused to take his insurance. I have instructed them to follow-up with the surgeons over there to see if any surgical approach will be considered. He is already established with Dr. Christen Nix from oncology over here and I have recommended that they follow-up with him. As per the notes she will need repeat banding with an EGD. This can be done locally over here.

## 2023-08-25 ENCOUNTER — TELEPHONE (OUTPATIENT)
Dept: INFUSION THERAPY | Age: 68
End: 2023-08-25

## 2023-08-25 NOTE — TELEPHONE ENCOUNTER
8/25/23 - spoke w/ pt's wife  Lidia Pacheco, for the 8/31/23 Pet scan at [facility] arrival time of 7:30am and NPO 6 hours prior - plain water only

## 2023-08-27 NOTE — PROGRESS NOTES
Patient Name: Andrew Vallejo  Patient : 1955  Patient MRN: [id number]     Primary Oncologist: Som Troy MD  Referring Provider: Kellie Davis MD     Date of Service: 2023      Chief Complaint:   Chief Complaint   Patient presents with    Follow-up     He came in for follow-up visit. Patient Active Problem List:     Splenomegaly     NAFLD (nonalcoholic fatty liver disease)     Carcinoma of duodenum      Liver cirrhosis secondary to MONGE (nonalcoholic steatohepatitis)     Port-A-Cath in place     Bilateral primary osteoarthritis of knee     Arthritis of both knees     Leukopenia     Primary malignant neoplasm of small intestine     Thrombocytopenic disorder     Fatigue     HPI:  Rafaela Ivan is a pleasant 76year old  male patient with underlying liver cirrhosis/MONGE with esophageal varices followed by Dr Eh Walter, who was referred back for evaluation of pancytopenia. 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 He had mismatch MMR. He received adjuvant FOLFOX from 2011 through 2012 followed by RT till 2012. CT scan in May 2014 showed no recurrent disease. US in 2014 showed 17 cm spleen, previously 16 cm with hepatic steatosis. Platelet was 80 - 603 since  through . CEA was 2.4. He was seen by Dr Eh Walter in 2019. AFP in 2019 was 4.6. 2019, calcium 9.4, LFTs were unremarkable. Creatinine  0.8. Total bilirubin was 0.8. Alpha-fetoprotein 3. INR 1.21. 2019 CMP grossly unremarkable except for glucose 125. WBC  2.1, RBC count 3.68, hemoglobin 11.7, hematocrit 34.1, MCV 92.6, plat 64. At the time he just got over flu. If pancytopenia continues to get worse, he may consider bone marrow study. He had left TKR in  without complication. He did not receive blood product transfusion.   He plans to have

## 2023-08-28 LAB — COMMENT: NORMAL

## 2023-08-28 NOTE — RESULT ENCOUNTER NOTE
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   Please refer to previous telephone encounter

## 2023-08-31 ENCOUNTER — HOSPITAL ENCOUNTER (OUTPATIENT)
Dept: PET IMAGING | Age: 68
Discharge: HOME | End: 2023-08-31
Attending: INTERNAL MEDICINE
Payer: MEDICARE

## 2023-08-31 DIAGNOSIS — C17.0 CARCINOMA OF DUODENUM (HCC): ICD-10-CM

## 2023-08-31 PROCEDURE — 2580000003 HC RX 258: Performed by: INTERNAL MEDICINE

## 2023-08-31 PROCEDURE — 78815 PET IMAGE W/CT SKULL-THIGH: CPT

## 2023-08-31 PROCEDURE — 3430000000 HC RX DIAGNOSTIC RADIOPHARMACEUTICAL: Performed by: INTERNAL MEDICINE

## 2023-08-31 PROCEDURE — A9552 F18 FDG: HCPCS | Performed by: INTERNAL MEDICINE

## 2023-08-31 RX ORDER — SODIUM CHLORIDE 0.9 % (FLUSH) 0.9 %
10 SYRINGE (ML) INJECTION PRN
Status: COMPLETED | OUTPATIENT
Start: 2023-08-31 | End: 2023-08-31

## 2023-08-31 RX ORDER — FLUDEOXYGLUCOSE F 18 200 MCI/ML
17.34 INJECTION, SOLUTION INTRAVENOUS
Status: COMPLETED | OUTPATIENT
Start: 2023-08-31 | End: 2023-08-31

## 2023-08-31 RX ADMIN — SODIUM CHLORIDE, PRESERVATIVE FREE 10 ML: 5 INJECTION INTRAVENOUS at 07:49

## 2023-08-31 RX ADMIN — FLUDEOXYGLUCOSE F 18 17.34 MILLICURIE: 200 INJECTION, SOLUTION INTRAVENOUS at 07:49

## 2023-09-01 ENCOUNTER — ANESTHESIA EVENT (OUTPATIENT)
Dept: ENDOSCOPY | Age: 68
End: 2023-09-01
Payer: MEDICARE

## 2023-09-01 NOTE — PROGRESS NOTES
Spoke with  patient and he will  arrive at 0945 at [facility] on 9/5/2023 for his procedure at 1115. IV order is in epic.

## 2023-09-05 ENCOUNTER — HOSPITAL ENCOUNTER (OUTPATIENT)
Age: 68
Setting detail: OUTPATIENT SURGERY
Discharge: HOME | End: 2023-09-05
Attending: INTERNAL MEDICINE | Admitting: INTERNAL MEDICINE
Payer: MEDICARE

## 2023-09-05 ENCOUNTER — ANESTHESIA (OUTPATIENT)
Dept: ENDOSCOPY | Age: 68
End: 2023-09-05
Payer: MEDICARE

## 2023-09-05 VITALS
TEMPERATURE: 96.9 F | WEIGHT: 177 LBS | RESPIRATION RATE: 16 BRPM | HEART RATE: 62 BPM | OXYGEN SATURATION: 98 % | HEIGHT: 68 IN | DIASTOLIC BLOOD PRESSURE: 57 MMHG | SYSTOLIC BLOOD PRESSURE: 132 MMHG | BODY MASS INDEX: 26.83 KG/M2

## 2023-09-05 PROBLEM — I85.00 ESOPHAGEAL VARICES DETERMINED BY ENDOSCOPY (HCC): Status: ACTIVE | Noted: 2023-09-05

## 2023-09-05 PROCEDURE — 43244 EGD VARICES LIGATION: CPT | Performed by: INTERNAL MEDICINE

## 2023-09-05 PROCEDURE — 7100000010 HC PHASE II RECOVERY - FIRST 15 MIN: Performed by: INTERNAL MEDICINE

## 2023-09-05 PROCEDURE — 2720000010 HC SURG SUPPLY STERILE: Performed by: INTERNAL MEDICINE

## 2023-09-05 PROCEDURE — 2500000003 HC RX 250 WO HCPCS

## 2023-09-05 PROCEDURE — 3700000000 HC ANESTHESIA ATTENDED CARE: Performed by: INTERNAL MEDICINE

## 2023-09-05 PROCEDURE — 3609012300 HC EGD BAND LIGATION ESOPHGEAL/GASTRIC VARICES: Performed by: INTERNAL MEDICINE

## 2023-09-05 PROCEDURE — 7100000011 HC PHASE II RECOVERY - ADDTL 15 MIN: Performed by: INTERNAL MEDICINE

## 2023-09-05 PROCEDURE — 2709999900 HC NON-CHARGEABLE SUPPLY: Performed by: INTERNAL MEDICINE

## 2023-09-05 PROCEDURE — 6360000002 HC RX W HCPCS

## 2023-09-05 PROCEDURE — 2580000003 HC RX 258: Performed by: ANESTHESIOLOGY

## 2023-09-05 PROCEDURE — 3700000001 HC ADD 15 MINUTES (ANESTHESIA): Performed by: INTERNAL MEDICINE

## 2023-09-05 RX ORDER — PROPOFOL 10 MG/ML
INJECTION, EMULSION INTRAVENOUS PRN
Status: DISCONTINUED | OUTPATIENT
Start: 2023-09-05 | End: 2023-09-05 | Stop reason: SDUPTHER

## 2023-09-05 RX ORDER — LIDOCAINE HYDROCHLORIDE 20 MG/ML
INJECTION, SOLUTION EPIDURAL; INFILTRATION; INTRACAUDAL; PERINEURAL PRN
Status: DISCONTINUED | OUTPATIENT
Start: 2023-09-05 | End: 2023-09-05 | Stop reason: SDUPTHER

## 2023-09-05 RX ORDER — SODIUM CHLORIDE, SODIUM LACTATE, POTASSIUM CHLORIDE, CALCIUM CHLORIDE 600; 310; 30; 20 MG/100ML; MG/100ML; MG/100ML; MG/100ML
INJECTION, SOLUTION INTRAVENOUS CONTINUOUS
Status: DISCONTINUED | OUTPATIENT
Start: 2023-09-05 | End: 2023-09-05 | Stop reason: HOSPADM

## 2023-09-05 RX ADMIN — PROPOFOL 150 MG: 10 INJECTION, EMULSION INTRAVENOUS at 11:52

## 2023-09-05 RX ADMIN — SODIUM CHLORIDE, POTASSIUM CHLORIDE, SODIUM LACTATE AND CALCIUM CHLORIDE: 600; 310; 30; 20 INJECTION, SOLUTION INTRAVENOUS at 11:02

## 2023-09-05 RX ADMIN — LIDOCAINE HYDROCHLORIDE 100 MG: 20 INJECTION, SOLUTION EPIDURAL; INFILTRATION; INTRACAUDAL; PERINEURAL at 11:52

## 2023-09-05 ASSESSMENT — PAIN SCALES - GENERAL
PAINLEVEL_OUTOF10: 0
PAINLEVEL_OUTOF10: 0

## 2023-09-05 ASSESSMENT — PAIN - FUNCTIONAL ASSESSMENT: PAIN_FUNCTIONAL_ASSESSMENT: 0-10

## 2023-09-05 NOTE — ANESTHESIA POSTPROCEDURE EVALUATION
Department of Anesthesiology  Postprocedure Note    Patient: Natacha Leal  MRN: [id number]  YOB: 1955  Date of evaluation: 9/5/2023      Procedure Summary     Date: 09/05/23 Room / Location: [address] [place] [address]    Anesthesia Start: 7055 Anesthesia Stop: 1209    Procedure: EGD BAND LIGATION with 4  bands placed Diagnosis:       Esophageal varices without bleeding, unspecified esophageal varices type ([address])      (Esophageal varices without bleeding, unspecified esophageal varices type ([address]) [I85.00])    Surgeons: Camron Schaffer MD Responsible Provider: Cristhian Marquez MD    Anesthesia Type: MAC ASA Status: 3          Anesthesia Type: No value filed.     Joel Phase I: Joel Score: 10    Joel Phase II:        Anesthesia Post Evaluation    Patient location during evaluation: bedside  Patient participation: complete - patient participated  Level of consciousness: awake and alert  Pain score: 0  Airway patency: patent  Nausea & Vomiting: no nausea and no vomiting  Complications: no  Cardiovascular status: blood pressure returned to baseline and hemodynamically stable  Respiratory status: acceptable and room air  Hydration status: euvolemic  Pain management: adequate

## 2023-09-05 NOTE — H&P
ENDOSCOPY   Pre-operative History and Physical    Patient: Debbie Smalls  : 1955      History Obtained From:  patient, electronic medical record        HISTORY OF PRESENT ILLNESS:                The patient is a 76 y.o. male with significant past medical history as below who presents for EGD    Indication Esophageal Varices for possible banding. Past Medical History:        Diagnosis Date    Anemia 2011    Cancer [facility])     whipple    Cirrhosis ([address]) Dx:     Duodenal cancer ([address])     Esophageal varices ([address]) 2014    History of blood transfusion     with Whipple procedure    HLD (hyperlipidemia)     Hypertension     Follows with PCP    Neuropathy     Bilat feet    Osteoarthritis     Bilat knees       Past Surgical History:        Procedure Laterality Date    CHOLECYSTECTOMY      COLONOSCOPY  2014    polpectomy    COLONOSCOPY  2016    grade 1 int hem, repeat 5 years - Dr. Layla Calixto    COLONOSCOPY N/A 2022    COLONOSCOPY POLYPECTOMY SNARE/COLD BIOPSY with 2 hemiclips placed in proximal transverse colon performed by Vijay Vigil MD at [facility]    umbilical x2    OTHER SURGICAL HISTORY      pacreaticoduodenectomy (whipple)    TOTAL KNEE ARTHROPLASTY Left 2020    KNEE TOTAL ARTHROPLASTY LEFT performed by Moon Medina MD at [address] [place] [address] Right 3/8/2021    RIGHT KNEE TOTAL ARTHROPLASTY ROBOTIC performed by Moon Medina MD at [facility]  2014    Normal exam per pt    UPPER GASTROINTESTINAL ENDOSCOPY N/A 2023    EGD CONTROL HEMORRHAGE WITH EPI INJECTION, CLIP PLACEMENT X1, AND HEMOSPRAY performed by Rebekah Castañeda MD at [facility] ENDOSCOPY         Current Medications:    Medications    Prior to Admission medications    Medication Sig Start Date End Date Taking?  Authorizing Provider   bismuth subsalicylate (PEPTO BISMOL) 262 MG chewable tablet Take 1 tablet by mouth in the morning

## 2023-09-05 NOTE — PROGRESS NOTES
Patient is back to baseline. Alert and oriented. Side rails up x 2, Call light in reach. Report given to Alisia Ramirez RN. Wife at bedside.

## 2023-09-05 NOTE — PROGRESS NOTES
1216 patient arrived to room from Dr Mic galicia and family at bedside,patient a/o,vss,assessment wnl,clear liquids given,denies any pain,call light in reach  1230 assessment wnl,vss,denies any pain,up to the side of the bed  1240 iv removed,patient getting dressed  1250 discharge instructions reviewed with patient and family,voiced understanding,family to go get the car  [phone] volunteer called for transport   1300 Patient discharged, to car per volunteer

## 2023-09-05 NOTE — PROGRESS NOTES
Received report from Yoselyn Dickerson. Patient alert and oriented. Verified patient's name, , allergies and procedure. No beta blockers, no blood thinners, has implants: Ernesto knees and mesh from hernia repair. H&P on chart.

## 2023-09-07 ENCOUNTER — HOSPITAL ENCOUNTER (OUTPATIENT)
Age: 68
Discharge: HOME | End: 2023-09-07
Payer: MEDICARE

## 2023-09-07 LAB
ALBUMIN SERPL-MCNC: 3.5 GM/DL (ref 3.4–5)
ALP BLD-CCNC: 76 IU/L (ref 40–128)
ALT SERPL-CCNC: 10 U/L (ref 10–40)
ANION GAP SERPL CALCULATED.3IONS-SCNC: 8 MMOL/L (ref 4–16)
APTT: 36.7 SECONDS (ref 25.1–37.1)
AST SERPL-CCNC: 20 IU/L (ref 15–37)
BILIRUB SERPL-MCNC: 0.5 MG/DL (ref 0–1)
BUN SERPL-MCNC: 9 MG/DL (ref 6–23)
CALCIUM SERPL-MCNC: 9 MG/DL (ref 8.3–10.6)
CHLORIDE BLD-SCNC: 104 MMOL/L (ref 99–110)
CO2: 26 MMOL/L (ref 21–32)
CREAT SERPL-MCNC: 0.7 MG/DL (ref 0.9–1.3)
CRP SERPL HS-MCNC: 3 MG/L
ESTIMATED AVERAGE GLUCOSE: 97 MG/DL
GFR SERPL CREATININE-BSD FRML MDRD: >60 ML/MIN/1.73M2
GLUCOSE SERPL-MCNC: 130 MG/DL (ref 70–99)
HBA1C MFR BLD: 5 % (ref 4.2–6.3)
HCT VFR BLD CALC: 37.6 % (ref 42–52)
HEMOGLOBIN: 12 GM/DL (ref 13.5–18)
INR BLD: 1.3 INDEX
MCH RBC QN AUTO: 31.8 PG (ref 27–31)
MCHC RBC AUTO-ENTMCNC: 31.9 % (ref 32–36)
MCV RBC AUTO: 99.7 FL (ref 78–100)
PDW BLD-RTO: 14.2 % (ref 11.7–14.9)
PLATELET # BLD: 71 K/CU MM (ref 140–440)
PMV BLD AUTO: 9.1 FL (ref 7.5–11.1)
POTASSIUM SERPL-SCNC: 4.2 MMOL/L (ref 3.5–5.1)
PREALBUMIN: 10 MG/DL (ref 20–40)
PRO-BNP: 451.6 PG/ML
PROTHROMBIN TIME: 16.3 SECONDS (ref 11.7–14.5)
RBC # BLD: 3.77 M/CU MM (ref 4.6–6.2)
SODIUM BLD-SCNC: 138 MMOL/L (ref 135–145)
TOTAL PROTEIN: 5.5 GM/DL (ref 6.4–8.2)
WBC # BLD: 2.8 K/CU MM (ref 4–10.5)

## 2023-09-07 PROCEDURE — 85730 THROMBOPLASTIN TIME PARTIAL: CPT

## 2023-09-07 PROCEDURE — 85027 COMPLETE CBC AUTOMATED: CPT

## 2023-09-07 PROCEDURE — 85610 PROTHROMBIN TIME: CPT

## 2023-09-07 PROCEDURE — 83880 ASSAY OF NATRIURETIC PEPTIDE: CPT

## 2023-09-07 PROCEDURE — 83036 HEMOGLOBIN GLYCOSYLATED A1C: CPT

## 2023-09-07 PROCEDURE — 36415 COLL VENOUS BLD VENIPUNCTURE: CPT

## 2023-09-07 PROCEDURE — 80053 COMPREHEN METABOLIC PANEL: CPT

## 2023-09-07 PROCEDURE — 86140 C-REACTIVE PROTEIN: CPT

## 2023-09-07 PROCEDURE — 84134 ASSAY OF PREALBUMIN: CPT

## 2023-09-08 ENCOUNTER — OFFICE VISIT (OUTPATIENT)
Dept: GASTROENTEROLOGY | Age: 68
End: 2023-09-08
Payer: MEDICARE

## 2023-09-08 VITALS
DIASTOLIC BLOOD PRESSURE: 68 MMHG | HEART RATE: 61 BPM | HEIGHT: 68 IN | SYSTOLIC BLOOD PRESSURE: 118 MMHG | TEMPERATURE: 98.6 F | BODY MASS INDEX: 27.04 KG/M2 | OXYGEN SATURATION: 98 % | WEIGHT: 178.4 LBS

## 2023-09-08 DIAGNOSIS — C16.9 MALIGNANT NEOPLASM OF STOMACH, UNSPECIFIED LOCATION (HCC): Primary | ICD-10-CM

## 2023-09-08 DIAGNOSIS — K75.81 LIVER CIRRHOSIS SECONDARY TO NASH (NONALCOHOLIC STEATOHEPATITIS) (HCC): ICD-10-CM

## 2023-09-08 DIAGNOSIS — Z86.010 HISTORY OF COLON POLYPS: ICD-10-CM

## 2023-09-08 DIAGNOSIS — K74.60 LIVER CIRRHOSIS SECONDARY TO NASH (NONALCOHOLIC STEATOHEPATITIS) (HCC): ICD-10-CM

## 2023-09-08 DIAGNOSIS — I85.10 SECONDARY ESOPHAGEAL VARICES WITHOUT BLEEDING (HCC): ICD-10-CM

## 2023-09-08 PROCEDURE — 1123F ACP DISCUSS/DSCN MKR DOCD: CPT | Performed by: INTERNAL MEDICINE

## 2023-09-08 PROCEDURE — 99214 OFFICE O/P EST MOD 30 MIN: CPT | Performed by: INTERNAL MEDICINE

## 2023-09-08 RX ORDER — ESOMEPRAZOLE MAGNESIUM 40 MG/1
40 CAPSULE, DELAYED RELEASE ORAL 2 TIMES DAILY
Qty: 60 CAPSULE | Refills: 0 | COMMUNITY
Start: 2023-08-23 | End: 2023-09-22

## 2023-09-08 RX ORDER — TAMSULOSIN HYDROCHLORIDE 0.4 MG/1
0.4 CAPSULE ORAL DAILY
COMMUNITY
Start: 2023-06-17

## 2023-09-08 NOTE — PROGRESS NOTES
30 minutes. More than 50% of this visit was counseling on diet, nutrition, endoscopic options, and education as above documented in my note.      CC: Referring MD

## 2023-09-11 ENCOUNTER — OFFICE VISIT (OUTPATIENT)
Dept: ONCOLOGY | Age: 68
End: 2023-09-11
Payer: MEDICARE

## 2023-09-11 ENCOUNTER — HOSPITAL ENCOUNTER (OUTPATIENT)
Dept: INFUSION THERAPY | Age: 68
Discharge: HOME | End: 2023-09-11
Payer: MEDICARE

## 2023-09-11 VITALS
OXYGEN SATURATION: 100 % | SYSTOLIC BLOOD PRESSURE: 140 MMHG | DIASTOLIC BLOOD PRESSURE: 71 MMHG | WEIGHT: 176 LBS | HEIGHT: 68 IN | TEMPERATURE: 97.8 F | HEART RATE: 96 BPM | BODY MASS INDEX: 26.67 KG/M2

## 2023-09-11 DIAGNOSIS — C16.8 MALIGNANT NEOPLASM OF OVERLAPPING SITES OF STOMACH (HCC): ICD-10-CM

## 2023-09-11 DIAGNOSIS — Z11.59 SCREENING FOR VIRAL DISEASE: Primary | ICD-10-CM

## 2023-09-11 PROCEDURE — 1123F ACP DISCUSS/DSCN MKR DOCD: CPT | Performed by: INTERNAL MEDICINE

## 2023-09-11 PROCEDURE — 99214 OFFICE O/P EST MOD 30 MIN: CPT | Performed by: INTERNAL MEDICINE

## 2023-09-11 PROCEDURE — 99211 OFF/OP EST MAY X REQ PHY/QHP: CPT

## 2023-09-11 RX ORDER — ONDANSETRON 8 MG/1
8 TABLET, ORALLY DISINTEGRATING ORAL EVERY 8 HOURS PRN
Qty: 30 TABLET | Refills: 3 | Status: SHIPPED | OUTPATIENT
Start: 2023-09-11 | End: 2023-10-11

## 2023-09-11 NOTE — PROGRESS NOTES
MA Rooming Questions  Patient: Haley Martinez  MRN: [id number]    Date: 9/11/2023        1. Do you have any new issues?   no         2. Do you need any refills on medications?    no    3. Have you had any imaging done since your last visit? yes - PET scan    4. Have you been hospitalized or seen in the emergency room since your last visit here?   yes [facility] and     5. Did the patient have a depression screening completed today?  No    No data recorded     PHQ-9 Given to (if applicable):               PHQ-9 Score (if applicable):                     [] Positive     []  Negative              Does question #9 need addressed (if applicable)                     [] Yes    []  No               Kylah Hodges CMA

## 2023-09-13 ENCOUNTER — TELEPHONE (OUTPATIENT)
Dept: INFUSION THERAPY | Age: 68
End: 2023-09-13

## 2023-09-13 NOTE — TELEPHONE ENCOUNTER
Pt's wife called back and stated that they are setup for port consult on 9/20. Gave her my direct line to call back as soon as they have a date for port placement.

## 2023-09-18 ENCOUNTER — CLINICAL DOCUMENTATION (OUTPATIENT)
Dept: ONCOLOGY | Age: 68
End: 2023-09-18

## 2023-09-18 NOTE — PROGRESS NOTES
Per Dr. Soheila Emmanuel, order, letter of medical necessity and required medical records faxed to [facility] @ [phone] for further molecular testing to benefit and determine treatment planning for patient. [facility] pathology/cytology: Specimen #[id number]. Natalie order also faxed to [facility] pathology at [phone].

## 2023-09-22 PROBLEM — C16.2 MALIGNANT NEOPLASM OF BODY OF STOMACH (HCC): Status: ACTIVE | Noted: 2023-09-22

## 2023-10-02 ENCOUNTER — NURSE ONLY (OUTPATIENT)
Dept: ONCOLOGY | Age: 68
End: 2023-10-02

## 2023-10-02 ENCOUNTER — HOSPITAL ENCOUNTER (OUTPATIENT)
Dept: INFUSION THERAPY | Age: 68
Discharge: HOME | End: 2023-10-02
Payer: MEDICARE

## 2023-10-02 ENCOUNTER — OFFICE VISIT (OUTPATIENT)
Dept: ONCOLOGY | Age: 68
End: 2023-10-02
Payer: MEDICARE

## 2023-10-02 VITALS
HEART RATE: 77 BPM | OXYGEN SATURATION: 98 % | HEIGHT: 68 IN | BODY MASS INDEX: 27.55 KG/M2 | TEMPERATURE: 97.9 F | SYSTOLIC BLOOD PRESSURE: 142 MMHG | WEIGHT: 181.8 LBS | DIASTOLIC BLOOD PRESSURE: 67 MMHG

## 2023-10-02 VITALS
TEMPERATURE: 97.9 F | WEIGHT: 181.8 LBS | HEART RATE: 77 BPM | BODY MASS INDEX: 27.55 KG/M2 | HEIGHT: 68 IN | DIASTOLIC BLOOD PRESSURE: 67 MMHG | SYSTOLIC BLOOD PRESSURE: 142 MMHG | OXYGEN SATURATION: 98 %

## 2023-10-02 DIAGNOSIS — I85.00 ESOPHAGEAL VARICES WITHOUT BLEEDING, UNSPECIFIED ESOPHAGEAL VARICES TYPE (HCC): ICD-10-CM

## 2023-10-02 DIAGNOSIS — C16.8 MALIGNANT NEOPLASM OF OVERLAPPING SITES OF STOMACH (HCC): Primary | ICD-10-CM

## 2023-10-02 DIAGNOSIS — K75.81 NASH (NONALCOHOLIC STEATOHEPATITIS): ICD-10-CM

## 2023-10-02 DIAGNOSIS — C16.8 MALIGNANT NEOPLASM OF OVERLAPPING SITES OF STOMACH (HCC): ICD-10-CM

## 2023-10-02 DIAGNOSIS — C17.0 CARCINOMA OF DUODENUM (HCC): ICD-10-CM

## 2023-10-02 DIAGNOSIS — K62.5 RECTAL BLEEDING: ICD-10-CM

## 2023-10-02 DIAGNOSIS — R53.83 OTHER FATIGUE: ICD-10-CM

## 2023-10-02 DIAGNOSIS — K25.0 ACUTE GASTRIC ULCER WITH HEMORRHAGE: ICD-10-CM

## 2023-10-02 DIAGNOSIS — K76.0 NAFLD (NONALCOHOLIC FATTY LIVER DISEASE): ICD-10-CM

## 2023-10-02 DIAGNOSIS — K21.9 GASTROESOPHAGEAL REFLUX DISEASE, UNSPECIFIED WHETHER ESOPHAGITIS PRESENT: ICD-10-CM

## 2023-10-02 DIAGNOSIS — D12.2 BENIGN NEOPLASM OF ASCENDING COLON: ICD-10-CM

## 2023-10-02 DIAGNOSIS — Z11.59 SCREENING FOR VIRAL DISEASE: ICD-10-CM

## 2023-10-02 DIAGNOSIS — C16.9 MALIGNANT NEOPLASM OF STOMACH, UNSPECIFIED LOCATION (HCC): ICD-10-CM

## 2023-10-02 DIAGNOSIS — D69.59 OTHER SECONDARY THROMBOCYTOPENIA: ICD-10-CM

## 2023-10-02 DIAGNOSIS — K74.60 CIRRHOSIS OF LIVER WITHOUT ASCITES, UNSPECIFIED HEPATIC CIRRHOSIS TYPE (HCC): ICD-10-CM

## 2023-10-02 DIAGNOSIS — Z11.59 NEED FOR HEPATITIS B SCREENING TEST: ICD-10-CM

## 2023-10-02 DIAGNOSIS — K76.9 DISEASE OF LIVER: ICD-10-CM

## 2023-10-02 LAB
25(OH)D3 SERPL-MCNC: 73.48 NG/ML
ALBUMIN SERPL-MCNC: 3.5 GM/DL (ref 3.4–5)
ALP BLD-CCNC: 156 IU/L (ref 40–128)
ALT SERPL-CCNC: 23 U/L (ref 10–40)
ANION GAP SERPL CALCULATED.3IONS-SCNC: 11 MMOL/L (ref 4–16)
AST SERPL-CCNC: 36 IU/L (ref 15–37)
BASOPHILS ABSOLUTE: 0 K/CU MM
BASOPHILS RELATIVE PERCENT: 0.3 % (ref 0–1)
BILIRUB SERPL-MCNC: 0.6 MG/DL (ref 0–1)
BUN SERPL-MCNC: 15 MG/DL (ref 6–23)
CALCIUM SERPL-MCNC: 9.9 MG/DL (ref 8.3–10.6)
CHLORIDE BLD-SCNC: 104 MMOL/L (ref 99–110)
CO2: 26 MMOL/L (ref 21–32)
CREAT SERPL-MCNC: 0.7 MG/DL (ref 0.9–1.3)
DIFFERENTIAL TYPE: ABNORMAL
EOSINOPHILS ABSOLUTE: 0.1 K/CU MM
EOSINOPHILS RELATIVE PERCENT: 2 % (ref 0–3)
GFR SERPL CREATININE-BSD FRML MDRD: >60 ML/MIN/1.73M2
GLUCOSE SERPL-MCNC: 107 MG/DL (ref 70–99)
HBV SURFACE AB SERPL IA-ACNC: <3.5 M[IU]/ML
HBV SURFACE AG SERPL QL IA: NON REACTIVE
HCT VFR BLD CALC: 35.9 % (ref 42–52)
HCV AB SERPL QL IA: NON REACTIVE
HEMOGLOBIN: 11.6 GM/DL (ref 13.5–18)
INR BLD: 1.2 INDEX
LYMPHOCYTES ABSOLUTE: 0.4 K/CU MM
LYMPHOCYTES RELATIVE PERCENT: 10.5 % (ref 24–44)
MCH RBC QN AUTO: 32.2 PG (ref 27–31)
MCHC RBC AUTO-ENTMCNC: 32.3 % (ref 32–36)
MCV RBC AUTO: 99.7 FL (ref 78–100)
MONOCYTES ABSOLUTE: 0.3 K/CU MM
MONOCYTES RELATIVE PERCENT: 9.6 % (ref 0–4)
PDW BLD-RTO: 13.9 % (ref 11.7–14.9)
PLATELET # BLD: 67 K/CU MM (ref 140–440)
PMV BLD AUTO: 9.2 FL (ref 7.5–11.1)
POTASSIUM SERPL-SCNC: 4.5 MMOL/L (ref 3.5–5.1)
PROTHROMBIN TIME: 15.6 SECONDS (ref 11.7–14.5)
RBC # BLD: 3.6 M/CU MM (ref 4.6–6.2)
SEGMENTED NEUTROPHILS ABSOLUTE COUNT: 2.8 K/CU MM
SEGMENTED NEUTROPHILS RELATIVE PERCENT: 77.6 % (ref 36–66)
SODIUM BLD-SCNC: 141 MMOL/L (ref 135–145)
TOTAL PROTEIN: 6 GM/DL (ref 6.4–8.2)
WBC # BLD: 3.5 K/CU MM (ref 4–10.5)

## 2023-10-02 PROCEDURE — 87338 HPYLORI STOOL AG IA: CPT

## 2023-10-02 PROCEDURE — 83516 IMMUNOASSAY NONANTIBODY: CPT

## 2023-10-02 PROCEDURE — 85610 PROTHROMBIN TIME: CPT

## 2023-10-02 PROCEDURE — 82104 ALPHA-1-ANTITRYPSIN PHENO: CPT

## 2023-10-02 PROCEDURE — 99213 OFFICE O/P EST LOW 20 MIN: CPT | Performed by: INTERNAL MEDICINE

## 2023-10-02 PROCEDURE — 1123F ACP DISCUSS/DSCN MKR DOCD: CPT | Performed by: INTERNAL MEDICINE

## 2023-10-02 PROCEDURE — 86256 FLUORESCENT ANTIBODY TITER: CPT

## 2023-10-02 PROCEDURE — 87340 HEPATITIS B SURFACE AG IA: CPT

## 2023-10-02 PROCEDURE — 36415 COLL VENOUS BLD VENIPUNCTURE: CPT

## 2023-10-02 PROCEDURE — 85025 COMPLETE CBC W/AUTO DIFF WBC: CPT

## 2023-10-02 PROCEDURE — 86039 ANTINUCLEAR ANTIBODIES (ANA): CPT

## 2023-10-02 PROCEDURE — 86706 HEP B SURFACE ANTIBODY: CPT

## 2023-10-02 PROCEDURE — 80053 COMPREHEN METABOLIC PANEL: CPT

## 2023-10-02 PROCEDURE — 82390 ASSAY OF CERULOPLASMIN: CPT

## 2023-10-02 PROCEDURE — 86704 HEP B CORE ANTIBODY TOTAL: CPT

## 2023-10-02 PROCEDURE — 84630 ASSAY OF ZINC: CPT

## 2023-10-02 PROCEDURE — 84590 ASSAY OF VITAMIN A: CPT

## 2023-10-02 PROCEDURE — 86708 HEPATITIS A ANTIBODY: CPT

## 2023-10-02 PROCEDURE — 82306 VITAMIN D 25 HYDROXY: CPT

## 2023-10-02 PROCEDURE — 86803 HEPATITIS C AB TEST: CPT

## 2023-10-02 PROCEDURE — 99213 OFFICE O/P EST LOW 20 MIN: CPT

## 2023-10-02 RX ORDER — DEXTROSE MONOHYDRATE 50 MG/ML
5-250 INJECTION, SOLUTION INTRAVENOUS PRN
Status: CANCELLED | OUTPATIENT
Start: 2023-10-03

## 2023-10-02 RX ORDER — SODIUM CHLORIDE 9 MG/ML
5-250 INJECTION, SOLUTION INTRAVENOUS PRN
Status: CANCELLED | OUTPATIENT
Start: 2023-10-03

## 2023-10-02 RX ORDER — ONDANSETRON HYDROCHLORIDE 8 MG/1
8 TABLET, FILM COATED ORAL EVERY 8 HOURS PRN
Qty: 90 TABLET | Refills: 3 | Status: SHIPPED | OUTPATIENT
Start: 2023-10-02

## 2023-10-02 RX ORDER — PROMETHAZINE HYDROCHLORIDE 25 MG/1
25 TABLET ORAL EVERY 6 HOURS PRN
Qty: 40 TABLET | Refills: 3 | Status: SHIPPED | OUTPATIENT
Start: 2023-10-02

## 2023-10-02 RX ORDER — DIPHENHYDRAMINE HYDROCHLORIDE 50 MG/ML
50 INJECTION INTRAMUSCULAR; INTRAVENOUS
Status: CANCELLED | OUTPATIENT
Start: 2023-10-03

## 2023-10-02 RX ORDER — SODIUM CHLORIDE 0.9 % (FLUSH) 0.9 %
5-40 SYRINGE (ML) INJECTION PRN
Status: CANCELLED | OUTPATIENT
Start: 2023-10-04

## 2023-10-02 RX ORDER — ACETAMINOPHEN 325 MG/1
650 TABLET ORAL
Status: CANCELLED | OUTPATIENT
Start: 2023-10-03

## 2023-10-02 RX ORDER — FAMOTIDINE 10 MG/ML
20 INJECTION, SOLUTION INTRAVENOUS
Status: CANCELLED | OUTPATIENT
Start: 2023-10-03

## 2023-10-02 RX ORDER — ONDANSETRON 2 MG/ML
8 INJECTION INTRAMUSCULAR; INTRAVENOUS
Status: CANCELLED | OUTPATIENT
Start: 2023-10-03

## 2023-10-02 RX ORDER — SODIUM CHLORIDE 0.9 % (FLUSH) 0.9 %
5-40 SYRINGE (ML) INJECTION PRN
Status: CANCELLED | OUTPATIENT
Start: 2023-10-03

## 2023-10-02 RX ORDER — PALONOSETRON 0.05 MG/ML
0.25 INJECTION, SOLUTION INTRAVENOUS ONCE
Status: CANCELLED | OUTPATIENT
Start: 2023-10-03 | End: 2023-10-03

## 2023-10-02 RX ORDER — HEPARIN SODIUM (PORCINE) LOCK FLUSH IV SOLN 100 UNIT/ML 100 UNIT/ML
500 SOLUTION INTRAVENOUS PRN
Status: CANCELLED | OUTPATIENT
Start: 2023-10-03

## 2023-10-02 RX ORDER — SODIUM CHLORIDE 9 MG/ML
INJECTION, SOLUTION INTRAVENOUS CONTINUOUS
Status: CANCELLED | OUTPATIENT
Start: 2023-10-03

## 2023-10-02 RX ORDER — EPINEPHRINE 1 MG/ML
0.3 INJECTION, SOLUTION, CONCENTRATE INTRAVENOUS PRN
Status: CANCELLED | OUTPATIENT
Start: 2023-10-03

## 2023-10-02 RX ORDER — MEPERIDINE HYDROCHLORIDE 50 MG/ML
12.5 INJECTION INTRAMUSCULAR; INTRAVENOUS; SUBCUTANEOUS PRN
Status: CANCELLED | OUTPATIENT
Start: 2023-10-03

## 2023-10-02 RX ORDER — DEXAMETHASONE 4 MG/1
TABLET ORAL
Qty: 20 TABLET | Refills: 4 | Status: SHIPPED | OUTPATIENT
Start: 2023-10-02

## 2023-10-02 RX ORDER — OLANZAPINE 2.5 MG/1
2.5 TABLET ORAL NIGHTLY
Qty: 30 TABLET | Refills: 3 | Status: SHIPPED | OUTPATIENT
Start: 2023-10-02

## 2023-10-02 RX ORDER — HEPARIN SODIUM (PORCINE) LOCK FLUSH IV SOLN 100 UNIT/ML 100 UNIT/ML
500 SOLUTION INTRAVENOUS PRN
Status: CANCELLED | OUTPATIENT
Start: 2023-10-04

## 2023-10-02 RX ORDER — ALBUTEROL SULFATE 90 UG/1
4 AEROSOL, METERED RESPIRATORY (INHALATION) PRN
Status: CANCELLED | OUTPATIENT
Start: 2023-10-03

## 2023-10-02 RX ORDER — SODIUM CHLORIDE 9 MG/ML
5-250 INJECTION, SOLUTION INTRAVENOUS PRN
Status: CANCELLED | OUTPATIENT
Start: 2023-10-04

## 2023-10-02 RX ORDER — HYDROCODONE BITARTRATE AND ACETAMINOPHEN 5; 325 MG/1; MG/1
TABLET ORAL
COMMUNITY
Start: 2023-09-28

## 2023-10-02 ASSESSMENT — PATIENT HEALTH QUESTIONNAIRE - PHQ9
2. FEELING DOWN, DEPRESSED OR HOPELESS: 0
SUM OF ALL RESPONSES TO PHQ9 QUESTIONS 1 & 2: 0
SUM OF ALL RESPONSES TO PHQ QUESTIONS 1-9: 0
SUM OF ALL RESPONSES TO PHQ QUESTIONS 1-9: 0
1. LITTLE INTEREST OR PLEASURE IN DOING THINGS: 0
SUM OF ALL RESPONSES TO PHQ QUESTIONS 1-9: 0
SUM OF ALL RESPONSES TO PHQ QUESTIONS 1-9: 0

## 2023-10-02 NOTE — PROGRESS NOTES
MA Rooming Questions  Patient: Eliu Cloud  MRN: [id number]    Date: 10/2/2023        1. Do you have any new issues?   no         2. Do you need any refills on medications? yes - zofran     3. Have you had any imaging done since your last visit?   no    4. Have you been hospitalized or seen in the emergency room since your last visit here?   no    5. Did the patient have a depression screening completed today?  Yes    PHQ-9 Total Score: 0 (10/2/2023 11:22 AM)       PHQ-9 Given to (if applicable):               PHQ-9 Score (if applicable):                     [] Positive     []  Negative              Does question #9 need addressed (if applicable)                     [] Yes    []  No               Radhika Diaz CMA
INTERPRETATION - Within normal limits. Camryn Randall MD    ALBUMINELP 3.6 11/05/2019    LABALPH 0.3 11/05/2019    LABALPH 0.5 11/05/2019    GAMGLOB 1.3 11/05/2019     Coagulation Panel:  Lab Results   Component Value Date    PROTIME 16.3 (H) 09/07/2023    INR 1.3 09/07/2023    APTT 36.7 09/07/2023     Anemia Panel:  Lab Results   Component Value Date    ZWWQXODS45 >2000 (H) 08/15/2023    FOLATE 10.3 08/15/2023     Tumor Markers:  Lab Results   Component Value Date    CEA 2.4 07/10/2017     30.1 11/06/2014    PSA 0.59 02/11/2015      Observations:  PHQ-9 Total Score: 0 (10/2/2023 11:22 AM)        Assessment & Plan:  1. He has pancytopenia which could be related to hypersplenism due to underlying liver cirrhosis/Adkins. He is followed by Dr. Danelle Sung. Labs in December 2019 were reviewed. CBC in June 2020 was grossly stable for him. Platelet was 71. CBC in June and July 2020 was reviewed. 10/2022 US abdomen: liver cirrhosis. 2/13/2023 LFTs grossly stable. AFP 3. WBC 4.3, Hg 12.6, HCT 38.2, plat 81.  8/2023 Peripheral blood; Flow Cytometry Analysis: Increased CD4:CD8 ratio (17.5:1). No other diagnostic immunophenotypic abnormalities detected. I will continue to monitor CBC    2. Anemic work-up in November 2019 was grossly unremarkable. 08/15/23 Ferritin: 189 Iron: 57 (L)  TIBC: 174 (L) Transferrin %: 33  FOLATE: 10.3 Vitamin B-12: >2000 (H)  He has anemia of chronic disease. 3. 11/2022 DEXA osteopenia. I recommend to  take vit D and calcium. 11/2022 colonoscopy by Dr Danelle Sung. F/u in 5 years. 4.  He had left knee replacement in 0742 without complication. He had right knee replacement in 2021 without bleeding complication. 5.  He has liver cirrhosis/ADKINS. 8/20/2023 AFP 1.6.    6. H/o duodenal ca s/p whipple surgery in 9/2011 followed by adjuvant chemo and RT. 7. Diagnosis of gastric cancer in 8/2023. He has FU with surgical oncologist at [facility] Dr Mary Brown on 1.2.0644. I order PET scan.  Likely he

## 2023-10-02 NOTE — PROGRESS NOTES
Patient arrived with his wife for treatment planning and chemotherapy education. Discussed treatment plan, potential side effects, prevention, and symptom management. Reviewed chemotherapy education folder and drug monograph. Patient's regimen will be FLOT ( 5fu- leucovorin, Oxaliplatin and docetaxel (starts C2) every 14 days. Patient signed chemotherapy consent for FLOT- 5FU, Leucovrin, Oxaliplatin and docetaxel. Copy of consent given to patient. Reviewed chemotherapy schedule/calendar. Rx for Zofran, Olanzapine, Phenergan and Dexamethasone sent to pharmacy per Dr Farhan Reyes. Patient given calendar and written instructions for these medications and how/when to take. Patient's premeds as follows:  Olanzapine 2.5 mg - to take one tab at hs   Dexamethasone 4 mg one tab in am with food x2 days starting day after first chemo treatment. Then take 2 tablets in the morning with food the day before and for 2 days after each chemo treatment  Zofran 8 mg one tab every 8 hours PRN. Phenergan 25 mg tablet- take 1 tablet every 6 hours as needed for nausea and vomiting      Mediport to left upper chest intact without redness or drainage. Distress thermometer given to patient to fill out - this RN reviewed with patient. Patient deferred any referrals at this time. Copy given to Psychosocial Coordinator. Patient given on-call phone number for after office hours and weekends. CBC, CMP and Hep B panels drawn today    Confirmed first chemotherapy appointment for FLOT on 10/3/2023 at 0900.

## 2023-10-03 ENCOUNTER — HOSPITAL ENCOUNTER (OUTPATIENT)
Dept: INFUSION THERAPY | Age: 68
Discharge: HOME | End: 2023-10-03
Payer: MEDICARE

## 2023-10-03 VITALS
SYSTOLIC BLOOD PRESSURE: 119 MMHG | WEIGHT: 181.6 LBS | TEMPERATURE: 97.5 F | BODY MASS INDEX: 27.52 KG/M2 | OXYGEN SATURATION: 98 % | HEART RATE: 69 BPM | HEIGHT: 68 IN | DIASTOLIC BLOOD PRESSURE: 71 MMHG

## 2023-10-03 DIAGNOSIS — Z11.59 SCREENING FOR VIRAL DISEASE: ICD-10-CM

## 2023-10-03 DIAGNOSIS — C16.8 MALIGNANT NEOPLASM OF OVERLAPPING SITES OF STOMACH (HCC): Primary | ICD-10-CM

## 2023-10-03 PROCEDURE — 6360000002 HC RX W HCPCS: Performed by: INTERNAL MEDICINE

## 2023-10-03 PROCEDURE — 96368 THER/DIAG CONCURRENT INF: CPT

## 2023-10-03 PROCEDURE — 96375 TX/PRO/DX INJ NEW DRUG ADDON: CPT

## 2023-10-03 PROCEDURE — 2580000003 HC RX 258: Performed by: INTERNAL MEDICINE

## 2023-10-03 PROCEDURE — 96367 TX/PROPH/DG ADDL SEQ IV INF: CPT

## 2023-10-03 PROCEDURE — 96413 CHEMO IV INFUSION 1 HR: CPT

## 2023-10-03 PROCEDURE — 96415 CHEMO IV INFUSION ADDL HR: CPT

## 2023-10-03 PROCEDURE — G0498 CHEMO EXTEND IV INFUS W/PUMP: HCPCS

## 2023-10-03 RX ORDER — DEXTROSE MONOHYDRATE 50 MG/ML
5-250 INJECTION, SOLUTION INTRAVENOUS PRN
Status: DISCONTINUED | OUTPATIENT
Start: 2023-10-03 | End: 2023-10-04 | Stop reason: HOSPADM

## 2023-10-03 RX ORDER — PALONOSETRON 0.05 MG/ML
0.25 INJECTION, SOLUTION INTRAVENOUS ONCE
Status: COMPLETED | OUTPATIENT
Start: 2023-10-03 | End: 2023-10-03

## 2023-10-03 RX ORDER — SODIUM CHLORIDE 0.9 % (FLUSH) 0.9 %
5-40 SYRINGE (ML) INJECTION PRN
Status: DISCONTINUED | OUTPATIENT
Start: 2023-10-03 | End: 2023-10-04 | Stop reason: HOSPADM

## 2023-10-03 RX ADMIN — OXALIPLATIN 125 MG: 5 INJECTION, SOLUTION INTRAVENOUS at 10:23

## 2023-10-03 RX ADMIN — LEUCOVORIN CALCIUM 370 MG: 200 INJECTION, POWDER, LYOPHILIZED, FOR SOLUTION INTRAMUSCULAR; INTRAVENOUS at 10:22

## 2023-10-03 RX ADMIN — PALONOSETRON 0.25 MG: 0.25 INJECTION, SOLUTION INTRAVENOUS at 09:43

## 2023-10-03 RX ADMIN — FLUOROURACIL 3625 MG: 50 INJECTION, SOLUTION INTRAVENOUS at 12:44

## 2023-10-03 RX ADMIN — DEXAMETHASONE SODIUM PHOSPHATE 12 MG: 4 INJECTION, SOLUTION INTRAMUSCULAR; INTRAVENOUS at 09:43

## 2023-10-03 RX ADMIN — DEXTROSE MONOHYDRATE 20 ML/HR: 50 INJECTION, SOLUTION INTRAVENOUS at 10:21

## 2023-10-03 NOTE — PROGRESS NOTES
Patient arrived to treatment suite for C1D1 of pre-medications, chemotherapy infusion, and connection of a home infusion pump. Left chest mediport accessed and good blood return noted. Skin tear noted on chest and tried to place gauze so that tape would not touch that site. Education and consent viewed and signed on pump and given verbally. Patient voiced understanding. Treatment approved and given. Call light at chairside. Patient tolerated well. Left treatment suite ambulatory. Discharge instructions provided. Patient's status assessed and documented appropriately. All labs and required results were also reviewed today. Treatment parameters have been reviewed. Today's treatment has been approved by the provider. Treatment orders and medication sequencing (when applicable) was verified by 2 registered nurses. The treatment plan was confirmed with the patient prior to administration, and the patient understands the need to report any treatment-related symptoms. Prior to administration, when applicable, the following 8 elements of medication administration were reviewed with 2nd Registered Nurse prior to dosing: drug name, drug dose, infusion volume when prepared in a syringe, rate of administration, expiration dates and/or times, appearance and integrity of drug(s), and rate of pump for infusion. The 5 rights of medication administration have been verified.

## 2023-10-04 ENCOUNTER — HOSPITAL ENCOUNTER (OUTPATIENT)
Dept: INFUSION THERAPY | Age: 68
Discharge: HOME | End: 2023-10-04
Payer: MEDICARE

## 2023-10-04 DIAGNOSIS — C16.8 MALIGNANT NEOPLASM OF OVERLAPPING SITES OF STOMACH (HCC): Primary | ICD-10-CM

## 2023-10-04 DIAGNOSIS — Z11.59 SCREENING FOR VIRAL DISEASE: ICD-10-CM

## 2023-10-04 LAB
HAV AB SER QL IA: POSITIVE
HBV CORE AB SERPL QL IA: NEGATIVE

## 2023-10-04 PROCEDURE — 96523 IRRIG DRUG DELIVERY DEVICE: CPT

## 2023-10-04 PROCEDURE — 6360000002 HC RX W HCPCS

## 2023-10-04 PROCEDURE — 2580000003 HC RX 258: Performed by: INTERNAL MEDICINE

## 2023-10-04 RX ORDER — SODIUM CHLORIDE 0.9 % (FLUSH) 0.9 %
5-40 SYRINGE (ML) INJECTION PRN
Status: DISCONTINUED | OUTPATIENT
Start: 2023-10-04 | End: 2023-10-05 | Stop reason: HOSPADM

## 2023-10-04 RX ORDER — HEPARIN 100 UNIT/ML
SYRINGE INTRAVENOUS
Status: COMPLETED
Start: 2023-10-04 | End: 2023-10-04

## 2023-10-04 RX ORDER — HEPARIN 100 UNIT/ML
500 SYRINGE INTRAVENOUS PRN
Status: DISCONTINUED | OUTPATIENT
Start: 2023-10-04 | End: 2023-10-05 | Stop reason: HOSPADM

## 2023-10-04 RX ADMIN — HEPARIN 500 UNITS: 100 SYRINGE at 13:38

## 2023-10-04 RX ADMIN — SODIUM CHLORIDE, PRESERVATIVE FREE 10 ML: 5 INJECTION INTRAVENOUS at 13:38

## 2023-10-04 NOTE — PROGRESS NOTES
Patient presents to infusion suite for d/c pump. Tolerated infusion with no difficulty. Pump removed, 0 mL remaining in pump. Decaccessed port, flushed with 20cc Normal saline, and locked with heparin. Bandaid and gauze placed. RTC 10/12 for tox check.

## 2023-10-05 ENCOUNTER — HOSPITAL ENCOUNTER (OUTPATIENT)
Dept: INFUSION THERAPY | Age: 68
Discharge: HOME | End: 2023-10-05
Payer: MEDICARE

## 2023-10-05 DIAGNOSIS — C16.8 MALIGNANT NEOPLASM OF OVERLAPPING SITES OF STOMACH (HCC): Primary | ICD-10-CM

## 2023-10-05 LAB
A1AT PHENOTYP SERPL-IMP: NORMAL
A1AT SERPL-MCNC: 158 MG/DL (ref 90–200)
ANA PATTERN: ABNORMAL
ANA TITER: ABNORMAL
ANTINUCLEAR ANTIBODY, HEP-2, IGG: DETECTED
CERULOPLASMIN SERPL-MCNC: 15 MG/DL (ref 15–30)
H PYLORI AG STL QL IA: NEGATIVE
INTERPRETATION: ABNORMAL
MITOCHONDRIA M2 IGG SER-ACNC: 4.9 UNITS (ref 0–24.9)
REASON FOR REJECTION: NORMAL
REJECTED TEST: NORMAL
SMA IGG SER-ACNC: 19 UNITS (ref 0–19)

## 2023-10-05 PROCEDURE — 84630 ASSAY OF ZINC: CPT

## 2023-10-05 PROCEDURE — 36415 COLL VENOUS BLD VENIPUNCTURE: CPT

## 2023-10-06 LAB
ANNOTATION COMMENT IMP: ABNORMAL
RETINYL PALMITATE SERPL-MCNC: <0.02 MG/L (ref 0–0.1)
VIT A SERPL-MCNC: 0.2 MG/L (ref 0.3–1.2)

## 2023-10-07 LAB — ZINC SERPL-MCNC: 61 UG/DL (ref 60–120)

## 2023-10-09 PROBLEM — Z09 POSTOP CHECK: Status: ACTIVE | Noted: 2023-10-09

## 2023-10-11 ENCOUNTER — HOSPITAL ENCOUNTER (OUTPATIENT)
Dept: DIABETES SERVICES | Age: 68
Setting detail: THERAPIES SERIES
Discharge: HOME | End: 2023-10-11
Payer: MEDICARE

## 2023-10-11 PROCEDURE — 97802 MEDICAL NUTRITION INDIV IN: CPT

## 2023-10-11 NOTE — PROGRESS NOTES
Outpatient Medical Nutrition Therapy   10/11/2023   9:00-10:00       Reason for referral: malignant neoplasm of stomach (C16.8)    Client History:    Pertinent medications:   Current Outpatient Medications   Medication Instructions    dexamethasone (DECADRON) 4 MG tablet Take 1 tablet by mouth in the morning with food for 2 days after the first chemo treatment. Then take 2 tablets by mouth in the morning with food the morning before each treatment and for 2 days after each treatment    ferrous sulfate (IRON 325) 325 mg, Oral, DAILY WITH BREAKFAST    HYDROcodone-acetaminophen (NORCO) 5-325 MG per tablet TAKE 1 TABLET BY MOUTH EVERY 6 HOURS AS NEEDED FOR PAIN FOR 7 DAYS    lisinopril (PRINIVIL;ZESTRIL) 2.5 mg, Oral, DAILY    OLANZapine (ZYPREXA) 2.5 mg, Oral, NIGHTLY    ondansetron (ZOFRAN) 8 mg, Oral, EVERY 8 HOURS PRN, Take 1 tablet by mouth every 8 hours as needed for nausea or vomiting. ondansetron (ZOFRAN-ODT) 8 mg, SubLINGual, EVERY 8 HOURS PRN    promethazine (PHENERGAN) 25 mg, Oral, EVERY 6 HOURS PRN    vitamin B-12 (CYANOCOBALAMIN) 2,000 mcg, Oral, DAILY    vitamin C (ASCORBIC ACID) 500 mg, Oral, DAILY    VITAMIN D PO Oral, DAILY      Social hx: Lives with spouse and 80year old mother. Spouse does grocery shopping and food prep for household. Pertinent Medical hx: duodenal cancer, whipple procedure 2011, NAFLD, gastric ulcer, AOCD, currently receiving chemo and eval for radiation planned    Activity habits: Less active recently. Spouse asking about exercise routine and joining a fitness center for muscle health. Encouraged to discuss options with oncology. Food/nutrition habits: Prior to recent GI issues, gastric ulcer was eating regular diet with at least 2 meals each day. Tolerating smaller volume at meals recently and does not tolerate higher acid foods-orange juice and tomato products. Does not usually consume many fruits or vegetables, per spouse has always been this way.  Patient prefers

## 2023-10-12 ENCOUNTER — OFFICE VISIT (OUTPATIENT)
Dept: ONCOLOGY | Age: 68
End: 2023-10-12
Payer: MEDICARE

## 2023-10-12 ENCOUNTER — HOSPITAL ENCOUNTER (OUTPATIENT)
Dept: INFUSION THERAPY | Age: 68
Discharge: HOME | End: 2023-10-12
Payer: MEDICARE

## 2023-10-12 VITALS
OXYGEN SATURATION: 97 % | BODY MASS INDEX: 27.16 KG/M2 | HEIGHT: 68 IN | TEMPERATURE: 98.3 F | HEART RATE: 82 BPM | WEIGHT: 179.2 LBS | DIASTOLIC BLOOD PRESSURE: 64 MMHG | SYSTOLIC BLOOD PRESSURE: 129 MMHG

## 2023-10-12 DIAGNOSIS — C16.8 MALIGNANT NEOPLASM OF OVERLAPPING SITES OF STOMACH (HCC): ICD-10-CM

## 2023-10-12 DIAGNOSIS — C16.8 MALIGNANT NEOPLASM OF OVERLAPPING SITES OF STOMACH (HCC): Primary | ICD-10-CM

## 2023-10-12 LAB
ALBUMIN SERPL-MCNC: 3.3 GM/DL (ref 3.4–5)
ALP BLD-CCNC: 130 IU/L (ref 40–129)
ALT SERPL-CCNC: 26 U/L (ref 10–40)
ANION GAP SERPL CALCULATED.3IONS-SCNC: 15 MMOL/L (ref 4–16)
AST SERPL-CCNC: 42 IU/L (ref 15–37)
BASOPHILS ABSOLUTE: 0 K/CU MM
BASOPHILS RELATIVE PERCENT: 0.5 % (ref 0–1)
BILIRUB SERPL-MCNC: 0.7 MG/DL (ref 0–1)
BUN SERPL-MCNC: 16 MG/DL (ref 6–23)
CALCIUM SERPL-MCNC: 9.5 MG/DL (ref 8.3–10.6)
CHLORIDE BLD-SCNC: 105 MMOL/L (ref 99–110)
CO2: 20 MMOL/L (ref 21–32)
CREAT SERPL-MCNC: 0.7 MG/DL (ref 0.9–1.3)
DIFFERENTIAL TYPE: ABNORMAL
EOSINOPHILS ABSOLUTE: 0.1 K/CU MM
EOSINOPHILS RELATIVE PERCENT: 1.6 % (ref 0–3)
GFR SERPL CREATININE-BSD FRML MDRD: >60 ML/MIN/1.73M2
GLUCOSE SERPL-MCNC: 121 MG/DL (ref 70–99)
HCT VFR BLD CALC: 35.3 % (ref 42–52)
HEMOGLOBIN: 11 GM/DL (ref 13.5–18)
LYMPHOCYTES ABSOLUTE: 0.5 K/CU MM
LYMPHOCYTES RELATIVE PERCENT: 13.8 % (ref 24–44)
MCH RBC QN AUTO: 32.2 PG (ref 27–31)
MCHC RBC AUTO-ENTMCNC: 31.2 % (ref 32–36)
MCV RBC AUTO: 103.2 FL (ref 78–100)
MONOCYTES ABSOLUTE: 0.6 K/CU MM
MONOCYTES RELATIVE PERCENT: 14.9 % (ref 0–4)
PDW BLD-RTO: 13.9 % (ref 11.7–14.9)
PLATELET # BLD: 61 K/CU MM (ref 140–440)
PMV BLD AUTO: 10.1 FL (ref 7.5–11.1)
POTASSIUM SERPL-SCNC: 4.7 MMOL/L (ref 3.5–5.1)
RBC # BLD: 3.42 M/CU MM (ref 4.6–6.2)
SEGMENTED NEUTROPHILS ABSOLUTE COUNT: 2.7 K/CU MM
SEGMENTED NEUTROPHILS RELATIVE PERCENT: 69.2 % (ref 36–66)
SODIUM BLD-SCNC: 140 MMOL/L (ref 135–145)
TOTAL PROTEIN: 5.7 GM/DL (ref 6.4–8.2)
WBC # BLD: 3.8 K/CU MM (ref 4–10.5)

## 2023-10-12 PROCEDURE — 36415 COLL VENOUS BLD VENIPUNCTURE: CPT

## 2023-10-12 PROCEDURE — 80053 COMPREHEN METABOLIC PANEL: CPT

## 2023-10-12 PROCEDURE — 1123F ACP DISCUSS/DSCN MKR DOCD: CPT | Performed by: PHYSICIAN ASSISTANT

## 2023-10-12 PROCEDURE — 99214 OFFICE O/P EST MOD 30 MIN: CPT | Performed by: PHYSICIAN ASSISTANT

## 2023-10-12 PROCEDURE — 99211 OFF/OP EST MAY X REQ PHY/QHP: CPT

## 2023-10-12 PROCEDURE — 85025 COMPLETE CBC W/AUTO DIFF WBC: CPT

## 2023-10-12 RX ORDER — PANTOPRAZOLE SODIUM 40 MG/1
40 TABLET, DELAYED RELEASE ORAL 2 TIMES DAILY
COMMUNITY
Start: 2023-10-08

## 2023-10-12 NOTE — PROGRESS NOTES
MA Rooming Questions  Patient: Khanh Ayers  MRN: [id number]    Date: 10/12/2023        1. Do you have any new issues? yes - liver doctor would like patient to have a hep b injection. Also states surgery is no longer an option after chemotherapy. Patient states uc also states he should start radiation therapy ASAP          2. Do you need any refills on medications?    no    3. Have you had any imaging done since your last visit? yes - labs 10/2    4. Have you been hospitalized or seen in the emergency room since your last visit here?   no    5. Did the patient have a depression screening completed today?  No    No data recorded     PHQ-9 Given to (if applicable):               PHQ-9 Score (if applicable):                     [] Positive     []  Negative              Does question #9 need addressed (if applicable)                     [] Yes    []  No               Misti Moon CMA
complication. 5.  He has liver cirrhosis/MONGE. 8/20/2023 AFP 1.6.    6. H/o duodenal ca s/p whipple surgery in 9/2011 followed by adjuvant chemo and RT. 7. Diagnosis of gastric cancer in 8/2023. He has FU with surgical oncologist at [facility] Dr Jimbo Palacios on [phone]. I order PET scan. Likely he will need perioperative chemotherapy. 9/2023 LFTs grossly wnl. WBC 2.8, Hg 12, MCV 99.7, plat 71.   8/31/2023 PET as above. He was seen by [facility] surgical oncologist who recommended neoadjuvant chemotherapy for gastric cancer and adjuvant chemotherapy. D/t liver cirrhosis and pancytopenia, I offer FOLFOX regimen. CARIS pending. S/p mediport by Dr Ashley Worley. I will check CBC and CMP today. 10/3/2023 C1. D/t underlying liver cirrhosis and pancytopenia, I will monitor CBC and CMP closely. He was seen by liver specialist at [facility]. He was evaluated at [facility] for definitive surgery and not a candidate, recommend proceeding with definitive chemoradiation. Refer back to Dr Suzanne Weiner who followed previously. Will discuss if less less intense chemotherapy regimen should be used. He has Covid vaccine. Return to clinic in 3 weeks or sooner. All of his questions have been answered for today. Recent imaging and labs were reviewed and discussed with the patient.

## 2023-10-13 DIAGNOSIS — C16.8 MALIGNANT NEOPLASM OF OVERLAPPING SITES OF STOMACH (HCC): Primary | ICD-10-CM

## 2023-10-13 DIAGNOSIS — Z11.59 SCREENING FOR VIRAL DISEASE: ICD-10-CM

## 2023-10-14 NOTE — PROGRESS NOTES
Patient Name: Luisa Samuels  Patient : 1955  Patient MRN: [id number]     Primary Oncologist: Lorraine Finn MD  Referring Provider: Melodie Cannon MD     Date of Service: 10/31/2023      Chief Complaint:   Chief Complaint   Patient presents with    Follow-up     He came in for follow-up visit. Patient Active Problem List:     Splenomegaly     NAFLD (nonalcoholic fatty liver disease)     Carcinoma of duodenum      Liver cirrhosis secondary to MONGE (nonalcoholic steatohepatitis)     Port-A-Cath in place     Bilateral primary osteoarthritis of knee     Arthritis of both knees     Leukopenia     Primary malignant neoplasm of small intestine     Thrombocytopenic disorder     Fatigue     HPI:  Gunnar Decker is a pleasant 76year old  male patient with underlying liver cirrhosis/MONGE with esophageal varices followed by Dr Pedro Copeland, who was referred back for evaluation of pancytopenia. 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 He had mismatch MMR. He received adjuvant FOLFOX from 2011 through 2012 followed by RT till 2012. CT scan in May 2014 showed no recurrent disease. US in 2014 showed 17 cm spleen, previously 16 cm with hepatic steatosis. Platelet was 80 - 925 since  through . CEA was 2.4. He was seen by Dr Pedro Copeland in 2019. AFP in 2019 was 4.6. 2019, calcium 9.4, LFTs were unremarkable. Creatinine  0.8. Total bilirubin was 0.8. Alpha-fetoprotein 3. INR 1.21. 2019 CMP grossly unremarkable except for glucose 125. WBC  2.1, RBC count 3.68, hemoglobin 11.7, hematocrit 34.1, MCV 92.6, plat 64. At the time he just got over flu. If pancytopenia continues to get worse, he may consider bone marrow study. He had left TKR in  without complication. He did not receive blood product transfusion.   He plans to have

## 2023-10-16 ENCOUNTER — HOSPITAL ENCOUNTER (OUTPATIENT)
Dept: INFUSION THERAPY | Age: 68
Discharge: HOME | End: 2023-10-16
Payer: MEDICARE

## 2023-10-16 ENCOUNTER — CLINICAL DOCUMENTATION (OUTPATIENT)
Dept: ONCOLOGY | Age: 68
End: 2023-10-16

## 2023-10-16 DIAGNOSIS — C16.8 MALIGNANT NEOPLASM OF OVERLAPPING SITES OF STOMACH (HCC): ICD-10-CM

## 2023-10-16 DIAGNOSIS — Z11.59 SCREENING FOR VIRAL DISEASE: ICD-10-CM

## 2023-10-16 LAB
ALBUMIN SERPL-MCNC: 3.3 GM/DL (ref 3.4–5)
ALP BLD-CCNC: 154 IU/L (ref 40–128)
ALT SERPL-CCNC: 28 U/L (ref 10–40)
ANION GAP SERPL CALCULATED.3IONS-SCNC: 6 MMOL/L (ref 4–16)
AST SERPL-CCNC: 42 IU/L (ref 15–37)
BASOPHILS ABSOLUTE: 0 K/CU MM
BASOPHILS RELATIVE PERCENT: 0.5 % (ref 0–1)
BILIRUB SERPL-MCNC: 0.7 MG/DL (ref 0–1)
BUN SERPL-MCNC: 12 MG/DL (ref 6–23)
CALCIUM SERPL-MCNC: 9.4 MG/DL (ref 8.3–10.6)
CHLORIDE BLD-SCNC: 105 MMOL/L (ref 99–110)
CO2: 28 MMOL/L (ref 21–32)
CREAT SERPL-MCNC: 0.7 MG/DL (ref 0.9–1.3)
DIFFERENTIAL TYPE: ABNORMAL
EOSINOPHILS ABSOLUTE: 0.1 K/CU MM
EOSINOPHILS RELATIVE PERCENT: 1.7 % (ref 0–3)
GFR SERPL CREATININE-BSD FRML MDRD: >60 ML/MIN/1.73M2
GLUCOSE SERPL-MCNC: 186 MG/DL (ref 70–99)
HCT VFR BLD CALC: 35.6 % (ref 42–52)
HEMOGLOBIN: 11.5 GM/DL (ref 13.5–18)
LYMPHOCYTES ABSOLUTE: 0.4 K/CU MM
LYMPHOCYTES RELATIVE PERCENT: 9 % (ref 24–44)
MCH RBC QN AUTO: 32.3 PG (ref 27–31)
MCHC RBC AUTO-ENTMCNC: 32.3 % (ref 32–36)
MCV RBC AUTO: 100 FL (ref 78–100)
MONOCYTES ABSOLUTE: 0.5 K/CU MM
MONOCYTES RELATIVE PERCENT: 12.5 % (ref 0–4)
PDW BLD-RTO: 14 % (ref 11.7–14.9)
PLATELET # BLD: 62 K/CU MM (ref 140–440)
PMV BLD AUTO: 9.4 FL (ref 7.5–11.1)
POTASSIUM SERPL-SCNC: 4.3 MMOL/L (ref 3.5–5.1)
RBC # BLD: 3.56 M/CU MM (ref 4.6–6.2)
SEGMENTED NEUTROPHILS ABSOLUTE COUNT: 3.1 K/CU MM
SEGMENTED NEUTROPHILS RELATIVE PERCENT: 76.3 % (ref 36–66)
SODIUM BLD-SCNC: 139 MMOL/L (ref 135–145)
TOTAL PROTEIN: 5.5 GM/DL (ref 6.4–8.2)
WBC # BLD: 4 K/CU MM (ref 4–10.5)

## 2023-10-16 PROCEDURE — 36415 COLL VENOUS BLD VENIPUNCTURE: CPT

## 2023-10-16 PROCEDURE — 80053 COMPREHEN METABOLIC PANEL: CPT

## 2023-10-16 PROCEDURE — 85025 COMPLETE CBC W/AUTO DIFF WBC: CPT

## 2023-10-16 RX ORDER — DEXTROSE MONOHYDRATE 50 MG/ML
5-250 INJECTION, SOLUTION INTRAVENOUS PRN
Status: CANCELLED | OUTPATIENT
Start: 2023-10-17

## 2023-10-16 RX ORDER — DIPHENHYDRAMINE HYDROCHLORIDE 50 MG/ML
50 INJECTION INTRAMUSCULAR; INTRAVENOUS
Status: CANCELLED | OUTPATIENT
Start: 2023-10-17

## 2023-10-16 RX ORDER — ALBUTEROL SULFATE 90 UG/1
4 AEROSOL, METERED RESPIRATORY (INHALATION) PRN
Status: CANCELLED | OUTPATIENT
Start: 2023-10-17

## 2023-10-16 RX ORDER — SODIUM CHLORIDE 9 MG/ML
5-250 INJECTION, SOLUTION INTRAVENOUS PRN
Status: CANCELLED | OUTPATIENT
Start: 2023-10-17

## 2023-10-16 RX ORDER — SODIUM CHLORIDE 9 MG/ML
INJECTION, SOLUTION INTRAVENOUS CONTINUOUS
Status: CANCELLED | OUTPATIENT
Start: 2023-10-17

## 2023-10-16 RX ORDER — HEPARIN SODIUM (PORCINE) LOCK FLUSH IV SOLN 100 UNIT/ML 100 UNIT/ML
500 SOLUTION INTRAVENOUS PRN
Status: CANCELLED | OUTPATIENT
Start: 2023-10-18

## 2023-10-16 RX ORDER — ONDANSETRON 2 MG/ML
8 INJECTION INTRAMUSCULAR; INTRAVENOUS
Status: CANCELLED | OUTPATIENT
Start: 2023-10-17

## 2023-10-16 RX ORDER — EPINEPHRINE 1 MG/ML
0.3 INJECTION, SOLUTION, CONCENTRATE INTRAVENOUS PRN
Status: CANCELLED | OUTPATIENT
Start: 2023-10-17

## 2023-10-16 RX ORDER — HEPARIN SODIUM (PORCINE) LOCK FLUSH IV SOLN 100 UNIT/ML 100 UNIT/ML
500 SOLUTION INTRAVENOUS PRN
Status: CANCELLED | OUTPATIENT
Start: 2023-10-17

## 2023-10-16 RX ORDER — MEPERIDINE HYDROCHLORIDE 50 MG/ML
12.5 INJECTION INTRAMUSCULAR; INTRAVENOUS; SUBCUTANEOUS PRN
Status: CANCELLED | OUTPATIENT
Start: 2023-10-17

## 2023-10-16 RX ORDER — SODIUM CHLORIDE 0.9 % (FLUSH) 0.9 %
5-40 SYRINGE (ML) INJECTION PRN
Status: CANCELLED | OUTPATIENT
Start: 2023-10-17

## 2023-10-16 RX ORDER — SODIUM CHLORIDE 0.9 % (FLUSH) 0.9 %
5-40 SYRINGE (ML) INJECTION PRN
Status: CANCELLED | OUTPATIENT
Start: 2023-10-18

## 2023-10-16 RX ORDER — FAMOTIDINE 10 MG/ML
20 INJECTION, SOLUTION INTRAVENOUS
Status: CANCELLED | OUTPATIENT
Start: 2023-10-17

## 2023-10-16 RX ORDER — SODIUM CHLORIDE 9 MG/ML
5-250 INJECTION, SOLUTION INTRAVENOUS PRN
Status: CANCELLED | OUTPATIENT
Start: 2023-10-18

## 2023-10-16 RX ORDER — ACETAMINOPHEN 325 MG/1
650 TABLET ORAL
Status: CANCELLED | OUTPATIENT
Start: 2023-10-17

## 2023-10-16 RX ORDER — PALONOSETRON 0.05 MG/ML
0.25 INJECTION, SOLUTION INTRAVENOUS ONCE
Status: CANCELLED | OUTPATIENT
Start: 2023-10-17 | End: 2023-10-17

## 2023-10-16 NOTE — PROGRESS NOTES
Patient Assistance    Met with:     Navigator Type: Infusion  Documentation Type: Assistance Review  Contact Type: No Contact  Navigation Status: Tx Plan Added/Changed  Status of Patient Insurance Coverage: Patient has active coverage          Additional notes:      Financial Navigator is reviewing benefits due to recent treatment orders. Per this patients plan, patient will have a  0% co-insurance with a $5200 [employer] max with $ 3165 having been met currently. There is no available assistance at this time, however patient can apply for [address] should they choose.

## 2023-10-17 ENCOUNTER — HOSPITAL ENCOUNTER (OUTPATIENT)
Dept: INFUSION THERAPY | Age: 68
Discharge: HOME | End: 2023-10-17
Payer: MEDICARE

## 2023-10-17 ENCOUNTER — HOSPITAL ENCOUNTER (OUTPATIENT)
Dept: RADIATION ONCOLOGY | Age: 68
Discharge: HOME | End: 2023-10-17
Payer: MEDICARE

## 2023-10-17 VITALS
DIASTOLIC BLOOD PRESSURE: 77 MMHG | HEART RATE: 81 BPM | RESPIRATION RATE: 16 BRPM | WEIGHT: 188.6 LBS | OXYGEN SATURATION: 97 % | BODY MASS INDEX: 28.58 KG/M2 | SYSTOLIC BLOOD PRESSURE: 150 MMHG | TEMPERATURE: 97.7 F | HEIGHT: 68 IN

## 2023-10-17 DIAGNOSIS — C16.8 MALIGNANT NEOPLASM OF OVERLAPPING SITES OF STOMACH (HCC): Primary | ICD-10-CM

## 2023-10-17 DIAGNOSIS — Z11.59 SCREENING FOR VIRAL DISEASE: ICD-10-CM

## 2023-10-17 PROCEDURE — 96415 CHEMO IV INFUSION ADDL HR: CPT

## 2023-10-17 PROCEDURE — 96375 TX/PRO/DX INJ NEW DRUG ADDON: CPT

## 2023-10-17 PROCEDURE — 99205 OFFICE O/P NEW HI 60 MIN: CPT | Performed by: RADIOLOGY

## 2023-10-17 PROCEDURE — 2580000003 HC RX 258: Performed by: INTERNAL MEDICINE

## 2023-10-17 PROCEDURE — 96367 TX/PROPH/DG ADDL SEQ IV INF: CPT

## 2023-10-17 PROCEDURE — G0498 CHEMO EXTEND IV INFUS W/PUMP: HCPCS

## 2023-10-17 PROCEDURE — 6360000002 HC RX W HCPCS: Performed by: INTERNAL MEDICINE

## 2023-10-17 PROCEDURE — 96413 CHEMO IV INFUSION 1 HR: CPT

## 2023-10-17 PROCEDURE — 96368 THER/DIAG CONCURRENT INF: CPT

## 2023-10-17 RX ORDER — PALONOSETRON 0.05 MG/ML
0.25 INJECTION, SOLUTION INTRAVENOUS ONCE
Status: COMPLETED | OUTPATIENT
Start: 2023-10-17 | End: 2023-10-17

## 2023-10-17 RX ORDER — DEXTROSE MONOHYDRATE 50 MG/ML
5-250 INJECTION, SOLUTION INTRAVENOUS PRN
Status: DISCONTINUED | OUTPATIENT
Start: 2023-10-17 | End: 2023-10-18 | Stop reason: HOSPADM

## 2023-10-17 RX ADMIN — LEUCOVORIN CALCIUM 370 MG: 200 INJECTION, POWDER, LYOPHILIZED, FOR SOLUTION INTRAMUSCULAR; INTRAVENOUS at 10:34

## 2023-10-17 RX ADMIN — FLUOROURACIL 4125 MG: 50 INJECTION, SOLUTION INTRAVENOUS at 12:56

## 2023-10-17 RX ADMIN — OXALIPLATIN 135 MG: 5 INJECTION, SOLUTION INTRAVENOUS at 10:35

## 2023-10-17 RX ADMIN — PALONOSETRON 0.25 MG: 0.25 INJECTION, SOLUTION INTRAVENOUS at 09:52

## 2023-10-17 RX ADMIN — DEXAMETHASONE SODIUM PHOSPHATE 12 MG: 4 INJECTION, SOLUTION INTRAMUSCULAR; INTRAVENOUS at 09:59

## 2023-10-17 NOTE — PROGRESS NOTES
Updated treatment condition for platelets to notify provider if platelets are less than 50,000 per mm3 per Dr. Danay Bailey.     Bruce Patterson PharmD, [facility], BCPS  10/17/2023 9:02 AM

## 2023-10-17 NOTE — CONSULTS
Radiation Oncology Consultation  Encounter Date: 10/17/2023 8:50 AM    Mr. Bertha Mcrae is a 76 y.o. male  : 1955  MRN: [id number]  Acct Number: [de-identified]  Requesting Provider: No att. providers found        CONSULTANT: Kimmy Bird MD    PHYSICIANS:   Primary Care: MD Andree Bravo MD      DIAGNOSIS: Gastric anastomotic ulcer with poorly differentiated adenocarcinoma       Cancer Staging   Carcinoma of duodenum [facility])  Staging form: Ampulla Of Vater, AJCC 8th Edition  - Clinical: cT3, cN0, cM0 - Signed by Leatha Corona MD on 2020           TREATMENT COURSE:   Oncology History   Carcinoma of duodenum ([address])   2012 - 2012 Radiation    Duodenal bed/LNs: 4500 cGy AP/PA & lats  Boost: 540 cGy; TD: 5040 cGy     2014 Initial Diagnosis    Carcinoma of duodenum ([address])     Malignant neoplasm of overlapping sites of stomach ([address])   10/3/2023 -  Chemotherapy    OP DOCEtaxel + OXALIplatin + leucovorin + fluorouracil (FLOT) (docetaxel start with c2)  Plan Provider: Leatha Corona MD  Treatment goal: Neoadjuvant  Line of treatment: Neoadjuvant           HPI:   Today I had the privilege of seeing Bertha Mcrae in consultation. As you recall, Bertha Mcrae is a 76 y.o. male who history of a carcinoma of the duodenum for which he underwent resection followed by adjuvant concurrent chemo RT completed in  who was recently found to have an adenocarcinoma of the stomach. He was previously in a fairly good state of health, however, noticed a 30 pound weight loss with associated nausea and epigastric discomfort ongoing for approximately 3 months with associated dizziness with rapid position changes, prompting a medical evaluation. An abdominal ultrasound from 2023 showed cirrhosis, hepatomegaly, small amount of right upper quadrant ascites with nonvisualization of the pancreas.   PET/CT from 2023 showed focal activity in the anterior right upper quadrant at

## 2023-10-17 NOTE — PROGRESS NOTES
Pt arrived to treatment suite following OV with Dr. Lynn Schofield. Labs reviewed with Dr. Kenia Munoz. Pt has history of low platelets. Yesterday's reading 62. Dr. Kenia Munoz aware and changes are made to treatment plan to notify physician if platelets less than 19,852. Pharmacy notified, and treatment plan updated. Treatment plan approved. Assessment complete. Mediport accessed per protocol. Positive blood return noted. Treatment plan released and completed as ordered. Pt tolerated well. CIV chemo pump connected for home infusion. Infusing prior to discharge. AVS provided. Discharge ambulatory in stable condition. Return in 24 hours for pump disconnect.   Bia Menendez RN

## 2023-10-17 NOTE — PLAN OF CARE
complications and controlled management of side effects. Nurse to evaluate weekly during radiation treatments.

## 2023-10-18 ENCOUNTER — HOSPITAL ENCOUNTER (OUTPATIENT)
Dept: INFUSION THERAPY | Age: 68
Discharge: HOME | End: 2023-10-18
Payer: MEDICARE

## 2023-10-18 DIAGNOSIS — Z11.59 SCREENING FOR VIRAL DISEASE: ICD-10-CM

## 2023-10-18 DIAGNOSIS — C16.8 MALIGNANT NEOPLASM OF OVERLAPPING SITES OF STOMACH (HCC): Primary | ICD-10-CM

## 2023-10-18 PROCEDURE — 96523 IRRIG DRUG DELIVERY DEVICE: CPT

## 2023-10-18 PROCEDURE — 6360000002 HC RX W HCPCS

## 2023-10-18 PROCEDURE — 2580000003 HC RX 258: Performed by: INTERNAL MEDICINE

## 2023-10-18 RX ORDER — HEPARIN 100 UNIT/ML
500 SYRINGE INTRAVENOUS PRN
Status: DISCONTINUED | OUTPATIENT
Start: 2023-10-18 | End: 2023-10-19 | Stop reason: HOSPADM

## 2023-10-18 RX ORDER — HEPARIN 100 UNIT/ML
SYRINGE INTRAVENOUS
Status: COMPLETED
Start: 2023-10-18 | End: 2023-10-18

## 2023-10-18 RX ORDER — SODIUM CHLORIDE 0.9 % (FLUSH) 0.9 %
5-40 SYRINGE (ML) INJECTION PRN
Status: DISCONTINUED | OUTPATIENT
Start: 2023-10-18 | End: 2023-10-19 | Stop reason: HOSPADM

## 2023-10-18 RX ADMIN — HEPARIN 500 UNITS: 100 SYRINGE at 13:27

## 2023-10-18 RX ADMIN — SODIUM CHLORIDE, PRESERVATIVE FREE 20 ML: 5 INJECTION INTRAVENOUS at 13:28

## 2023-10-18 NOTE — PROGRESS NOTES
Patient presents to infusion suite for d/c pump. Tolerated infusion with no difficulty. Pump removed, 0 mL remaining in pump. Decaccessed port, flushed with 20cc Normal saline, and locked with heparin. Bandaid and gauze placed. [facility] 10/25 for simulation.

## 2023-10-23 ENCOUNTER — APPOINTMENT (OUTPATIENT)
Dept: RADIATION ONCOLOGY | Age: 68
End: 2023-10-23
Payer: MEDICARE

## 2023-10-23 ENCOUNTER — ANESTHESIA EVENT (OUTPATIENT)
Dept: ENDOSCOPY | Age: 68
End: 2023-10-23
Payer: MEDICARE

## 2023-10-23 NOTE — PROGRESS NOTES
Spoke with patient's wife Joce Bass and patient will arrive at [address] [facility] on 10/24/2023 for his procedure at [address] [place] [address]. IV order is in [facility].

## 2023-10-24 ENCOUNTER — HOSPITAL ENCOUNTER (OUTPATIENT)
Age: 68
Setting detail: OUTPATIENT SURGERY
Discharge: HOME | End: 2023-10-24
Attending: INTERNAL MEDICINE | Admitting: INTERNAL MEDICINE
Payer: MEDICARE

## 2023-10-24 ENCOUNTER — ANESTHESIA (OUTPATIENT)
Dept: ENDOSCOPY | Age: 68
End: 2023-10-24
Payer: MEDICARE

## 2023-10-24 VITALS
RESPIRATION RATE: 16 BRPM | DIASTOLIC BLOOD PRESSURE: 70 MMHG | HEIGHT: 68 IN | WEIGHT: 188 LBS | HEART RATE: 62 BPM | OXYGEN SATURATION: 99 % | TEMPERATURE: 97.3 F | SYSTOLIC BLOOD PRESSURE: 143 MMHG | BODY MASS INDEX: 28.49 KG/M2

## 2023-10-24 PROBLEM — I85.10 SECONDARY ESOPHAGEAL VARICES WITHOUT BLEEDING (HCC): Status: ACTIVE | Noted: 2023-09-05

## 2023-10-24 PROCEDURE — 3609012300 HC EGD BAND LIGATION ESOPHGEAL/GASTRIC VARICES: Performed by: INTERNAL MEDICINE

## 2023-10-24 PROCEDURE — 7100000010 HC PHASE II RECOVERY - FIRST 15 MIN: Performed by: INTERNAL MEDICINE

## 2023-10-24 PROCEDURE — 2580000003 HC RX 258: Performed by: ANESTHESIOLOGY

## 2023-10-24 PROCEDURE — 43244 EGD VARICES LIGATION: CPT | Performed by: INTERNAL MEDICINE

## 2023-10-24 PROCEDURE — 6360000002 HC RX W HCPCS: Performed by: NURSE ANESTHETIST, CERTIFIED REGISTERED

## 2023-10-24 PROCEDURE — 3700000001 HC ADD 15 MINUTES (ANESTHESIA): Performed by: INTERNAL MEDICINE

## 2023-10-24 PROCEDURE — 2500000003 HC RX 250 WO HCPCS: Performed by: NURSE ANESTHETIST, CERTIFIED REGISTERED

## 2023-10-24 PROCEDURE — 3700000000 HC ANESTHESIA ATTENDED CARE: Performed by: INTERNAL MEDICINE

## 2023-10-24 PROCEDURE — 7100000011 HC PHASE II RECOVERY - ADDTL 15 MIN: Performed by: INTERNAL MEDICINE

## 2023-10-24 PROCEDURE — 2720000010 HC SURG SUPPLY STERILE: Performed by: INTERNAL MEDICINE

## 2023-10-24 PROCEDURE — 2709999900 HC NON-CHARGEABLE SUPPLY: Performed by: INTERNAL MEDICINE

## 2023-10-24 RX ORDER — SODIUM CHLORIDE, SODIUM LACTATE, POTASSIUM CHLORIDE, CALCIUM CHLORIDE 600; 310; 30; 20 MG/100ML; MG/100ML; MG/100ML; MG/100ML
INJECTION, SOLUTION INTRAVENOUS CONTINUOUS
Status: DISCONTINUED | OUTPATIENT
Start: 2023-10-24 | End: 2023-10-24 | Stop reason: HOSPADM

## 2023-10-24 RX ORDER — PROPOFOL 10 MG/ML
INJECTION, EMULSION INTRAVENOUS PRN
Status: DISCONTINUED | OUTPATIENT
Start: 2023-10-24 | End: 2023-10-24 | Stop reason: SDUPTHER

## 2023-10-24 RX ORDER — LIDOCAINE HYDROCHLORIDE 20 MG/ML
INJECTION, SOLUTION INFILTRATION; PERINEURAL PRN
Status: DISCONTINUED | OUTPATIENT
Start: 2023-10-24 | End: 2023-10-24 | Stop reason: SDUPTHER

## 2023-10-24 RX ADMIN — SODIUM CHLORIDE, POTASSIUM CHLORIDE, SODIUM LACTATE AND CALCIUM CHLORIDE: 600; 310; 30; 20 INJECTION, SOLUTION INTRAVENOUS at 07:49

## 2023-10-24 RX ADMIN — PROPOFOL 50 MG: 10 INJECTION, EMULSION INTRAVENOUS at 08:50

## 2023-10-24 RX ADMIN — PROPOFOL 50 MG: 10 INJECTION, EMULSION INTRAVENOUS at 08:57

## 2023-10-24 RX ADMIN — PROPOFOL 50 MG: 10 INJECTION, EMULSION INTRAVENOUS at 08:45

## 2023-10-24 RX ADMIN — PROPOFOL 50 MG: 10 INJECTION, EMULSION INTRAVENOUS at 08:53

## 2023-10-24 RX ADMIN — PROPOFOL 50 MG: 10 INJECTION, EMULSION INTRAVENOUS at 08:47

## 2023-10-24 RX ADMIN — LIDOCAINE HYDROCHLORIDE 100 MG: 20 INJECTION, SOLUTION INFILTRATION; PERINEURAL at 08:46

## 2023-10-24 ASSESSMENT — PAIN SCALES - GENERAL
PAINLEVEL_OUTOF10: 0
PAINLEVEL_OUTOF10: 0

## 2023-10-24 NOTE — ANESTHESIA POSTPROCEDURE EVALUATION
Department of Anesthesiology  Postprocedure Note    Patient: Kelli Room  MRN: [id number]  YOB: 1955  Date of evaluation: 10/24/2023      Procedure Summary     Date: 10/24/23 Room / Location: [address] [place] [address]    Anesthesia Start: 6967 Anesthesia Stop: 0906    Procedure: EGD BAND LIGATION WITH PLACEMENT OF 2 BANDS Diagnosis:       Esophageal varices without bleeding, unspecified esophageal varices type (HCC)      (Esophageal varices without bleeding, unspecified esophageal varices type ([address]) [I85.00])    Surgeons: Caleb Damon MD Responsible Provider: Vijay Richard MD    Anesthesia Type: MAC ASA Status: 3          Anesthesia Type: MAC    Joel Phase I: Joel Score: 10    Joel Phase II:        Anesthesia Post Evaluation    Patient location during evaluation: bedside  Patient participation: complete - patient participated  Level of consciousness: awake and alert  Pain score: 0  Airway patency: patent  Nausea & Vomiting: no nausea and no vomiting  Complications: no  Cardiovascular status: blood pressure returned to baseline and hemodynamically stable  Respiratory status: acceptable, spontaneous ventilation and room air  Hydration status: euvolemic  Pain management: adequate

## 2023-10-24 NOTE — ANESTHESIA PRE PROCEDURE
Department of Anesthesiology  Preprocedure Note       Name:  Jose Luna   Age:  76 y.o.  :  1955                                          MRN:  [id number]         Date:  10/24/2023      Surgeon: Tamiko Rabago):  Asher [place] MD    Procedure: Procedure(s):  EGD ESOPHAGOGASTRODUODENOSCOPY    Medications prior to admission:   Prior to Admission medications    Medication Sig Start Date End Date Taking? Authorizing Provider   pantoprazole (PROTONIX) 40 MG tablet Take 1 tablet by mouth 2 times daily 10/8/23   Constantin Barros MD   ondansetron (ZOFRAN) 8 MG tablet Take 1 tablet by mouth every 8 hours as needed for Nausea or Vomiting Take 1 tablet by mouth every 8 hours as needed for nausea or vomiting. 10/2/23   Anastasia Riddle MD   promethazine (PHENERGAN) 25 MG tablet Take 1 tablet by mouth every 6 hours as needed for Nausea 10/2/23   Anastasia Riddle MD   OLANZapine (ZYPREXA) 2.5 MG tablet Take 1 tablet by mouth nightly 10/2/23   Anastasia Riddle MD   dexamethasone (DECADRON) 4 MG tablet Take 1 tablet by mouth in the morning with food for 2 days after the first chemo treatment. Then take 2 tablets by mouth in the morning with food the morning before each treatment and for 2 days after each treatment 10/2/23   Anastasia Riddle MD   ondansetron (ZOFRAN-ODT) 8 MG TBDP disintegrating tablet Place 1 tablet under the tongue every 8 hours as needed for Nausea or Vomiting 9/11/23 10/12/23  Anastasia Riddle MD   VITAMIN D PO Take by mouth daily    Sung Jackson MD   vitamin C (ASCORBIC ACID) 500 MG tablet Take 1 tablet by mouth daily    Sung Jackson MD   ferrous sulfate 325 (65 FE) MG tablet Take 1 tablet by mouth daily (with breakfast)    Sung Jackson MD   vitamin B-12 (CYANOCOBALAMIN) 1000 MCG tablet Take 2 tablets by mouth daily    Sung Jackson MD       Current medications:    No current facility-administered medications for this visit. No current outpatient medications on file.

## 2023-10-24 NOTE — PROGRESS NOTES
0913 Pt received from [facility] and report received from [facility]. Pt denies c/o. Pt given [employer]. Wife at bedside. Pt abdomen soft and non tender. 0930 In to check on pt. Pt denies c/o or needs. Wife at bedside. Call light in reach. 8236 Discharge instructions given top pt and wife. Both verbalized understanding of instructions. Pt up to side of bed. Pt tolerated well and ready to get dressed to go home. Pt denies c/o or needs. Call light in reach. Wife at bedside. 1000 Pt discharged to home per wheelchair via [facility].

## 2023-10-25 ENCOUNTER — HOSPITAL ENCOUNTER (OUTPATIENT)
Dept: RADIATION ONCOLOGY | Age: 68
Discharge: HOME | End: 2023-10-25
Payer: MEDICARE

## 2023-10-25 PROCEDURE — 77334 RADIATION TREATMENT AID(S): CPT | Performed by: RADIOLOGY

## 2023-10-25 PROCEDURE — 77332 RADIATION TREATMENT AID(S): CPT | Performed by: RADIOLOGY

## 2023-10-25 PROCEDURE — 77470 SPECIAL RADIATION TREATMENT: CPT | Performed by: RADIOLOGY

## 2023-10-26 DIAGNOSIS — Z11.59 SCREENING FOR VIRAL DISEASE: ICD-10-CM

## 2023-10-26 DIAGNOSIS — C16.8 MALIGNANT NEOPLASM OF OVERLAPPING SITES OF STOMACH (HCC): Primary | ICD-10-CM

## 2023-10-30 ENCOUNTER — HOSPITAL ENCOUNTER (OUTPATIENT)
Dept: INFUSION THERAPY | Age: 68
Discharge: HOME | End: 2023-10-30
Payer: MEDICARE

## 2023-10-30 DIAGNOSIS — Z11.59 SCREENING FOR VIRAL DISEASE: ICD-10-CM

## 2023-10-30 DIAGNOSIS — C16.8 MALIGNANT NEOPLASM OF OVERLAPPING SITES OF STOMACH (HCC): ICD-10-CM

## 2023-10-30 LAB
ALBUMIN SERPL-MCNC: 2.9 GM/DL (ref 3.4–5)
ALP BLD-CCNC: 136 IU/L (ref 40–128)
ALT SERPL-CCNC: 24 U/L (ref 10–40)
ANION GAP SERPL CALCULATED.3IONS-SCNC: 6 MMOL/L (ref 4–16)
AST SERPL-CCNC: 32 IU/L (ref 15–37)
BASOPHILS ABSOLUTE: 0 K/CU MM
BASOPHILS RELATIVE PERCENT: 0.9 % (ref 0–1)
BILIRUB SERPL-MCNC: 0.8 MG/DL (ref 0–1)
BUN SERPL-MCNC: 10 MG/DL (ref 6–23)
CALCIUM SERPL-MCNC: 9.2 MG/DL (ref 8.3–10.6)
CHLORIDE BLD-SCNC: 104 MMOL/L (ref 99–110)
CO2: 28 MMOL/L (ref 21–32)
CREAT SERPL-MCNC: 0.8 MG/DL (ref 0.9–1.3)
DIFFERENTIAL TYPE: ABNORMAL
EOSINOPHILS ABSOLUTE: 0.1 K/CU MM
EOSINOPHILS RELATIVE PERCENT: 1.4 % (ref 0–3)
GFR SERPL CREATININE-BSD FRML MDRD: >60 ML/MIN/1.73M2
GLUCOSE SERPL-MCNC: 129 MG/DL (ref 70–99)
HCT VFR BLD CALC: 34.4 % (ref 42–52)
HEMOGLOBIN: 11 GM/DL (ref 13.5–18)
LYMPHOCYTES ABSOLUTE: 0.5 K/CU MM
LYMPHOCYTES RELATIVE PERCENT: 13.5 % (ref 24–44)
MCH RBC QN AUTO: 31.7 PG (ref 27–31)
MCHC RBC AUTO-ENTMCNC: 32 % (ref 32–36)
MCV RBC AUTO: 99.1 FL (ref 78–100)
MONOCYTES ABSOLUTE: 0.4 K/CU MM
MONOCYTES RELATIVE PERCENT: 12 % (ref 0–4)
PDW BLD-RTO: 14.8 % (ref 11.7–14.9)
PLATELET # BLD: 64 K/CU MM (ref 140–440)
PMV BLD AUTO: 8.7 FL (ref 7.5–11.1)
POTASSIUM SERPL-SCNC: 4.3 MMOL/L (ref 3.5–5.1)
RBC # BLD: 3.47 M/CU MM (ref 4.6–6.2)
SEGMENTED NEUTROPHILS ABSOLUTE COUNT: 2.5 K/CU MM
SEGMENTED NEUTROPHILS RELATIVE PERCENT: 72.2 % (ref 36–66)
SODIUM BLD-SCNC: 138 MMOL/L (ref 135–145)
TOTAL PROTEIN: 6 GM/DL (ref 6.4–8.2)
WBC # BLD: 3.5 K/CU MM (ref 4–10.5)

## 2023-10-30 PROCEDURE — 80053 COMPREHEN METABOLIC PANEL: CPT

## 2023-10-30 PROCEDURE — 36415 COLL VENOUS BLD VENIPUNCTURE: CPT

## 2023-10-30 PROCEDURE — 85025 COMPLETE CBC W/AUTO DIFF WBC: CPT

## 2023-10-30 RX ORDER — SODIUM CHLORIDE 9 MG/ML
5-250 INJECTION, SOLUTION INTRAVENOUS PRN
Status: CANCELLED | OUTPATIENT
Start: 2023-11-01

## 2023-10-30 RX ORDER — HEPARIN SODIUM (PORCINE) LOCK FLUSH IV SOLN 100 UNIT/ML 100 UNIT/ML
500 SOLUTION INTRAVENOUS PRN
Status: CANCELLED | OUTPATIENT
Start: 2023-10-31

## 2023-10-30 RX ORDER — DEXTROSE MONOHYDRATE 50 MG/ML
5-250 INJECTION, SOLUTION INTRAVENOUS PRN
Status: CANCELLED | OUTPATIENT
Start: 2023-10-31

## 2023-10-30 RX ORDER — DIPHENHYDRAMINE HYDROCHLORIDE 50 MG/ML
50 INJECTION INTRAMUSCULAR; INTRAVENOUS
Status: CANCELLED | OUTPATIENT
Start: 2023-10-31

## 2023-10-30 RX ORDER — SODIUM CHLORIDE 0.9 % (FLUSH) 0.9 %
5-40 SYRINGE (ML) INJECTION PRN
Status: CANCELLED | OUTPATIENT
Start: 2023-11-01

## 2023-10-30 RX ORDER — ONDANSETRON 2 MG/ML
8 INJECTION INTRAMUSCULAR; INTRAVENOUS
Status: CANCELLED | OUTPATIENT
Start: 2023-10-31

## 2023-10-30 RX ORDER — FAMOTIDINE 10 MG/ML
20 INJECTION, SOLUTION INTRAVENOUS
Status: CANCELLED | OUTPATIENT
Start: 2023-10-31

## 2023-10-30 RX ORDER — PALONOSETRON 0.05 MG/ML
0.25 INJECTION, SOLUTION INTRAVENOUS ONCE
Status: CANCELLED | OUTPATIENT
Start: 2023-10-31 | End: 2023-10-31

## 2023-10-30 RX ORDER — SODIUM CHLORIDE 9 MG/ML
INJECTION, SOLUTION INTRAVENOUS CONTINUOUS
Status: CANCELLED | OUTPATIENT
Start: 2023-10-31

## 2023-10-30 RX ORDER — EPINEPHRINE 1 MG/ML
0.3 INJECTION, SOLUTION, CONCENTRATE INTRAVENOUS PRN
Status: CANCELLED | OUTPATIENT
Start: 2023-10-31

## 2023-10-30 RX ORDER — MEPERIDINE HYDROCHLORIDE 50 MG/ML
12.5 INJECTION INTRAMUSCULAR; INTRAVENOUS; SUBCUTANEOUS PRN
Status: CANCELLED | OUTPATIENT
Start: 2023-10-31

## 2023-10-30 RX ORDER — ALBUTEROL SULFATE 90 UG/1
4 AEROSOL, METERED RESPIRATORY (INHALATION) PRN
Status: CANCELLED | OUTPATIENT
Start: 2023-10-31

## 2023-10-30 RX ORDER — HEPARIN SODIUM (PORCINE) LOCK FLUSH IV SOLN 100 UNIT/ML 100 UNIT/ML
500 SOLUTION INTRAVENOUS PRN
Status: CANCELLED | OUTPATIENT
Start: 2023-11-01

## 2023-10-30 RX ORDER — SODIUM CHLORIDE 9 MG/ML
5-250 INJECTION, SOLUTION INTRAVENOUS PRN
Status: CANCELLED | OUTPATIENT
Start: 2023-10-31

## 2023-10-30 RX ORDER — SODIUM CHLORIDE 0.9 % (FLUSH) 0.9 %
5-40 SYRINGE (ML) INJECTION PRN
Status: CANCELLED | OUTPATIENT
Start: 2023-10-31

## 2023-10-30 RX ORDER — ACETAMINOPHEN 325 MG/1
650 TABLET ORAL
Status: CANCELLED | OUTPATIENT
Start: 2023-10-31

## 2023-10-31 ENCOUNTER — HOSPITAL ENCOUNTER (OUTPATIENT)
Dept: INFUSION THERAPY | Age: 68
Discharge: HOME | End: 2023-10-31
Payer: MEDICARE

## 2023-10-31 ENCOUNTER — HOSPITAL ENCOUNTER (OUTPATIENT)
Dept: RADIATION ONCOLOGY | Age: 68
End: 2023-10-31
Payer: MEDICARE

## 2023-10-31 ENCOUNTER — OFFICE VISIT (OUTPATIENT)
Dept: ONCOLOGY | Age: 68
End: 2023-10-31
Payer: MEDICARE

## 2023-10-31 VITALS
HEART RATE: 76 BPM | TEMPERATURE: 98.2 F | SYSTOLIC BLOOD PRESSURE: 157 MMHG | BODY MASS INDEX: 27.68 KG/M2 | WEIGHT: 182.6 LBS | DIASTOLIC BLOOD PRESSURE: 75 MMHG | OXYGEN SATURATION: 96 % | HEIGHT: 68 IN

## 2023-10-31 VITALS
WEIGHT: 182.6 LBS | DIASTOLIC BLOOD PRESSURE: 75 MMHG | OXYGEN SATURATION: 96 % | SYSTOLIC BLOOD PRESSURE: 157 MMHG | HEART RATE: 76 BPM | TEMPERATURE: 98.2 F | BODY MASS INDEX: 27.76 KG/M2 | RESPIRATION RATE: 16 BRPM

## 2023-10-31 DIAGNOSIS — C16.8 MALIGNANT NEOPLASM OF OVERLAPPING SITES OF STOMACH (HCC): Primary | ICD-10-CM

## 2023-10-31 DIAGNOSIS — Z11.59 SCREENING FOR VIRAL DISEASE: ICD-10-CM

## 2023-10-31 PROCEDURE — 96375 TX/PRO/DX INJ NEW DRUG ADDON: CPT

## 2023-10-31 PROCEDURE — 96367 TX/PROPH/DG ADDL SEQ IV INF: CPT

## 2023-10-31 PROCEDURE — 96361 HYDRATE IV INFUSION ADD-ON: CPT

## 2023-10-31 PROCEDURE — 96417 CHEMO IV INFUS EACH ADDL SEQ: CPT

## 2023-10-31 PROCEDURE — 96413 CHEMO IV INFUSION 1 HR: CPT

## 2023-10-31 PROCEDURE — 96372 THER/PROPH/DIAG INJ SC/IM: CPT

## 2023-10-31 PROCEDURE — 6360000002 HC RX W HCPCS: Performed by: INTERNAL MEDICINE

## 2023-10-31 PROCEDURE — 1123F ACP DISCUSS/DSCN MKR DOCD: CPT | Performed by: INTERNAL MEDICINE

## 2023-10-31 PROCEDURE — 99214 OFFICE O/P EST MOD 30 MIN: CPT | Performed by: INTERNAL MEDICINE

## 2023-10-31 PROCEDURE — G0498 CHEMO EXTEND IV INFUS W/PUMP: HCPCS

## 2023-10-31 PROCEDURE — 96415 CHEMO IV INFUSION ADDL HR: CPT

## 2023-10-31 PROCEDURE — 2580000003 HC RX 258: Performed by: INTERNAL MEDICINE

## 2023-10-31 PROCEDURE — 2500000003 HC RX 250 WO HCPCS: Performed by: INTERNAL MEDICINE

## 2023-10-31 RX ORDER — HEPARIN SODIUM (PORCINE) LOCK FLUSH IV SOLN 100 UNIT/ML 100 UNIT/ML
500 SOLUTION INTRAVENOUS PRN
OUTPATIENT
Start: 2023-11-29

## 2023-10-31 RX ORDER — ACETAMINOPHEN 325 MG/1
650 TABLET ORAL
OUTPATIENT
Start: 2023-11-28

## 2023-10-31 RX ORDER — ONDANSETRON 2 MG/ML
8 INJECTION INTRAMUSCULAR; INTRAVENOUS
OUTPATIENT
Start: 2023-11-28

## 2023-10-31 RX ORDER — SODIUM CHLORIDE 0.9 % (FLUSH) 0.9 %
5-40 SYRINGE (ML) INJECTION PRN
OUTPATIENT
Start: 2023-11-15

## 2023-10-31 RX ORDER — SODIUM CHLORIDE 0.9 % (FLUSH) 0.9 %
5-40 SYRINGE (ML) INJECTION PRN
OUTPATIENT
Start: 2023-11-28

## 2023-10-31 RX ORDER — SODIUM CHLORIDE 0.9 % (FLUSH) 0.9 %
5-40 SYRINGE (ML) INJECTION PRN
OUTPATIENT
Start: 2023-11-14

## 2023-10-31 RX ORDER — ONDANSETRON 2 MG/ML
8 INJECTION INTRAMUSCULAR; INTRAVENOUS
OUTPATIENT
Start: 2023-11-14

## 2023-10-31 RX ORDER — DEXAMETHASONE SODIUM PHOSPHATE 4 MG/ML
4 INJECTION, SOLUTION INTRA-ARTICULAR; INTRALESIONAL; INTRAMUSCULAR; INTRAVENOUS; SOFT TISSUE
Status: COMPLETED | OUTPATIENT
Start: 2023-10-31 | End: 2023-10-31

## 2023-10-31 RX ORDER — DIPHENHYDRAMINE HYDROCHLORIDE 50 MG/ML
50 INJECTION INTRAMUSCULAR; INTRAVENOUS
OUTPATIENT
Start: 2023-11-14

## 2023-10-31 RX ORDER — DIPHENHYDRAMINE HYDROCHLORIDE 50 MG/ML
50 INJECTION INTRAMUSCULAR; INTRAVENOUS
OUTPATIENT
Start: 2023-11-28

## 2023-10-31 RX ORDER — EPINEPHRINE 1 MG/ML
0.3 INJECTION, SOLUTION, CONCENTRATE INTRAVENOUS PRN
OUTPATIENT
Start: 2023-11-14

## 2023-10-31 RX ORDER — SODIUM CHLORIDE 9 MG/ML
INJECTION, SOLUTION INTRAVENOUS
Status: COMPLETED | OUTPATIENT
Start: 2023-10-31 | End: 2023-10-31

## 2023-10-31 RX ORDER — SODIUM CHLORIDE 9 MG/ML
5-250 INJECTION, SOLUTION INTRAVENOUS PRN
OUTPATIENT
Start: 2023-11-15

## 2023-10-31 RX ORDER — SODIUM CHLORIDE 9 MG/ML
5-250 INJECTION, SOLUTION INTRAVENOUS PRN
OUTPATIENT
Start: 2023-11-29

## 2023-10-31 RX ORDER — DEXTROSE MONOHYDRATE 50 MG/ML
5-250 INJECTION, SOLUTION INTRAVENOUS PRN
OUTPATIENT
Start: 2023-11-28

## 2023-10-31 RX ORDER — MEPERIDINE HYDROCHLORIDE 50 MG/ML
12.5 INJECTION INTRAMUSCULAR; INTRAVENOUS; SUBCUTANEOUS PRN
OUTPATIENT
Start: 2023-11-28

## 2023-10-31 RX ORDER — FAMOTIDINE 10 MG/ML
20 INJECTION, SOLUTION INTRAVENOUS
OUTPATIENT
Start: 2023-11-28

## 2023-10-31 RX ORDER — ALBUTEROL SULFATE 90 UG/1
4 AEROSOL, METERED RESPIRATORY (INHALATION) PRN
OUTPATIENT
Start: 2023-11-28

## 2023-10-31 RX ORDER — HEPARIN SODIUM (PORCINE) LOCK FLUSH IV SOLN 100 UNIT/ML 100 UNIT/ML
500 SOLUTION INTRAVENOUS PRN
OUTPATIENT
Start: 2023-11-14

## 2023-10-31 RX ORDER — SODIUM CHLORIDE 9 MG/ML
5-250 INJECTION, SOLUTION INTRAVENOUS PRN
OUTPATIENT
Start: 2023-11-28

## 2023-10-31 RX ORDER — HEPARIN SODIUM (PORCINE) LOCK FLUSH IV SOLN 100 UNIT/ML 100 UNIT/ML
500 SOLUTION INTRAVENOUS PRN
OUTPATIENT
Start: 2023-11-15

## 2023-10-31 RX ORDER — PALONOSETRON 0.05 MG/ML
0.25 INJECTION, SOLUTION INTRAVENOUS ONCE
OUTPATIENT
Start: 2023-11-14 | End: 2023-11-14

## 2023-10-31 RX ORDER — FAMOTIDINE 10 MG/ML
20 INJECTION, SOLUTION INTRAVENOUS
OUTPATIENT
Start: 2023-11-14

## 2023-10-31 RX ORDER — SODIUM CHLORIDE 9 MG/ML
5-250 INJECTION, SOLUTION INTRAVENOUS PRN
OUTPATIENT
Start: 2023-11-14

## 2023-10-31 RX ORDER — HEPARIN SODIUM (PORCINE) LOCK FLUSH IV SOLN 100 UNIT/ML 100 UNIT/ML
500 SOLUTION INTRAVENOUS PRN
OUTPATIENT
Start: 2023-11-28

## 2023-10-31 RX ORDER — PALONOSETRON 0.05 MG/ML
0.25 INJECTION, SOLUTION INTRAVENOUS ONCE
Status: COMPLETED | OUTPATIENT
Start: 2023-10-31 | End: 2023-10-31

## 2023-10-31 RX ORDER — SODIUM CHLORIDE 9 MG/ML
INJECTION, SOLUTION INTRAVENOUS CONTINUOUS
OUTPATIENT
Start: 2023-11-28

## 2023-10-31 RX ORDER — FAMOTIDINE 10 MG/ML
20 INJECTION, SOLUTION INTRAVENOUS
Status: COMPLETED | OUTPATIENT
Start: 2023-10-31 | End: 2023-10-31

## 2023-10-31 RX ORDER — SODIUM CHLORIDE 9 MG/ML
INJECTION, SOLUTION INTRAVENOUS CONTINUOUS
OUTPATIENT
Start: 2023-11-14

## 2023-10-31 RX ORDER — DEXTROSE MONOHYDRATE 50 MG/ML
5-250 INJECTION, SOLUTION INTRAVENOUS PRN
Status: DISCONTINUED | OUTPATIENT
Start: 2023-10-31 | End: 2023-11-01 | Stop reason: HOSPADM

## 2023-10-31 RX ORDER — ALBUTEROL SULFATE 90 UG/1
4 AEROSOL, METERED RESPIRATORY (INHALATION) PRN
OUTPATIENT
Start: 2023-11-14

## 2023-10-31 RX ORDER — PALONOSETRON 0.05 MG/ML
0.25 INJECTION, SOLUTION INTRAVENOUS ONCE
OUTPATIENT
Start: 2023-11-28 | End: 2023-11-28

## 2023-10-31 RX ORDER — DEXTROSE MONOHYDRATE 50 MG/ML
5-250 INJECTION, SOLUTION INTRAVENOUS PRN
OUTPATIENT
Start: 2023-11-14

## 2023-10-31 RX ORDER — ACETAMINOPHEN 325 MG/1
650 TABLET ORAL
OUTPATIENT
Start: 2023-11-14

## 2023-10-31 RX ORDER — SODIUM CHLORIDE 0.9 % (FLUSH) 0.9 %
5-40 SYRINGE (ML) INJECTION PRN
Status: DISCONTINUED | OUTPATIENT
Start: 2023-10-31 | End: 2023-11-01 | Stop reason: HOSPADM

## 2023-10-31 RX ORDER — EPINEPHRINE 1 MG/ML
0.3 INJECTION, SOLUTION, CONCENTRATE INTRAVENOUS PRN
OUTPATIENT
Start: 2023-11-28

## 2023-10-31 RX ORDER — DIPHENHYDRAMINE HYDROCHLORIDE 50 MG/ML
50 INJECTION INTRAMUSCULAR; INTRAVENOUS
Status: COMPLETED | OUTPATIENT
Start: 2023-10-31 | End: 2023-10-31

## 2023-10-31 RX ORDER — MEPERIDINE HYDROCHLORIDE 50 MG/ML
12.5 INJECTION INTRAMUSCULAR; INTRAVENOUS; SUBCUTANEOUS PRN
OUTPATIENT
Start: 2023-11-14

## 2023-10-31 RX ORDER — SODIUM CHLORIDE 0.9 % (FLUSH) 0.9 %
5-40 SYRINGE (ML) INJECTION PRN
OUTPATIENT
Start: 2023-11-29

## 2023-10-31 RX ADMIN — PALONOSETRON 0.25 MG: 0.25 INJECTION, SOLUTION INTRAVENOUS at 08:58

## 2023-10-31 RX ADMIN — DOCETAXEL 98 MG: 20 INJECTION, SOLUTION, CONCENTRATE INTRAVENOUS at 09:25

## 2023-10-31 RX ADMIN — LEUCOVORIN CALCIUM 370 MG: 200 INJECTION, POWDER, LYOPHILIZED, FOR SUSPENSION INTRAMUSCULAR; INTRAVENOUS at 10:42

## 2023-10-31 RX ADMIN — DEXTROSE MONOHYDRATE 20 ML/HR: 50 INJECTION, SOLUTION INTRAVENOUS at 08:58

## 2023-10-31 RX ADMIN — FAMOTIDINE 20 MG: 10 INJECTION, SOLUTION INTRAVENOUS at 15:56

## 2023-10-31 RX ADMIN — DEXAMETHASONE SODIUM PHOSPHATE 12 MG: 4 INJECTION, SOLUTION INTRAMUSCULAR; INTRAVENOUS at 08:58

## 2023-10-31 RX ADMIN — DIPHENHYDRAMINE HYDROCHLORIDE 50 MG: 50 INJECTION INTRAMUSCULAR; INTRAVENOUS at 15:55

## 2023-10-31 RX ADMIN — DEXAMETHASONE SODIUM PHOSPHATE 4 MG: 4 INJECTION, SOLUTION INTRAMUSCULAR; INTRAVENOUS at 15:54

## 2023-10-31 RX ADMIN — OXALIPLATIN 155 MG: 5 INJECTION, SOLUTION INTRAVENOUS at 10:44

## 2023-10-31 RX ADMIN — FLUOROURACIL 4850 MG: 50 INJECTION, SOLUTION INTRAVENOUS at 13:07

## 2023-10-31 RX ADMIN — SODIUM CHLORIDE: 9 INJECTION, SOLUTION INTRAVENOUS at 15:55

## 2023-10-31 NOTE — PROGRESS NOTES
MA Rooming Questions  Patient: Tee Chung  MRN: [id number]    Date: 10/31/2023        1. Do you have any new issues?   no         2. Do you need any refills on medications?    no    3. Have you had any imaging done since your last visit?   no    4. Have you been hospitalized or seen in the emergency room since your last visit here?   yes - EGD    5. Did the patient have a depression screening completed today?  No    No data recorded     PHQ-9 Given to (if applicable):               PHQ-9 Score (if applicable):                     [] Positive     []  Negative              Does question #9 need addressed (if applicable)                     [] Yes    []  No               Enrike Lockhart MA

## 2023-10-31 NOTE — PROGRESS NOTES
Patient returned to treatment suite after going home with pump due to tremors/rigors that started about 5 minutes after arriving home. Patient brought back to treatment suite and VS /87, , and O2-88% on room air. Dr. Farhan Reyes to treatment suite. Pump paused. Patient given Decadron 4mg, Benadryl 25mg, and Pepcid 20mg. IVF running at 999ml/hr. Patient provided blankets. Dr. Farhan Reyes evaluated patient, whose tremors subsided after medication administration. VS recheck was /75, T99.6, O2-93%, and . Patient states feeling better. Instructions given to run treatment slower for next infusion. After 500ml of normal saline infused, patient was reconnected to pump and pump was restarted. Patient left treatment suite ambulatory with spouse.

## 2023-10-31 NOTE — PROGRESS NOTES
Patient arrived to treatment suite for pre-medications, chemotherapy infusion, and connection of a home infusion pump. Left chest mediport accessed and good blood return noted. Patient has no questions or concerns for the doctor at this time. Treatment approved and given. Patient tolerated well. Left treatment suite ambulatory. Discharge instructions provided at check-out due to doctor appointment. Mediport left connected for home infusion. Patient's status assessed and documented appropriately. All labs and required results were also reviewed today. Treatment parameters have been reviewed. Today's treatment has been approved by the provider. Treatment orders and medication sequencing (when applicable) was verified by 2 registered nurses. The treatment plan was confirmed with the patient prior to administration, and the patient understands the need to report any treatment-related symptoms. Prior to administration, when applicable, the following 8 elements of medication administration were reviewed with 2nd Registered Nurse prior to dosing: drug name, drug dose, infusion volume when prepared in a syringe, rate of administration, expiration dates and/or times, appearance and integrity of drug(s), and rate of pump for infusion. The 5 rights of medication administration have been verified.

## 2023-11-01 ENCOUNTER — HOSPITAL ENCOUNTER (OUTPATIENT)
Dept: RADIATION ONCOLOGY | Age: 68
Discharge: HOME | End: 2023-11-01
Payer: MEDICARE

## 2023-11-01 ENCOUNTER — HOSPITAL ENCOUNTER (OUTPATIENT)
Dept: INFUSION THERAPY | Age: 68
Discharge: HOME | End: 2023-11-01
Payer: MEDICARE

## 2023-11-01 DIAGNOSIS — C16.8 MALIGNANT NEOPLASM OF OVERLAPPING SITES OF STOMACH (HCC): Primary | ICD-10-CM

## 2023-11-01 DIAGNOSIS — Z11.59 SCREENING FOR VIRAL DISEASE: ICD-10-CM

## 2023-11-01 PROCEDURE — 6360000002 HC RX W HCPCS: Performed by: INTERNAL MEDICINE

## 2023-11-01 PROCEDURE — 2580000003 HC RX 258: Performed by: INTERNAL MEDICINE

## 2023-11-01 PROCEDURE — 96523 IRRIG DRUG DELIVERY DEVICE: CPT

## 2023-11-01 PROCEDURE — 77386 HC NTSTY MODUL RAD TX DLVR CPLX: CPT | Performed by: RADIOLOGY

## 2023-11-01 RX ORDER — HEPARIN 100 UNIT/ML
500 SYRINGE INTRAVENOUS PRN
Status: DISCONTINUED | OUTPATIENT
Start: 2023-11-01 | End: 2023-11-02 | Stop reason: HOSPADM

## 2023-11-01 RX ORDER — SODIUM CHLORIDE 0.9 % (FLUSH) 0.9 %
5-40 SYRINGE (ML) INJECTION PRN
Status: DISCONTINUED | OUTPATIENT
Start: 2023-11-01 | End: 2023-11-02 | Stop reason: HOSPADM

## 2023-11-01 RX ORDER — MIRTAZAPINE 15 MG/1
15 TABLET, FILM COATED ORAL NIGHTLY
Qty: 30 TABLET | Refills: 0 | Status: SHIPPED | OUTPATIENT
Start: 2023-11-01

## 2023-11-01 RX ADMIN — HEPARIN 500 UNITS: 100 SYRINGE at 13:45

## 2023-11-01 RX ADMIN — SODIUM CHLORIDE, PRESERVATIVE FREE 10 ML: 5 INJECTION INTRAVENOUS at 13:45

## 2023-11-01 NOTE — PROGRESS NOTES
Patient arrived to treatment suite from radiation for disconnection of a home infusion pump. Just over 1 hour left of infusion and patient is waiting. Patient stated having one episode of emesis this morning - discussed with Dr. iLly Soler who spoke with patient. Patient disconnected after infusion and mediport flushed with normal saline and heparinized. De-accessed and band-aid applied. Patient tolerated well. Left treatment suite ambulatory with spouse.

## 2023-11-02 ENCOUNTER — HOSPITAL ENCOUNTER (OUTPATIENT)
Dept: RADIATION ONCOLOGY | Age: 68
Discharge: HOME | End: 2023-11-02
Payer: MEDICARE

## 2023-11-02 PROCEDURE — 77386 HC NTSTY MODUL RAD TX DLVR CPLX: CPT | Performed by: RADIOLOGY

## 2023-11-03 ENCOUNTER — HOSPITAL ENCOUNTER (OUTPATIENT)
Dept: RADIATION ONCOLOGY | Age: 68
Discharge: HOME | End: 2023-11-03
Payer: MEDICARE

## 2023-11-06 ENCOUNTER — HOSPITAL ENCOUNTER (OUTPATIENT)
Dept: RADIATION ONCOLOGY | Age: 68
Discharge: HOME | End: 2023-11-06
Payer: MEDICARE

## 2023-11-06 PROCEDURE — 77386 HC NTSTY MODUL RAD TX DLVR CPLX: CPT | Performed by: RADIOLOGY

## 2023-11-06 PROCEDURE — G6002 STEREOSCOPIC X-RAY GUIDANCE: HCPCS | Performed by: RADIOLOGY

## 2023-11-07 ENCOUNTER — HOSPITAL ENCOUNTER (OUTPATIENT)
Dept: RADIATION ONCOLOGY | Age: 68
Discharge: HOME | End: 2023-11-07
Payer: MEDICARE

## 2023-11-07 ENCOUNTER — HOSPITAL ENCOUNTER (OUTPATIENT)
Dept: INFUSION THERAPY | Age: 68
Discharge: HOME | DRG: 374 | End: 2023-11-07
Payer: MEDICARE

## 2023-11-07 VITALS — HEART RATE: 107 BPM | TEMPERATURE: 99.9 F | SYSTOLIC BLOOD PRESSURE: 137 MMHG | DIASTOLIC BLOOD PRESSURE: 70 MMHG

## 2023-11-07 VITALS
OXYGEN SATURATION: 97 % | BODY MASS INDEX: 25.04 KG/M2 | WEIGHT: 165.2 LBS | TEMPERATURE: 98.2 F | DIASTOLIC BLOOD PRESSURE: 68 MMHG | HEART RATE: 97 BPM | SYSTOLIC BLOOD PRESSURE: 130 MMHG | HEIGHT: 68 IN

## 2023-11-07 DIAGNOSIS — C16.9 MALIGNANT NEOPLASM OF STOMACH, UNSPECIFIED LOCATION (HCC): Primary | ICD-10-CM

## 2023-11-07 DIAGNOSIS — R11.0 NAUSEA: ICD-10-CM

## 2023-11-07 PROCEDURE — 2580000003 HC RX 258: Performed by: INTERNAL MEDICINE

## 2023-11-07 PROCEDURE — 96375 TX/PRO/DX INJ NEW DRUG ADDON: CPT

## 2023-11-07 PROCEDURE — 6360000002 HC RX W HCPCS

## 2023-11-07 PROCEDURE — 77336 RADIATION PHYSICS CONSULT: CPT | Performed by: RADIOLOGY

## 2023-11-07 PROCEDURE — 77386 HC NTSTY MODUL RAD TX DLVR CPLX: CPT | Performed by: RADIOLOGY

## 2023-11-07 PROCEDURE — 96360 HYDRATION IV INFUSION INIT: CPT

## 2023-11-07 PROCEDURE — 6360000002 HC RX W HCPCS: Performed by: INTERNAL MEDICINE

## 2023-11-07 RX ORDER — 0.9 % SODIUM CHLORIDE 0.9 %
500 INTRAVENOUS SOLUTION INTRAVENOUS ONCE
Status: COMPLETED | OUTPATIENT
Start: 2023-11-07 | End: 2023-11-07

## 2023-11-07 RX ORDER — DEXAMETHASONE SODIUM PHOSPHATE 4 MG/ML
4 INJECTION, SOLUTION INTRA-ARTICULAR; INTRALESIONAL; INTRAMUSCULAR; INTRAVENOUS; SOFT TISSUE ONCE
Status: COMPLETED | OUTPATIENT
Start: 2023-11-07 | End: 2023-11-07

## 2023-11-07 RX ORDER — HEPARIN 100 UNIT/ML
SYRINGE INTRAVENOUS
Status: COMPLETED
Start: 2023-11-07 | End: 2023-11-07

## 2023-11-07 RX ORDER — DRONABINOL 2.5 MG/1
2.5 CAPSULE ORAL
Qty: 60 CAPSULE | Refills: 0 | Status: ON HOLD | OUTPATIENT
Start: 2023-11-07 | End: 2023-11-09

## 2023-11-07 RX ADMIN — SODIUM CHLORIDE 500 ML: 9 INJECTION, SOLUTION INTRAVENOUS at 12:27

## 2023-11-07 RX ADMIN — HEPARIN 500 UNITS: 100 SYRINGE at 13:44

## 2023-11-07 RX ADMIN — DEXAMETHASONE SODIUM PHOSPHATE 4 MG: 4 INJECTION, SOLUTION INTRAMUSCULAR; INTRAVENOUS at 13:30

## 2023-11-07 ASSESSMENT — PAIN SCALES - GENERAL: PAINLEVEL_OUTOF10: 0

## 2023-11-07 NOTE — PROGRESS NOTES
Weekly Radiation Treatment Progress Note    DATE OF SERVICE: 11/7/2023     DIAGNOSIS:   Cancer Staging   Carcinoma of duodenum [facility])  Staging form: Ampulla Of Vater, AJCC 8th Edition  - Clinical: cT3, cN0, cM0 - Signed by Michael Mckinney MD on 9/25/2020       TREATMENT COURSE:   Oncology History   Carcinoma of duodenum ([address])   4/23/2012 - 5/31/2012 Radiation    Duodenal bed/LNs: 4500 cGy AP/PA & lats  Boost: 540 cGy; TD: 5040 cGy     2012 Surgery    Whipple     12/8/2014 Initial Diagnosis    Carcinoma of duodenum ([address])     Malignant neoplasm of overlapping sites of stomach ([address])   10/3/2023 -  Chemotherapy    OP DOCEtaxel + OXALIplatin + leucovorin + fluorouracil (FLOT) (docetaxel start with c2)  Plan Provider: Michael Mckinney MD  Treatment goal: Neoadjuvant  Line of treatment: Neoadjuvant           Site: Gastric mass  Current Total Radiation Dose: 1000 cGy    Pt doing fairly. Energy low. He reports emesis despite Zofran and Phenergan, but is only taking them approximately once daily. He also reports of having dark brown pebbly stools. Denies melanotic stools. Reports he is consuming very small amounts of food/liquids. Had chemo early last week. EXAM  Wt Readings from Last 3 Encounters:   10/31/23 82.8 kg (182 lb 9.6 oz)   10/31/23 82.8 kg (182 lb 9.6 oz)   10/18/23 85.3 kg (188 lb)     NAD  Mucous membranes slightly dry. Skin turgor decreased. Orthostatics sitting: Blood pressure 137/70 pulse 109    Standing: Blood pressure 107/65 pulse 117    Setup images, chart, plan reviewed    A/P:   Tolerating RT   Continue RT as planned  Start Zofran and Phenergan every morning and evening scheduled with additional dosing as needed. I spoke with Dr. Patti Mcdonald who will give IV fluids today. Increase p.o. intake.       Electronically signed by Kym Reyes MD on 11/7/2023 at 12:05 PM

## 2023-11-07 NOTE — TELEPHONE ENCOUNTER
Marinol 2.5 mg  to be sent to [facility] on [address] . Pending RX to Provider to be sent to pharmacy.

## 2023-11-07 NOTE — PROGRESS NOTES
Arrived to treatment suite for IV hydration and Dex. Pt has been very weak, not eating or drinking per patient/family. Weight loss of 17.4lbs since 10/31/23. Dr. Omari Hartmann updated, plan to give fluids and Dex 4mg today and tomorrow. Marinol 2.5mg prescription also sent in to patient pharmacy. Patient and family would like to be seen by Dr. Omari Hartmann this week. Appointment scheduled for 11/10. Plan to return for fluids tomorrow morning at 0800 following radiation appointment. IV hydration completed as ordered. Pt tolerated well. AVS declined. Discharge ambulatory in stable condition.   Stefanie Recinos RN

## 2023-11-08 ENCOUNTER — HOSPITAL ENCOUNTER (INPATIENT)
Age: 68
LOS: 3 days | Discharge: HOME | DRG: 374 | End: 2023-11-11
Attending: STUDENT IN AN ORGANIZED HEALTH CARE EDUCATION/TRAINING PROGRAM | Admitting: STUDENT IN AN ORGANIZED HEALTH CARE EDUCATION/TRAINING PROGRAM
Payer: MEDICARE

## 2023-11-08 ENCOUNTER — HOSPITAL ENCOUNTER (OUTPATIENT)
Dept: RADIATION ONCOLOGY | Age: 68
Discharge: HOME | End: 2023-11-08
Payer: MEDICARE

## 2023-11-08 ENCOUNTER — OFFICE VISIT (OUTPATIENT)
Dept: ONCOLOGY | Age: 68
End: 2023-11-08
Payer: MEDICARE

## 2023-11-08 ENCOUNTER — APPOINTMENT (OUTPATIENT)
Dept: GENERAL RADIOLOGY | Age: 68
DRG: 374 | End: 2023-11-08
Attending: STUDENT IN AN ORGANIZED HEALTH CARE EDUCATION/TRAINING PROGRAM
Payer: MEDICARE

## 2023-11-08 ENCOUNTER — HOSPITAL ENCOUNTER (OUTPATIENT)
Dept: INFUSION THERAPY | Age: 68
Discharge: HOME | DRG: 374 | End: 2023-11-08
Payer: MEDICARE

## 2023-11-08 VITALS
HEIGHT: 74 IN | RESPIRATION RATE: 16 BRPM | OXYGEN SATURATION: 97 % | SYSTOLIC BLOOD PRESSURE: 174 MMHG | WEIGHT: 164 LBS | TEMPERATURE: 99 F | DIASTOLIC BLOOD PRESSURE: 80 MMHG | BODY MASS INDEX: 21.05 KG/M2 | HEART RATE: 104 BPM

## 2023-11-08 VITALS
DIASTOLIC BLOOD PRESSURE: 80 MMHG | HEIGHT: 68 IN | TEMPERATURE: 99 F | WEIGHT: 164 LBS | BODY MASS INDEX: 24.86 KG/M2 | OXYGEN SATURATION: 97 % | SYSTOLIC BLOOD PRESSURE: 174 MMHG | HEART RATE: 104 BPM

## 2023-11-08 DIAGNOSIS — E86.0 DEHYDRATION: ICD-10-CM

## 2023-11-08 DIAGNOSIS — C16.2 MALIGNANT NEOPLASM OF BODY OF STOMACH (HCC): Primary | ICD-10-CM

## 2023-11-08 DIAGNOSIS — R19.7 DIARRHEA, UNSPECIFIED TYPE: ICD-10-CM

## 2023-11-08 DIAGNOSIS — R11.2 NAUSEA VOMITING AND DIARRHEA: Primary | ICD-10-CM

## 2023-11-08 DIAGNOSIS — R19.7 NAUSEA VOMITING AND DIARRHEA: Primary | ICD-10-CM

## 2023-11-08 DIAGNOSIS — C16.9 MALIGNANT NEOPLASM OF STOMACH, UNSPECIFIED LOCATION (HCC): ICD-10-CM

## 2023-11-08 DIAGNOSIS — D61.818 PANCYTOPENIA (HCC): ICD-10-CM

## 2023-11-08 PROBLEM — R62.7 FAILURE TO THRIVE IN ADULT: Status: ACTIVE | Noted: 2023-11-08

## 2023-11-08 PROBLEM — Z09 POSTOP CHECK: Status: RESOLVED | Noted: 2023-10-09 | Resolved: 2023-11-08

## 2023-11-08 LAB
ALBUMIN SERPL-MCNC: 2.9 GM/DL (ref 3.4–5)
ALP BLD-CCNC: 95 IU/L (ref 40–128)
ALT SERPL-CCNC: 13 U/L (ref 10–40)
ANION GAP SERPL CALCULATED.3IONS-SCNC: 9 MMOL/L (ref 4–16)
AST SERPL-CCNC: 13 IU/L (ref 15–37)
BASOPHILS ABSOLUTE: 0 K/CU MM
BASOPHILS RELATIVE PERCENT: 2.1 % (ref 0–1)
BILIRUB SERPL-MCNC: 2.3 MG/DL (ref 0–1)
BUN SERPL-MCNC: 20 MG/DL (ref 6–23)
CALCIUM SERPL-MCNC: 9.4 MG/DL (ref 8.3–10.6)
CHLORIDE BLD-SCNC: 103 MMOL/L (ref 99–110)
CO2: 24 MMOL/L (ref 21–32)
CREAT SERPL-MCNC: 0.9 MG/DL (ref 0.9–1.3)
DIFFERENTIAL TYPE: ABNORMAL
EOSINOPHILS ABSOLUTE: 0 K/CU MM
EOSINOPHILS RELATIVE PERCENT: 2.1 % (ref 0–3)
GFR SERPL CREATININE-BSD FRML MDRD: >60 ML/MIN/1.73M2
GLUCOSE SERPL-MCNC: 232 MG/DL (ref 70–99)
HCT VFR BLD CALC: 28.4 % (ref 42–52)
HEMOGLOBIN: 9.4 GM/DL (ref 13.5–18)
INFLUENZA A ANTIGEN: NOT DETECTED
INFLUENZA B ANTIGEN: NOT DETECTED
LYMPHOCYTES ABSOLUTE: 0.2 K/CU MM
LYMPHOCYTES RELATIVE PERCENT: 50 % (ref 24–44)
MAGNESIUM: 1.8 MG/DL (ref 1.8–2.4)
MCH RBC QN AUTO: 30.8 PG (ref 27–31)
MCHC RBC AUTO-ENTMCNC: 33.1 % (ref 32–36)
MCV RBC AUTO: 93.1 FL (ref 78–100)
MONOCYTES ABSOLUTE: 0.2 K/CU MM
MONOCYTES RELATIVE PERCENT: 45.8 % (ref 0–4)
PDW BLD-RTO: 14 % (ref 11.7–14.9)
PLATELET # BLD: 46 K/CU MM (ref 140–440)
PMV BLD AUTO: 8.9 FL (ref 7.5–11.1)
POTASSIUM SERPL-SCNC: 4.1 MMOL/L (ref 3.5–5.1)
RBC # BLD: 3.05 M/CU MM (ref 4.6–6.2)
SARS-COV-2 RDRP RESP QL NAA+PROBE: NOT DETECTED
SEGMENTED NEUTROPHILS ABSOLUTE COUNT: 0 K/CU MM
SEGMENTED NEUTROPHILS RELATIVE PERCENT: 0 % (ref 36–66)
SODIUM BLD-SCNC: 136 MMOL/L (ref 135–145)
SOURCE: NORMAL
TOTAL PROTEIN: 5.6 GM/DL (ref 6.4–8.2)
WBC # BLD: 0.5 K/CU MM (ref 4–10.5)

## 2023-11-08 PROCEDURE — 6360000002 HC RX W HCPCS: Performed by: STUDENT IN AN ORGANIZED HEALTH CARE EDUCATION/TRAINING PROGRAM

## 2023-11-08 PROCEDURE — 94761 N-INVAS EAR/PLS OXIMETRY MLT: CPT

## 2023-11-08 PROCEDURE — 2580000003 HC RX 258: Performed by: INTERNAL MEDICINE

## 2023-11-08 PROCEDURE — 6370000000 HC RX 637 (ALT 250 FOR IP): Performed by: STUDENT IN AN ORGANIZED HEALTH CARE EDUCATION/TRAINING PROGRAM

## 2023-11-08 PROCEDURE — 71046 X-RAY EXAM CHEST 2 VIEWS: CPT

## 2023-11-08 PROCEDURE — 6360000002 HC RX W HCPCS: Performed by: INTERNAL MEDICINE

## 2023-11-08 PROCEDURE — 87502 INFLUENZA DNA AMP PROBE: CPT

## 2023-11-08 PROCEDURE — 1123F ACP DISCUSS/DSCN MKR DOCD: CPT | Performed by: INTERNAL MEDICINE

## 2023-11-08 PROCEDURE — 6360000002 HC RX W HCPCS

## 2023-11-08 PROCEDURE — 85025 COMPLETE CBC W/AUTO DIFF WBC: CPT

## 2023-11-08 PROCEDURE — 2580000003 HC RX 258: Performed by: STUDENT IN AN ORGANIZED HEALTH CARE EDUCATION/TRAINING PROGRAM

## 2023-11-08 PROCEDURE — 96360 HYDRATION IV INFUSION INIT: CPT

## 2023-11-08 PROCEDURE — 83735 ASSAY OF MAGNESIUM: CPT

## 2023-11-08 PROCEDURE — 1200000000 HC SEMI PRIVATE

## 2023-11-08 PROCEDURE — 99214 OFFICE O/P EST MOD 30 MIN: CPT | Performed by: INTERNAL MEDICINE

## 2023-11-08 PROCEDURE — 77386 HC NTSTY MODUL RAD TX DLVR CPLX: CPT | Performed by: RADIOLOGY

## 2023-11-08 PROCEDURE — 80053 COMPREHEN METABOLIC PANEL: CPT

## 2023-11-08 PROCEDURE — 96375 TX/PRO/DX INJ NEW DRUG ADDON: CPT

## 2023-11-08 PROCEDURE — 87635 SARS-COV-2 COVID-19 AMP PRB: CPT

## 2023-11-08 PROCEDURE — 99285 EMERGENCY DEPT VISIT HI MDM: CPT

## 2023-11-08 RX ORDER — ACETAMINOPHEN 650 MG/1
650 SUPPOSITORY RECTAL EVERY 6 HOURS PRN
Status: DISCONTINUED | OUTPATIENT
Start: 2023-11-08 | End: 2023-11-11 | Stop reason: HOSPADM

## 2023-11-08 RX ORDER — POTASSIUM CHLORIDE 20 MEQ/1
40 TABLET, EXTENDED RELEASE ORAL PRN
Status: DISCONTINUED | OUTPATIENT
Start: 2023-11-08 | End: 2023-11-11 | Stop reason: HOSPADM

## 2023-11-08 RX ORDER — MAGNESIUM SULFATE IN WATER 40 MG/ML
2000 INJECTION, SOLUTION INTRAVENOUS PRN
Status: DISCONTINUED | OUTPATIENT
Start: 2023-11-08 | End: 2023-11-11 | Stop reason: HOSPADM

## 2023-11-08 RX ORDER — ONDANSETRON 4 MG/1
4 TABLET, ORALLY DISINTEGRATING ORAL EVERY 8 HOURS PRN
Status: DISCONTINUED | OUTPATIENT
Start: 2023-11-08 | End: 2023-11-11 | Stop reason: HOSPADM

## 2023-11-08 RX ORDER — 0.9 % SODIUM CHLORIDE 0.9 %
500 INTRAVENOUS SOLUTION INTRAVENOUS ONCE
Status: COMPLETED | OUTPATIENT
Start: 2023-11-08 | End: 2023-11-08

## 2023-11-08 RX ORDER — POTASSIUM CHLORIDE 7.45 MG/ML
10 INJECTION INTRAVENOUS PRN
Status: DISCONTINUED | OUTPATIENT
Start: 2023-11-08 | End: 2023-11-11 | Stop reason: HOSPADM

## 2023-11-08 RX ORDER — SODIUM CHLORIDE 9 MG/ML
INJECTION, SOLUTION INTRAVENOUS PRN
Status: DISCONTINUED | OUTPATIENT
Start: 2023-11-08 | End: 2023-11-11 | Stop reason: HOSPADM

## 2023-11-08 RX ORDER — SODIUM CHLORIDE 0.9 % (FLUSH) 0.9 %
5-40 SYRINGE (ML) INJECTION EVERY 12 HOURS SCHEDULED
Status: DISCONTINUED | OUTPATIENT
Start: 2023-11-08 | End: 2023-11-11 | Stop reason: HOSPADM

## 2023-11-08 RX ORDER — SODIUM CHLORIDE, SODIUM LACTATE, POTASSIUM CHLORIDE, AND CALCIUM CHLORIDE .6; .31; .03; .02 G/100ML; G/100ML; G/100ML; G/100ML
1000 INJECTION, SOLUTION INTRAVENOUS ONCE
Status: COMPLETED | OUTPATIENT
Start: 2023-11-08 | End: 2023-11-08

## 2023-11-08 RX ORDER — ENOXAPARIN SODIUM 100 MG/ML
40 INJECTION SUBCUTANEOUS DAILY
Status: DISCONTINUED | OUTPATIENT
Start: 2023-11-08 | End: 2023-11-11 | Stop reason: HOSPADM

## 2023-11-08 RX ORDER — DEXAMETHASONE SODIUM PHOSPHATE 4 MG/ML
4 INJECTION, SOLUTION INTRA-ARTICULAR; INTRALESIONAL; INTRAMUSCULAR; INTRAVENOUS; SOFT TISSUE ONCE
Status: COMPLETED | OUTPATIENT
Start: 2023-11-08 | End: 2023-11-08

## 2023-11-08 RX ORDER — ONDANSETRON 2 MG/ML
4 INJECTION INTRAMUSCULAR; INTRAVENOUS EVERY 6 HOURS PRN
Status: DISCONTINUED | OUTPATIENT
Start: 2023-11-08 | End: 2023-11-11 | Stop reason: HOSPADM

## 2023-11-08 RX ORDER — SODIUM CHLORIDE 0.9 % (FLUSH) 0.9 %
5-40 SYRINGE (ML) INJECTION PRN
Status: DISCONTINUED | OUTPATIENT
Start: 2023-11-08 | End: 2023-11-11 | Stop reason: HOSPADM

## 2023-11-08 RX ORDER — POLYETHYLENE GLYCOL 3350 17 G/17G
17 POWDER, FOR SOLUTION ORAL DAILY PRN
Status: DISCONTINUED | OUTPATIENT
Start: 2023-11-08 | End: 2023-11-11 | Stop reason: HOSPADM

## 2023-11-08 RX ORDER — SODIUM CHLORIDE 9 MG/ML
INJECTION, SOLUTION INTRAVENOUS CONTINUOUS
Status: DISCONTINUED | OUTPATIENT
Start: 2023-11-08 | End: 2023-11-10

## 2023-11-08 RX ORDER — HEPARIN 100 UNIT/ML
SYRINGE INTRAVENOUS
Status: DISCONTINUED
Start: 2023-11-08 | End: 2023-11-08 | Stop reason: HOSPADM

## 2023-11-08 RX ORDER — ACETAMINOPHEN 325 MG/1
650 TABLET ORAL EVERY 6 HOURS PRN
Status: DISCONTINUED | OUTPATIENT
Start: 2023-11-08 | End: 2023-11-11 | Stop reason: HOSPADM

## 2023-11-08 RX ORDER — MULTIVIT-MIN/IRON/FOLIC ACID/K 18-600-40
2000 CAPSULE ORAL DAILY
COMMUNITY

## 2023-11-08 RX ADMIN — ACETAMINOPHEN 650 MG: 325 TABLET ORAL at 18:56

## 2023-11-08 RX ADMIN — FILGRASTIM-AAFI 300 MCG: 300 INJECTION, SOLUTION SUBCUTANEOUS at 18:26

## 2023-11-08 RX ADMIN — SODIUM CHLORIDE, POTASSIUM CHLORIDE, SODIUM LACTATE AND CALCIUM CHLORIDE 1000 ML: 600; 310; 30; 20 INJECTION, SOLUTION INTRAVENOUS at 14:07

## 2023-11-08 RX ADMIN — SODIUM CHLORIDE: 9 INJECTION, SOLUTION INTRAVENOUS at 17:16

## 2023-11-08 RX ADMIN — SODIUM CHLORIDE 500 ML: 9 INJECTION, SOLUTION INTRAVENOUS at 08:58

## 2023-11-08 RX ADMIN — SODIUM CHLORIDE, PRESERVATIVE FREE 10 ML: 5 INJECTION INTRAVENOUS at 22:15

## 2023-11-08 RX ADMIN — DEXAMETHASONE SODIUM PHOSPHATE 4 MG: 4 INJECTION, SOLUTION INTRAMUSCULAR; INTRAVENOUS at 09:04

## 2023-11-08 RX ADMIN — ONDANSETRON 4 MG: 2 INJECTION INTRAMUSCULAR; INTRAVENOUS at 22:50

## 2023-11-08 ASSESSMENT — PAIN DESCRIPTION - LOCATION
LOCATION: NECK
LOCATION: GENERALIZED

## 2023-11-08 ASSESSMENT — ENCOUNTER SYMPTOMS
BLOOD IN STOOL: 0
ABDOMINAL PAIN: 0
SHORTNESS OF BREATH: 0
DIARRHEA: 1
COUGH: 1
VOMITING: 1
SORE THROAT: 0
NAUSEA: 1

## 2023-11-08 ASSESSMENT — PAIN SCALES - GENERAL
PAINLEVEL_OUTOF10: 7
PAINLEVEL_OUTOF10: 0
PAINLEVEL_OUTOF10: 6

## 2023-11-08 ASSESSMENT — PAIN DESCRIPTION - FREQUENCY: FREQUENCY: INTERMITTENT

## 2023-11-08 ASSESSMENT — PAIN DESCRIPTION - ONSET: ONSET: ON-GOING

## 2023-11-08 ASSESSMENT — PAIN DESCRIPTION - DESCRIPTORS: DESCRIPTORS: ACHING;DISCOMFORT

## 2023-11-08 ASSESSMENT — PAIN - FUNCTIONAL ASSESSMENT: PAIN_FUNCTIONAL_ASSESSMENT: ACTIVITIES ARE NOT PREVENTED

## 2023-11-08 ASSESSMENT — PAIN DESCRIPTION - ORIENTATION: ORIENTATION: RIGHT;LEFT

## 2023-11-08 ASSESSMENT — PAIN DESCRIPTION - PAIN TYPE: TYPE: ACUTE PAIN

## 2023-11-08 NOTE — ED NOTES
1311 paged Dr Hi Proper  11/08/23 1312    1319 Dr Patti Mcdonald returned call      Aman Began  11/08/23 1322

## 2023-11-08 NOTE — ED PROVIDER NOTES
[address] [facility]  Emergency Department Encounter    Pt Name:Arnie Newman  MRN: [id number]  Birthdate 1955  Date of evaluation: 11/8/23  PCP:  Walden Halsted, MD       CHIEF COMPLAINT       Chief Complaint   Patient presents with    Emesis     Pt from cancer center, WBC 0, lost 18 pounds in 2 weeks    Diarrhea       HISTORY OF PRESENT ILLNESS     Ashly Ventura is a 76 y.o. male who presents with multiple symptoms. Patient has a history of duodenal cancer approximately 8 years ago status postchemotherapy and Whipple procedure. Was recently diagnosed a few months ago with gastric cancer and is on chemotherapy. He follows with Dr. Christen Nix. He reports chronic diarrhea for months. Patient also reports chronic decreased p.o. intake and occasional emesis. Last week he was started on a new chemotherapy regiment. Afterwards he had worsening nausea, vomiting, diarrhea. He states that at best he could get 2 ensures and per day. Today he did have emesis that appeared to be [place] \"nonbloody. He denies any black or bloody stools. Patient states over the last 8 days he has lost 18 pounds. Patient also reports cough with green sputum production for the last 3 weeks. He denies any fevers or shortness of breath. Does have occasional chest pain. Patient states the cough only started after radiation. Patient had outpatient labs drawn today showing to be severely leukopenic with a white blood cell count of 0.5, also had chronic pancytopenia with chronically low thrombocytes and hemoglobin. CMP shows no significant electrolyte abnormalities does show elevated glucose with a normal anion gap, does show decreased protein.     Patient was instructed to come into emergency department by his oncologist.    Swathi Costa / SURGICAL / Anthony Choi / Mauri Acevedo HISTORY      has a past medical history of Anemia, Cancer ([address]), Cirrhosis ([address]), Duodenal cancer ([address]), Efforts were made to edit the dictations but occasionally words are mis-transcribed.)        Aydee Vasquez DO  Resident  11/08/23 0239

## 2023-11-08 NOTE — PROGRESS NOTES
Pt requested to be independent in room. Bed alarm refusal form printed and discussed with pt. Pt acknowledges risks and still chose to sign bed alarm refusal. Signed form placed in pt's chart.

## 2023-11-08 NOTE — ED TRIAGE NOTES
Pt arrived to the ED with complaints of emesis and diarrhea x 1 week. Per pt, he has lost 18 pounds in 2 weeks. Pt states he has stomach cancer and was told to come over to the ED to be admitted due to WBC of 0.

## 2023-11-08 NOTE — PROGRESS NOTES
4 Eyes Skin Assessment     NAME:  Larissa Mcnally  YOB: 1955  MEDICAL RECORD NUMBER:  [id number]    The patient is being assessed for  Admission    I agree that at least one RN has performed a thorough Head to Toe Skin Assessment on the patient. ALL assessment sites listed below have been assessed. Areas assessed by both nurses:    Head, Face, Ears, Shoulders, Back, Chest, Arms, Elbows, Hands, Sacrum. Buttock, Coccyx, Ischium, and Legs. Feet and Heels        Does the Patient have a Wound?  No noted wound(s)       Mj Prevention initiated by RN: No  Wound Care Orders initiated by RN: No    Pressure Injury (Stage 3,4, Unstageable, DTI, NWPT, and Complex wounds) if present, place Wound referral order by RN under : No    New Ostomies, if present place, Ostomy referral order under : No     Nurse 1 eSignature: Electronically signed by Gifty Alfaro LPN on 34/5/87 at 8:71 PM EST    **SHARE this note so that the co-signing nurse can place an eSignature**    Nurse 2 eSignature: Electronically signed by Cory Gomez RN on 11/8/23 at 6:53 PM EST

## 2023-11-08 NOTE — PROGRESS NOTES
Patient Name: Amy Motta  Patient : 1955  Patient MRN: [id number]     Primary Oncologist: Lenore Murry MD  Referring Provider: Awilda Wetzel MD     Date of Service: 2023      Chief Complaint:   Chief Complaint   Patient presents with    Follow-up     He came in for follow-up visit. Patient Active Problem List:     Splenomegaly     NAFLD (nonalcoholic fatty liver disease)     Carcinoma of duodenum      Liver cirrhosis secondary to MONGE (nonalcoholic steatohepatitis)     Port-A-Cath in place     Bilateral primary osteoarthritis of knee     Arthritis of both knees     Leukopenia     Primary malignant neoplasm of small intestine     Thrombocytopenic disorder     Fatigue     HPI:  Amanuel Baeza is a pleasant 76year old  male patient with underlying liver cirrhosis/MONGE with esophageal varices followed by Dr Kylah Villareal, who was referred back for evaluation of pancytopenia. 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 He had mismatch MMR. He received adjuvant FOLFOX from 2011 through 2012 followed by RT till 2012. CT scan in May 2014 showed no recurrent disease. US in 2014 showed 17 cm spleen, previously 16 cm with hepatic steatosis. Platelet was 80 - 165 since  through . CEA was 2.4. He was seen by Dr Kylah Villareal in 2019. AFP in 2019 was 4.6. 2019, calcium 9.4, LFTs were unremarkable. Creatinine  0.8. Total bilirubin was 0.8. Alpha-fetoprotein 3. INR 1.21. 2019 CMP grossly unremarkable except for glucose 125. WBC  2.1, RBC count 3.68, hemoglobin 11.7, hematocrit 34.1, MCV 92.6, plat 64. At the time he just got over flu. If pancytopenia continues to get worse, he may consider bone marrow study. He had left TKR in  without complication. He did not receive blood product transfusion.   He plans to have

## 2023-11-08 NOTE — PROGRESS NOTES
MA Rooming Questions  Patient: Cary Singh  MRN: [id number]    Date: 11/8/2023        1. Do you have any new issues? yes - aches all over, weight loss- 18.5 pounds since 10/31. Nausea and vomiting-since last week, diarrhea          2. Do you need any refills on medications?    no    3. Have you had any imaging done since your last visit?   no    4. Have you been hospitalized or seen in the emergency room since your last visit here?   no    5. Did the patient have a depression screening completed today?  No    No data recorded     PHQ-9 Given to (if applicable):               PHQ-9 Score (if applicable):                     [] Positive     []  Negative              Does question #9 need addressed (if applicable)                     [] Yes    []  No               Elidia Danielle CMA

## 2023-11-08 NOTE — PROGRESS NOTES
Arrived to treatment suite for IV hydration and Dex. Pt weak, nauseated and having explosive diarrhea. Pt vomiting yellow/green fluid with coffee ground sediment noted. Zofran and Phenergan not helping due to patient being unable to keep down. Mediport accessed, labs drawn. Dr. Lily Soler in for [facility]. Labs resulted, reviewed. Dr. Lily Soler would like patient to be admitted. ANC 0, WBC 0.5. Pt denies fever. Fluids and Dex given as ordered. Pt tolerated well. Mediport remains accessed due to being sent to the hospital.  Report called to [facility] in ER as well as Cristy Matias, hospital supervisor. Discharge via wheelchair with wife at chairside.  Von Muro RN

## 2023-11-08 NOTE — CARE COORDINATION
MCG criteria for Abdominal pain reviewed at this time, criteria supports Inpatient Admission.  BAM,RN/CM

## 2023-11-08 NOTE — H&P
V2.0  History and Physical      Name:  Juaquin Ledesma /Age/Sex: 1955  (76 y.o. male)   MRN & CSN:  [id number] & [id number] Encounter Date/Time: 2023 2:16 PM EST   Location:   PCP: Stefania Lucas MD       Hospital Day: 1    Assessment and Plan:   Juaquin Ledesma is a 76 y.o. male  who presents with Failure to thrive in adult    1. Failure to thrive  -Reports 18 pounds weight loss, poor oral intake with associated nausea vomiting and diarrhea  -Diagnosed with gastric adenocarcinoma 2023 and currently undergoing chemoradiation. Last chemo was 10/31  -Supportive treatment with adequate hydration/antiemetics  -PT OT  -Dietitian consult    2. 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 Started on chemo with FOLFOX and radiation  -Oncology Dr. Richard Mccrary consulted    3. Pancytopenia  -Likely secondary to chemo  -On filgrastim  -No overt signs of bleeding  -Oncology consulted     4. 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  -With esophageal varices status post banding  -Follows up outpatient with GI      Hospital Problems             Last Modified POA    * (Principal) Failure to thrive in adult 2023 Yes           Disposition:   Current Living situation: Home  Expected Disposition: TBD  Estimated D/C: TBD    Diet ADULT DIET; Regular   DVT Prophylaxis [] Lovenox, []  Heparin, [] SCDs, [] Ambulation,  [] Eliquis, [] Xarelto, [] Coumadin   Code Status Full Code   Surrogate Decision Maker/ POA Spouse     Personally reviewed Lab Studies and Imaging             History from:     patient    History of Present Illness:     Chief Complaint: Failure to thrive  Juaquin Ledesma is a 76 y.o. male with pmh of liver cirrhosis, duodenal cancer status post Whipple's procedure, gastric adenocarcinoma who presents due to failure to thrive.   Patient was diagnosed with gastric increased interstitial opacities, with a peripheral predominance, appears similar on previous exam.     Low lung volumes with underaeration of the lung bases. Chronic increased interstitial opacities. No definite superimposed acute process.          Electronically signed by Xu Shah MD on 11/8/2023 at 2:16 PM

## 2023-11-09 ENCOUNTER — HOSPITAL ENCOUNTER (OUTPATIENT)
Dept: RADIATION ONCOLOGY | Age: 68
End: 2023-11-09
Payer: MEDICARE

## 2023-11-09 ENCOUNTER — APPOINTMENT (OUTPATIENT)
Dept: CT IMAGING | Age: 68
DRG: 374 | End: 2023-11-09
Payer: MEDICARE

## 2023-11-09 ENCOUNTER — CLINICAL DOCUMENTATION (OUTPATIENT)
Dept: ONCOLOGY | Age: 68
End: 2023-11-09

## 2023-11-09 DIAGNOSIS — C16.9 MALIGNANT NEOPLASM OF STOMACH, UNSPECIFIED LOCATION (HCC): ICD-10-CM

## 2023-11-09 DIAGNOSIS — R11.0 NAUSEA: ICD-10-CM

## 2023-11-09 PROBLEM — E43 SEVERE MALNUTRITION (HCC): Status: ACTIVE | Noted: 2023-11-09

## 2023-11-09 LAB
ANION GAP SERPL CALCULATED.3IONS-SCNC: 7 MMOL/L (ref 4–16)
ATYPICAL LYMPHOCYTE ABSOLUTE COUNT: ABNORMAL
BUN SERPL-MCNC: 20 MG/DL (ref 6–23)
CALCIUM SERPL-MCNC: 9.4 MG/DL (ref 8.3–10.6)
CHLORIDE BLD-SCNC: 103 MMOL/L (ref 99–110)
CO2: 24 MMOL/L (ref 21–32)
CREAT SERPL-MCNC: 0.7 MG/DL (ref 0.9–1.3)
DIFFERENTIAL TYPE: ABNORMAL
GFR SERPL CREATININE-BSD FRML MDRD: >60 ML/MIN/1.73M2
GLUCOSE SERPL-MCNC: 97 MG/DL (ref 70–99)
HCT VFR BLD CALC: 25.8 % (ref 42–52)
HEMOGLOBIN: 8.6 GM/DL (ref 13.5–18)
LYMPHOCYTES ABSOLUTE: 0.6 K/CU MM
LYMPHOCYTES RELATIVE PERCENT: 80 % (ref 24–44)
MCH RBC QN AUTO: 30.7 PG (ref 27–31)
MCHC RBC AUTO-ENTMCNC: 33.3 % (ref 32–36)
MCV RBC AUTO: 92.1 FL (ref 78–100)
METAMYELOCYTES ABSOLUTE COUNT: 0.03 K/CU MM
METAMYELOCYTES PERCENT: 4 %
MONOCYTES ABSOLUTE: 0.1 K/CU MM
MONOCYTES RELATIVE PERCENT: 10 % (ref 0–4)
NUCLEATED RED BLOOD CELLS: 1
PDW BLD-RTO: 13.4 % (ref 11.7–14.9)
PLATELET # BLD: 53 K/CU MM (ref 140–440)
PMV BLD AUTO: 10.3 FL (ref 7.5–11.1)
POTASSIUM SERPL-SCNC: 4.2 MMOL/L (ref 3.5–5.1)
RBC # BLD: 2.8 M/CU MM (ref 4.6–6.2)
SEGMENTED NEUTROPHILS ABSOLUTE COUNT: 0 K/CU MM
SEGMENTED NEUTROPHILS RELATIVE PERCENT: 6 % (ref 36–66)
SODIUM BLD-SCNC: 134 MMOL/L (ref 135–145)
WBC # BLD: 0.7 K/CU MM (ref 4–10.5)

## 2023-11-09 PROCEDURE — 70460 CT HEAD/BRAIN W/DYE: CPT

## 2023-11-09 PROCEDURE — 99223 1ST HOSP IP/OBS HIGH 75: CPT | Performed by: INTERNAL MEDICINE

## 2023-11-09 PROCEDURE — 97163 PT EVAL HIGH COMPLEX 45 MIN: CPT

## 2023-11-09 PROCEDURE — 85007 BL SMEAR W/DIFF WBC COUNT: CPT

## 2023-11-09 PROCEDURE — 6370000000 HC RX 637 (ALT 250 FOR IP): Performed by: STUDENT IN AN ORGANIZED HEALTH CARE EDUCATION/TRAINING PROGRAM

## 2023-11-09 PROCEDURE — 97162 PT EVAL MOD COMPLEX 30 MIN: CPT

## 2023-11-09 PROCEDURE — APPNB60 APP NON BILLABLE TIME 46-60 MINS: Performed by: PHYSICIAN ASSISTANT

## 2023-11-09 PROCEDURE — 20561 NDL INSJ W/O NJX 3+ MUSC: CPT

## 2023-11-09 PROCEDURE — 2580000003 HC RX 258: Performed by: STUDENT IN AN ORGANIZED HEALTH CARE EDUCATION/TRAINING PROGRAM

## 2023-11-09 PROCEDURE — 6370000000 HC RX 637 (ALT 250 FOR IP): Performed by: INTERNAL MEDICINE

## 2023-11-09 PROCEDURE — L1930 AFO PLASTIC: HCPCS

## 2023-11-09 PROCEDURE — 1200000000 HC SEMI PRIVATE

## 2023-11-09 PROCEDURE — 97116 GAIT TRAINING THERAPY: CPT

## 2023-11-09 PROCEDURE — 6360000002 HC RX W HCPCS

## 2023-11-09 PROCEDURE — 80048 BASIC METABOLIC PNL TOTAL CA: CPT

## 2023-11-09 PROCEDURE — 87040 BLOOD CULTURE FOR BACTERIA: CPT

## 2023-11-09 PROCEDURE — 97166 OT EVAL MOD COMPLEX 45 MIN: CPT

## 2023-11-09 PROCEDURE — 6370000000 HC RX 637 (ALT 250 FOR IP): Performed by: PHYSICIAN ASSISTANT

## 2023-11-09 PROCEDURE — 97535 SELF CARE MNGMENT TRAINING: CPT

## 2023-11-09 PROCEDURE — 6360000004 HC RX CONTRAST MEDICATION: Performed by: INTERNAL MEDICINE

## 2023-11-09 PROCEDURE — 6360000002 HC RX W HCPCS: Performed by: STUDENT IN AN ORGANIZED HEALTH CARE EDUCATION/TRAINING PROGRAM

## 2023-11-09 PROCEDURE — 36415 COLL VENOUS BLD VENIPUNCTURE: CPT

## 2023-11-09 PROCEDURE — 90901 BIOFEEDBACK TRAIN ANY METH: CPT

## 2023-11-09 PROCEDURE — 94761 N-INVAS EAR/PLS OXIMETRY MLT: CPT

## 2023-11-09 PROCEDURE — 85027 COMPLETE CBC AUTOMATED: CPT

## 2023-11-09 PROCEDURE — 97602 WOUND(S) CARE NON-SELECTIVE: CPT

## 2023-11-09 PROCEDURE — 6360000002 HC RX W HCPCS: Performed by: INTERNAL MEDICINE

## 2023-11-09 RX ORDER — PROMETHAZINE HYDROCHLORIDE 25 MG/ML
6.25 INJECTION, SOLUTION INTRAMUSCULAR; INTRAVENOUS ONCE
Status: COMPLETED | OUTPATIENT
Start: 2023-11-09 | End: 2023-11-09

## 2023-11-09 RX ORDER — LEVOFLOXACIN 5 MG/ML
750 INJECTION, SOLUTION INTRAVENOUS EVERY 24 HOURS
Status: DISCONTINUED | OUTPATIENT
Start: 2023-11-09 | End: 2023-11-11 | Stop reason: HOSPADM

## 2023-11-09 RX ORDER — MIRTAZAPINE 15 MG/1
15 TABLET, ORALLY DISINTEGRATING ORAL NIGHTLY
Status: DISCONTINUED | OUTPATIENT
Start: 2023-11-09 | End: 2023-11-11 | Stop reason: HOSPADM

## 2023-11-09 RX ORDER — DRONABINOL 2.5 MG/1
2.5 CAPSULE ORAL 2 TIMES DAILY
Status: DISCONTINUED | OUTPATIENT
Start: 2023-11-09 | End: 2023-11-11 | Stop reason: HOSPADM

## 2023-11-09 RX ORDER — OXYCODONE HCL 20 MG/ML
5 CONCENTRATE, ORAL ORAL EVERY 4 HOURS PRN
Status: DISCONTINUED | OUTPATIENT
Start: 2023-11-09 | End: 2023-11-11 | Stop reason: HOSPADM

## 2023-11-09 RX ORDER — PROMETHAZINE HYDROCHLORIDE 12.5 MG/1
25 TABLET ORAL EVERY 6 HOURS PRN
Status: DISCONTINUED | OUTPATIENT
Start: 2023-11-09 | End: 2023-11-11 | Stop reason: HOSPADM

## 2023-11-09 RX ORDER — DRONABINOL 5 MG/1
5 CAPSULE ORAL
Qty: 60 CAPSULE | Refills: 0 | Status: SHIPPED | OUTPATIENT
Start: 2023-11-09 | End: 2023-12-09

## 2023-11-09 RX ADMIN — PROMETHAZINE HYDROCHLORIDE 6.25 MG: 25 INJECTION INTRAMUSCULAR; INTRAVENOUS at 03:59

## 2023-11-09 RX ADMIN — ACETAMINOPHEN 650 MG: 325 TABLET ORAL at 10:07

## 2023-11-09 RX ADMIN — DRONABINOL 2.5 MG: 2.5 CAPSULE ORAL at 20:53

## 2023-11-09 RX ADMIN — HYDROMORPHONE HYDROCHLORIDE 0.25 MG: 1 INJECTION, SOLUTION INTRAMUSCULAR; INTRAVENOUS; SUBCUTANEOUS at 18:24

## 2023-11-09 RX ADMIN — SODIUM CHLORIDE: 9 INJECTION, SOLUTION INTRAVENOUS at 23:15

## 2023-11-09 RX ADMIN — SODIUM CHLORIDE: 9 INJECTION, SOLUTION INTRAVENOUS at 06:41

## 2023-11-09 RX ADMIN — SODIUM CHLORIDE, PRESERVATIVE FREE 10 ML: 5 INJECTION INTRAVENOUS at 20:53

## 2023-11-09 RX ADMIN — ACETAMINOPHEN 650 MG: 325 TABLET ORAL at 16:42

## 2023-11-09 RX ADMIN — LEVOFLOXACIN 750 MG: 5 INJECTION, SOLUTION INTRAVENOUS at 09:38

## 2023-11-09 RX ADMIN — MIRTAZAPINE 15 MG: 15 TABLET, ORALLY DISINTEGRATING ORAL at 20:53

## 2023-11-09 RX ADMIN — HYDROMORPHONE HYDROCHLORIDE 0.5 MG: 1 INJECTION, SOLUTION INTRAMUSCULAR; INTRAVENOUS; SUBCUTANEOUS at 03:59

## 2023-11-09 RX ADMIN — Medication 5 MG: at 21:03

## 2023-11-09 RX ADMIN — PROMETHAZINE HYDROCHLORIDE 25 MG: 12.5 TABLET ORAL at 16:42

## 2023-11-09 RX ADMIN — FILGRASTIM-AAFI 300 MCG: 300 INJECTION, SOLUTION SUBCUTANEOUS at 19:17

## 2023-11-09 RX ADMIN — IOPAMIDOL 75 ML: 755 INJECTION, SOLUTION INTRAVENOUS at 17:32

## 2023-11-09 ASSESSMENT — PAIN DESCRIPTION - PAIN TYPE: TYPE: ACUTE PAIN

## 2023-11-09 ASSESSMENT — PAIN DESCRIPTION - DESCRIPTORS
DESCRIPTORS: ACHING

## 2023-11-09 ASSESSMENT — PAIN DESCRIPTION - LOCATION
LOCATION: HEAD
LOCATION: GENERALIZED
LOCATION: HEAD

## 2023-11-09 ASSESSMENT — PAIN DESCRIPTION - FREQUENCY: FREQUENCY: CONTINUOUS

## 2023-11-09 ASSESSMENT — PAIN SCALES - GENERAL
PAINLEVEL_OUTOF10: 7
PAINLEVEL_OUTOF10: 7
PAINLEVEL_OUTOF10: 8
PAINLEVEL_OUTOF10: 8
PAINLEVEL_OUTOF10: 2

## 2023-11-09 ASSESSMENT — PAIN - FUNCTIONAL ASSESSMENT: PAIN_FUNCTIONAL_ASSESSMENT: ACTIVITIES ARE NOT PREVENTED

## 2023-11-09 ASSESSMENT — PAIN DESCRIPTION - ORIENTATION: ORIENTATION: POSTERIOR;UPPER

## 2023-11-09 NOTE — CONSULTS
Age of Onset    Vision Loss Mother     High Blood Pressure Mother     Arthritis Mother     Asthma Mother     Cancer Father     No Known Problems Sister     Arthritis Brother     High Blood Pressure Brother     Asthma Brother     Other Sister         Rare Blood disease    No Known Problems Sister     No Known Problems Daughter     No Known Problems Daughter             Allergies   Allergen Reactions    Erythromycin      vomitting    Lorazepam Itching    Zolpidem Itching    Adhesive Tape Rash    Macrolides And Ketolides Rash     No current facility-administered medications on file prior to encounter. Current Outpatient Medications on File Prior to Encounter   Medication Sig Dispense Refill    Cholecalciferol (VITAMIN D) 50 MCG (2000 UT) CAPS capsule Take 2,000 Units by mouth daily      vitamin A 3 MG (54808 UT) capsule Take 1 capsule by mouth daily      dronabinol (MARINOL) 2.5 MG capsule Take 1 capsule by mouth 2 times daily (before meals) for 30 days. Max Daily Amount: 5 mg 60 capsule 0    mirtazapine (REMERON) 15 MG tablet Take 1 tablet by mouth nightly 30 tablet 0    pantoprazole (PROTONIX) 40 MG tablet Take 1 tablet by mouth 2 times daily      ondansetron (ZOFRAN) 8 MG tablet Take 1 tablet by mouth every 8 hours as needed for Nausea or Vomiting Take 1 tablet by mouth every 8 hours as needed for nausea or vomiting. 90 tablet 3    promethazine (PHENERGAN) 25 MG tablet Take 1 tablet by mouth every 6 hours as needed for Nausea 40 tablet 3    OLANZapine (ZYPREXA) 2.5 MG tablet Take 1 tablet by mouth nightly 30 tablet 3    dexamethasone (DECADRON) 4 MG tablet Take 1 tablet by mouth in the morning with food for 2 days after the first chemo treatment.  Then take 2 tablets by mouth in the morning with food the morning before each treatment and for 2 days after each treatment 20 tablet 4    ondansetron (ZOFRAN-ODT) 8 MG TBDP disintegrating tablet Place 1 tablet under the tongue every 8 hours as needed for Nausea or Non-tender, non-distended, BS present. No rebound or guarding. MUSCULOSKELETAL: full range of motion noted, tone is normal  NEUROLOGIC: Motor skills grossly intact. CN II - XII grossly intact. SKIN: warm and dry, no jaundice, no bruising or petechiae. EXTREMITIES: no peripheral edema, no clubbing or cyanosis     Labs:    Lab Results   Component Value Date    WBC 0.7 (LL) 11/09/2023    HGB 8.6 (L) 11/09/2023    HCT 25.8 (L) 11/09/2023    MCV 92.1 11/09/2023    PLT 53 (L) 11/09/2023    LYMPHOPCT 50.0 (H) 11/08/2023    RBC 2.80 (L) 11/09/2023    MCH 30.7 11/09/2023    MCHC 33.3 11/09/2023    RDW 13.4 11/09/2023     Lab Results   Component Value Date    INR 1.2 10/02/2023    PROTIME 15.6 (H) 10/02/2023     Lab Results   Component Value Date     (L) 11/09/2023    K 4.2 11/09/2023     11/09/2023    CO2 24 11/09/2023    BUN 20 11/09/2023    CREATININE 0.7 (L) 11/09/2023    GLUCOSE 97 11/09/2023    CALCIUM 9.4 11/09/2023    MG 1.8 11/08/2023    PROT 5.6 (L) 11/08/2023    LABALBU 2.9 (L) 11/08/2023    BILITOT 2.3 (H) 11/08/2023    ALKPHOS 95 11/08/2023    AST 13 (L) 11/08/2023    ALT 13 11/08/2023    LABGLOM >60 11/09/2023    GFRAA >60 04/11/2022    AGRATIO 1.4 10/18/2022    GLOB 2.3 08/08/2022    POCGLU 107 (H) 10/14/2011     Lab Results   Component Value Date    ALKPHOS 95 11/08/2023    ALT 13 11/08/2023    AST 13 (L) 11/08/2023    BILITOT 2.3 (H) 11/08/2023    PROT 5.6 (L) 11/08/2023     Lab Results   Component Value Date    URICACID 6.9 07/12/2016     Lab Results   Component Value Date     07/10/2017     Lab Results   Component Value Date    IRON 57 (L) 08/15/2023    TIBC 174 (L) 08/15/2023    FERRITIN 189 08/15/2023     Imaging:  XR CHEST (2 VW)   Final Result   Low lung volumes with underaeration of the lung bases. Chronic increased interstitial opacities. No definite superimposed acute process.            Assessment/Plan:    #Pancytopenia  -Has some level of pancytopenia at baseline

## 2023-11-09 NOTE — TELEPHONE ENCOUNTER
Anesthesia Evaluation                  Airway   Mallampati: I  TM distance: >3 FB  Neck ROM: full  No difficulty expected  Dental      Pulmonary    (+) pulmonary embolism,  Cardiovascular     ECG reviewed    (+) hypertension      Neuro/Psych  GI/Hepatic/Renal/Endo    (+) obesity    Musculoskeletal     Abdominal    Substance History      OB/GYN          Other                      Anesthesia Plan    ASA 3     general     intravenous induction     Anesthetic plan, risks, benefits, and alternatives have been provided, discussed and informed consent has been obtained with: patient.    Plan discussed with CRNA.    CODE STATUS:          Patient contacted our office in need of refill for DRONABINOL 5. Patient has a scheduled appointment on 11/13. [facility] . Pending for patients chart provider [facility].

## 2023-11-09 NOTE — CONSULTS
Comprehensive Nutrition Assessment    Type and Reason for Visit:  Initial, Consult (Poor intake/Appetite 5 or More Days)    Nutrition Recommendations/Plan:   Trial variety of high calorie/protein oral nutrition supplements during admission w/ Regular Diet  Encourage small sips and snacks with high calorie-protein between meals   Endorse adequate hydration, tart/sour drinks, and mints for dry mouth   Monitor and replete lytes prn, consider antidiarrheal regimen  nd antiemetic regimen prn, consider dry/bland snacks (crackers, applesauce, etc.) with GERD precautions   Consider MVI regimen, encourage diversity in diet (New American plate recommendation)      Malnutrition Assessment:  Malnutrition Status:  Severe malnutrition (11/09/23 1343)    Context:  Acute Illness     Findings of the 6 clinical characteristics of malnutrition:  Energy Intake:  75% or less of estimated energy requirements for 7 or more days (loss of appetite x 1 month)  Weight Loss:  Greater than 2% over 1 week (9.7%/18lb loss x 8 days)     Body Fat Loss: Moderate body fat loss Fat Overlying Ribs, Triceps, Orbital   Muscle Mass Loss: Moderate muscle mass loss Clavicles (pectoralis & deltoids), Scapula (trapezius), Temples (temporalis)  Fluid Accumulation:  No significant fluid accumulation Extremities   Strength:  Measurable reduction in  strength (per pt)    Nutrition Assessment:    Admitted with FTT, Dehydration, Pancytopenia, Gastric Carcinoma undergoing Chemoradiation since August 2023. Hx Whipple Surgery 2011, Cirrhosis s/s MONGE, GERD. Currently on regular diet, reduced ral intakes with loss of appetite x 1 months with N/V/D daily over the last 2-3 weeks. C/o xerostomia. Admits to 18.5# unintentional wt loss x 8 days with stated UBW of under 200 lb, consistent with 9.7% wt loss in 8-9 days. Pt states trial 2 Ensures of 220 kcals per day but only able to drink half at the time.  Pt had wife at home able to cook for pt, pt admits diet is

## 2023-11-09 NOTE — PROGRESS NOTES
Occupational [address] ACUTE CARE OCCUPATIONAL THERAPY EVALUATION    Minor Needle, 1955, [id number]/[id number], 11/9/2023    Discharge Recommendation: Home with initial 24 hour supervision/assistance    History:  [place]:  The primary encounter diagnosis was Nausea vomiting and diarrhea. Diagnoses of Dehydration, Diarrhea, unspecified type, Malignant neoplasm of stomach, unspecified location ([address]), and Pancytopenia ([address]) were also pertinent to this visit. Subjective:  Patient states: \"I needed this! I can't stay in bed any longer! \"   Pain: Pt denied pain this date  Communication with other providers: PT Cici Morgan  Restrictions: General Precautions, Low Fall Risk, IV, Bed/chair alarm    Home Setup/Prior level of function:  Social/Functional History  Lives With: Spouse  Type of Home: House  Home Layout: One level  Home Access: Stairs to enter with rails  Entrance Stairs - Number of Steps: 3  Bathroom Shower/Tub: Tub/Shower unit  Bathroom Toilet: Standard  Home Equipment: [place] [employer], rolling  ADL Assistance: Independent  Homemaking Assistance: Independent  Homemaking Responsibilities: Yes  Ambulation Assistance: Independent (uses no AD)  Transfer Assistance: Independent  Active : Yes  Occupation: Retired, Part time employment  Type of Occupation: [employer]    Examination:  Observation: Supine in bed upon arrival. Pleasant and agreeable to evaluation. Wife present and supportive.    Vision: WFL (Glasses)  Hearing: WFL  Vitals: Stable vitals throughout session    Body Systems and functions:  ROM: WFL all joints in BL UEs  Strength: WFL all major muscle groups BL UEs  Sensation: WFL (denies numbness/tingling)  Tone: Normal  Coordination: WFL for ADLs  Perception: WNL    Activities of Daily Living (ADLs):  Feeding: Independent   Grooming: Supervision (completed hand hygiene, facial hygiene, and oral hygiene tasks of brushing/rinsing in standing at sink; min cues for safe body positioning during

## 2023-11-09 NOTE — PROGRESS NOTES
Physical Therapy  Facility/Department: [facility]N  Physical Therapy Initial Assessment    Name: Osman Bustos  : 1955  MRN: [id number]  Date of Service: 2023    Discharge Recommendations:  24 hour supervision or assist, Home with Home health PT        Functional Outcome Measure:    Acute Care Index of Function (ACIF):    Score: 0.82 (0.71 or greater = appropriate for home with home PT and 24 hour supervision/assist)        Patient Diagnosis(es): The primary encounter diagnosis was Nausea vomiting and diarrhea. Diagnoses of Dehydration, Diarrhea, unspecified type, Malignant neoplasm of stomach, unspecified location ([address]), and Pancytopenia ([address]) were also pertinent to this visit. Past Medical History:  has a past medical history of Anemia, Cancer ([address]), Cirrhosis ([address]), Duodenal cancer ([address]), Esophageal varices ([address]), History of blood transfusion, HLD (hyperlipidemia), Hypertension, Neuropathy, and Osteoarthritis. Past Surgical History:  has a past surgical history that includes other surgical history; Upper gastrointestinal endoscopy (2014); Cholecystectomy (); hernia repair (); Total knee arthroplasty (Left, 2020); Colonoscopy (2014); Colonoscopy (2016); Total knee arthroplasty (Right, 2021); Colonoscopy (N/A, 2022); Upper gastrointestinal endoscopy (N/A, 2023); Upper gastrointestinal endoscopy (N/A, 2023); Mediport insertion, single; and Upper gastrointestinal endoscopy (N/A, 10/24/2023). Assessment   Body Structures, Functions, Activity Limitations Requiring Skilled Therapeutic Intervention: Decreased functional mobility ; Decreased high-level IADLs;Decreased endurance;Decreased balance;Decreased strength  Therapy Prognosis: Good  Decision Making: Medium Complexity  Clinical Presentation: evolving  Requires PT Follow-Up: Yes  Activity Tolerance  Activity Tolerance: Patient tolerated evaluation without incident     Plan   Physical Therapy

## 2023-11-09 NOTE — PROGRESS NOTES
V2.0    [facility] Progress Note      Name:  Carlos Lund /Age/Sex: 1955  (76 y.o. male)   MRN & CSN:  [id number] & [id number] Encounter Date/Time: 2023 11:29 AM EST   Location:  [address] [place] [address]- PCP: Martin Joaquin MD     Attending:Jose Roberto Cronin MD       Hospital Day: 2    Assessment and Recommendations   Carlos Lund is a 76 y.o. male  who presents with Failure to thrive in adult      1. Failure to thrive  -Reports 18 pounds weight loss, poor oral intake with associated nausea vomiting and diarrhea  -Diagnosed with gastric adenocarcinoma 2023 and currently undergoing chemoradiation. Last chemo was 10/31  -Supportive treatment with adequate hydration/antiemetics  -PT OT  -Dietitian consult     2. 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 Started on chemo with FOLFOX and radiation  -Oncology Dr. Alena Valladares consulted and following     3. Pancytopenia  -Likely secondary to chemo  -On filgrastim  -Started on prophylaxis with levofloxacin  -No overt signs of bleeding  -Oncology consulted      4. 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 No acute overnight events reported. 1 fever spike of 38.1 reported. Nausea and vomiting resolved. Denies chest pain, shortness of breath.     Pertinent volumes are low. There is underaeration of the lung bases. No pleural effusion or pneumothorax. Chronic bilateral increased interstitial opacities, with a peripheral predominance, appears similar on previous exam.     Low lung volumes with underaeration of the lung bases. Chronic increased interstitial opacities. No definite superimposed acute process. CBC:   Recent Labs     11/08/23  0859 11/09/23  0316   WBC 0.5* 0.7*   HGB 9.4* 8.6*   PLT 46* 53*     BMP:    Recent Labs     11/08/23  0859 11/09/23  0316    134*   K 4.1 4.2    103   CO2 24 24   BUN 20 20   CREATININE 0.9 0.7*   GLUCOSE 232* 97     Hepatic:   Recent Labs     11/08/23 0859   AST 13*   ALT 13   BILITOT 2.3*   ALKPHOS 95     Lipids:   Lab Results   Component Value Date/Time    CHOL 159 02/11/2015 12:45 PM    HDL 35 02/11/2015 12:45 PM    TRIG 137 02/11/2015 12:45 PM     Hemoglobin A1C:   Lab Results   Component Value Date/Time    LABA1C 5.0 09/07/2023 08:37 AM     TSH: No results found for: \"TSH\"  Troponin: No results found for: \"TROPONINT\"  Lactic Acid: No results for input(s): \"LACTA\" in the last 72 hours. BNP: No results for input(s): \"PROBNP\" in the last 72 hours.   UA:  Lab Results   Component Value Date/Time    NITRU NEGATIVE 03/01/2021 11:00 AM    COLORU AFSHIN 03/01/2021 11:00 AM    WBCUA 2 03/01/2021 11:00 AM    RBCUA 9 03/01/2021 11:00 AM    MUCUS RARE 03/01/2021 11:00 AM    TRICHOMONAS NONE SEEN 03/01/2021 11:00 AM    BACTERIA NEGATIVE 03/01/2021 11:00 AM    CLARITYU HAZY 03/01/2021 11:00 AM    SPECGRAV 1.025 03/01/2021 11:00 AM    LEUKOCYTESUR NEGATIVE 03/01/2021 11:00 AM    UROBILINOGEN NEGATIVE 03/01/2021 11:00 AM    BILIRUBINUR NEGATIVE 03/01/2021 11:00 AM    BLOODU NEGATIVE 03/01/2021 11:00 AM    KETUA NEGATIVE 03/01/2021 11:00 AM     Urine Cultures: No results found for: \"LABURIN\"  Blood Cultures: No results found for: \"BC\"  No results found for: \"BLOODCULT2\"  Organism: No results found for:

## 2023-11-09 NOTE — CARE COORDINATION
Reviewed chart, discussed in IDR, then spoke with pt/wife. Pt lives with wife, he is in independent with ADL's and wife able to help with transportation. Pt has a PCP and insurance that cover medications. We discussed HC and DME. They already have RW and cane, do not believe any HC or more   DME needed. CM will continue to follow.

## 2023-11-10 ENCOUNTER — HOSPITAL ENCOUNTER (OUTPATIENT)
Dept: RADIATION ONCOLOGY | Age: 68
End: 2023-11-10
Payer: MEDICARE

## 2023-11-10 PROBLEM — R19.7 NAUSEA VOMITING AND DIARRHEA: Status: ACTIVE | Noted: 2023-11-10

## 2023-11-10 PROBLEM — R11.2 NAUSEA VOMITING AND DIARRHEA: Status: ACTIVE | Noted: 2023-11-10

## 2023-11-10 LAB
ALBUMIN SERPL-MCNC: 2.2 GM/DL (ref 3.4–5)
ALP BLD-CCNC: 68 IU/L (ref 40–128)
ALT SERPL-CCNC: 8 U/L (ref 10–40)
ANION GAP SERPL CALCULATED.3IONS-SCNC: 6 MMOL/L (ref 4–16)
ANISOCYTOSIS: ABNORMAL
AST SERPL-CCNC: 11 IU/L (ref 15–37)
ATYPICAL LYMPHOCYTE ABSOLUTE COUNT: ABNORMAL
BANDED NEUTROPHILS ABSOLUTE COUNT: 0.16 K/CU MM
BANDED NEUTROPHILS RELATIVE PERCENT: 12 % (ref 5–11)
BASOPHILIC STIPPLING: ABNORMAL
BILIRUB SERPL-MCNC: 0.9 MG/DL (ref 0–1)
BUN SERPL-MCNC: 20 MG/DL (ref 6–23)
CALCIUM SERPL-MCNC: 9.1 MG/DL (ref 8.3–10.6)
CHLORIDE BLD-SCNC: 104 MMOL/L (ref 99–110)
CO2: 27 MMOL/L (ref 21–32)
CREAT SERPL-MCNC: 0.9 MG/DL (ref 0.9–1.3)
DIFFERENTIAL TYPE: ABNORMAL
EOSINOPHILS ABSOLUTE: 0 K/CU MM
EOSINOPHILS RELATIVE PERCENT: 1 % (ref 0–3)
GFR SERPL CREATININE-BSD FRML MDRD: >60 ML/MIN/1.73M2
GLUCOSE SERPL-MCNC: 83 MG/DL (ref 70–99)
HCT VFR BLD CALC: 25.4 % (ref 42–52)
HEMOGLOBIN: 8 GM/DL (ref 13.5–18)
LYMPHOCYTES ABSOLUTE: 0.3 K/CU MM
LYMPHOCYTES RELATIVE PERCENT: 26 % (ref 24–44)
MAGNESIUM: 1.8 MG/DL (ref 1.8–2.4)
MCH RBC QN AUTO: 30.2 PG (ref 27–31)
MCHC RBC AUTO-ENTMCNC: 31.5 % (ref 32–36)
MCV RBC AUTO: 95.8 FL (ref 78–100)
METAMYELOCYTES ABSOLUTE COUNT: 0.04 K/CU MM
METAMYELOCYTES PERCENT: 3 %
MONOCYTES ABSOLUTE: 0.3 K/CU MM
MONOCYTES RELATIVE PERCENT: 23 % (ref 0–4)
NUCLEATED RED BLOOD CELLS: 5
PDW BLD-RTO: 14 % (ref 11.7–14.9)
PLATELET # BLD: 42 K/CU MM (ref 140–440)
PMV BLD AUTO: 10.4 FL (ref 7.5–11.1)
POLYCHROMASIA: ABNORMAL
POTASSIUM SERPL-SCNC: 4.1 MMOL/L (ref 3.5–5.1)
RBC # BLD: 2.65 M/CU MM (ref 4.6–6.2)
SEGMENTED NEUTROPHILS ABSOLUTE COUNT: 0.5 K/CU MM
SEGMENTED NEUTROPHILS RELATIVE PERCENT: 35 % (ref 36–66)
SMEAR REVIEW: NORMAL
SODIUM BLD-SCNC: 137 MMOL/L (ref 135–145)
TOTAL PROTEIN: 4.7 GM/DL (ref 6.4–8.2)
WBC # BLD: 1.3 K/CU MM (ref 4–10.5)

## 2023-11-10 PROCEDURE — 93005 ELECTROCARDIOGRAM TRACING: CPT | Performed by: STUDENT IN AN ORGANIZED HEALTH CARE EDUCATION/TRAINING PROGRAM

## 2023-11-10 PROCEDURE — 80053 COMPREHEN METABOLIC PANEL: CPT

## 2023-11-10 PROCEDURE — 6370000000 HC RX 637 (ALT 250 FOR IP): Performed by: INTERNAL MEDICINE

## 2023-11-10 PROCEDURE — 85007 BL SMEAR W/DIFF WBC COUNT: CPT

## 2023-11-10 PROCEDURE — 99232 SBSQ HOSP IP/OBS MODERATE 35: CPT | Performed by: PHYSICIAN ASSISTANT

## 2023-11-10 PROCEDURE — 1200000000 HC SEMI PRIVATE

## 2023-11-10 PROCEDURE — 6370000000 HC RX 637 (ALT 250 FOR IP): Performed by: PHYSICIAN ASSISTANT

## 2023-11-10 PROCEDURE — 6360000002 HC RX W HCPCS: Performed by: INTERNAL MEDICINE

## 2023-11-10 PROCEDURE — 85027 COMPLETE CBC AUTOMATED: CPT

## 2023-11-10 PROCEDURE — 36591 DRAW BLOOD OFF VENOUS DEVICE: CPT

## 2023-11-10 PROCEDURE — 36415 COLL VENOUS BLD VENIPUNCTURE: CPT

## 2023-11-10 PROCEDURE — 6360000002 HC RX W HCPCS: Performed by: STUDENT IN AN ORGANIZED HEALTH CARE EDUCATION/TRAINING PROGRAM

## 2023-11-10 PROCEDURE — 83735 ASSAY OF MAGNESIUM: CPT

## 2023-11-10 RX ADMIN — DRONABINOL 2.5 MG: 2.5 CAPSULE ORAL at 10:42

## 2023-11-10 RX ADMIN — LEVOFLOXACIN 750 MG: 5 INJECTION, SOLUTION INTRAVENOUS at 11:09

## 2023-11-10 RX ADMIN — MIRTAZAPINE 15 MG: 15 TABLET, ORALLY DISINTEGRATING ORAL at 21:36

## 2023-11-10 RX ADMIN — Medication 5 MG: at 10:55

## 2023-11-10 RX ADMIN — FILGRASTIM-AAFI 300 MCG: 300 INJECTION, SOLUTION SUBCUTANEOUS at 18:32

## 2023-11-10 RX ADMIN — Medication 5 MG: at 21:35

## 2023-11-10 RX ADMIN — DRONABINOL 2.5 MG: 2.5 CAPSULE ORAL at 21:35

## 2023-11-10 RX ADMIN — Medication 5 MG: at 02:32

## 2023-11-10 ASSESSMENT — PAIN SCALES - GENERAL
PAINLEVEL_OUTOF10: 6
PAINLEVEL_OUTOF10: 8
PAINLEVEL_OUTOF10: 7
PAINLEVEL_OUTOF10: 6
PAINLEVEL_OUTOF10: 8

## 2023-11-10 ASSESSMENT — PAIN DESCRIPTION - ORIENTATION: ORIENTATION: RIGHT;LEFT

## 2023-11-10 ASSESSMENT — PAIN DESCRIPTION - DESCRIPTORS
DESCRIPTORS: ACHING

## 2023-11-10 ASSESSMENT — PAIN DESCRIPTION - LOCATION
LOCATION: HEAD;GENERALIZED
LOCATION: HEAD
LOCATION: HEAD
LOCATION: GENERALIZED

## 2023-11-10 ASSESSMENT — PAIN - FUNCTIONAL ASSESSMENT: PAIN_FUNCTIONAL_ASSESSMENT: ACTIVITIES ARE NOT PREVENTED

## 2023-11-10 NOTE — PROGRESS NOTES
Physician Progress Note      Marleen Ellis  CSN #:                  [id number]  :                       1955  ADMIT DATE:       2023 10:22 AM  DISCH DATE:  RESPONDING  PROVIDER #:        Xu Shah MD          QUERY TEXT:    Pt admitted with FTT. Pt noted to have gastric ca. If possible, please   document in progress notes and discharge summary the etiology for FTT. The medical record reflects the following:  Risk Factors: CA  Clinical Indicators: \"Failure to thrive -Reports 18 pounds weight loss, poor   oral intake with associated nausea vomiting and diarrhea  -Diagnosed with gastric adenocarcinoma 2023 and currently undergoing   chemoradiation. ED provider note \"Patient arrives here for evaluation of   nausea vomiting diarrhea, productive cough. .... noted dehydration under final   impression. Treatment: IVF, oncology consult, labs. Thank you,  Una PEARSON, RN, [place] [phone]  Options provided:  -- FTT due to gastric adenocarcinoma  -- FTT due to dehydration  -- FTT due to, please specify. -- Other - I will add my own diagnosis  -- Disagree - Not applicable / Not valid  -- Disagree - Clinically unable to determine / Unknown  -- Refer to Clinical Documentation Reviewer    PROVIDER RESPONSE TEXT:    This patient has FTT due to gastric adenocarcinoma.     Query created by: Una Beckett on 11/10/2023 3:10 PM      Electronically signed by:  Xu Shah MD 11/10/2023 4:16 PM

## 2023-11-10 NOTE — CONSULTS
Consult completed. Left upper chest PowerMedPort de-accessed & re-accessed with new needleset /p CHX scrub with SkinPrep, CHG gel DSSG, and new LuerLok/SwabCaps applied. Site now returns blood briskly and flushes without any resistance/abnormality. Infusion restarted and pt denies other c/o or needs.

## 2023-11-10 NOTE — PROGRESS NOTES
V2.0    [facility] [place] [facility] Progress Note      Name:  Osman Bustos /Age/Sex: 1955  (76 y.o. male)   MRN & CSN:  [id number] & [id number] Encounter Date/Time: 11/10/2023 11:29 AM EST   Location:  [address] [place] [address]- PCP: Carrie Horner MD     Attending:Zuhair Cronin MD       Hospital Day: 3    Assessment and Recommendations   Osman Bustos is a 76 y.o. male  who presents with Failure to thrive in adult      1. Failure to thrive  -Reports 18 pounds weight loss, poor oral intake with associated nausea vomiting and diarrhea  -Diagnosed with gastric adenocarcinoma 2023 and currently undergoing chemoradiation. Last chemo was 10/31  -Supportive treatment with adequate hydration/antiemetics  -PT OT  -Dietitian consult     2. 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 Started on chemo with FOLFOX and radiation  -Oncology Dr. Lukasz Flores consulted and following     3. Pancytopenia  -Likely secondary to chemo  -On filgrastim  -Started on prophylaxis with levofloxacin  -No overt signs of bleeding  -Oncology consulted      4. 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  -With esophageal varices status post banding  -Follows up outpatient with GI      Diet ADULT DIET;  Regular; NO RED SAUCES  ADULT ORAL NUTRITION SUPPLEMENT; Breakfast, Lunch; Standard High Calorie/High Protein Oral Supplement  ADULT ORAL NUTRITION SUPPLEMENT; Breakfast, Dinner; Frozen Oral Supplement   DVT Prophylaxis [] Lovenox, []  Heparin, [] SCDs, [] Ambulation,  [] Eliquis, [] Xarelto  [] Coumadin   Code Status Full Code   Disposition From: Home  Expected Disposition: TBD  Estimated Date of Discharge: TBD  Patient requires continued admission due to failure to thrive/pancytopenia   Surrogate Decision Maker/ POA       Personally reviewed Lab Studies and Imaging             Subjective:     Chief Complaint:     Patient seen and examined at CLARITYU [facility]ZY 03/01/2021 11:00 AM    SPECGRAV 1.025 03/01/2021 11:00 AM    LEUKOCYTESUR NEGATIVE 03/01/2021 11:00 AM    UROBILINOGEN NEGATIVE 03/01/2021 11:00 AM    BILIRUBINUR NEGATIVE 03/01/2021 11:00 AM    BLOODU NEGATIVE 03/01/2021 11:00 AM    KETUA NEGATIVE 03/01/2021 11:00 AM     Urine Cultures: No results found for: \"LABURIN\"  Blood Cultures: No results found for: \"BC\"  No results found for: \"BLOODCULT2\"  Organism: No results found for: \"ORG\"      Electronically signed by Everardo Walters MD on 11/10/2023 at 11:24 AM

## 2023-11-10 NOTE — CONSULTS
to 500. Will receive 1 more dose at least and consider stopping continuing improvement.  -Platelets and hemoglobin relatively stable. No S/S of bleeding. #Protein Calorie Malnutrition  -Related to radiation side effects  -Titrate supportive medications for adequate analgesia and nausea control  -Continue 2.5 mg Marinol    #Insomnia  -Add nightly mirtazapine    #Locally advanced gastric cancer  -Currently on FLOT and radiation  -We will hold treatment until acute issues improve    #Headache  -CT head negative  -Continue symptom management, may have been exacerbated by dehydration     Remainder of care per primary team.    Patient was seen independently with attending physician Dr Louis Bansal available at all times for consultation. Jeanne Martino PA-C    Imaging and labs reviewed and plan of care discussed with patient in depth. We will follow along. Please call with any questions.

## 2023-11-10 NOTE — PROGRESS NOTES
Pt HR noted to be irregular earlier in the shift. HR going from 90s to 110, then back to 80s-90s, then shooting up to 120s at times. This nurse spoke with tele tech and per tele tech, pt did have run of A fib for a short while last night around 10P. M. Pt does not have any cardiac history and no history of A fib. NP Andrew Martinez notified. The following new orders received: obtain EKG and obtain magnesium level with A.M. labs. EKG at this time showing sinus tach with HR staying at 112-113.

## 2023-11-11 VITALS
OXYGEN SATURATION: 95 % | RESPIRATION RATE: 18 BRPM | WEIGHT: 164.9 LBS | SYSTOLIC BLOOD PRESSURE: 112 MMHG | HEIGHT: 74 IN | TEMPERATURE: 97.8 F | HEART RATE: 86 BPM | DIASTOLIC BLOOD PRESSURE: 68 MMHG | BODY MASS INDEX: 21.16 KG/M2

## 2023-11-11 LAB
ALBUMIN SERPL-MCNC: 2.5 GM/DL (ref 3.4–5)
ALP BLD-CCNC: 82 IU/L (ref 40–129)
ALT SERPL-CCNC: 9 U/L (ref 10–40)
ANION GAP SERPL CALCULATED.3IONS-SCNC: 8 MMOL/L (ref 4–16)
AST SERPL-CCNC: 17 IU/L (ref 15–37)
BANDED NEUTROPHILS ABSOLUTE COUNT: 0.83 K/CU MM
BANDED NEUTROPHILS RELATIVE PERCENT: 13 % (ref 5–11)
BILIRUB SERPL-MCNC: 1 MG/DL (ref 0–1)
BUN SERPL-MCNC: 19 MG/DL (ref 6–23)
CALCIUM SERPL-MCNC: 9.1 MG/DL (ref 8.3–10.6)
CHLORIDE BLD-SCNC: 104 MMOL/L (ref 99–110)
CO2: 26 MMOL/L (ref 21–32)
CREAT SERPL-MCNC: 1 MG/DL (ref 0.9–1.3)
DIFFERENTIAL TYPE: ABNORMAL
EKG ATRIAL RATE: 113 BPM
EKG DIAGNOSIS: NORMAL
EKG P AXIS: -11 DEGREES
EKG P-R INTERVAL: 152 MS
EKG Q-T INTERVAL: 296 MS
EKG QRS DURATION: 72 MS
EKG QTC CALCULATION (BAZETT): 406 MS
EKG R AXIS: -1 DEGREES
EKG T AXIS: 27 DEGREES
EKG VENTRICULAR RATE: 113 BPM
GFR SERPL CREATININE-BSD FRML MDRD: >60 ML/MIN/1.73M2
GLUCOSE SERPL-MCNC: 97 MG/DL (ref 70–99)
HCT VFR BLD CALC: 29.3 % (ref 42–52)
HEMOGLOBIN: 9.1 GM/DL (ref 13.5–18)
LYMPHOCYTES ABSOLUTE: 0.7 K/CU MM
LYMPHOCYTES RELATIVE PERCENT: 11 % (ref 24–44)
MCH RBC QN AUTO: 29.9 PG (ref 27–31)
MCHC RBC AUTO-ENTMCNC: 31.1 % (ref 32–36)
MCV RBC AUTO: 96.4 FL (ref 78–100)
METAMYELOCYTES ABSOLUTE COUNT: 0.13 K/CU MM
METAMYELOCYTES PERCENT: 2 %
MONOCYTES ABSOLUTE: 0.8 K/CU MM
MONOCYTES RELATIVE PERCENT: 12 % (ref 0–4)
MYELOCYTE PERCENT: 1 %
MYELOCYTES ABSOLUTE COUNT: 0.06 K/CU MM
NUCLEATED RED BLOOD CELLS: 1
PDW BLD-RTO: 14.5 % (ref 11.7–14.9)
PLATELET # BLD: 65 K/CU MM (ref 140–440)
PMV BLD AUTO: 10.8 FL (ref 7.5–11.1)
POTASSIUM SERPL-SCNC: 4 MMOL/L (ref 3.5–5.1)
RBC # BLD: 3.04 M/CU MM (ref 4.6–6.2)
SEGMENTED NEUTROPHILS ABSOLUTE COUNT: 3.9 K/CU MM
SEGMENTED NEUTROPHILS RELATIVE PERCENT: 61 % (ref 36–66)
SODIUM BLD-SCNC: 138 MMOL/L (ref 135–145)
TOTAL PROTEIN: 5.6 GM/DL (ref 6.4–8.2)
WBC # BLD: 6.4 K/CU MM (ref 4–10.5)
WBC # BLD: ABNORMAL 10*3/UL

## 2023-11-11 PROCEDURE — 6370000000 HC RX 637 (ALT 250 FOR IP): Performed by: INTERNAL MEDICINE

## 2023-11-11 PROCEDURE — 36415 COLL VENOUS BLD VENIPUNCTURE: CPT

## 2023-11-11 PROCEDURE — 93010 ELECTROCARDIOGRAM REPORT: CPT | Performed by: INTERNAL MEDICINE

## 2023-11-11 PROCEDURE — 2580000003 HC RX 258: Performed by: STUDENT IN AN ORGANIZED HEALTH CARE EDUCATION/TRAINING PROGRAM

## 2023-11-11 PROCEDURE — 85007 BL SMEAR W/DIFF WBC COUNT: CPT

## 2023-11-11 PROCEDURE — 6360000002 HC RX W HCPCS: Performed by: STUDENT IN AN ORGANIZED HEALTH CARE EDUCATION/TRAINING PROGRAM

## 2023-11-11 PROCEDURE — 85027 COMPLETE CBC AUTOMATED: CPT

## 2023-11-11 PROCEDURE — 80053 COMPREHEN METABOLIC PANEL: CPT

## 2023-11-11 RX ORDER — HEPARIN 100 UNIT/ML
100 SYRINGE INTRAVENOUS PRN
Status: DISCONTINUED | OUTPATIENT
Start: 2023-11-11 | End: 2023-11-11 | Stop reason: HOSPADM

## 2023-11-11 RX ADMIN — HEPARIN 100 UNITS: 100 SYRINGE at 12:07

## 2023-11-11 RX ADMIN — SODIUM CHLORIDE: 9 INJECTION, SOLUTION INTRAVENOUS at 08:53

## 2023-11-11 RX ADMIN — SODIUM CHLORIDE, PRESERVATIVE FREE 20 ML: 5 INJECTION INTRAVENOUS at 12:07

## 2023-11-11 RX ADMIN — LEVOFLOXACIN 750 MG: 5 INJECTION, SOLUTION INTRAVENOUS at 08:56

## 2023-11-11 RX ADMIN — DRONABINOL 2.5 MG: 2.5 CAPSULE ORAL at 08:52

## 2023-11-11 NOTE — DISCHARGE INSTR - DIET

## 2023-11-11 NOTE — PLAN OF CARE
Problem: Safety - Adult  Goal: Free from fall injury  11/11/2023 0646 by Zara Diego RN  Outcome: Progressing

## 2023-11-12 LAB
CULTURE: NORMAL
CULTURE: NORMAL
Lab: NORMAL
Lab: NORMAL
SPECIMEN: NORMAL
SPECIMEN: NORMAL

## 2023-11-13 ENCOUNTER — HOSPITAL ENCOUNTER (OUTPATIENT)
Dept: RADIATION ONCOLOGY | Age: 68
End: 2023-11-13
Payer: MEDICARE

## 2023-11-13 ENCOUNTER — TELEPHONE (OUTPATIENT)
Dept: RADIATION ONCOLOGY | Age: 68
End: 2023-11-13

## 2023-11-13 NOTE — TELEPHONE ENCOUNTER
Pt called to inquire about resuming radiation since discharged from hospital. Pt reports still has headache and some nausea but some improvement since admission to [facility]. Per Dr. Leida Acosta, chemo on hold but okay to resume radiation if pt agreeable. Pt reports will come to radiation appt tomorrow 11/14 ar 7:45 AM and if doesn't feel well enough for radiation he will see Dr. Rio Salas for evaluation. Direct contact info given, advised pt to call with any questions or concerns. Pt voices understanding of above. RT staff notified.

## 2023-11-14 ENCOUNTER — HOSPITAL ENCOUNTER (OUTPATIENT)
Dept: RADIATION ONCOLOGY | Age: 68
Discharge: HOME | End: 2023-11-14
Payer: MEDICARE

## 2023-11-14 LAB
CULTURE: NORMAL
CULTURE: NORMAL
Lab: NORMAL
Lab: NORMAL
SPECIMEN: NORMAL
SPECIMEN: NORMAL

## 2023-11-14 PROCEDURE — 77386 HC NTSTY MODUL RAD TX DLVR CPLX: CPT | Performed by: RADIOLOGY

## 2023-11-14 ASSESSMENT — PAIN SCALES - GENERAL: PAINLEVEL_OUTOF10: 0

## 2023-11-14 NOTE — PLAN OF CARE
Care plan reviewed. _Pt c/o fatigue, tolerating ADLs with periods of rest.    _Pt reports still having nausea but medication helping, using Ensure to supplement. _Pt reports rash on torso and legs since hospitalization- Dr. Vitale Friends notified.

## 2023-11-14 NOTE — PROGRESS NOTES
Weekly Radiation Treatment Progress Note    DATE OF SERVICE: 11/14/2023     DIAGNOSIS:   Cancer Staging   Carcinoma of duodenum [facility])  Staging form: Ampulla Of Vater, AJCC 8th Edition  - Clinical: cT3, cN0, cM0 - Signed by Eunice Wyatt MD on 9/25/2020       TREATMENT COURSE:   Oncology History   Carcinoma of duodenum ([address])   4/23/2012 - 5/31/2012 Radiation    Duodenal bed/LNs: 4500 cGy AP/PA & lats  Boost: 540 cGy; TD: 5040 cGy     2012 Surgery    Whipple     12/8/2014 Initial Diagnosis    Carcinoma of duodenum ([address])     Malignant neoplasm of overlapping sites of stomach ([address])   10/3/2023 -  Chemotherapy    OP DOCEtaxel + OXALIplatin + leucovorin + fluorouracil (FLOT) (docetaxel start with c2)  Plan Provider: Eunice Wyatt MD  Treatment goal: Neoadjuvant  Line of treatment: Neoadjuvant           Site: Gastric Mass  Current Total Radiation Dose: 1400 cGy    Pt doing better. Was hospitalized last week for dehydration, nausea, vomiting, diarrhea, failure to thrive. Taking nutritional supplements now twice daily. Taking Zofran twice daily and Phenergan at night. He complains of a skin rash developing over his abdomen, back, and legs over the past few days without itching. History of allergy to lorazepam.  On Remeron at night, started 11/1/2023. He reports he has mild nausea with poor taste. Chemo is being held. EXAM  Wt Readings from Last 3 Encounters:   11/09/23 74.8 kg (164 lb 14.5 oz)   11/08/23 74.4 kg (164 lb)   11/08/23 74.4 kg (164 lb)     NAD  Maculopapular rash over the lower abdomen, back, and legs consistent with drug reaction    Setup images, chart, plan reviewed    A/P:   Tolerating RT well  Continue RT as planned  Hold chemo per Dr. Travis Chow. Increase p.o. intake and increased to 3 nutritional supplements a day. Increase Zofran to every 8 hours as written with Phenergan as needed. Hold Remeron for now due to rash.       Electronically signed by Will Norman MD on 11/14/2023 at 8:01

## 2023-11-15 ENCOUNTER — HOSPITAL ENCOUNTER (OUTPATIENT)
Dept: RADIATION ONCOLOGY | Age: 68
Discharge: HOME | End: 2023-11-15
Payer: MEDICARE

## 2023-11-15 PROCEDURE — 77386 HC NTSTY MODUL RAD TX DLVR CPLX: CPT | Performed by: RADIOLOGY

## 2023-11-15 PROCEDURE — G6002 STEREOSCOPIC X-RAY GUIDANCE: HCPCS | Performed by: RADIOLOGY

## 2023-11-16 ENCOUNTER — HOSPITAL ENCOUNTER (OUTPATIENT)
Dept: RADIATION ONCOLOGY | Age: 68
Discharge: HOME | End: 2023-11-16
Payer: MEDICARE

## 2023-11-16 PROCEDURE — 77386 HC NTSTY MODUL RAD TX DLVR CPLX: CPT | Performed by: RADIOLOGY

## 2023-11-17 ENCOUNTER — OFFICE VISIT (OUTPATIENT)
Dept: ONCOLOGY | Age: 68
End: 2023-11-17
Payer: MEDICARE

## 2023-11-17 ENCOUNTER — HOSPITAL ENCOUNTER (OUTPATIENT)
Dept: INFUSION THERAPY | Age: 68
Discharge: HOME | End: 2023-11-17

## 2023-11-17 ENCOUNTER — HOSPITAL ENCOUNTER (OUTPATIENT)
Dept: RADIATION ONCOLOGY | Age: 68
Discharge: HOME | End: 2023-11-17
Payer: MEDICARE

## 2023-11-17 VITALS
SYSTOLIC BLOOD PRESSURE: 135 MMHG | OXYGEN SATURATION: 96 % | TEMPERATURE: 97.6 F | DIASTOLIC BLOOD PRESSURE: 65 MMHG | HEIGHT: 74 IN | HEART RATE: 80 BPM | WEIGHT: 170.8 LBS | BODY MASS INDEX: 21.92 KG/M2

## 2023-11-17 DIAGNOSIS — C16.9 MALIGNANT NEOPLASM OF STOMACH, UNSPECIFIED LOCATION (HCC): Primary | ICD-10-CM

## 2023-11-17 PROCEDURE — 99214 OFFICE O/P EST MOD 30 MIN: CPT | Performed by: INTERNAL MEDICINE

## 2023-11-17 PROCEDURE — 77386 HC NTSTY MODUL RAD TX DLVR CPLX: CPT | Performed by: RADIOLOGY

## 2023-11-17 PROCEDURE — 77336 RADIATION PHYSICS CONSULT: CPT | Performed by: RADIOLOGY

## 2023-11-17 PROCEDURE — 1123F ACP DISCUSS/DSCN MKR DOCD: CPT | Performed by: INTERNAL MEDICINE

## 2023-11-17 NOTE — PROGRESS NOTES
MA Rooming Questions  Patient: Mekhi Farr  MRN: [id number]    Date: 11/17/2023        1. Do you have any new issues?   no         2. Do you need any refills on medications?    no    3. Have you had any imaging done since your last visit?   no    4. Have you been hospitalized or seen in the emergency room since your last visit here?   no    5. Did the patient have a depression screening completed today?  No    No data recorded     PHQ-9 Given to (if applicable):               PHQ-9 Score (if applicable):                     [] Positive     []  Negative              Does question #9 need addressed (if applicable)                     [] Yes    []  No               Tracey Velasquez CMA
Lab Results   Component Value Date    ESR 14 03/01/2021     Chemistry:  Lab Results   Component Value Date     11/11/2023    K 4.0 11/11/2023     11/11/2023    CO2 26 11/11/2023    BUN 19 11/11/2023    CREATININE 1.0 11/11/2023    GLUCOSE 97 11/11/2023    CALCIUM 9.1 11/11/2023    PROT 5.6 (L) 11/11/2023    LABALBU 2.5 (L) 11/11/2023    BILITOT 1.0 11/11/2023    ALKPHOS 82 11/11/2023    AST 17 11/11/2023    ALT 9 (L) 11/11/2023    LABGLOM >60 11/11/2023    GFRAA >60 04/11/2022    AGRATIO 1.4 10/18/2022    GLOB 2.3 08/08/2022    MG 1.8 11/10/2023    POCGLU 107 (H) 10/14/2011     Lab Results   Component Value Date     07/10/2017     Lab Results   Component Value Date    TSHHS 0.971 08/15/2023     Immunology:  Lab Results   Component Value Date    PROT 5.6 (L) 11/11/2023    SPEP  11/05/2019     INTERPRETATION - Within normal limits. Gunjan Johnson MD    ALBUMINELP 3.6 11/05/2019    LABALPH 0.3 11/05/2019    LABALPH 0.5 11/05/2019    GAMGLOB 1.3 11/05/2019     Coagulation Panel:  Lab Results   Component Value Date    PROTIME 15.6 (H) 10/02/2023    INR 1.2 10/02/2023    APTT 36.7 09/07/2023     Anemia Panel:  Lab Results   Component Value Date    AUWHMCOZ30 >2000 (H) 08/15/2023    FOLATE 10.3 08/15/2023     Tumor Markers:  Lab Results   Component Value Date    CEA 2.4 07/10/2017     30.1 11/06/2014    PSA 0.59 02/11/2015      Observations:  No data recorded      Assessment & Plan:  1. He has pancytopenia which could be related to hypersplenism due to underlying liver cirrhosis/Adkins. He is followed by Dr. Jason Fernandez. Labs in December 2019 were reviewed. CBC in June 2020 was grossly stable for him. Platelet was 71. CBC in June and July 2020 was reviewed. 10/2022 US abdomen: liver cirrhosis. 2/13/2023 LFTs grossly stable. AFP 3. WBC 4.3, Hg 12.6, HCT 38.2, plat 81.  8/2023 Peripheral blood; Flow Cytometry Analysis: Increased CD4:CD8 ratio (17.5:1).  No other diagnostic immunophenotypic

## 2023-11-20 ENCOUNTER — HOSPITAL ENCOUNTER (OUTPATIENT)
Dept: RADIATION ONCOLOGY | Age: 68
Discharge: HOME | End: 2023-11-20
Payer: MEDICARE

## 2023-11-20 VITALS — WEIGHT: 171.2 LBS | BODY MASS INDEX: 21.98 KG/M2

## 2023-11-20 PROCEDURE — G6002 STEREOSCOPIC X-RAY GUIDANCE: HCPCS | Performed by: RADIOLOGY

## 2023-11-20 PROCEDURE — 77386 HC NTSTY MODUL RAD TX DLVR CPLX: CPT | Performed by: RADIOLOGY

## 2023-11-20 ASSESSMENT — PAIN SCALES - GENERAL: PAINLEVEL_OUTOF10: 0

## 2023-11-20 NOTE — PROGRESS NOTES
Weekly Radiation Treatment Progress Note    DATE OF SERVICE: 11/20/2023     DIAGNOSIS:  Cancer Staging   Carcinoma of duodenum [facility])  Staging form: Ampulla Of Vater, AJCC 8th Edition  - Clinical: cT3, cN0, cM0 - Signed by Ludger Barthel, MD on 9/25/2020       TREATMENT COURSE:   Oncology History   Carcinoma of duodenum ([address])   4/23/2012 - 5/31/2012 Radiation    Duodenal bed/LNs: 4500 cGy AP/PA & lats  Boost: 540 cGy; TD: 5040 cGy     2012 Surgery    Whipple     12/8/2014 Initial Diagnosis    Carcinoma of duodenum ([address])     Malignant neoplasm of overlapping sites of stomach ([address])   10/3/2023 -  Chemotherapy    OP DOCEtaxel + OXALIplatin + leucovorin + fluorouracil (FLOT) (docetaxel start with c2)  Plan Provider: Ludger Barthel, MD  Treatment goal: Neoadjuvant  Line of treatment: Neoadjuvant           Site: Gastric Mass   Current Total Radiation Dose: 2200 cGy    Pt doing well. Energy fairly good. Skin rash had resolved with discontinuation of antianxiety meds and Benadryl, but returned this morning.   He reports he is eating well      EXAM  Wt Readings from Last 3 Encounters:   11/17/23 77.5 kg (170 lb 12.8 oz)   11/09/23 74.8 kg (164 lb 14.5 oz)   11/08/23 74.4 kg (164 lb)     NAD  Maculopapular rash on his back and side    Setup images, chart, plan reviewed    A/P:   Tolerating RT well  Continue RT as planned  Restart Benadryl      Electronically signed by Lorri Dillon MD on 11/20/2023 at 7:52 AM

## 2023-11-20 NOTE — PLAN OF CARE
Care plan reviewed. _Pt c/o fatigue, tolerating ADLs with periods of rest.     _Pt reports still having nausea but medication helping, using Ensure to supplement.

## 2023-11-21 ENCOUNTER — OFFICE VISIT (OUTPATIENT)
Dept: GASTROENTEROLOGY | Age: 68
End: 2023-11-21
Payer: MEDICARE

## 2023-11-21 ENCOUNTER — APPOINTMENT (OUTPATIENT)
Dept: RADIATION ONCOLOGY | Age: 68
End: 2023-11-21
Payer: MEDICARE

## 2023-11-21 VITALS
TEMPERATURE: 97.3 F | HEART RATE: 79 BPM | WEIGHT: 172.6 LBS | DIASTOLIC BLOOD PRESSURE: 60 MMHG | SYSTOLIC BLOOD PRESSURE: 124 MMHG | BODY MASS INDEX: 22.15 KG/M2 | HEIGHT: 74 IN | OXYGEN SATURATION: 95 %

## 2023-11-21 DIAGNOSIS — I85.10 SECONDARY ESOPHAGEAL VARICES WITHOUT BLEEDING (HCC): ICD-10-CM

## 2023-11-21 DIAGNOSIS — K74.60 LIVER CIRRHOSIS SECONDARY TO NASH (NONALCOHOLIC STEATOHEPATITIS) (HCC): ICD-10-CM

## 2023-11-21 DIAGNOSIS — K75.81 LIVER CIRRHOSIS SECONDARY TO NASH (NONALCOHOLIC STEATOHEPATITIS) (HCC): ICD-10-CM

## 2023-11-21 DIAGNOSIS — C16.9 MALIGNANT NEOPLASM OF STOMACH, UNSPECIFIED LOCATION (HCC): Primary | ICD-10-CM

## 2023-11-21 PROCEDURE — 99214 OFFICE O/P EST MOD 30 MIN: CPT | Performed by: INTERNAL MEDICINE

## 2023-11-21 PROCEDURE — 1123F ACP DISCUSS/DSCN MKR DOCD: CPT | Performed by: INTERNAL MEDICINE

## 2023-11-21 RX ORDER — CARVEDILOL 3.12 MG/1
3.12 TABLET ORAL 2 TIMES DAILY
Qty: 60 TABLET | Refills: 3 | Status: SHIPPED | OUTPATIENT
Start: 2023-11-21

## 2023-11-21 NOTE — H&P (VIEW-ONLY)
[facility] Gastroenterology and Hepatology             MD Caleb Torres MD             Lizeth Gorman, APRN-CNP             [address] [facility] [address], [address] [facility]             [phone] fax [phone]        Gastroenterology Clinic Consultation    Caleb Damon MD  Encounter Date: 11/21/23     CC: Follow-up       No referring provider defined for this encounter. History obtained from: patient, family, medical records     Subjective:       Kelli Wren is an 76 y. o.  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 He is trying with failure to thrive and per the wife will do 2 ensure. He underwent EGD with banding with me on 10/24/23 and had 2 bands placed. Currently undergoing chemo and radiation. Chemo held due to pancytopenia. 09/8/2023  Since his last outpatient follow-up the patient presented to me for an upper GI endoscopy on 8/18/2023. At that time he was noted to have a large grade 3 esophagus, large amount of food in the stomach, classic Whipple was found with stenosis and ulceration. There was an oozing gastric ulcer with hemorrhage, biopsies were done and Hemospray was applied. 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 He subsequently followed up back with me and underwent repeat EGD with me on 09/05/2023 very large grade 2 varices were noted. Ulceration and scarring from prior treatment was visible, 4 bands were successfully placed. Again a large amount of food was noted in the gastric body.   Patient has been following up with Dr. Ricki Troncoso at [facility]

## 2023-11-21 NOTE — PROGRESS NOTES
[facility] Gastroenterology and Hepatology             MD Raghu Russo MD Falauren Cortez, APRN-CNP             [address] [facility] [address], [address] [facility]             [phone] fax [phone]        Gastroenterology Clinic Consultation    Raghu Diego MD  Encounter Date: 11/21/23     CC: Follow-up       No referring provider defined for this encounter. History obtained from: patient, family, medical records     Subjective:       Eliu Cloud is an 76 y. o.  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 He is trying with failure to thrive and per the wife will do 2 ensure. He underwent EGD with banding with me on 10/24/23 and had 2 bands placed. Currently undergoing chemo and radiation. Chemo held due to pancytopenia. 09/8/2023  Since his last outpatient follow-up the patient presented to me for an upper GI endoscopy on 8/18/2023. At that time he was noted to have a large grade 3 esophagus, large amount of food in the stomach, classic Whipple was found with stenosis and ulceration. There was an oozing gastric ulcer with hemorrhage, biopsies were done and Hemospray was applied. 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 He subsequently followed up back with me and underwent repeat EGD with me on 09/05/2023 very large grade 2 varices were noted. Ulceration and scarring from prior treatment was visible, 4 bands were successfully placed. Again a large amount of food was noted in the gastric body.   Patient has been following up with Dr. Karina Veloz at [facility] of

## 2023-11-22 ENCOUNTER — HOSPITAL ENCOUNTER (OUTPATIENT)
Dept: RADIATION ONCOLOGY | Age: 68
Discharge: HOME | End: 2023-11-22
Payer: MEDICARE

## 2023-11-22 ENCOUNTER — HOSPITAL ENCOUNTER (OUTPATIENT)
Age: 68
Discharge: HOME | End: 2023-11-22
Payer: MEDICARE

## 2023-11-22 LAB
ALBUMIN SERPL-MCNC: 2.4 GM/DL (ref 3.4–5)
ALP BLD-CCNC: 128 IU/L (ref 40–128)
ALT SERPL-CCNC: 13 U/L (ref 10–40)
ANION GAP SERPL CALCULATED.3IONS-SCNC: 8 MMOL/L (ref 4–16)
AST SERPL-CCNC: 28 IU/L (ref 15–37)
BILIRUB SERPL-MCNC: 0.7 MG/DL (ref 0–1)
BUN SERPL-MCNC: 11 MG/DL (ref 6–23)
CALCIUM SERPL-MCNC: 9.1 MG/DL (ref 8.3–10.6)
CHLORIDE BLD-SCNC: 101 MMOL/L (ref 99–110)
CO2: 28 MMOL/L (ref 21–32)
CREAT SERPL-MCNC: 0.9 MG/DL (ref 0.9–1.3)
GFR SERPL CREATININE-BSD FRML MDRD: >60 ML/MIN/1.73M2
GLUCOSE SERPL-MCNC: 94 MG/DL (ref 70–99)
HCT VFR BLD CALC: 32.1 % (ref 42–52)
HEMOGLOBIN: 9.8 GM/DL (ref 13.5–18)
INR BLD: 1.3 INDEX
MCH RBC QN AUTO: 30.7 PG (ref 27–31)
MCHC RBC AUTO-ENTMCNC: 30.5 % (ref 32–36)
MCV RBC AUTO: 100.6 FL (ref 78–100)
PDW BLD-RTO: 17.3 % (ref 11.7–14.9)
PLATELET # BLD: 145 K/CU MM (ref 140–440)
PMV BLD AUTO: 9.7 FL (ref 7.5–11.1)
POTASSIUM SERPL-SCNC: 4.1 MMOL/L (ref 3.5–5.1)
PROTHROMBIN TIME: 16.7 SECONDS (ref 11.7–14.5)
RBC # BLD: 3.19 M/CU MM (ref 4.6–6.2)
SODIUM BLD-SCNC: 137 MMOL/L (ref 135–145)
TOTAL PROTEIN: 5.9 GM/DL (ref 6.4–8.2)
WBC # BLD: 3.7 K/CU MM (ref 4–10.5)

## 2023-11-22 PROCEDURE — 80053 COMPREHEN METABOLIC PANEL: CPT

## 2023-11-22 PROCEDURE — 77386 HC NTSTY MODUL RAD TX DLVR CPLX: CPT | Performed by: RADIOLOGY

## 2023-11-22 PROCEDURE — 36415 COLL VENOUS BLD VENIPUNCTURE: CPT

## 2023-11-22 PROCEDURE — 85027 COMPLETE CBC AUTOMATED: CPT

## 2023-11-22 PROCEDURE — 85610 PROTHROMBIN TIME: CPT

## 2023-11-27 ENCOUNTER — HOSPITAL ENCOUNTER (OUTPATIENT)
Dept: RADIATION ONCOLOGY | Age: 68
Discharge: HOME | End: 2023-11-27
Payer: MEDICARE

## 2023-11-27 PROCEDURE — G6002 STEREOSCOPIC X-RAY GUIDANCE: HCPCS | Performed by: RADIOLOGY

## 2023-11-27 PROCEDURE — 77386 HC NTSTY MODUL RAD TX DLVR CPLX: CPT | Performed by: RADIOLOGY

## 2023-11-28 ENCOUNTER — HOSPITAL ENCOUNTER (OUTPATIENT)
Dept: RADIATION ONCOLOGY | Age: 68
Discharge: HOME | End: 2023-11-28
Payer: MEDICARE

## 2023-11-28 PROCEDURE — G6002 STEREOSCOPIC X-RAY GUIDANCE: HCPCS | Performed by: RADIOLOGY

## 2023-11-28 PROCEDURE — 77386 HC NTSTY MODUL RAD TX DLVR CPLX: CPT | Performed by: RADIOLOGY

## 2023-11-29 ENCOUNTER — HOSPITAL ENCOUNTER (OUTPATIENT)
Dept: RADIATION ONCOLOGY | Age: 68
Discharge: HOME | End: 2023-11-29
Payer: MEDICARE

## 2023-11-29 PROCEDURE — 77336 RADIATION PHYSICS CONSULT: CPT | Performed by: RADIOLOGY

## 2023-11-29 PROCEDURE — 77386 HC NTSTY MODUL RAD TX DLVR CPLX: CPT | Performed by: RADIOLOGY

## 2023-11-29 PROCEDURE — G6002 STEREOSCOPIC X-RAY GUIDANCE: HCPCS | Performed by: RADIOLOGY

## 2023-11-30 ENCOUNTER — HOSPITAL ENCOUNTER (OUTPATIENT)
Dept: RADIATION ONCOLOGY | Age: 68
Discharge: HOME | End: 2023-11-30
Payer: MEDICARE

## 2023-11-30 PROCEDURE — G6002 STEREOSCOPIC X-RAY GUIDANCE: HCPCS | Performed by: RADIOLOGY

## 2023-11-30 PROCEDURE — 77386 HC NTSTY MODUL RAD TX DLVR CPLX: CPT | Performed by: RADIOLOGY

## 2023-12-01 ENCOUNTER — APPOINTMENT (OUTPATIENT)
Dept: RADIATION ONCOLOGY | Age: 68
End: 2023-12-01
Payer: MEDICARE

## 2023-12-01 ENCOUNTER — HOSPITAL ENCOUNTER (OUTPATIENT)
Dept: RADIATION ONCOLOGY | Age: 68
Discharge: HOME | End: 2023-12-01
Payer: MEDICARE

## 2023-12-01 PROCEDURE — 77386 HC NTSTY MODUL RAD TX DLVR CPLX: CPT | Performed by: RADIOLOGY

## 2023-12-04 ENCOUNTER — HOSPITAL ENCOUNTER (OUTPATIENT)
Dept: RADIATION ONCOLOGY | Age: 68
Discharge: HOME | End: 2023-12-04
Payer: MEDICARE

## 2023-12-04 VITALS — BODY MASS INDEX: 23.31 KG/M2 | WEIGHT: 181.6 LBS

## 2023-12-04 PROCEDURE — G6002 STEREOSCOPIC X-RAY GUIDANCE: HCPCS | Performed by: RADIOLOGY

## 2023-12-04 PROCEDURE — 77386 HC NTSTY MODUL RAD TX DLVR CPLX: CPT | Performed by: RADIOLOGY

## 2023-12-04 ASSESSMENT — PAIN SCALES - GENERAL: PAINLEVEL_OUTOF10: 0

## 2023-12-04 NOTE — PLAN OF CARE
Care plan reviewed. _Pt c/o fatigue, tolerating ADLs with periods of rest.     _Pt reports still having nausea but medication helping, using Ensure to supplement. _c/o sporadic episodes of diarrhea, reports incontinent at times due to urgency, Dr. Leo Gomez notified of c/o's.

## 2023-12-04 NOTE — PROGRESS NOTES
Weekly Radiation Treatment Progress Note    DATE OF SERVICE: 12/4/2023     DIAGNOSIS:   Cancer Staging   Carcinoma of duodenum [facility])  Staging form: Ampulla Of Vater, AJCC 8th Edition  - Clinical: cT3, cN0, cM0 - Signed by Lorrie Meredith MD on 9/25/2020       TREATMENT COURSE:   Oncology History   Carcinoma of duodenum ([address])   4/23/2012 - 5/31/2012 Radiation    Duodenal bed/LNs: 4500 cGy AP/PA & lats  Boost: 540 cGy; TD: 5040 cGy     2012 Surgery    Whipple     12/8/2014 Initial Diagnosis    Carcinoma of duodenum ([address])     Malignant neoplasm of overlapping sites of stomach ([address])   10/3/2023 -  Chemotherapy    OP DOCEtaxel + OXALIplatin + leucovorin + fluorouracil (FLOT) (docetaxel start with c2)  Plan Provider: Lorrie Meredith MD  Treatment goal: Neoadjuvant  Line of treatment: Neoadjuvant           Site: Gastric Mass  Current Total Radiation Dose: 3600 cGy    Pt doing well. Energy good. +/- mild nausea- no emesis.  On Zofran  +/- occassional diarrhea with incontinence every few days    EXAM  Wt Readings from Last 3 Encounters:   11/21/23 78.3 kg (172 lb 9.6 oz)   11/20/23 77.7 kg (171 lb 3.2 oz)   11/17/23 77.5 kg (170 lb 12.8 oz)     NAD    Setup images, chart, plan reviewed    A/P:   Tolerating RT well  Continue RT as planned  If diarrhea become more consistent/frequent, consider adding immodium      Electronically signed by Kevin Petit MD on 12/4/2023 at 8:07 AM

## 2023-12-05 ENCOUNTER — HOSPITAL ENCOUNTER (OUTPATIENT)
Dept: RADIATION ONCOLOGY | Age: 68
Discharge: HOME | End: 2023-12-05
Payer: MEDICARE

## 2023-12-05 PROCEDURE — G6002 STEREOSCOPIC X-RAY GUIDANCE: HCPCS | Performed by: RADIOLOGY

## 2023-12-05 PROCEDURE — 77386 HC NTSTY MODUL RAD TX DLVR CPLX: CPT | Performed by: RADIOLOGY

## 2023-12-06 ENCOUNTER — HOSPITAL ENCOUNTER (OUTPATIENT)
Dept: RADIATION ONCOLOGY | Age: 68
Discharge: HOME | End: 2023-12-06
Payer: MEDICARE

## 2023-12-06 PROCEDURE — 77336 RADIATION PHYSICS CONSULT: CPT | Performed by: RADIOLOGY

## 2023-12-06 PROCEDURE — 77386 HC NTSTY MODUL RAD TX DLVR CPLX: CPT | Performed by: RADIOLOGY

## 2023-12-06 PROCEDURE — G6002 STEREOSCOPIC X-RAY GUIDANCE: HCPCS | Performed by: RADIOLOGY

## 2023-12-06 PROCEDURE — 77427 RADIATION TX MANAGEMENT X5: CPT | Performed by: RADIOLOGY

## 2023-12-07 ENCOUNTER — APPOINTMENT (OUTPATIENT)
Dept: RADIATION ONCOLOGY | Age: 68
End: 2023-12-07
Payer: MEDICARE

## 2023-12-07 ENCOUNTER — HOSPITAL ENCOUNTER (OUTPATIENT)
Dept: RADIATION ONCOLOGY | Age: 68
Discharge: HOME | End: 2023-12-07
Payer: MEDICARE

## 2023-12-07 PROCEDURE — 77386 HC NTSTY MODUL RAD TX DLVR CPLX: CPT | Performed by: RADIOLOGY

## 2023-12-08 ENCOUNTER — APPOINTMENT (OUTPATIENT)
Dept: RADIATION ONCOLOGY | Age: 68
End: 2023-12-08
Payer: MEDICARE

## 2023-12-08 ENCOUNTER — HOSPITAL ENCOUNTER (OUTPATIENT)
Dept: RADIATION ONCOLOGY | Age: 68
Discharge: HOME | End: 2023-12-08
Payer: MEDICARE

## 2023-12-08 ENCOUNTER — ANESTHESIA EVENT (OUTPATIENT)
Dept: ENDOSCOPY | Age: 68
DRG: 432 | End: 2023-12-08
Payer: MEDICARE

## 2023-12-08 DIAGNOSIS — Z11.59 SCREENING FOR VIRAL DISEASE: Primary | ICD-10-CM

## 2023-12-08 DIAGNOSIS — C16.8 MALIGNANT NEOPLASM OF OVERLAPPING SITES OF STOMACH (HCC): ICD-10-CM

## 2023-12-08 PROCEDURE — 77386 HC NTSTY MODUL RAD TX DLVR CPLX: CPT | Performed by: RADIOLOGY

## 2023-12-08 NOTE — ANESTHESIA PRE PROCEDURE
Department of Anesthesiology  Preprocedure Note       Name:  Juan Manuel Layton   Age:  76 y.o.  :  1955                                          MRN:  [id number]         Date:  2023      Surgeon: Alana Page):  Pam Maldonado MD    Procedure: Procedure(s):  EGD ESOPHAGOGASTRODUODENOSCOPY    Medications prior to admission:   Prior to Admission medications    Medication Sig Start Date End Date Taking? Authorizing Provider   carvedilol (COREG) 3.125 MG tablet Take 1 tablet by mouth 2 times daily 23   Pam Maldonado MD   aspirin-acetaminophen-caffeine (EXCEDRIN MIGRAINE) 417-860-74 MG per tablet Take 1 tablet by mouth every 6 hours as needed for Headaches 23   Adriana Adame MD   dronabinol (MARINOL) 5 MG capsule Take 1 capsule by mouth 2 times daily (before meals) for 30 days. Max Daily Amount: 10 mg 23  Sugey Ulrich MD   Cholecalciferol (VITAMIN D) 50 MCG (2000 UT) CAPS capsule Take 2,000 Units by mouth daily    Sung Jackson MD   vitamin A 3 MG (26872 UT) capsule Take 1 capsule by mouth daily    ProviderSung MD   pantoprazole (PROTONIX) 40 MG tablet Take 1 tablet by mouth 2 times daily 10/8/23   Bridgette Novak MD   ondansetron (ZOFRAN) 8 MG tablet Take 1 tablet by mouth every 8 hours as needed for Nausea or Vomiting Take 1 tablet by mouth every 8 hours as needed for nausea or vomiting. 10/2/23   Sugey Ulrich MD   promethazine (PHENERGAN) 25 MG tablet Take 1 tablet by mouth every 6 hours as needed for Nausea 10/2/23   Sugey Ulrich MD   OLANZapine (ZYPREXA) 2.5 MG tablet Take 1 tablet by mouth nightly 10/2/23   Sugey Ulrich MD   dexamethasone (DECADRON) 4 MG tablet Take 1 tablet by mouth in the morning with food for 2 days after the first chemo treatment.  Then take 2 tablets by mouth in the morning with food the morning before each treatment and for 2 days after each treatment 10/2/23   Sugey Ulrich MD   ondansetron (ZOFRAN-ODT) 8 MG TBDP disintegrating

## 2023-12-11 ENCOUNTER — TELEPHONE (OUTPATIENT)
Dept: RADIATION ONCOLOGY | Age: 68
End: 2023-12-11

## 2023-12-11 ENCOUNTER — APPOINTMENT (OUTPATIENT)
Dept: RADIATION ONCOLOGY | Age: 68
End: 2023-12-11
Payer: MEDICARE

## 2023-12-11 ENCOUNTER — ANESTHESIA (OUTPATIENT)
Dept: ENDOSCOPY | Age: 68
DRG: 432 | End: 2023-12-11
Payer: MEDICARE

## 2023-12-11 ENCOUNTER — HOSPITAL ENCOUNTER (INPATIENT)
Age: 68
LOS: 2 days | Discharge: HOME | DRG: 432 | End: 2023-12-13
Attending: INTERNAL MEDICINE | Admitting: INTERNAL MEDICINE
Payer: MEDICARE

## 2023-12-11 PROBLEM — I85.11 SECONDARY ESOPHAGEAL VARICES WITH BLEEDING (HCC): Status: ACTIVE | Noted: 2023-12-11

## 2023-12-11 LAB
ABO/RH: NORMAL
ANTIBODY SCREEN: NEGATIVE
HCT VFR BLD CALC: 26.1 % (ref 42–52)
HEMOGLOBIN: 8.1 GM/DL (ref 13.5–18)
INR BLD: 1.4 INDEX
PROTHROMBIN TIME: 17.1 SECONDS (ref 11.7–14.5)

## 2023-12-11 PROCEDURE — 85014 HEMATOCRIT: CPT

## 2023-12-11 PROCEDURE — 2720000010 HC SURG SUPPLY STERILE: Performed by: INTERNAL MEDICINE

## 2023-12-11 PROCEDURE — 86901 BLOOD TYPING SEROLOGIC RH(D): CPT

## 2023-12-11 PROCEDURE — 3700000001 HC ADD 15 MINUTES (ANESTHESIA): Performed by: INTERNAL MEDICINE

## 2023-12-11 PROCEDURE — 6360000002 HC RX W HCPCS: Performed by: INTERNAL MEDICINE

## 2023-12-11 PROCEDURE — 06L38CZ OCCLUSION OF ESOPHAGEAL VEIN WITH EXTRALUMINAL DEVICE, VIA NATURAL OR ARTIFICIAL OPENING ENDOSCOPIC: ICD-10-PCS | Performed by: INTERNAL MEDICINE

## 2023-12-11 PROCEDURE — 2000000000 HC ICU R&B

## 2023-12-11 PROCEDURE — 85610 PROTHROMBIN TIME: CPT

## 2023-12-11 PROCEDURE — 7100000000 HC PACU RECOVERY - FIRST 15 MIN: Performed by: INTERNAL MEDICINE

## 2023-12-11 PROCEDURE — 2500000003 HC RX 250 WO HCPCS: Performed by: NURSE ANESTHETIST, CERTIFIED REGISTERED

## 2023-12-11 PROCEDURE — 3700000000 HC ANESTHESIA ATTENDED CARE: Performed by: INTERNAL MEDICINE

## 2023-12-11 PROCEDURE — 86900 BLOOD TYPING SEROLOGIC ABO: CPT

## 2023-12-11 PROCEDURE — 6360000002 HC RX W HCPCS: Performed by: NURSE ANESTHETIST, CERTIFIED REGISTERED

## 2023-12-11 PROCEDURE — 7100000001 HC PACU RECOVERY - ADDTL 15 MIN: Performed by: INTERNAL MEDICINE

## 2023-12-11 PROCEDURE — 6360000002 HC RX W HCPCS: Performed by: NURSE PRACTITIONER

## 2023-12-11 PROCEDURE — 2709999900 HC NON-CHARGEABLE SUPPLY: Performed by: INTERNAL MEDICINE

## 2023-12-11 PROCEDURE — 2580000003 HC RX 258: Performed by: ANESTHESIOLOGY

## 2023-12-11 PROCEDURE — 3609012300 HC EGD BAND LIGATION ESOPHGEAL/GASTRIC VARICES: Performed by: INTERNAL MEDICINE

## 2023-12-11 PROCEDURE — 2580000003 HC RX 258: Performed by: INTERNAL MEDICINE

## 2023-12-11 PROCEDURE — 85018 HEMOGLOBIN: CPT

## 2023-12-11 PROCEDURE — 2700000000 HC OXYGEN THERAPY PER DAY

## 2023-12-11 PROCEDURE — 94761 N-INVAS EAR/PLS OXIMETRY MLT: CPT

## 2023-12-11 PROCEDURE — 43244 EGD VARICES LIGATION: CPT | Performed by: INTERNAL MEDICINE

## 2023-12-11 PROCEDURE — C9113 INJ PANTOPRAZOLE SODIUM, VIA: HCPCS | Performed by: INTERNAL MEDICINE

## 2023-12-11 PROCEDURE — 2580000003 HC RX 258: Performed by: NURSE PRACTITIONER

## 2023-12-11 PROCEDURE — 86850 RBC ANTIBODY SCREEN: CPT

## 2023-12-11 RX ORDER — SODIUM CHLORIDE 0.9 % (FLUSH) 0.9 %
5-40 SYRINGE (ML) INJECTION EVERY 12 HOURS SCHEDULED
Status: DISCONTINUED | OUTPATIENT
Start: 2023-12-11 | End: 2023-12-13 | Stop reason: HOSPADM

## 2023-12-11 RX ORDER — ACETAMINOPHEN 325 MG/1
650 TABLET ORAL EVERY 6 HOURS PRN
Status: DISCONTINUED | OUTPATIENT
Start: 2023-12-11 | End: 2023-12-13 | Stop reason: HOSPADM

## 2023-12-11 RX ORDER — POTASSIUM CHLORIDE 7.45 MG/ML
10 INJECTION INTRAVENOUS PRN
Status: DISCONTINUED | OUTPATIENT
Start: 2023-12-11 | End: 2023-12-13 | Stop reason: HOSPADM

## 2023-12-11 RX ORDER — ONDANSETRON 4 MG/1
4 TABLET, ORALLY DISINTEGRATING ORAL EVERY 8 HOURS PRN
Status: DISCONTINUED | OUTPATIENT
Start: 2023-12-11 | End: 2023-12-13 | Stop reason: HOSPADM

## 2023-12-11 RX ORDER — POTASSIUM CHLORIDE 20 MEQ/1
40 TABLET, EXTENDED RELEASE ORAL PRN
Status: DISCONTINUED | OUTPATIENT
Start: 2023-12-11 | End: 2023-12-13 | Stop reason: HOSPADM

## 2023-12-11 RX ORDER — SODIUM CHLORIDE 9 MG/ML
INJECTION, SOLUTION INTRAVENOUS PRN
Status: DISCONTINUED | OUTPATIENT
Start: 2023-12-11 | End: 2023-12-13 | Stop reason: HOSPADM

## 2023-12-11 RX ORDER — POLYETHYLENE GLYCOL 3350 17 G/17G
17 POWDER, FOR SOLUTION ORAL DAILY PRN
Status: DISCONTINUED | OUTPATIENT
Start: 2023-12-11 | End: 2023-12-13 | Stop reason: HOSPADM

## 2023-12-11 RX ORDER — ACETAMINOPHEN 650 MG/1
650 SUPPOSITORY RECTAL EVERY 6 HOURS PRN
Status: DISCONTINUED | OUTPATIENT
Start: 2023-12-11 | End: 2023-12-13 | Stop reason: HOSPADM

## 2023-12-11 RX ORDER — PROPOFOL 10 MG/ML
INJECTION, EMULSION INTRAVENOUS PRN
Status: DISCONTINUED | OUTPATIENT
Start: 2023-12-11 | End: 2023-12-11 | Stop reason: SDUPTHER

## 2023-12-11 RX ORDER — MAGNESIUM SULFATE IN WATER 40 MG/ML
2000 INJECTION, SOLUTION INTRAVENOUS PRN
Status: DISCONTINUED | OUTPATIENT
Start: 2023-12-11 | End: 2023-12-13 | Stop reason: HOSPADM

## 2023-12-11 RX ORDER — LIDOCAINE HYDROCHLORIDE 20 MG/ML
INJECTION, SOLUTION INFILTRATION; PERINEURAL PRN
Status: DISCONTINUED | OUTPATIENT
Start: 2023-12-11 | End: 2023-12-11 | Stop reason: SDUPTHER

## 2023-12-11 RX ORDER — SODIUM CHLORIDE, SODIUM LACTATE, POTASSIUM CHLORIDE, CALCIUM CHLORIDE 600; 310; 30; 20 MG/100ML; MG/100ML; MG/100ML; MG/100ML
INJECTION, SOLUTION INTRAVENOUS CONTINUOUS
Status: DISCONTINUED | OUTPATIENT
Start: 2023-12-11 | End: 2023-12-11 | Stop reason: HOSPADM

## 2023-12-11 RX ORDER — ONDANSETRON 2 MG/ML
4 INJECTION INTRAMUSCULAR; INTRAVENOUS EVERY 6 HOURS PRN
Status: DISCONTINUED | OUTPATIENT
Start: 2023-12-11 | End: 2023-12-13 | Stop reason: HOSPADM

## 2023-12-11 RX ORDER — SODIUM CHLORIDE 0.9 % (FLUSH) 0.9 %
5-40 SYRINGE (ML) INJECTION PRN
Status: DISCONTINUED | OUTPATIENT
Start: 2023-12-11 | End: 2023-12-13 | Stop reason: HOSPADM

## 2023-12-11 RX ADMIN — SODIUM CHLORIDE, PRESERVATIVE FREE 10 ML: 5 INJECTION INTRAVENOUS at 20:54

## 2023-12-11 RX ADMIN — SODIUM CHLORIDE, POTASSIUM CHLORIDE, SODIUM LACTATE AND CALCIUM CHLORIDE: 600; 310; 30; 20 INJECTION, SOLUTION INTRAVENOUS at 07:06

## 2023-12-11 RX ADMIN — PANTOPRAZOLE SODIUM 8 MG/HR: 40 INJECTION, POWDER, FOR SOLUTION INTRAVENOUS at 13:49

## 2023-12-11 RX ADMIN — PROPOFOL 180 MG: 10 INJECTION, EMULSION INTRAVENOUS at 08:26

## 2023-12-11 RX ADMIN — SODIUM CHLORIDE: 9 INJECTION, SOLUTION INTRAVENOUS at 11:56

## 2023-12-11 RX ADMIN — LIDOCAINE HYDROCHLORIDE 100 MG: 20 INJECTION, SOLUTION INFILTRATION; PERINEURAL at 08:26

## 2023-12-11 RX ADMIN — CEFTRIAXONE 1000 MG: 1 INJECTION, POWDER, FOR SOLUTION INTRAMUSCULAR; INTRAVENOUS at 11:57

## 2023-12-11 RX ADMIN — OCTREOTIDE ACETATE 50 MCG/HR: 500 INJECTION, SOLUTION INTRAVENOUS; SUBCUTANEOUS at 13:45

## 2023-12-11 RX ADMIN — PANTOPRAZOLE SODIUM 8 MG/HR: 40 INJECTION, POWDER, FOR SOLUTION INTRAVENOUS at 21:06

## 2023-12-11 RX ADMIN — OCTREOTIDE ACETATE 50 MCG/HR: 500 INJECTION, SOLUTION INTRAVENOUS; SUBCUTANEOUS at 21:04

## 2023-12-11 ASSESSMENT — PAIN SCALES - GENERAL
PAINLEVEL_OUTOF10: 0

## 2023-12-11 ASSESSMENT — PAIN - FUNCTIONAL ASSESSMENT: PAIN_FUNCTIONAL_ASSESSMENT: 0-10

## 2023-12-11 NOTE — ANESTHESIA POSTPROCEDURE EVALUATION
Department of Anesthesiology  Postprocedure Note    Patient: Jose Luna  MRN: [id number]  YOB: 1955  Date of evaluation: 12/11/2023      Procedure Summary       Date: 12/11/23 Room / Location: [address] [place] [address]    Anest[facility]    Procedure: EGD BAND LIGATION with 3 bands Diagnosis:       Esophageal varices without bleeding, unspecified esophageal varices type (HCC)      (Esophageal varices without bleeding, unspecified esophageal varices type ([address]) [I85.00])    Surgeons: Asher Carmen MD Responsible Provider: Carolee Gitelman, MD    Anesthesia Type: MAC ASA Status: 3            Anesthesia Type: MAC    Joel Phase I: Joel Score: 5    Joel Phase II:        Anesthesia Post Evaluation    Patient location during evaluation: PACU  Level of consciousness: awake  Pain score: 0  Complications: yes  Specific complications: unexpected post-op hospitalization  Cardiovascular status: hemodynamically stable  Respiratory status: acceptable  Hydration status: euvolemic

## 2023-12-11 NOTE — PLAN OF CARE
Problem: Pain  Goal: Verbalizes/displays adequate comfort level or baseline comfort level  Outcome: Progressing     Problem: Discharge Planning  Goal: Discharge to home or other facility with appropriate resources  Outcome: Progressing  Flowsheets (Taken 12/11/2023 1020)  Discharge to home or other facility with appropriate resources:   Identify barriers to discharge with patient and caregiver   Refer to discharge planning if patient needs post-hospital services based on physician order or complex needs related to functional status, cognitive ability or social support system     Problem: Skin/Tissue Integrity  Goal: Absence of new skin breakdown  Description: 1. Monitor for areas of redness and/or skin breakdown  2. Assess vascular access sites hourly  3. Every 4-6 hours minimum:  Change oxygen saturation probe site  4. Every 4-6 hours:  If on nasal continuous positive airway pressure, respiratory therapy assess nares and determine need for appliance change or resting period.   Outcome: Progressing     Problem: Chronic Conditions and Co-morbidities  Goal: Patient's chronic conditions and co-morbidity symptoms are monitored and maintained or improved  Outcome: Progressing     Problem: Safety - Adult  Goal: Free from fall injury  Outcome: Progressing

## 2023-12-11 NOTE — ANESTHESIA PRE PROCEDURE
Department of Anesthesiology  Preprocedure Note       Name:  Cherie Bautista   Age:  76 y.o.  :  1955                                          MRN:  [id number]         Date:  2023      Surgeon: Janis Song):  Sandrita Du MD    Procedure: Procedure(s):  EGD ESOPHAGOGASTRODUODENOSCOPY    Medications prior to admission:   Prior to Admission medications    Medication Sig Start Date End Date Taking? Authorizing Provider   carvedilol (COREG) 3.125 MG tablet Take 1 tablet by mouth 2 times daily 23   Sandrita Du MD   aspirin-acetaminophen-caffeine (EXCEDRIN MIGRAINE) [phone] MG per tablet Take 1 tablet by mouth every 6 hours as needed for Headaches 23   Yamilex Guerrero MD   Cholecalciferol (VITAMIN D) 50 MCG (2000 UT) CAPS capsule Take 2,000 Units by mouth daily    Sung Jackson MD   vitamin A 3 MG (54471 UT) capsule Take 1 capsule by mouth daily    ProviderSung MD   pantoprazole (PROTONIX) 40 MG tablet Take 1 tablet by mouth 2 times daily 10/8/23   Brittany Sandhu MD   ondansetron (ZOFRAN) 8 MG tablet Take 1 tablet by mouth every 8 hours as needed for Nausea or Vomiting Take 1 tablet by mouth every 8 hours as needed for nausea or vomiting. 10/2/23   Consuelo Wolff MD   promethazine (PHENERGAN) 25 MG tablet Take 1 tablet by mouth every 6 hours as needed for Nausea 10/2/23   Consuelo Wolff MD   OLANZapine (ZYPREXA) 2.5 MG tablet Take 1 tablet by mouth nightly 10/2/23   Consuelo Wolff MD   dexamethasone (DECADRON) 4 MG tablet Take 1 tablet by mouth in the morning with food for 2 days after the first chemo treatment.  Then take 2 tablets by mouth in the morning with food the morning before each treatment and for 2 days after each treatment 10/2/23   Consuelo Wolff MD   ondansetron (ZOFRAN-ODT) 8 MG TBDP disintegrating tablet Place 1 tablet under the tongue every 8 hours as needed for Nausea or Vomiting 23  Consuelo Wolff MD   VITAMIN D PO Take 5,000 Units by mouth

## 2023-12-11 NOTE — ANESTHESIA POSTPROCEDURE EVALUATION
Department of Anesthesiology  Postprocedure Note    Patient: Bertha Mcrae  MRN: [id number]  YOB: 1955  Date of evaluation: 12/11/2023      Procedure Summary       Date: 12/11/23 Room / Location: [address] [place] [address]    Anesthesia Start: 0820 Anesthesia Stop:     Procedure: EGD BAND LIGATION with 3 bands Diagnosis:       Esophageal varices without bleeding, unspecified esophageal varices type (HCC)      (Esophageal varices without bleeding, unspecified esophageal varices type ([address]) [I85.00])    Surgeons: Jessica Mckenna MD Responsible Provider: Yakelin Ferguson MD    Anesthesia Type: MAC ASA Status: 3            Anesthesia Type: MAC    Joel Phase I: Joel Score: 10    Joel Phase II:        Anesthesia Post Evaluation    Patient location during evaluation: PACU  Patient participation: complete - patient participated  Level of consciousness: awake  Pain score: 0  Airway patency: patent  Nausea & Vomiting: no nausea and no vomiting  Complications: no  Cardiovascular status: blood pressure returned to baseline  Respiratory status: acceptable  Hydration status: euvolemic  Pain management: adequate

## 2023-12-11 NOTE — TELEPHONE ENCOUNTER
Pt's spouse called, reported pt had procedure to band esophogeal varices and had bleeding requiring admission to [facility]. Pt unable to receive radiation treatment today. Wife reports plan is for pt to be discharged tomorrow but will not be in time for 7:45 RT. Direct contact info given, pt to call [facility] once discharged to assess if radiation treatment can be done. RT staff and Dr. Hill Both aware.

## 2023-12-11 NOTE — CONSULTS
2012    Pancreas 5,040 cGy in 28 fractions    HLD (hyperlipidemia)     Hypertension     Follows with PCP    Neuropathy     Bilat feet    Osteoarthritis     Bilat knees     Past Surgical History:   Procedure Laterality Date    CHOLECYSTECTOMY  2011    COLONOSCOPY  03/19/2014    polpectomy    COLONOSCOPY  03/09/2016    grade 1 int hem, repeat 5 years - Dr. Michael Lanier N/A 11/22/2022    COLONOSCOPY POLYPECTOMY SNARE/COLD BIOPSY with 2 hemiclips placed in proximal transverse colon performed by Ashley Patel MD at [address]  6679    umbilical x2    MEDIPORT INSERTION, SINGLE      OTHER SURGICAL HISTORY      pacreaticoduodenectomy (whipple)    TOTAL KNEE ARTHROPLASTY Left 02/17/2020    KNEE TOTAL ARTHROPLASTY LEFT performed by Ingrid Witt MD at [address] [place] [address] Right 03/08/2021    RIGHT KNEE TOTAL ARTHROPLASTY ROBOTIC performed by Ingrid Witt MD at [facility]  12/11/2014    Normal exam per pt    UPPER GASTROINTESTINAL ENDOSCOPY N/A 08/18/2023    EGD CONTROL HEMORRHAGE WITH EPI INJECTION, CLIP PLACEMENT X1, AND HEMOSPRAY performed by Erasmo Waterman MD at [address] [place] [address] 09/05/2023    EGD BAND LIGATION with 4  bands placed performed by Erasmo Waterman MD at [address] [place] [address] 10/24/2023    EGD BAND LIGATION WITH PLACEMENT OF 2 BANDS performed by Erasmo Waterman MD at [facility] ENDOSCOPY     Social History     Socioeconomic History    Marital status:      Spouse name: Not on file    Number of children: Not on file    Years of education: Not on file    Highest education level: Not on file   Occupational History    Not on file   Tobacco Use    Smoking status: Never    Smokeless tobacco: Never   Vaping Use    Vaping Use: Never used   Substance and Sexual Activity    Alcohol use: No    Drug use: No    Sexual activity: Yes     Partners: Female   Other Topics Concern    Not on
be revised to meet current compliance and payer requirements. The provider is responsible for the final determination of appropriate codes,       and modifiers. Scope Withdrawal Time:       00:15:09 Vaughn Agarwal MD This document has been electronically signed. Note Initiated:12/11/2023 Note Completed:12/11/2023 9:01 AM    CBC:   Recent Labs     12/11/23  0915   HGB 8.1*     BMP:  No results for input(s): \"NA\", \"K\", \"CL\", \"CO2\", \"BUN\", \"CREATININE\", \"GLUCOSE\" in the last 72 hours. Hepatic: No results for input(s): \"AST\", \"ALT\", \"ALB\", \"BILITOT\", \"ALKPHOS\" in the last 72 hours. Lipids:   Lab Results   Component Value Date/Time    CHOL 159 02/11/2015 12:45 PM    HDL 35 02/11/2015 12:45 PM    TRIG 137 02/11/2015 12:45 PM     Hemoglobin A1C:   Lab Results   Component Value Date/Time    LABA1C 5.0 09/07/2023 08:37 AM     TSH: No results found for: \"TSH\"  Troponin: No results found for: \"TROPONINT\"  Lactic Acid: No results for input(s): \"LACTA\" in the last 72 hours. BNP: No results for input(s): \"PROBNP\" in the last 72 hours.   UA:  Lab Results   Component Value Date/Time    NITRU NEGATIVE 03/01/2021 11:00 AM    COLORU AFSHIN 03/01/2021 11:00 AM    WBCUA 2 03/01/2021 11:00 AM    RBCUA 9 03/01/2021 11:00 AM    MUCUS RARE 03/01/2021 11:00 AM    TRICHOMONAS NONE SEEN 03/01/2021 11:00 AM    BACTERIA NEGATIVE 03/01/2021 11:00 AM    CLARITYU HAZY 03/01/2021 11:00 AM    SPECGRAV 1.025 03/01/2021 11:00 AM    LEUKOCYTESUR NEGATIVE 03/01/2021 11:00 AM    UROBILINOGEN NEGATIVE 03/01/2021 11:00 AM    BILIRUBINUR NEGATIVE 03/01/2021 11:00 AM    BLOODU NEGATIVE 03/01/2021 11:00 AM    KETUA NEGATIVE 03/01/2021 11:00 AM     Urine Cultures: No results found for: \"LABURIN\"  Blood Cultures: No results found for: \"BC\"  No results found for: \"BLOODCULT2\"  Organism: No results found for: \"ORG\"    Personally reviewed Lab Studies, Imaging    Electronically signed by Manan Beyer MD on 12/11/2023 at 2:43 PM
%    Platelets 77 (L) 780 - 440 K/CU MM    MPV 8.9 7.5 - 11.1 FL    Differential Type AUTOMATED DIFFERENTIAL     Segs Relative 72.9 (H) 36 - 66 %    Lymphocytes % 8.6 (L) 24 - 44 %    Monocytes % 8.6 (H) 0 - 4 %    Eosinophils % 8.2 (H) 0 - 3 %    Basophils % 1.4 (H) 0 - 1 %    Segs Absolute 2.1 K/CU MM    Lymphocytes Absolute 0.3 K/CU MM    Monocytes Absolute 0.3 K/CU MM    Eosinophils Absolute 0.2 K/CU MM    Basophils Absolute 0.0 K/CU MM    Nucleated RBC % 0.0 %    Total Nucleated RBC 0.0 K/CU MM    Total Immature Neutrophil 0.01 K/CU MM    Immature Neutrophil % 0.3 0 - 0.43 %   Comprehensive Metabolic Panel    Collection Time: 12/12/23  9:43 AM   Result Value Ref Range    Sodium 141 135 - 145 MMOL/L    Potassium 4.5 3.5 - 5.1 MMOL/L    Chloride 106 99 - 110 mMol/L    CO2 30 21 - 32 MMOL/L    Anion Gap 5 4 - 16    Glucose 91 70 - 99 MG/DL    BUN 9 6 - 23 MG/DL    Creatinine 0.7 (L) 0.9 - 1.3 MG/DL    Est, Glom Filt Rate >60 >60 mL/min/1.73m2    Calcium 8.2 (L) 8.3 - 10.6 MG/DL    Total Protein 5.2 (L) 6.4 - 8.2 GM/DL    Albumin 2.2 (L) 3.4 - 5.0 GM/DL    Total Bilirubin 0.9 0.0 - 1.0 MG/DL    Alkaline Phosphatase 118 40 - 129 IU/L    ALT 14 10 - 40 U/L    AST 29 15 - 37 IU/L       IMPRESSION/RECOMMENDATIONS:  1) Post EGD band ligation bleeding, hgb post procedure 8.1, curently 8.9 -started on octreotide continuous infusion and Protonix continuous infusion in PACU. - Hx of esophageal varices without bleeding  -Trend H/H  -Continue infusions as mentioned above  - Monitor for overt blood loss  -transfuse if hgb <7  -Consider CTA if becomes hemodynamically unstable   -will advance diet today. 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       MELD 3.0: 11 at 12/12/2023  9:43 AM  MELD-Na: 10 at 12/12/2023  9:43

## 2023-12-12 ENCOUNTER — HOSPITAL ENCOUNTER (OUTPATIENT)
Dept: RADIATION ONCOLOGY | Age: 68
End: 2023-12-12
Payer: MEDICARE

## 2023-12-12 ENCOUNTER — APPOINTMENT (OUTPATIENT)
Dept: RADIATION ONCOLOGY | Age: 68
End: 2023-12-12
Payer: MEDICARE

## 2023-12-12 LAB
ALBUMIN SERPL-MCNC: 2.2 GM/DL (ref 3.4–5)
ALP BLD-CCNC: 118 IU/L (ref 40–129)
ALT SERPL-CCNC: 14 U/L (ref 10–40)
ANION GAP SERPL CALCULATED.3IONS-SCNC: 5 MMOL/L (ref 4–16)
AST SERPL-CCNC: 29 IU/L (ref 15–37)
BASOPHILS ABSOLUTE: 0 K/CU MM
BASOPHILS RELATIVE PERCENT: 1.4 % (ref 0–1)
BILIRUB SERPL-MCNC: 0.9 MG/DL (ref 0–1)
BUN SERPL-MCNC: 9 MG/DL (ref 6–23)
CALCIUM SERPL-MCNC: 8.2 MG/DL (ref 8.3–10.6)
CHLORIDE BLD-SCNC: 106 MMOL/L (ref 99–110)
CO2: 30 MMOL/L (ref 21–32)
CREAT SERPL-MCNC: 0.7 MG/DL (ref 0.9–1.3)
DIFFERENTIAL TYPE: ABNORMAL
EOSINOPHILS ABSOLUTE: 0.2 K/CU MM
EOSINOPHILS RELATIVE PERCENT: 8.2 % (ref 0–3)
GFR SERPL CREATININE-BSD FRML MDRD: >60 ML/MIN/1.73M2
GLUCOSE SERPL-MCNC: 91 MG/DL (ref 70–99)
HCT VFR BLD CALC: 28.4 % (ref 42–52)
HEMOGLOBIN: 8.9 GM/DL (ref 13.5–18)
IMMATURE NEUTROPHIL %: 0.3 % (ref 0–0.43)
LYMPHOCYTES ABSOLUTE: 0.3 K/CU MM
LYMPHOCYTES RELATIVE PERCENT: 8.6 % (ref 24–44)
MCH RBC QN AUTO: 31.6 PG (ref 27–31)
MCHC RBC AUTO-ENTMCNC: 31.3 % (ref 32–36)
MCV RBC AUTO: 100.7 FL (ref 78–100)
MONOCYTES ABSOLUTE: 0.3 K/CU MM
MONOCYTES RELATIVE PERCENT: 8.6 % (ref 0–4)
NUCLEATED RBC %: 0 %
PDW BLD-RTO: 17.1 % (ref 11.7–14.9)
PLATELET # BLD: 77 K/CU MM (ref 140–440)
PMV BLD AUTO: 8.9 FL (ref 7.5–11.1)
POTASSIUM SERPL-SCNC: 4.5 MMOL/L (ref 3.5–5.1)
RBC # BLD: 2.82 M/CU MM (ref 4.6–6.2)
SEGMENTED NEUTROPHILS ABSOLUTE COUNT: 2.1 K/CU MM
SEGMENTED NEUTROPHILS RELATIVE PERCENT: 72.9 % (ref 36–66)
SODIUM BLD-SCNC: 141 MMOL/L (ref 135–145)
TOTAL IMMATURE NEUTOROPHIL: 0.01 K/CU MM
TOTAL NUCLEATED RBC: 0 K/CU MM
TOTAL PROTEIN: 5.2 GM/DL (ref 6.4–8.2)
WBC # BLD: 2.9 K/CU MM (ref 4–10.5)

## 2023-12-12 PROCEDURE — 99222 1ST HOSP IP/OBS MODERATE 55: CPT | Performed by: INTERNAL MEDICINE

## 2023-12-12 PROCEDURE — 6360000002 HC RX W HCPCS: Performed by: INTERNAL MEDICINE

## 2023-12-12 PROCEDURE — 2580000003 HC RX 258: Performed by: NURSE PRACTITIONER

## 2023-12-12 PROCEDURE — 94761 N-INVAS EAR/PLS OXIMETRY MLT: CPT

## 2023-12-12 PROCEDURE — 85025 COMPLETE CBC W/AUTO DIFF WBC: CPT

## 2023-12-12 PROCEDURE — 6360000002 HC RX W HCPCS: Performed by: NURSE PRACTITIONER

## 2023-12-12 PROCEDURE — 80053 COMPREHEN METABOLIC PANEL: CPT

## 2023-12-12 PROCEDURE — 2580000003 HC RX 258: Performed by: INTERNAL MEDICINE

## 2023-12-12 PROCEDURE — 2060000000 HC ICU INTERMEDIATE R&B

## 2023-12-12 PROCEDURE — C9113 INJ PANTOPRAZOLE SODIUM, VIA: HCPCS | Performed by: INTERNAL MEDICINE

## 2023-12-12 RX ADMIN — CEFTRIAXONE 1000 MG: 1 INJECTION, POWDER, FOR SOLUTION INTRAMUSCULAR; INTRAVENOUS at 11:12

## 2023-12-12 RX ADMIN — SODIUM CHLORIDE, PRESERVATIVE FREE 10 ML: 5 INJECTION INTRAVENOUS at 08:37

## 2023-12-12 RX ADMIN — OCTREOTIDE ACETATE 50 MCG/HR: 500 INJECTION, SOLUTION INTRAVENOUS; SUBCUTANEOUS at 06:39

## 2023-12-12 RX ADMIN — PANTOPRAZOLE SODIUM 8 MG/HR: 40 INJECTION, POWDER, FOR SOLUTION INTRAVENOUS at 06:39

## 2023-12-12 RX ADMIN — SODIUM CHLORIDE, PRESERVATIVE FREE 10 ML: 5 INJECTION INTRAVENOUS at 20:43

## 2023-12-12 RX ADMIN — PANTOPRAZOLE SODIUM 8 MG/HR: 40 INJECTION, POWDER, FOR SOLUTION INTRAVENOUS at 08:32

## 2023-12-12 RX ADMIN — OCTREOTIDE ACETATE 50 MCG/HR: 500 INJECTION, SOLUTION INTRAVENOUS; SUBCUTANEOUS at 08:31

## 2023-12-12 RX ADMIN — PANTOPRAZOLE SODIUM 8 MG/HR: 40 INJECTION, POWDER, FOR SOLUTION INTRAVENOUS at 20:43

## 2023-12-12 ASSESSMENT — PAIN SCALES - GENERAL
PAINLEVEL_OUTOF10: 0

## 2023-12-12 NOTE — PLAN OF CARE
Problem: Pain  Goal: Verbalizes/displays adequate comfort level or baseline comfort level  Outcome: Progressing     Problem: Discharge Planning  Goal: Discharge to home or other facility with appropriate resources  Outcome: Progressing     Problem: Skin/Tissue Integrity  Goal: Absence of new skin breakdown  Description: 1. Monitor for areas of redness and/or skin breakdown  2. Assess vascular access sites hourly  3. Every 4-6 hours minimum:  Change oxygen saturation probe site  4. Every 4-6 hours:  If on nasal continuous positive airway pressure, respiratory therapy assess nares and determine need for appliance change or resting period.   Outcome: Progressing     Problem: Chronic Conditions and Co-morbidities  Goal: Patient's chronic conditions and co-morbidity symptoms are monitored and maintained or improved  Outcome: Progressing     Problem: Safety - Adult  Goal: Free from fall injury  Outcome: Progressing

## 2023-12-12 NOTE — CARE COORDINATION
12/12/23 1503   Service Assessment   Patient Orientation Alert and Oriented   Cognition Alert   History Provided By Patient;Significant Other   Primary Caregiver Self   Support Systems Spouse/Significant Other;Family Members   Patient's Healthcare Decision Maker is: Legal Next of Kin   PCP Verified by CM Yes   Last Visit to PCP Within last year   Prior Functional Level Independent in ADLs/IADLs   Current Functional Level Independent in ADLs/IADLs   Can patient return to prior living arrangement Yes   Ability to make needs known: Good   Family able to assist with home care needs: Yes   Would you like for me to discuss the discharge plan with any other family members/significant others, and if so, who? Yes  (wife is present and permission to speak with family.)     CM into see pt and wife to initiate a safe discharge plan. Permission to speak with wife present. Cm  introduced self and explained role of CM. Pt is kind, alert and oriented. Pt lives with his wife. Pt is typically independent at home. Wife provides transportation for pt. Provides groceries/ makes appointments Pt has a PCP and insurance. CM offered home care and pt declined. CM informed that pt has all needed DME. Discharge plan is for pt to return home with his wife. PCP/ insurance. Pt has transportation. Declined home care when offered.   CM provided card and encouraged to call for any needs or concern. CM is available if any needs arise.

## 2023-12-13 ENCOUNTER — APPOINTMENT (OUTPATIENT)
Dept: RADIATION ONCOLOGY | Age: 68
End: 2023-12-13
Payer: MEDICARE

## 2023-12-13 ENCOUNTER — HOSPITAL ENCOUNTER (OUTPATIENT)
Dept: RADIATION ONCOLOGY | Age: 68
Discharge: HOME | End: 2023-12-13
Payer: MEDICARE

## 2023-12-13 VITALS
RESPIRATION RATE: 15 BRPM | BODY MASS INDEX: 29.24 KG/M2 | SYSTOLIC BLOOD PRESSURE: 140 MMHG | DIASTOLIC BLOOD PRESSURE: 83 MMHG | HEART RATE: 78 BPM | OXYGEN SATURATION: 97 % | TEMPERATURE: 97.5 F | WEIGHT: 192.9 LBS | HEIGHT: 68 IN

## 2023-12-13 LAB
BASOPHILS ABSOLUTE: 0 K/CU MM
BASOPHILS RELATIVE PERCENT: 1 % (ref 0–1)
DIFFERENTIAL TYPE: ABNORMAL
EOSINOPHILS ABSOLUTE: 0.3 K/CU MM
EOSINOPHILS RELATIVE PERCENT: 7.6 % (ref 0–3)
HCT VFR BLD CALC: 27.1 % (ref 42–52)
HCT VFR BLD CALC: 32 % (ref 42–52)
HEMOGLOBIN: 10.1 GM/DL (ref 13.5–18)
HEMOGLOBIN: 8.5 GM/DL (ref 13.5–18)
IMMATURE NEUTROPHIL %: 0.2 % (ref 0–0.43)
LYMPHOCYTES ABSOLUTE: 0.2 K/CU MM
LYMPHOCYTES RELATIVE PERCENT: 5.2 % (ref 24–44)
MCH RBC QN AUTO: 31.3 PG (ref 27–31)
MCH RBC QN AUTO: 31.4 PG (ref 27–31)
MCHC RBC AUTO-ENTMCNC: 31.4 % (ref 32–36)
MCHC RBC AUTO-ENTMCNC: 31.6 % (ref 32–36)
MCV RBC AUTO: 100 FL (ref 78–100)
MCV RBC AUTO: 99.1 FL (ref 78–100)
MONOCYTES ABSOLUTE: 0.4 K/CU MM
MONOCYTES RELATIVE PERCENT: 9.3 % (ref 0–4)
NUCLEATED RBC %: 0 %
PDW BLD-RTO: 17.1 % (ref 11.7–14.9)
PDW BLD-RTO: 17.3 % (ref 11.7–14.9)
PLATELET # BLD: 57 K/CU MM (ref 140–440)
PLATELET # BLD: 77 K/CU MM (ref 140–440)
PMV BLD AUTO: 9.3 FL (ref 7.5–11.1)
PMV BLD AUTO: 9.3 FL (ref 7.5–11.1)
RBC # BLD: 2.71 M/CU MM (ref 4.6–6.2)
RBC # BLD: 3.23 M/CU MM (ref 4.6–6.2)
SEGMENTED NEUTROPHILS ABSOLUTE COUNT: 3.2 K/CU MM
SEGMENTED NEUTROPHILS RELATIVE PERCENT: 76.7 % (ref 36–66)
TOTAL IMMATURE NEUTOROPHIL: 0.01 K/CU MM
TOTAL NUCLEATED RBC: 0 K/CU MM
WBC # BLD: 2.9 K/CU MM (ref 4–10.5)
WBC # BLD: 4.2 K/CU MM (ref 4–10.5)

## 2023-12-13 PROCEDURE — 2580000003 HC RX 258: Performed by: INTERNAL MEDICINE

## 2023-12-13 PROCEDURE — 94761 N-INVAS EAR/PLS OXIMETRY MLT: CPT

## 2023-12-13 PROCEDURE — 85025 COMPLETE CBC W/AUTO DIFF WBC: CPT

## 2023-12-13 PROCEDURE — C9113 INJ PANTOPRAZOLE SODIUM, VIA: HCPCS | Performed by: STUDENT IN AN ORGANIZED HEALTH CARE EDUCATION/TRAINING PROGRAM

## 2023-12-13 PROCEDURE — 6360000002 HC RX W HCPCS: Performed by: STUDENT IN AN ORGANIZED HEALTH CARE EDUCATION/TRAINING PROGRAM

## 2023-12-13 PROCEDURE — 85027 COMPLETE CBC AUTOMATED: CPT

## 2023-12-13 PROCEDURE — C9113 INJ PANTOPRAZOLE SODIUM, VIA: HCPCS | Performed by: INTERNAL MEDICINE

## 2023-12-13 PROCEDURE — 6360000002 HC RX W HCPCS: Performed by: INTERNAL MEDICINE

## 2023-12-13 RX ORDER — PANTOPRAZOLE SODIUM 40 MG/1
40 TABLET, DELAYED RELEASE ORAL 2 TIMES DAILY
Qty: 60 TABLET | Refills: 3 | Status: SHIPPED | OUTPATIENT
Start: 2023-12-13

## 2023-12-13 RX ORDER — PANTOPRAZOLE SODIUM 40 MG/10ML
40 INJECTION, POWDER, LYOPHILIZED, FOR SOLUTION INTRAVENOUS 2 TIMES DAILY
Status: DISCONTINUED | OUTPATIENT
Start: 2023-12-13 | End: 2023-12-13 | Stop reason: HOSPADM

## 2023-12-13 RX ORDER — HEPARIN 100 UNIT/ML
300 SYRINGE INTRAVENOUS PRN
Status: DISCONTINUED | OUTPATIENT
Start: 2023-12-13 | End: 2023-12-13 | Stop reason: HOSPADM

## 2023-12-13 RX ADMIN — PANTOPRAZOLE SODIUM 8 MG/HR: 40 INJECTION, POWDER, FOR SOLUTION INTRAVENOUS at 05:06

## 2023-12-13 RX ADMIN — SODIUM CHLORIDE, PRESERVATIVE FREE 10 ML: 5 INJECTION INTRAVENOUS at 09:45

## 2023-12-13 RX ADMIN — Medication 300 UNITS: at 11:55

## 2023-12-13 RX ADMIN — PANTOPRAZOLE SODIUM 40 MG: 40 INJECTION, POWDER, FOR SOLUTION INTRAVENOUS at 09:45

## 2023-12-13 ASSESSMENT — PAIN SCALES - GENERAL
PAINLEVEL_OUTOF10: 0
PAINLEVEL_OUTOF10: 0

## 2023-12-13 NOTE — DISCHARGE INSTRUCTIONS
Please call your gastroenterologist, oncologist, radiation-oncologist and PCP  Follow up on your appointments  Continue to take meds as prescribed.   If you notice any dizziness, light-headedness, black stools or blood in your stool please reach out to your gastroenterologist or return to the nearest ED

## 2023-12-13 NOTE — DISCHARGE SUMMARY
placed with incomplete eradication of varices. A slow ooze           remained at the end of the procedure. -  The gastric fundus and gastric body were normal.        -  Evidence of a classic Whipple was found in the gastric antrum. This was           characterized by ulceration and moderate stenosis. Impression:        -  Grade II esophageal varices with stigmata of recent bleeding. Incompletely           eradicated. Banded. -  Normal gastric fundus and gastric body. 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 -  No specimens collected. Recommendation:        -  Admit the patient to ICU for ongoing care. -  Patient has a contact number available for emergencies. The signs and           symptoms of potential delayed complications were discussed with the patient. Return to normal activities tomorrow. Written discharge instructions were           provided to the patient. -  The findings and recommendations were discussed with the patient's family. -  The findings and recommendations were discussed with the patient. -  Administer an IV bolus of 50 micrograms of octreotide followed by an           infusion of 50 micrograms per hour for 3 days.        -  NPO today. -  Give Protonix (pantoprazole): initiate therapy with 80 mg IV bolus, then 8           mg/hr IV by continuous infusion.        -  Repeat upper endoscopy.  Procedure Code(s):        - 30319, Esophagogastroduodenoscopy, flexible, transoral; with band ligation           of esophageal/gastric varices Diagnosis Code(s):        - I85.01, Esophageal varices with bleeding        - Z90.411, Acquired partial absence of pancreas        - Z90.49, Acquired absence of other specified parts of digestive tract        - K91.61, Intraoperative hemorrhage and hematoma of a digestive system organ           or structure complicating a digestive system

## 2023-12-14 ENCOUNTER — APPOINTMENT (OUTPATIENT)
Dept: RADIATION ONCOLOGY | Age: 68
End: 2023-12-14
Payer: MEDICARE

## 2023-12-15 ENCOUNTER — APPOINTMENT (OUTPATIENT)
Dept: RADIATION ONCOLOGY | Age: 68
End: 2023-12-15
Payer: MEDICARE

## 2023-12-18 ENCOUNTER — APPOINTMENT (OUTPATIENT)
Dept: RADIATION ONCOLOGY | Age: 68
End: 2023-12-18
Payer: MEDICARE

## 2023-12-19 ENCOUNTER — APPOINTMENT (OUTPATIENT)
Dept: RADIATION ONCOLOGY | Age: 68
End: 2023-12-19
Payer: MEDICARE

## 2023-12-20 ENCOUNTER — APPOINTMENT (OUTPATIENT)
Dept: RADIATION ONCOLOGY | Age: 68
End: 2023-12-20
Payer: MEDICARE

## 2023-12-21 ENCOUNTER — APPOINTMENT (OUTPATIENT)
Dept: RADIATION ONCOLOGY | Age: 68
End: 2023-12-21
Payer: MEDICARE

## 2023-12-22 ENCOUNTER — HOSPITAL ENCOUNTER (OUTPATIENT)
Dept: INFUSION THERAPY | Age: 68
Discharge: HOME | End: 2023-12-22
Payer: MEDICARE

## 2023-12-22 DIAGNOSIS — C16.8 MALIGNANT NEOPLASM OF OVERLAPPING SITES OF STOMACH (HCC): ICD-10-CM

## 2023-12-22 DIAGNOSIS — Z11.59 SCREENING FOR VIRAL DISEASE: ICD-10-CM

## 2023-12-22 LAB
ALBUMIN SERPL-MCNC: 2.5 GM/DL (ref 3.4–5)
ALP BLD-CCNC: 121 IU/L (ref 40–128)
ALT SERPL-CCNC: 13 U/L (ref 10–40)
ANION GAP SERPL CALCULATED.3IONS-SCNC: 2 MMOL/L (ref 7–16)
AST SERPL-CCNC: 28 IU/L (ref 15–37)
BASOPHILS ABSOLUTE: 0 K/CU MM
BASOPHILS RELATIVE PERCENT: 0.4 % (ref 0–1)
BILIRUB SERPL-MCNC: 0.6 MG/DL (ref 0–1)
BUN SERPL-MCNC: 8 MG/DL (ref 6–23)
CALCIUM SERPL-MCNC: 8.4 MG/DL (ref 8.3–10.6)
CHLORIDE BLD-SCNC: 102 MMOL/L (ref 99–110)
CO2: 32 MMOL/L (ref 21–32)
CREAT SERPL-MCNC: 0.8 MG/DL (ref 0.9–1.3)
DIFFERENTIAL TYPE: ABNORMAL
EOSINOPHILS ABSOLUTE: 0.3 K/CU MM
EOSINOPHILS RELATIVE PERCENT: 11.3 % (ref 0–3)
GFR SERPL CREATININE-BSD FRML MDRD: >60 ML/MIN/1.73M2
GLUCOSE SERPL-MCNC: 119 MG/DL (ref 70–99)
HCT VFR BLD CALC: 32.2 % (ref 42–52)
HEMOGLOBIN: 10 GM/DL (ref 13.5–18)
LYMPHOCYTES ABSOLUTE: 0.2 K/CU MM
LYMPHOCYTES RELATIVE PERCENT: 9.3 % (ref 24–44)
MCH RBC QN AUTO: 31.1 PG (ref 27–31)
MCHC RBC AUTO-ENTMCNC: 31.1 % (ref 32–36)
MCV RBC AUTO: 100 FL (ref 78–100)
MONOCYTES ABSOLUTE: 0.4 K/CU MM
MONOCYTES RELATIVE PERCENT: 15.6 % (ref 0–4)
PDW BLD-RTO: 17 % (ref 11.7–14.9)
PLATELET # BLD: 69 K/CU MM (ref 140–440)
PMV BLD AUTO: 9.2 FL (ref 7.5–11.1)
POTASSIUM SERPL-SCNC: 4.3 MMOL/L (ref 3.5–5.1)
RBC # BLD: 3.22 M/CU MM (ref 4.6–6.2)
SEGMENTED NEUTROPHILS ABSOLUTE COUNT: 1.6 K/CU MM
SEGMENTED NEUTROPHILS RELATIVE PERCENT: 63.4 % (ref 36–66)
SODIUM BLD-SCNC: 136 MMOL/L (ref 135–145)
TOTAL PROTEIN: 5.4 GM/DL (ref 6.4–8.2)
WBC # BLD: 2.6 K/CU MM (ref 4–10.5)

## 2023-12-22 PROCEDURE — 85025 COMPLETE CBC W/AUTO DIFF WBC: CPT

## 2023-12-22 PROCEDURE — 36415 COLL VENOUS BLD VENIPUNCTURE: CPT

## 2023-12-22 PROCEDURE — 80053 COMPREHEN METABOLIC PANEL: CPT

## 2023-12-26 ENCOUNTER — HOSPITAL ENCOUNTER (OUTPATIENT)
Dept: INFUSION THERAPY | Age: 68
Discharge: HOME | End: 2023-12-26
Payer: MEDICARE

## 2023-12-26 VITALS
WEIGHT: 203.2 LBS | OXYGEN SATURATION: 93 % | DIASTOLIC BLOOD PRESSURE: 91 MMHG | HEART RATE: 67 BPM | BODY MASS INDEX: 31.89 KG/M2 | SYSTOLIC BLOOD PRESSURE: 160 MMHG | HEIGHT: 67 IN

## 2023-12-26 DIAGNOSIS — Z11.59 SCREENING FOR VIRAL DISEASE: ICD-10-CM

## 2023-12-26 DIAGNOSIS — C16.8 MALIGNANT NEOPLASM OF OVERLAPPING SITES OF STOMACH (HCC): Primary | ICD-10-CM

## 2023-12-26 LAB
ALBUMIN SERPL-MCNC: 2.4 GM/DL (ref 3.4–5)
ALP BLD-CCNC: 114 IU/L (ref 40–129)
ALT SERPL-CCNC: 12 U/L (ref 10–40)
ANION GAP SERPL CALCULATED.3IONS-SCNC: 6 MMOL/L (ref 7–16)
AST SERPL-CCNC: 20 IU/L (ref 15–37)
BASOPHILS ABSOLUTE: 0 K/CU MM
BASOPHILS RELATIVE PERCENT: 0.2 % (ref 0–1)
BILIRUB SERPL-MCNC: 0.5 MG/DL (ref 0–1)
BUN SERPL-MCNC: 8 MG/DL (ref 6–23)
CALCIUM SERPL-MCNC: 8.6 MG/DL (ref 8.3–10.6)
CHLORIDE BLD-SCNC: 103 MMOL/L (ref 99–110)
CO2: 28 MMOL/L (ref 21–32)
CREAT SERPL-MCNC: 0.7 MG/DL (ref 0.9–1.3)
DIFFERENTIAL TYPE: ABNORMAL
EGFR, POC: >60 ML/MIN/1.73M2
EOSINOPHILS ABSOLUTE: 0 K/CU MM
EOSINOPHILS RELATIVE PERCENT: 0.2 % (ref 0–3)
GFR SERPL CREATININE-BSD FRML MDRD: >60 ML/MIN/1.73M2
GLUCOSE BLD-MCNC: 179 MG/DL (ref 70–99)
GLUCOSE SERPL-MCNC: 176 MG/DL (ref 70–99)
HCT VFR BLD CALC: 29 % (ref 42–52)
HEMOGLOBIN: 9.2 GM/DL (ref 13.5–18)
LYMPHOCYTES ABSOLUTE: 0.2 K/CU MM
LYMPHOCYTES RELATIVE PERCENT: 4.7 % (ref 24–44)
MCH RBC QN AUTO: 31.2 PG (ref 27–31)
MCHC RBC AUTO-ENTMCNC: 31.7 % (ref 32–36)
MCV RBC AUTO: 98.3 FL (ref 78–100)
MONOCYTES ABSOLUTE: 0.4 K/CU MM
MONOCYTES RELATIVE PERCENT: 7.8 % (ref 0–4)
PDW BLD-RTO: 16.2 % (ref 11.7–14.9)
PLATELET # BLD: 75 K/CU MM (ref 140–440)
PMV BLD AUTO: 9.5 FL (ref 7.5–11.1)
POC BUN: 7 MG/DL (ref 6–23)
POC CALCIUM: 1.26 MMOL/L (ref 1.15–1.33)
POC CHLORIDE: 107 MMOL/L (ref 99–100)
POC CO2: 27 MMOL/L (ref 21–32)
POC CREATININE: 0.6 MG/DL (ref 0.5–1.2)
POTASSIUM SERPL-SCNC: 4.1 MMOL/L (ref 3.5–5.1)
POTASSIUM SERPL-SCNC: 4.2 MMOL/L (ref 3.5–5.1)
RBC # BLD: 2.95 M/CU MM (ref 4.6–6.2)
SEGMENTED NEUTROPHILS ABSOLUTE COUNT: 4.3 K/CU MM
SEGMENTED NEUTROPHILS RELATIVE PERCENT: 87.1 % (ref 36–66)
SODIUM BLD-SCNC: 137 MMOL/L (ref 135–145)
SODIUM BLD-SCNC: 141 MMOL/L (ref 135–145)
SOURCE, BLOOD GAS: ABNORMAL
TOTAL PROTEIN: 5.6 GM/DL (ref 6.4–8.2)
WBC # BLD: 4.9 K/CU MM (ref 4–10.5)

## 2023-12-26 PROCEDURE — 96367 TX/PROPH/DG ADDL SEQ IV INF: CPT

## 2023-12-26 PROCEDURE — 96413 CHEMO IV INFUSION 1 HR: CPT

## 2023-12-26 PROCEDURE — 2580000003 HC RX 258: Performed by: INTERNAL MEDICINE

## 2023-12-26 PROCEDURE — G0498 CHEMO EXTEND IV INFUS W/PUMP: HCPCS

## 2023-12-26 PROCEDURE — 96368 THER/DIAG CONCURRENT INF: CPT

## 2023-12-26 PROCEDURE — 85025 COMPLETE CBC W/AUTO DIFF WBC: CPT

## 2023-12-26 PROCEDURE — 96417 CHEMO IV INFUS EACH ADDL SEQ: CPT

## 2023-12-26 PROCEDURE — 96415 CHEMO IV INFUSION ADDL HR: CPT

## 2023-12-26 PROCEDURE — 96375 TX/PRO/DX INJ NEW DRUG ADDON: CPT

## 2023-12-26 PROCEDURE — 80053 COMPREHEN METABOLIC PANEL: CPT

## 2023-12-26 PROCEDURE — 6360000002 HC RX W HCPCS: Performed by: INTERNAL MEDICINE

## 2023-12-26 RX ORDER — PALONOSETRON 0.05 MG/ML
0.25 INJECTION, SOLUTION INTRAVENOUS ONCE
Status: COMPLETED | OUTPATIENT
Start: 2023-12-26 | End: 2023-12-26

## 2023-12-26 RX ORDER — DEXTROSE MONOHYDRATE 50 MG/ML
5-250 INJECTION, SOLUTION INTRAVENOUS PRN
Status: DISCONTINUED | OUTPATIENT
Start: 2023-12-26 | End: 2023-12-27 | Stop reason: HOSPADM

## 2023-12-26 RX ADMIN — PALONOSETRON 0.25 MG: 0.25 INJECTION, SOLUTION INTRAVENOUS at 11:48

## 2023-12-26 RX ADMIN — DOCETAXEL 98 MG: 20 INJECTION, SOLUTION INTRAVENOUS at 12:34

## 2023-12-26 RX ADMIN — OXALIPLATIN 155 MG: 5 INJECTION, SOLUTION INTRAVENOUS at 14:56

## 2023-12-26 RX ADMIN — FLUOROURACIL 4850 MG: 50 INJECTION, SOLUTION INTRAVENOUS at 17:13

## 2023-12-26 RX ADMIN — LEUCOVORIN CALCIUM 374 MG: 200 INJECTION, POWDER, LYOPHILIZED, FOR SUSPENSION INTRAMUSCULAR; INTRAVENOUS at 14:56

## 2023-12-26 RX ADMIN — DEXAMETHASONE SODIUM PHOSPHATE 12 MG: 4 INJECTION, SOLUTION INTRAMUSCULAR; INTRAVENOUS at 11:50

## 2023-12-26 RX ADMIN — DEXTROSE MONOHYDRATE 20 ML/HR: 50 INJECTION, SOLUTION INTRAVENOUS at 14:54

## 2023-12-26 NOTE — PROGRESS NOTES
Patient arrived to treatment suite for pre-medications, chemotherapy infusion, and connection of a home infusion pump. Left chest mediport accessed with good blood return noted. Patient andn wife express concern over abdominal distention and swelling in bilateral lower extremities. Pt had recent EGD with banding completed due to bleeding varices. Pt denies shortness of breath. B/P increased at 167/82. Dr. Lauro Forman updated on pt concerns. Plan to increase Spironolactone to 50mg daily and follow up with Dr. Jeannine Santo on 1/3/24. Treatment approved and completed as ordered. Patient tolerated well. Mediport left connected for home infusion. Chemo pump connected and infusing prior to discharge. Pt to return tomorrow at 1630 to be monitored for pump disconnect. Left treatment suite ambulatory. Debora Frederick RN    Patient's status assessed and documented appropriately. All labs and required results were also reviewed today. Treatment parameters have been reviewed. Today's treatment has been approved by the provider. Treatment orders and medication sequencing (when applicable) was verified by 2 registered nurses. The treatment plan was confirmed with the patient prior to administration, and the patient understands the need to report any treatment-related symptoms. Prior to administration, when applicable, the following 8 elements of medication administration were reviewed with 2nd Registered Nurse prior to dosing: drug name, drug dose, infusion volume when prepared in a syringe, rate of administration, expiration dates and/or times, appearance and integrity of drug(s), and rate of pump for infusion. The 5 rights of medication administration have been verified.

## 2023-12-27 ENCOUNTER — HOSPITAL ENCOUNTER (OUTPATIENT)
Dept: INFUSION THERAPY | Age: 68
Discharge: HOME | End: 2023-12-27
Payer: MEDICARE

## 2023-12-27 DIAGNOSIS — Z11.59 SCREENING FOR VIRAL DISEASE: ICD-10-CM

## 2023-12-27 DIAGNOSIS — C16.8 MALIGNANT NEOPLASM OF OVERLAPPING SITES OF STOMACH (HCC): Primary | ICD-10-CM

## 2023-12-27 PROCEDURE — 2580000003 HC RX 258: Performed by: INTERNAL MEDICINE

## 2023-12-27 PROCEDURE — 6360000002 HC RX W HCPCS

## 2023-12-27 PROCEDURE — 96523 IRRIG DRUG DELIVERY DEVICE: CPT

## 2023-12-27 RX ORDER — SPIRONOLACTONE 50 MG/1
50 TABLET, FILM COATED ORAL DAILY
Qty: 30 TABLET | Refills: 3 | Status: SHIPPED | OUTPATIENT
Start: 2023-12-27

## 2023-12-27 RX ORDER — HEPARIN 100 UNIT/ML
500 SYRINGE INTRAVENOUS PRN
Status: DISCONTINUED | OUTPATIENT
Start: 2023-12-27 | End: 2023-12-28 | Stop reason: HOSPADM

## 2023-12-27 RX ORDER — HEPARIN 100 UNIT/ML
SYRINGE INTRAVENOUS
Status: COMPLETED
Start: 2023-12-27 | End: 2023-12-27

## 2023-12-27 RX ORDER — SODIUM CHLORIDE 0.9 % (FLUSH) 0.9 %
5-40 SYRINGE (ML) INJECTION PRN
Status: DISCONTINUED | OUTPATIENT
Start: 2023-12-27 | End: 2023-12-28 | Stop reason: HOSPADM

## 2023-12-27 RX ADMIN — SODIUM CHLORIDE, PRESERVATIVE FREE 20 ML: 5 INJECTION INTRAVENOUS at 16:48

## 2023-12-27 RX ADMIN — HEPARIN 500 UNITS: 100 SYRINGE at 16:48

## 2023-12-27 NOTE — PROGRESS NOTES
Pt here pump disconnect. . Pt states he tested positive for COVID today at home with non productive cough. Pt states he called his PCP to inform him and PCP started him on a cough medication. Dr Rahul Durbin made aware of COVID positive and per him pt to be disconnected from pump and rest of tx does not need to be finished. Pump stopped with 5.4 ml's left. Pt tolerated tx without incident left ambulatory. Port flushed with 20 cc NS and 500 units of heparin and de-accessed.

## 2023-12-31 ENCOUNTER — HOSPITAL ENCOUNTER (EMERGENCY)
Age: 68
Discharge: HOME | End: 2023-12-31
Attending: EMERGENCY MEDICINE
Payer: MEDICARE

## 2023-12-31 VITALS
RESPIRATION RATE: 16 BRPM | OXYGEN SATURATION: 97 % | SYSTOLIC BLOOD PRESSURE: 147 MMHG | WEIGHT: 200 LBS | HEIGHT: 68 IN | TEMPERATURE: 97.1 F | HEART RATE: 68 BPM | BODY MASS INDEX: 30.31 KG/M2 | DIASTOLIC BLOOD PRESSURE: 79 MMHG

## 2023-12-31 DIAGNOSIS — D84.9 IMMUNOCOMPROMISED (HCC): ICD-10-CM

## 2023-12-31 DIAGNOSIS — D72.819 LEUKOPENIA, UNSPECIFIED TYPE: ICD-10-CM

## 2023-12-31 DIAGNOSIS — K64.8 INTERNAL HEMORRHOIDS: Primary | ICD-10-CM

## 2023-12-31 DIAGNOSIS — D69.6 THROMBOCYTOPENIA (HCC): ICD-10-CM

## 2023-12-31 LAB
ALBUMIN SERPL-MCNC: 2.4 GM/DL (ref 3.4–5)
ALP BLD-CCNC: 91 IU/L (ref 40–129)
ALT SERPL-CCNC: 12 U/L (ref 10–40)
ANION GAP SERPL CALCULATED.3IONS-SCNC: 4 MMOL/L (ref 7–16)
ANISOCYTOSIS: ABNORMAL
AST SERPL-CCNC: 22 IU/L (ref 15–37)
BILIRUB SERPL-MCNC: 1.4 MG/DL (ref 0–1)
BUN SERPL-MCNC: 18 MG/DL (ref 6–23)
BURR CELLS: ABNORMAL
CALCIUM SERPL-MCNC: 9.1 MG/DL (ref 8.3–10.6)
CHLORIDE BLD-SCNC: 98 MMOL/L (ref 99–110)
CO2: 30 MMOL/L (ref 21–32)
CREAT SERPL-MCNC: 0.7 MG/DL (ref 0.9–1.3)
DIFFERENTIAL TYPE: ABNORMAL
EOSINOPHILS ABSOLUTE: 0 K/CU MM
EOSINOPHILS RELATIVE PERCENT: 3 % (ref 0–3)
GFR SERPL CREATININE-BSD FRML MDRD: >60 ML/MIN/1.73M2
GLUCOSE SERPL-MCNC: 126 MG/DL (ref 70–99)
HCT VFR BLD CALC: 30.5 % (ref 42–52)
HEMOGLOBIN: 10 GM/DL (ref 13.5–18)
LYMPHOCYTES ABSOLUTE: 0.2 K/CU MM
LYMPHOCYTES RELATIVE PERCENT: 12 % (ref 24–44)
MCH RBC QN AUTO: 30.7 PG (ref 27–31)
MCHC RBC AUTO-ENTMCNC: 32.8 % (ref 32–36)
MCV RBC AUTO: 93.6 FL (ref 78–100)
METAMYELOCYTES ABSOLUTE COUNT: 0.02 K/CU MM
METAMYELOCYTES PERCENT: 1 %
MONOCYTES ABSOLUTE: 0 K/CU MM
MONOCYTES RELATIVE PERCENT: 3 % (ref 0–4)
MYELOCYTE PERCENT: 3 %
MYELOCYTES ABSOLUTE COUNT: 0.05 K/CU MM
PDW BLD-RTO: 15.6 % (ref 11.7–14.9)
PLATELET # BLD: 41 K/CU MM (ref 140–440)
PMV BLD AUTO: 9.5 FL (ref 7.5–11.1)
POTASSIUM SERPL-SCNC: 4.8 MMOL/L (ref 3.5–5.1)
RBC # BLD: 3.26 M/CU MM (ref 4.6–6.2)
SEGMENTED NEUTROPHILS ABSOLUTE COUNT: 1.2 K/CU MM
SEGMENTED NEUTROPHILS RELATIVE PERCENT: 78 % (ref 36–66)
SODIUM BLD-SCNC: 132 MMOL/L (ref 135–145)
TOTAL PROTEIN: 5.8 GM/DL (ref 6.4–8.2)
WBC # BLD: 1.5 K/CU MM (ref 4–10.5)

## 2023-12-31 PROCEDURE — 80053 COMPREHEN METABOLIC PANEL: CPT

## 2023-12-31 PROCEDURE — 85025 COMPLETE CBC W/AUTO DIFF WBC: CPT

## 2023-12-31 PROCEDURE — 99283 EMERGENCY DEPT VISIT LOW MDM: CPT

## 2023-12-31 RX ORDER — HYDROCORTISONE ACETATE 25 MG/1
25 SUPPOSITORY RECTAL 2 TIMES DAILY PRN
Qty: 20 SUPPOSITORY | Refills: 0 | Status: SHIPPED | OUTPATIENT
Start: 2023-12-31 | End: 2024-01-03 | Stop reason: SDUPTHER

## 2023-12-31 ASSESSMENT — LIFESTYLE VARIABLES
HOW OFTEN DO YOU HAVE A DRINK CONTAINING ALCOHOL: NEVER
HOW MANY STANDARD DRINKS CONTAINING ALCOHOL DO YOU HAVE ON A TYPICAL DAY: PATIENT DOES NOT DRINK

## 2023-12-31 NOTE — ED PROVIDER NOTES
file    Years of education: Not on file    Highest education level: Not on file   Occupational History    Not on file   Tobacco Use    Smoking status: Never    Smokeless tobacco: Never   Vaping Use    Vaping Use: Never used   Substance and Sexual Activity    Alcohol use: No    Drug use: No    Sexual activity: Yes     Partners: Female   Other Topics Concern    Not on file   Social History Narrative    Not on file     Social Determinants of Health     Financial Resource Strain: Not on file   Food Insecurity: No Food Insecurity (12/11/2023)    Hunger Vital Sign     Worried About Running Out of Food in the Last Year: Never true     Ran Out of Food in the Last Year: Never true   Recent Concern: Food Insecurity - Food Insecurity Present (11/8/2023)    Hunger Vital Sign     Worried About Running Out of Food in the Last Year: Never true     Ran Out of Food in the Last Year: Sometimes true   Transportation Needs: No Transportation Needs (12/11/2023)    PRAPARE - Transportation     Lack of Transportation (Medical): No     Lack of Transportation (Non-Medical): No   Physical Activity: Not on file   Stress: Not on file   Social Connections: Not on file   Intimate Partner Violence: Not on file   Housing Stability: Low Risk  (12/11/2023)    Housing Stability Vital Sign     Unable to Pay for Housing in the Last Year: No     Number of Places Lived in the Last Year: 1     Unstable Housing in the Last Year: No          Review of Systems   Constitutional:  Negative for activity change, appetite change, chills, diaphoresis, fatigue and fever.   HENT: Negative.  Negative for congestion, dental problem, ear pain, facial swelling, rhinorrhea, sinus pressure, sneezing, sore throat, tinnitus, trouble swallowing and voice change.    Eyes:  Negative for photophobia, pain, discharge, redness, itching and visual disturbance.   Respiratory:  Negative for cough, chest tightness, shortness of breath, wheezing and stridor.    Cardiovascular:

## 2023-12-31 NOTE — DISCHARGE INSTRUCTIONS
Follow up with primary care provider and oncologist, as well as gastroenterologist  Return if symptoms change or worsen.

## 2023-12-31 NOTE — ED NOTES
Pt verbalized understanding of discharge medication/side effects and follow-up care/instructions, denies any questions or concerns at this time. Prescription x1 sent to requested pharmacy; pt encouraged to return to the ED for any new or worsening symptoms.

## 2024-01-02 ENCOUNTER — CLINICAL DOCUMENTATION (OUTPATIENT)
Dept: ONCOLOGY | Age: 69
End: 2024-01-02

## 2024-01-02 DIAGNOSIS — C16.9 MALIGNANT NEOPLASM OF STOMACH, UNSPECIFIED LOCATION (HCC): ICD-10-CM

## 2024-01-02 DIAGNOSIS — C16.2 MALIGNANT NEOPLASM OF BODY OF STOMACH (HCC): Primary | ICD-10-CM

## 2024-01-02 NOTE — PROGRESS NOTES
This nurse received a VM from the patient's wife in regards to concerns about his WBC being 1.5. This nurse spoke with the provider and the patient will need to have repeat labs drawn on 1/5/2023.This nurse returned patient's wife's call @ [phone] and advised of the need of the need for repeat labs and having to wait 10 days post COVID + test. Patient will be placed on lab schedule for 1/5/24 @ 0900.

## 2024-01-03 ENCOUNTER — OFFICE VISIT (OUTPATIENT)
Dept: GASTROENTEROLOGY | Age: 69
End: 2024-01-03
Payer: MEDICARE

## 2024-01-03 VITALS
WEIGHT: 164.8 LBS | RESPIRATION RATE: 17 BRPM | HEART RATE: 68 BPM | OXYGEN SATURATION: 90 % | SYSTOLIC BLOOD PRESSURE: 122 MMHG | BODY MASS INDEX: 24.98 KG/M2 | DIASTOLIC BLOOD PRESSURE: 74 MMHG | HEIGHT: 68 IN

## 2024-01-03 DIAGNOSIS — K75.81 LIVER CIRRHOSIS SECONDARY TO NASH (NONALCOHOLIC STEATOHEPATITIS) (HCC): ICD-10-CM

## 2024-01-03 DIAGNOSIS — I85.10 SECONDARY ESOPHAGEAL VARICES WITHOUT BLEEDING (HCC): ICD-10-CM

## 2024-01-03 DIAGNOSIS — K62.5 RECTAL BLEEDING: ICD-10-CM

## 2024-01-03 DIAGNOSIS — C16.9 MALIGNANT NEOPLASM OF STOMACH, UNSPECIFIED LOCATION (HCC): Primary | ICD-10-CM

## 2024-01-03 DIAGNOSIS — K74.60 LIVER CIRRHOSIS SECONDARY TO NASH (NONALCOHOLIC STEATOHEPATITIS) (HCC): ICD-10-CM

## 2024-01-03 PROCEDURE — 1123F ACP DISCUSS/DSCN MKR DOCD: CPT | Performed by: INTERNAL MEDICINE

## 2024-01-03 PROCEDURE — 99214 OFFICE O/P EST MOD 30 MIN: CPT | Performed by: INTERNAL MEDICINE

## 2024-01-03 RX ORDER — HYDROCORTISONE ACETATE 25 MG/1
25 SUPPOSITORY RECTAL 2 TIMES DAILY PRN
Qty: 20 SUPPOSITORY | Refills: 0 | Status: ON HOLD | OUTPATIENT
Start: 2024-01-03 | End: 2024-01-13

## 2024-01-03 RX ORDER — ALBUTEROL SULFATE 90 UG/1
2 AEROSOL, METERED RESPIRATORY (INHALATION) EVERY 4 HOURS PRN
Status: ON HOLD | COMMUNITY
Start: 2023-12-27

## 2024-01-03 RX ORDER — CARVEDILOL 3.12 MG/1
3.12 TABLET ORAL 2 TIMES DAILY
Qty: 60 TABLET | Refills: 3 | Status: ON HOLD
Start: 2024-01-03

## 2024-01-03 NOTE — PROGRESS NOTES
[facility] Gastroenterology and Hepatology             MD Ryanne Hayes MD Carol Christensen, APRN-CNP             [address] [place] [address]             [phone] fax [phone]        Gastroenterology Clinic Consultation    Ryanne Haile MD  Encounter Date: 01/03/24     CC: Follow-Up from Hospital (No new issues or concerns /)       No referring provider defined for this encounter.     History obtained from: patient, family, medical records     Subjective:       Arnie Martinez is an 68 y.o. 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  During the banding episode there was some bleeding post banding and the patient was admitted to the hospital started on octreotide.  He recovered nicely and octreotide was discontinued and out of ICU in 1 day.  No overt melena or hematochezia was noted.  He was tolerating his diet.    He had an episode of rectal bleeding for which she was evaluated in the ED on 12/31/2023.  It was thought to be secondary to internal hemorrhoids.  Electrolytes with mild hyponatremia, creatinine 0.7.  LFTs were unremarkable except for mild hyperbilirubinemia.  Hemoglobin was noted to be at 10 there was a 1 cm thrombosed external hemorrhoid at the anal verge and the patient was sent home.    He has also been started on spironolactone 50 mg and I have discussed to stop it for a few days and then decrease to 25 mg as he is nauseated due to his Chemotherapy.     11/21/23   Since his last visit was recently admitted with Pancytopenia - neutropenia to the hospital. He is trying with failure to thrive and per the wife will do 2 ensure.   He underwent EGD with banding with me on 10/24/23 and

## 2024-01-05 ENCOUNTER — HOSPITAL ENCOUNTER (OUTPATIENT)
Dept: INFUSION THERAPY | Age: 69
Discharge: HOME | DRG: 871 | End: 2024-01-05
Payer: MEDICARE

## 2024-01-05 ENCOUNTER — TELEPHONE (OUTPATIENT)
Dept: ONCOLOGY | Age: 69
End: 2024-01-05

## 2024-01-05 ENCOUNTER — HOSPITAL ENCOUNTER (INPATIENT)
Age: 69
LOS: 3 days | Discharge: HOME | DRG: 871 | End: 2024-01-08
Attending: STUDENT IN AN ORGANIZED HEALTH CARE EDUCATION/TRAINING PROGRAM | Admitting: STUDENT IN AN ORGANIZED HEALTH CARE EDUCATION/TRAINING PROGRAM
Payer: MEDICARE

## 2024-01-05 ENCOUNTER — CLINICAL DOCUMENTATION (OUTPATIENT)
Dept: ONCOLOGY | Age: 69
End: 2024-01-05

## 2024-01-05 DIAGNOSIS — Z11.59 SCREENING FOR VIRAL DISEASE: ICD-10-CM

## 2024-01-05 DIAGNOSIS — C16.8 MALIGNANT NEOPLASM OF OVERLAPPING SITES OF STOMACH (HCC): ICD-10-CM

## 2024-01-05 PROBLEM — R53.1 GENERALIZED WEAKNESS: Status: ACTIVE | Noted: 2024-01-05

## 2024-01-05 LAB
25(OH)D3 SERPL-MCNC: 38.99 NG/ML
ALBUMIN SERPL-MCNC: 2.6 GM/DL (ref 3.4–5)
ALP BLD-CCNC: 78 IU/L (ref 40–128)
ALT SERPL-CCNC: 8 U/L (ref 10–40)
ANION GAP SERPL CALCULATED.3IONS-SCNC: 9 MMOL/L (ref 7–16)
ANISOCYTOSIS: ABNORMAL
AST SERPL-CCNC: 12 IU/L (ref 15–37)
B PARAP IS1001 DNA NPH QL NAA+NON-PROBE: NOT DETECTED
B PERT.PT PRMT NPH QL NAA+NON-PROBE: NOT DETECTED
BACTERIA: NEGATIVE /HPF
BANDED NEUTROPHILS ABSOLUTE COUNT: 0.07 K/CU MM
BANDED NEUTROPHILS RELATIVE PERCENT: 6 % (ref 5–11)
BILIRUB SERPL-MCNC: 1.6 MG/DL (ref 0–1)
BILIRUBIN URINE: NEGATIVE MG/DL
BLOOD, URINE: NEGATIVE
BUN SERPL-MCNC: 14 MG/DL (ref 6–23)
C PNEUM DNA NPH QL NAA+NON-PROBE: NOT DETECTED
CALCIUM OXALATE CRYSTALS: ABNORMAL /HPF
CALCIUM SERPL-MCNC: 8.7 MG/DL (ref 8.3–10.6)
CELLS COUNTED: 50
CHLORIDE BLD-SCNC: 96 MMOL/L (ref 99–110)
CLARITY: CLEAR
CO2: 25 MMOL/L (ref 21–32)
COLOR: YELLOW
CREAT SERPL-MCNC: 1.1 MG/DL (ref 0.9–1.3)
DIFFERENTIAL TYPE: ABNORMAL
FERRITIN: 701 NG/ML (ref 30–400)
FLUAV H1 2009 PAN RNA NPH NAA+NON-PROBE: NOT DETECTED
FLUAV H1 RNA NPH QL NAA+NON-PROBE: NOT DETECTED
FLUAV H3 RNA NPH QL NAA+NON-PROBE: NOT DETECTED
FLUAV RNA NPH QL NAA+NON-PROBE: NOT DETECTED
FLUBV RNA NPH QL NAA+NON-PROBE: NOT DETECTED
FOLATE SERPL-MCNC: >20 NG/ML (ref 3.1–17.5)
GFR SERPL CREATININE-BSD FRML MDRD: >60 ML/MIN/1.73M2
GLUCOSE SERPL-MCNC: 96 MG/DL (ref 70–99)
GLUCOSE, URINE: NEGATIVE MG/DL
HADV DNA NPH QL NAA+NON-PROBE: NOT DETECTED
HCOV 229E RNA NPH QL NAA+NON-PROBE: NOT DETECTED
HCOV HKU1 RNA NPH QL NAA+NON-PROBE: NOT DETECTED
HCOV NL63 RNA NPH QL NAA+NON-PROBE: NOT DETECTED
HCOV OC43 RNA NPH QL NAA+NON-PROBE: NOT DETECTED
HCT VFR BLD CALC: 25.6 % (ref 42–52)
HEMOGLOBIN: 8.5 GM/DL (ref 13.5–18)
HMPV RNA NPH QL NAA+NON-PROBE: NOT DETECTED
HPIV1 RNA NPH QL NAA+NON-PROBE: NOT DETECTED
HPIV2 RNA NPH QL NAA+NON-PROBE: NOT DETECTED
HPIV3 RNA NPH QL NAA+NON-PROBE: NOT DETECTED
HPIV4 RNA NPH QL NAA+NON-PROBE: NOT DETECTED
HYALINE CASTS: 0 /LPF
IRON: 24 UG/DL (ref 59–158)
KETONES, URINE: ABNORMAL MG/DL
LACTATE: 1.5 MMOL/L (ref 0.5–1.9)
LEUKOCYTE ESTERASE, URINE: NEGATIVE
LYMPHOCYTES ABSOLUTE: 0.4 K/CU MM
LYMPHOCYTES RELATIVE PERCENT: 36 % (ref 24–44)
M PNEUMO DNA NPH QL NAA+NON-PROBE: NOT DETECTED
MCH RBC QN AUTO: 30.2 PG (ref 27–31)
MCHC RBC AUTO-ENTMCNC: 33.2 % (ref 32–36)
MCV RBC AUTO: 91.1 FL (ref 78–100)
MONOCYTES ABSOLUTE: 0.5 K/CU MM
MONOCYTES RELATIVE PERCENT: 42 % (ref 0–4)
MUCUS: ABNORMAL HPF
NITRITE URINE, QUANTITATIVE: NEGATIVE
NUCLEATED RED BLOOD CELLS: 4
OSMOLALITY UR: 277 MOS/L (ref 280–300)
OSMOLALITY UR: 498 MOS/L (ref 292–1090)
OVALOCYTES: ABNORMAL
PCT TRANSFERRIN: 21 % (ref 10–44)
PDW BLD-RTO: 14.9 % (ref 11.7–14.9)
PH, URINE: 6 (ref 5–8)
PLATELET # BLD: 93 K/CU MM (ref 140–440)
PLT MORPHOLOGY: ABNORMAL
PMV BLD AUTO: 9.2 FL (ref 7.5–11.1)
POLYCHROMASIA: ABNORMAL
POTASSIUM SERPL-SCNC: 4.6 MMOL/L (ref 3.5–5.1)
PROCALCITONIN SERPL-MCNC: 0.56 NG/ML
PROTEIN UA: 30 MG/DL
RBC # BLD: 2.81 M/CU MM (ref 4.6–6.2)
RBC # BLD: ABNORMAL 10*6/UL
RBC URINE: 1 /HPF (ref 0–3)
RENAL EPITHELIAL, UA: <1 /HPF
RETICULOCYTE COUNT PCT: 2.8 % (ref 0.2–2.2)
RSV RNA NPH QL NAA+NON-PROBE: NOT DETECTED
RV+EV RNA NPH QL NAA+NON-PROBE: NOT DETECTED
SARS-COV-2 RNA NPH QL NAA+NON-PROBE: ABNORMAL
SEGMENTED NEUTROPHILS ABSOLUTE COUNT: 0.2 K/CU MM
SEGMENTED NEUTROPHILS RELATIVE PERCENT: 16 % (ref 36–66)
SODIUM BLD-SCNC: 130 MMOL/L (ref 135–145)
SODIUM URINE: 67 MMOL/L (ref 35–167)
SPECIFIC GRAVITY UA: 1.02 (ref 1–1.03)
SQUAMOUS EPITHELIAL: <1 /HPF
TOTAL IRON BINDING CAPACITY: 113 UG/DL (ref 250–450)
TOTAL PROTEIN: 5.4 GM/DL (ref 6.4–8.2)
TRICHOMONAS: ABNORMAL /HPF
TSH SERPL DL<=0.005 MIU/L-ACNC: 0.35 UIU/ML (ref 0.27–4.2)
UNSATURATED IRON BINDING CAPACITY: 89 UG/DL (ref 110–370)
UROBILINOGEN, URINE: 1 MG/DL (ref 0.2–1)
VITAMIN B-12: >2000 PG/ML (ref 211–911)
WBC # BLD: 1.2 K/CU MM (ref 4–10.5)
WBC # BLD: ABNORMAL 10*3/UL
WBC UA: 1 /HPF (ref 0–2)

## 2024-01-05 PROCEDURE — 0202U NFCT DS 22 TRGT SARS-COV-2: CPT

## 2024-01-05 PROCEDURE — 82728 ASSAY OF FERRITIN: CPT

## 2024-01-05 PROCEDURE — 84443 ASSAY THYROID STIM HORMONE: CPT

## 2024-01-05 PROCEDURE — 2580000003 HC RX 258: Performed by: STUDENT IN AN ORGANIZED HEALTH CARE EDUCATION/TRAINING PROGRAM

## 2024-01-05 PROCEDURE — 82607 VITAMIN B-12: CPT

## 2024-01-05 PROCEDURE — 1200000000 HC SEMI PRIVATE

## 2024-01-05 PROCEDURE — 85027 COMPLETE CBC AUTOMATED: CPT

## 2024-01-05 PROCEDURE — 85007 BL SMEAR W/DIFF WBC COUNT: CPT

## 2024-01-05 PROCEDURE — 83550 IRON BINDING TEST: CPT

## 2024-01-05 PROCEDURE — P9016 RBC LEUKOCYTES REDUCED: HCPCS

## 2024-01-05 PROCEDURE — 83935 ASSAY OF URINE OSMOLALITY: CPT

## 2024-01-05 PROCEDURE — 83605 ASSAY OF LACTIC ACID: CPT

## 2024-01-05 PROCEDURE — 6370000000 HC RX 637 (ALT 250 FOR IP): Performed by: STUDENT IN AN ORGANIZED HEALTH CARE EDUCATION/TRAINING PROGRAM

## 2024-01-05 PROCEDURE — 85045 AUTOMATED RETICULOCYTE COUNT: CPT

## 2024-01-05 PROCEDURE — 84300 ASSAY OF URINE SODIUM: CPT

## 2024-01-05 PROCEDURE — 80053 COMPREHEN METABOLIC PANEL: CPT

## 2024-01-05 PROCEDURE — 83930 ASSAY OF BLOOD OSMOLALITY: CPT

## 2024-01-05 PROCEDURE — 82746 ASSAY OF FOLIC ACID SERUM: CPT

## 2024-01-05 PROCEDURE — 83540 ASSAY OF IRON: CPT

## 2024-01-05 PROCEDURE — 6360000002 HC RX W HCPCS: Performed by: INTERNAL MEDICINE

## 2024-01-05 PROCEDURE — 84145 PROCALCITONIN (PCT): CPT

## 2024-01-05 PROCEDURE — 81001 URINALYSIS AUTO W/SCOPE: CPT

## 2024-01-05 PROCEDURE — 36415 COLL VENOUS BLD VENIPUNCTURE: CPT

## 2024-01-05 PROCEDURE — 87040 BLOOD CULTURE FOR BACTERIA: CPT

## 2024-01-05 PROCEDURE — 82306 VITAMIN D 25 HYDROXY: CPT

## 2024-01-05 RX ORDER — SODIUM CHLORIDE 9 MG/ML
INJECTION, SOLUTION INTRAVENOUS PRN
Status: DISCONTINUED | OUTPATIENT
Start: 2024-01-05 | End: 2024-01-08 | Stop reason: HOSPADM

## 2024-01-05 RX ORDER — CARVEDILOL 6.25 MG/1
3.12 TABLET ORAL 2 TIMES DAILY
Status: DISCONTINUED | OUTPATIENT
Start: 2024-01-05 | End: 2024-01-08 | Stop reason: HOSPADM

## 2024-01-05 RX ORDER — POLYETHYLENE GLYCOL 3350 17 G/17G
17 POWDER, FOR SOLUTION ORAL DAILY PRN
Status: DISCONTINUED | OUTPATIENT
Start: 2024-01-05 | End: 2024-01-08 | Stop reason: HOSPADM

## 2024-01-05 RX ORDER — ALBUTEROL SULFATE 90 UG/1
2 AEROSOL, METERED RESPIRATORY (INHALATION) EVERY 4 HOURS PRN
Status: DISCONTINUED | OUTPATIENT
Start: 2024-01-05 | End: 2024-01-08 | Stop reason: HOSPADM

## 2024-01-05 RX ORDER — ONDANSETRON 2 MG/ML
4 INJECTION INTRAMUSCULAR; INTRAVENOUS EVERY 6 HOURS PRN
Status: DISCONTINUED | OUTPATIENT
Start: 2024-01-05 | End: 2024-01-08 | Stop reason: HOSPADM

## 2024-01-05 RX ORDER — SPIRONOLACTONE 50 MG/1
25 TABLET, FILM COATED ORAL DAILY
Status: DISCONTINUED | OUTPATIENT
Start: 2024-01-05 | End: 2024-01-08 | Stop reason: HOSPADM

## 2024-01-05 RX ORDER — FERROUS SULFATE 325(65) MG
325 TABLET ORAL
Status: DISCONTINUED | OUTPATIENT
Start: 2024-01-06 | End: 2024-01-08 | Stop reason: HOSPADM

## 2024-01-05 RX ORDER — PROMETHAZINE HYDROCHLORIDE 25 MG/1
25 TABLET ORAL EVERY 6 HOURS PRN
Status: DISCONTINUED | OUTPATIENT
Start: 2024-01-05 | End: 2024-01-08 | Stop reason: HOSPADM

## 2024-01-05 RX ORDER — ACETAMINOPHEN 325 MG/1
650 TABLET ORAL EVERY 6 HOURS PRN
Status: DISCONTINUED | OUTPATIENT
Start: 2024-01-05 | End: 2024-01-08 | Stop reason: HOSPADM

## 2024-01-05 RX ORDER — SODIUM CHLORIDE 9 MG/ML
INJECTION, SOLUTION INTRAVENOUS CONTINUOUS
Status: DISPENSED | OUTPATIENT
Start: 2024-01-05 | End: 2024-01-06

## 2024-01-05 RX ORDER — POTASSIUM CHLORIDE 7.45 MG/ML
10 INJECTION INTRAVENOUS PRN
Status: DISCONTINUED | OUTPATIENT
Start: 2024-01-05 | End: 2024-01-08 | Stop reason: HOSPADM

## 2024-01-05 RX ORDER — MAGNESIUM SULFATE IN WATER 40 MG/ML
2000 INJECTION, SOLUTION INTRAVENOUS PRN
Status: DISCONTINUED | OUTPATIENT
Start: 2024-01-05 | End: 2024-01-08 | Stop reason: HOSPADM

## 2024-01-05 RX ORDER — ENOXAPARIN SODIUM 100 MG/ML
40 INJECTION SUBCUTANEOUS EVERY EVENING
Status: DISCONTINUED | OUTPATIENT
Start: 2024-01-06 | End: 2024-01-08 | Stop reason: HOSPADM

## 2024-01-05 RX ORDER — OLANZAPINE 2.5 MG/1
2.5 TABLET, FILM COATED ORAL NIGHTLY
Status: DISCONTINUED | OUTPATIENT
Start: 2024-01-05 | End: 2024-01-08 | Stop reason: HOSPADM

## 2024-01-05 RX ORDER — HYDROCORTISONE ACETATE 25 MG/1
25 SUPPOSITORY RECTAL 2 TIMES DAILY
Status: DISCONTINUED | OUTPATIENT
Start: 2024-01-05 | End: 2024-01-08 | Stop reason: HOSPADM

## 2024-01-05 RX ORDER — SODIUM CHLORIDE 0.9 % (FLUSH) 0.9 %
5-40 SYRINGE (ML) INJECTION EVERY 12 HOURS SCHEDULED
Status: DISCONTINUED | OUTPATIENT
Start: 2024-01-05 | End: 2024-01-08 | Stop reason: HOSPADM

## 2024-01-05 RX ORDER — ONDANSETRON 4 MG/1
4 TABLET, ORALLY DISINTEGRATING ORAL EVERY 8 HOURS PRN
Status: DISCONTINUED | OUTPATIENT
Start: 2024-01-05 | End: 2024-01-08 | Stop reason: HOSPADM

## 2024-01-05 RX ORDER — PANTOPRAZOLE SODIUM 40 MG/1
40 TABLET, DELAYED RELEASE ORAL 2 TIMES DAILY
Status: DISCONTINUED | OUTPATIENT
Start: 2024-01-05 | End: 2024-01-08

## 2024-01-05 RX ORDER — SODIUM CHLORIDE 0.9 % (FLUSH) 0.9 %
5-40 SYRINGE (ML) INJECTION PRN
Status: DISCONTINUED | OUTPATIENT
Start: 2024-01-05 | End: 2024-01-08 | Stop reason: HOSPADM

## 2024-01-05 RX ADMIN — PANTOPRAZOLE SODIUM 40 MG: 40 TABLET, DELAYED RELEASE ORAL at 21:02

## 2024-01-05 RX ADMIN — HYDROCORTISONE ACETATE 25 MG: 25 SUPPOSITORY RECTAL at 21:02

## 2024-01-05 RX ADMIN — SODIUM CHLORIDE: 9 INJECTION, SOLUTION INTRAVENOUS at 21:02

## 2024-01-05 RX ADMIN — FILGRASTIM-AAFI 300 MCG: 300 INJECTION, SOLUTION SUBCUTANEOUS at 21:02

## 2024-01-05 RX ADMIN — OLANZAPINE 2.5 MG: 5 TABLET, FILM COATED ORAL at 21:02

## 2024-01-05 NOTE — PROGRESS NOTES
This nurse called and spoke to Jessi BELTRÁN at [facility] in regards to direct admitting the patient. Information provided including a direct number to Dr Castillo. Awaiting the hospital ist to call Dr Castillo

## 2024-01-05 NOTE — H&P
History and Physical      Name:  Arnie Martinez /Age/Sex: 1955  (68 y.o. male)   MRN & CSN:  [id number] & [id number] Encounter Date/Time: 2024 6:55 PM   Location:  [address] [place] [address]- PCP: Pat Nguyễn MD       Hospital Day: 1    Assessment and Plan:     Patient is a 68-year-old male who presented with generalized weakness. Patient was a direct admit from Oncology clinic.    # Weight loss  - Endorsed minimal PO intake including fluids over past 1-2 weeks, last meal over 3 days prior. No dysphagia. Had COVID-19 infection the week prior. Family reported over 40 lbs weight loss over past week. EGD on 2023 with mild bleeding only s/p banding.   - Clinically very hypovolemic. Labs overall non-acute. Lost over 43 lbs since  per chart review.   - Labs and infection work-up ordered, IVF, nutritional supplements.     # Severe neutropenia  - Denied any fevers, cough, wounds or sick contacts. Last chemotherapy .   -  on admission, Neupogen ordered. Start antibiotics if patient develops fever. Neutropenic precautions.     # Poorly differentiated gastric carcinoma  - Hx of duodenal cancer. Followed by H/O, last chemotherapy on .  - H/O consulted per patient request.    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   - Followed by GI.     # Pancytopenia  # Chronic thrombocytopenia  - Labs overall stable.  - Hold DVT ppx if <50k.    # Essential hypretension  - Hold Aldactone and Coreg in setting of borderline hypotension and VAL.    # VAL  - No recent NSAIDs.   - Clinically hypovolemic. Cr 1.1, baseline ~ 0.7. UA ordered.  - Held Aldactone. Will challenge with IVF. Strict I/O's. Bladder scan if decreased voiding.    # Hyponatremia  - Initial Na of 130, likely in setting of dehydration.   - Studies pending.  - Will start on gentle NS, follow-up labs.    # Hemorrhoids  - Continue suppositories.     # GERD  - Continue PPI.    Checklist:  Advanced directive: full  Diet: cardiac  DVT ppx:

## 2024-01-05 NOTE — TELEPHONE ENCOUNTER
Patient son Chris called with concern for patients condition (he is not listed on the hipaa) States patient is not able to eat states he feels like there is a blockage in his throat.  Patient is extremely fatigued and has no strength.  Son is requesting that our office call [place] at [phone] he is requesting patient be direct admit to [facility].

## 2024-01-05 NOTE — TELEPHONE ENCOUNTER
This nurse called the patient's wife to discuss the concerns regarding his health. The wife states that he has had a significant weight loss, not eating, feeling like everything is getting stuck  and weakness. This nurse advised that Dr Castillo would attempt to arrange a direct admission.

## 2024-01-06 ENCOUNTER — APPOINTMENT (OUTPATIENT)
Dept: GENERAL RADIOLOGY | Age: 69
DRG: 871 | End: 2024-01-06
Attending: STUDENT IN AN ORGANIZED HEALTH CARE EDUCATION/TRAINING PROGRAM
Payer: MEDICARE

## 2024-01-06 ENCOUNTER — APPOINTMENT (OUTPATIENT)
Dept: GENERAL RADIOLOGY | Age: 69
DRG: 871 | End: 2024-01-06
Attending: INTERNAL MEDICINE
Payer: MEDICARE

## 2024-01-06 LAB
ALBUMIN SERPL-MCNC: 2 GM/DL (ref 3.4–5)
ALP BLD-CCNC: 62 IU/L (ref 40–128)
ALT SERPL-CCNC: 6 U/L (ref 10–40)
ANION GAP SERPL CALCULATED.3IONS-SCNC: 7 MMOL/L (ref 7–16)
AST SERPL-CCNC: 10 IU/L (ref 15–37)
BANDED NEUTROPHILS ABSOLUTE COUNT: 0.23 K/CU MM
BANDED NEUTROPHILS RELATIVE PERCENT: 13 % (ref 5–11)
BILIRUB SERPL-MCNC: 0.8 MG/DL (ref 0–1)
BUN SERPL-MCNC: 14 MG/DL (ref 6–23)
CALCIUM SERPL-MCNC: 8 MG/DL (ref 8.3–10.6)
CHLORIDE BLD-SCNC: 100 MMOL/L (ref 99–110)
CO2: 25 MMOL/L (ref 21–32)
CREAT SERPL-MCNC: 1 MG/DL (ref 0.9–1.3)
DIFFERENTIAL TYPE: ABNORMAL
GFR SERPL CREATININE-BSD FRML MDRD: >60 ML/MIN/1.73M2
GLUCOSE SERPL-MCNC: 124 MG/DL (ref 70–99)
HCT VFR BLD CALC: 19.8 % (ref 42–52)
HCT VFR BLD CALC: 21.4 % (ref 42–52)
HEMOGLOBIN: 6.7 GM/DL (ref 13.5–18)
HEMOGLOBIN: 7 GM/DL (ref 13.5–18)
INR BLD: 1.6 INDEX
LYMPHOCYTES ABSOLUTE: 0.3 K/CU MM
LYMPHOCYTES RELATIVE PERCENT: 16 % (ref 24–44)
MCH RBC QN AUTO: 30.5 PG (ref 27–31)
MCHC RBC AUTO-ENTMCNC: 33.8 % (ref 32–36)
MCV RBC AUTO: 90 FL (ref 78–100)
METAMYELOCYTES ABSOLUTE COUNT: 0.04 K/CU MM
METAMYELOCYTES PERCENT: 2 %
MONOCYTES ABSOLUTE: 0.4 K/CU MM
MONOCYTES RELATIVE PERCENT: 22 % (ref 0–4)
MYELOCYTE PERCENT: 1 %
MYELOCYTES ABSOLUTE COUNT: 0.02 K/CU MM
PDW BLD-RTO: 14.6 % (ref 11.7–14.9)
PLATELET # BLD: 60 K/CU MM (ref 140–440)
PMV BLD AUTO: 10.4 FL (ref 7.5–11.1)
POTASSIUM SERPL-SCNC: 3.8 MMOL/L (ref 3.5–5.1)
PROTHROMBIN TIME: 19 SECONDS (ref 11.7–14.5)
RBC # BLD: 2.2 M/CU MM (ref 4.6–6.2)
RBC # BLD: ABNORMAL 10*6/UL
SEGMENTED NEUTROPHILS ABSOLUTE COUNT: 0.8 K/CU MM
SEGMENTED NEUTROPHILS RELATIVE PERCENT: 46 % (ref 36–66)
SODIUM BLD-SCNC: 132 MMOL/L (ref 135–145)
TOTAL PROTEIN: 4.3 GM/DL (ref 6.4–8.2)
WBC # BLD: 1.8 K/CU MM (ref 4–10.5)
WBC # BLD: ABNORMAL 10*3/UL

## 2024-01-06 PROCEDURE — 86900 BLOOD TYPING SEROLOGIC ABO: CPT

## 2024-01-06 PROCEDURE — 86922 COMPATIBILITY TEST ANTIGLOB: CPT

## 2024-01-06 PROCEDURE — 86850 RBC ANTIBODY SCREEN: CPT

## 2024-01-06 PROCEDURE — 30233N1 TRANSFUSION OF NONAUTOLOGOUS RED BLOOD CELLS INTO PERIPHERAL VEIN, PERCUTANEOUS APPROACH: ICD-10-PCS | Performed by: STUDENT IN AN ORGANIZED HEALTH CARE EDUCATION/TRAINING PROGRAM

## 2024-01-06 PROCEDURE — 71046 X-RAY EXAM CHEST 2 VIEWS: CPT

## 2024-01-06 PROCEDURE — 85610 PROTHROMBIN TIME: CPT

## 2024-01-06 PROCEDURE — 2580000003 HC RX 258: Performed by: INTERNAL MEDICINE

## 2024-01-06 PROCEDURE — 85027 COMPLETE CBC AUTOMATED: CPT

## 2024-01-06 PROCEDURE — 36430 TRANSFUSION BLD/BLD COMPNT: CPT

## 2024-01-06 PROCEDURE — 6360000002 HC RX W HCPCS: Performed by: INTERNAL MEDICINE

## 2024-01-06 PROCEDURE — 85014 HEMATOCRIT: CPT

## 2024-01-06 PROCEDURE — 99223 1ST HOSP IP/OBS HIGH 75: CPT | Performed by: INTERNAL MEDICINE

## 2024-01-06 PROCEDURE — 6370000000 HC RX 637 (ALT 250 FOR IP): Performed by: STUDENT IN AN ORGANIZED HEALTH CARE EDUCATION/TRAINING PROGRAM

## 2024-01-06 PROCEDURE — 74018 RADEX ABDOMEN 1 VIEW: CPT

## 2024-01-06 PROCEDURE — 87641 MR-STAPH DNA AMP PROBE: CPT

## 2024-01-06 PROCEDURE — 2000000000 HC ICU R&B

## 2024-01-06 PROCEDURE — 80053 COMPREHEN METABOLIC PANEL: CPT

## 2024-01-06 PROCEDURE — 36415 COLL VENOUS BLD VENIPUNCTURE: CPT

## 2024-01-06 PROCEDURE — 85018 HEMOGLOBIN: CPT

## 2024-01-06 PROCEDURE — 86901 BLOOD TYPING SEROLOGIC RH(D): CPT

## 2024-01-06 PROCEDURE — 2580000003 HC RX 258: Performed by: STUDENT IN AN ORGANIZED HEALTH CARE EDUCATION/TRAINING PROGRAM

## 2024-01-06 PROCEDURE — 83605 ASSAY OF LACTIC ACID: CPT

## 2024-01-06 PROCEDURE — 85007 BL SMEAR W/DIFF WBC COUNT: CPT

## 2024-01-06 RX ORDER — SODIUM CHLORIDE, SODIUM LACTATE, POTASSIUM CHLORIDE, CALCIUM CHLORIDE 600; 310; 30; 20 MG/100ML; MG/100ML; MG/100ML; MG/100ML
INJECTION, SOLUTION INTRAVENOUS CONTINUOUS
Status: DISPENSED | OUTPATIENT
Start: 2024-01-06 | End: 2024-01-06

## 2024-01-06 RX ORDER — SODIUM CHLORIDE 9 MG/ML
INJECTION, SOLUTION INTRAVENOUS PRN
Status: DISCONTINUED | OUTPATIENT
Start: 2024-01-06 | End: 2024-01-08 | Stop reason: HOSPADM

## 2024-01-06 RX ORDER — SODIUM CHLORIDE, SODIUM LACTATE, POTASSIUM CHLORIDE, AND CALCIUM CHLORIDE .6; .31; .03; .02 G/100ML; G/100ML; G/100ML; G/100ML
500 INJECTION, SOLUTION INTRAVENOUS ONCE
Status: COMPLETED | OUTPATIENT
Start: 2024-01-06 | End: 2024-01-06

## 2024-01-06 RX ADMIN — OLANZAPINE 2.5 MG: 5 TABLET, FILM COATED ORAL at 22:34

## 2024-01-06 RX ADMIN — FILGRASTIM-AAFI 300 MCG: 300 INJECTION, SOLUTION SUBCUTANEOUS at 17:43

## 2024-01-06 RX ADMIN — SODIUM CHLORIDE, PRESERVATIVE FREE 10 ML: 5 INJECTION INTRAVENOUS at 22:35

## 2024-01-06 RX ADMIN — CEFEPIME 2000 MG: 2 INJECTION, POWDER, FOR SOLUTION INTRAVENOUS at 17:42

## 2024-01-06 RX ADMIN — HYDROCORTISONE ACETATE 25 MG: 25 SUPPOSITORY RECTAL at 22:41

## 2024-01-06 RX ADMIN — PANTOPRAZOLE SODIUM 40 MG: 40 TABLET, DELAYED RELEASE ORAL at 22:34

## 2024-01-06 RX ADMIN — SODIUM CHLORIDE, POTASSIUM CHLORIDE, SODIUM LACTATE AND CALCIUM CHLORIDE 1000 ML: 600; 310; 30; 20 INJECTION, SOLUTION INTRAVENOUS at 10:13

## 2024-01-06 RX ADMIN — HYDROCORTISONE ACETATE 25 MG: 25 SUPPOSITORY RECTAL at 12:09

## 2024-01-06 RX ADMIN — SODIUM CHLORIDE, POTASSIUM CHLORIDE, SODIUM LACTATE AND CALCIUM CHLORIDE 500 ML: 600; 310; 30; 20 INJECTION, SOLUTION INTRAVENOUS at 08:06

## 2024-01-06 RX ADMIN — ACETAMINOPHEN 650 MG: 325 TABLET ORAL at 13:21

## 2024-01-06 RX ADMIN — FERROUS SULFATE TAB 325 MG (65 MG ELEMENTAL FE) 325 MG: 325 (65 FE) TAB at 12:14

## 2024-01-06 ASSESSMENT — PAIN SCALES - GENERAL
PAINLEVEL_OUTOF10: 0
PAINLEVEL_OUTOF10: 0
PAINLEVEL_OUTOF10: 3

## 2024-01-06 NOTE — FLOWSHEET NOTE
4 Eyes Skin Assessment     NAME:  Arnie Martinez  YOB: 1955  MEDICAL RECORD NUMBER:  [id number]    The patient is being assessed for  Transfer to New Unit    I agree that at least one RN has performed a thorough Head to Toe Skin Assessment on the patient. ALL assessment sites listed below have been assessed.      Areas assessed by both nurses:    Head, Face, Ears, Shoulders, Back, Chest, Arms, Elbows, Hands, Sacrum. Buttock, Coccyx, Ischium, Legs. Feet and Heels, and Under Medical Devices         Does the Patient have a Wound? No noted wound(s)       Mj Prevention initiated by RN: Yes  Wound Care Orders initiated by RN: No    Pressure Injury (Stage 3,4, Unstageable, DTI, NWPT, and Complex wounds) if present, place Wound referral order by RN under : No    New Ostomies, if present place, Ostomy referral order under : No     Nurse 1 eSignature: Electronically signed by Cecilia Goldberg RN on 1/6/24 at 11:36 AM EST    **SHARE this note so that the co-signing nurse can place an eSignature**    Nurse 2 eSignature: Electronically signed by Walker Wilkinson RN on 1/6/24 at 5:04 PM EST

## 2024-01-06 NOTE — CONSENT
Informed Consent for Blood Component Transfusion Note    I have discussed with the patient the rationale for blood component transfusion; its benefits in treating or preventing fatigue, organ damage, or death; and its risk which includes mild transfusion reactions, rare risk of blood borne infection, or more serious but rare reactions. I have discussed the alternatives to transfusion, including the risk and consequences of not receiving transfusion. The patient had an opportunity to ask questions and had agreed to proceed with transfusion of blood components.    Electronically signed by Brandie Gregorio MD on 1/6/24 at 10:35 AM EST

## 2024-01-07 LAB
ABO/RH: NORMAL
ALBUMIN SERPL-MCNC: 2.1 GM/DL (ref 3.4–5)
ALP BLD-CCNC: 69 IU/L (ref 40–128)
ALT SERPL-CCNC: 7 U/L (ref 10–40)
ANION GAP SERPL CALCULATED.3IONS-SCNC: 7 MMOL/L (ref 7–16)
ANTIBODY SCREEN: NEGATIVE
AST SERPL-CCNC: 11 IU/L (ref 15–37)
BILIRUB SERPL-MCNC: 1 MG/DL (ref 0–1)
BUN SERPL-MCNC: 12 MG/DL (ref 6–23)
CALCIUM SERPL-MCNC: 8.1 MG/DL (ref 8.3–10.6)
CHLORIDE BLD-SCNC: 102 MMOL/L (ref 99–110)
CO2: 25 MMOL/L (ref 21–32)
COMPONENT: NORMAL
CREAT SERPL-MCNC: 0.9 MG/DL (ref 0.9–1.3)
CROSSMATCH RESULT: NORMAL
GFR SERPL CREATININE-BSD FRML MDRD: >60 ML/MIN/1.73M2
GLUCOSE SERPL-MCNC: 94 MG/DL (ref 70–99)
HCT VFR BLD CALC: 22.9 % (ref 42–52)
HEMOGLOBIN: 7.3 GM/DL (ref 13.5–18)
MCH RBC QN AUTO: 29.3 PG (ref 27–31)
MCHC RBC AUTO-ENTMCNC: 31.9 % (ref 32–36)
MCV RBC AUTO: 92 FL (ref 78–100)
MRSA, DNA, NASAL: NEGATIVE
PDW BLD-RTO: 16.6 % (ref 11.7–14.9)
PLATELET # BLD: 63 K/CU MM (ref 140–440)
PMV BLD AUTO: 10.3 FL (ref 7.5–11.1)
POTASSIUM SERPL-SCNC: 3.7 MMOL/L (ref 3.5–5.1)
PROCALCITONIN SERPL-MCNC: 0.33 NG/ML
RBC # BLD: 2.49 M/CU MM (ref 4.6–6.2)
SODIUM BLD-SCNC: 134 MMOL/L (ref 135–145)
SPECIMEN DESCRIPTION: NORMAL
STATUS: NORMAL
TOTAL PROTEIN: 4.5 GM/DL (ref 6.4–8.2)
TRANSFUSION STATUS: NORMAL
UNIT DIVISION: 0
UNIT NUMBER: NORMAL
WBC # BLD: 7.8 K/CU MM (ref 4–10.5)

## 2024-01-07 PROCEDURE — 99232 SBSQ HOSP IP/OBS MODERATE 35: CPT | Performed by: INTERNAL MEDICINE

## 2024-01-07 PROCEDURE — 2580000003 HC RX 258: Performed by: STUDENT IN AN ORGANIZED HEALTH CARE EDUCATION/TRAINING PROGRAM

## 2024-01-07 PROCEDURE — 2000000000 HC ICU R&B

## 2024-01-07 PROCEDURE — 80053 COMPREHEN METABOLIC PANEL: CPT

## 2024-01-07 PROCEDURE — 84145 PROCALCITONIN (PCT): CPT

## 2024-01-07 PROCEDURE — 94761 N-INVAS EAR/PLS OXIMETRY MLT: CPT

## 2024-01-07 PROCEDURE — 6360000002 HC RX W HCPCS: Performed by: INTERNAL MEDICINE

## 2024-01-07 PROCEDURE — 6370000000 HC RX 637 (ALT 250 FOR IP): Performed by: STUDENT IN AN ORGANIZED HEALTH CARE EDUCATION/TRAINING PROGRAM

## 2024-01-07 PROCEDURE — 6370000000 HC RX 637 (ALT 250 FOR IP): Performed by: INTERNAL MEDICINE

## 2024-01-07 PROCEDURE — 85027 COMPLETE CBC AUTOMATED: CPT

## 2024-01-07 PROCEDURE — 2580000003 HC RX 258: Performed by: INTERNAL MEDICINE

## 2024-01-07 PROCEDURE — 1200000000 HC SEMI PRIVATE

## 2024-01-07 RX ORDER — DOXYCYCLINE HYCLATE 100 MG
100 TABLET ORAL EVERY 12 HOURS SCHEDULED
Status: DISCONTINUED | OUTPATIENT
Start: 2024-01-07 | End: 2024-01-08 | Stop reason: HOSPADM

## 2024-01-07 RX ADMIN — PANTOPRAZOLE SODIUM 40 MG: 40 TABLET, DELAYED RELEASE ORAL at 08:30

## 2024-01-07 RX ADMIN — DOXYCYCLINE HYCLATE 100 MG: 100 TABLET, COATED ORAL at 12:29

## 2024-01-07 RX ADMIN — DOXYCYCLINE HYCLATE 100 MG: 100 TABLET, COATED ORAL at 21:01

## 2024-01-07 RX ADMIN — CEFEPIME 2000 MG: 2 INJECTION, POWDER, FOR SOLUTION INTRAVENOUS at 08:29

## 2024-01-07 RX ADMIN — SODIUM CHLORIDE, PRESERVATIVE FREE 10 ML: 5 INJECTION INTRAVENOUS at 21:02

## 2024-01-07 RX ADMIN — SODIUM CHLORIDE, PRESERVATIVE FREE 10 ML: 5 INJECTION INTRAVENOUS at 08:32

## 2024-01-07 RX ADMIN — HYDROCORTISONE ACETATE 25 MG: 25 SUPPOSITORY RECTAL at 21:02

## 2024-01-07 RX ADMIN — VANCOMYCIN HYDROCHLORIDE 1250 MG: 1.25 INJECTION, POWDER, LYOPHILIZED, FOR SOLUTION INTRAVENOUS at 08:28

## 2024-01-07 RX ADMIN — OLANZAPINE 2.5 MG: 5 TABLET, FILM COATED ORAL at 21:02

## 2024-01-07 RX ADMIN — CEFEPIME 2000 MG: 2 INJECTION, POWDER, FOR SOLUTION INTRAVENOUS at 00:15

## 2024-01-07 RX ADMIN — CEFTRIAXONE 1000 MG: 1 INJECTION, POWDER, FOR SOLUTION INTRAMUSCULAR; INTRAVENOUS at 17:24

## 2024-01-07 RX ADMIN — FERROUS SULFATE TAB 325 MG (65 MG ELEMENTAL FE) 325 MG: 325 (65 FE) TAB at 08:30

## 2024-01-07 RX ADMIN — PANTOPRAZOLE SODIUM 40 MG: 40 TABLET, DELAYED RELEASE ORAL at 21:01

## 2024-01-07 ASSESSMENT — PAIN SCALES - GENERAL
PAINLEVEL_OUTOF10: 0
PAINLEVEL_OUTOF10: 0

## 2024-01-08 VITALS
BODY MASS INDEX: 25.26 KG/M2 | HEIGHT: 68 IN | RESPIRATION RATE: 27 BRPM | DIASTOLIC BLOOD PRESSURE: 69 MMHG | OXYGEN SATURATION: 99 % | HEART RATE: 91 BPM | TEMPERATURE: 97.8 F | SYSTOLIC BLOOD PRESSURE: 113 MMHG | WEIGHT: 166.67 LBS

## 2024-01-08 PROBLEM — E43 SEVERE MALNUTRITION (HCC): Chronic | Status: ACTIVE | Noted: 2023-11-09

## 2024-01-08 PROBLEM — R13.19 ESOPHAGEAL DYSPHAGIA: Status: ACTIVE | Noted: 2024-01-08

## 2024-01-08 LAB
ANION GAP SERPL CALCULATED.3IONS-SCNC: 6 MMOL/L (ref 7–16)
BUN SERPL-MCNC: 11 MG/DL (ref 6–23)
CALCIUM SERPL-MCNC: 8.1 MG/DL (ref 8.3–10.6)
CHLORIDE BLD-SCNC: 106 MMOL/L (ref 99–110)
CO2: 26 MMOL/L (ref 21–32)
CREAT SERPL-MCNC: 0.8 MG/DL (ref 0.9–1.3)
GFR SERPL CREATININE-BSD FRML MDRD: >60 ML/MIN/1.73M2
GLUCOSE SERPL-MCNC: 134 MG/DL (ref 70–99)
HCT VFR BLD CALC: 23.1 % (ref 42–52)
HEMOGLOBIN: 7.3 GM/DL (ref 13.5–18)
MAGNESIUM: 1.9 MG/DL (ref 1.8–2.4)
MCH RBC QN AUTO: 29 PG (ref 27–31)
MCHC RBC AUTO-ENTMCNC: 31.6 % (ref 32–36)
MCV RBC AUTO: 91.7 FL (ref 78–100)
PDW BLD-RTO: 16.9 % (ref 11.7–14.9)
PHOSPHORUS: 2.5 MG/DL (ref 2.5–4.9)
PLATELET # BLD: 73 K/CU MM (ref 140–440)
PMV BLD AUTO: 9.9 FL (ref 7.5–11.1)
POTASSIUM SERPL-SCNC: 3.6 MMOL/L (ref 3.5–5.1)
RBC # BLD: 2.52 M/CU MM (ref 4.6–6.2)
SODIUM BLD-SCNC: 138 MMOL/L (ref 135–145)
WBC # BLD: 6.4 K/CU MM (ref 4–10.5)

## 2024-01-08 PROCEDURE — 83735 ASSAY OF MAGNESIUM: CPT

## 2024-01-08 PROCEDURE — 94761 N-INVAS EAR/PLS OXIMETRY MLT: CPT

## 2024-01-08 PROCEDURE — 85027 COMPLETE CBC AUTOMATED: CPT

## 2024-01-08 PROCEDURE — 80048 BASIC METABOLIC PNL TOTAL CA: CPT

## 2024-01-08 PROCEDURE — 99222 1ST HOSP IP/OBS MODERATE 55: CPT | Performed by: INTERNAL MEDICINE

## 2024-01-08 PROCEDURE — 2580000003 HC RX 258: Performed by: STUDENT IN AN ORGANIZED HEALTH CARE EDUCATION/TRAINING PROGRAM

## 2024-01-08 PROCEDURE — 97535 SELF CARE MNGMENT TRAINING: CPT

## 2024-01-08 PROCEDURE — 92610 EVALUATE SWALLOWING FUNCTION: CPT | Performed by: SPEECH-LANGUAGE PATHOLOGIST

## 2024-01-08 PROCEDURE — 97162 PT EVAL MOD COMPLEX 30 MIN: CPT

## 2024-01-08 PROCEDURE — 97166 OT EVAL MOD COMPLEX 45 MIN: CPT

## 2024-01-08 PROCEDURE — 84100 ASSAY OF PHOSPHORUS: CPT

## 2024-01-08 PROCEDURE — 83605 ASSAY OF LACTIC ACID: CPT

## 2024-01-08 PROCEDURE — 6360000002 HC RX W HCPCS: Performed by: INTERNAL MEDICINE

## 2024-01-08 PROCEDURE — 97116 GAIT TRAINING THERAPY: CPT

## 2024-01-08 PROCEDURE — 6370000000 HC RX 637 (ALT 250 FOR IP): Performed by: INTERNAL MEDICINE

## 2024-01-08 PROCEDURE — 36591 DRAW BLOOD OFF VENOUS DEVICE: CPT

## 2024-01-08 PROCEDURE — 99232 SBSQ HOSP IP/OBS MODERATE 35: CPT | Performed by: INTERNAL MEDICINE

## 2024-01-08 PROCEDURE — 6370000000 HC RX 637 (ALT 250 FOR IP): Performed by: STUDENT IN AN ORGANIZED HEALTH CARE EDUCATION/TRAINING PROGRAM

## 2024-01-08 RX ORDER — AMOXICILLIN AND CLAVULANATE POTASSIUM 875; 125 MG/1; MG/1
1 TABLET, FILM COATED ORAL 2 TIMES DAILY
Qty: 6 TABLET | Refills: 0 | Status: SHIPPED | OUTPATIENT
Start: 2024-01-08 | End: 2024-01-11

## 2024-01-08 RX ORDER — DOXYCYCLINE HYCLATE 100 MG
100 TABLET ORAL EVERY 12 HOURS SCHEDULED
Qty: 7 TABLET | Refills: 0 | Status: SHIPPED | OUTPATIENT
Start: 2024-01-08 | End: 2024-01-12

## 2024-01-08 RX ORDER — HEPARIN 100 UNIT/ML
100 SYRINGE INTRAVENOUS PRN
Status: DISCONTINUED | OUTPATIENT
Start: 2024-01-08 | End: 2024-01-08 | Stop reason: HOSPADM

## 2024-01-08 RX ADMIN — DOXYCYCLINE HYCLATE 100 MG: 100 TABLET, COATED ORAL at 08:50

## 2024-01-08 RX ADMIN — HEPARIN 100 UNITS: 100 SYRINGE at 16:52

## 2024-01-08 RX ADMIN — SODIUM CHLORIDE, PRESERVATIVE FREE 10 ML: 5 INJECTION INTRAVENOUS at 08:52

## 2024-01-08 RX ADMIN — PANTOPRAZOLE SODIUM 40 MG: 40 TABLET, DELAYED RELEASE ORAL at 08:50

## 2024-01-08 RX ADMIN — FERROUS SULFATE TAB 325 MG (65 MG ELEMENTAL FE) 325 MG: 325 (65 FE) TAB at 08:50

## 2024-01-08 ASSESSMENT — PAIN SCALES - GENERAL: PAINLEVEL_OUTOF10: 0

## 2024-01-08 NOTE — DISCHARGE INSTRUCTIONS
You were admitted for generalized weakness due to COVID19 and pneumonia and likely because your blood counts were low too. You are doing much better now.     Please finish antibiotics as prescribed until all the pills are done.    You were also seen by the gastroenterologist for difficulty and pain while swallowing, which he believes to be due to radiation. Please continue following a soft and liquid diet while that improves.     Please follow up with gastroenterology in 2 weeks, oncology in 1 week and your primary care provider in 1 week.    If your symptoms come back or worsen, please return to the emergency room.

## 2024-01-08 NOTE — DISCHARGE INSTR - COC
Nurse Assessment:  Last Vital Signs: /72   Pulse 91   Temp 97.8 °F (36.6 °C) (Oral)   Resp 16   Ht 1.727 m (5' 8\")   Wt 75.6 kg (166 lb 10.7 oz)   SpO2 99%   BMI 25.34 kg/m²     Last documented pain score (0-10 scale): Pain Level: 0  Last Weight:   Wt Readings from Last 1 Encounters:   01/08/24 75.6 kg (166 lb 10.7 oz)     Mental Status:  {IP PT MENTAL STATUS:20030}    IV Access:  { ABILIO IV ACCESS:100424631}    Nursing Mobility/ADLs:  Walking   {CHP DME ADLs:056893191}  Transfer  {CHP DME ADLs:159834360}  Bathing  {CHP DME ADLs:225740920}  Dressing  {CHP DME ADLs:164816784}  Toileting  {CHP DME ADLs:164755303}  Feeding  {CHP DME ADLs:563720370}  Med Admin  {CHP DME ADLs:710815609}  Med Delivery   { ABILIO MED Delivery:714588780}    Wound Care Documentation and Therapy:  Incision 02/17/20 Knee Anterior;Left (Active)   Number of days: 1421       Incision 03/08/21 Knee Anterior;Right (Active)   Number of days: 1036        Elimination:  Continence:   Bowel: {YES / NO:19727}  Bladder: {YES / NO:19727}  Urinary Catheter: {Urinary Catheter:995658438}   Colostomy/Ileostomy/Ileal Conduit: {YES / NO:19727}       Date of Last BM: ***  No intake or output data in the 24 hours ending 01/08/24 1552  I/O last 3 completed shifts:  In: 1271.6 [P.O.:640; I.V.:100; IV Piggyback:531.6]  Out: -     Safety Concerns:     { ABILIO Safety Concerns:414558258}    Impairments/Disabilities:      { ABILIO Impairments/Disabilities:980416763}    Nutrition Therapy:  Current Nutrition Therapy:   { ABILIO Diet List:028493660}    Routes of Feeding: {CHP DME Other Feedings:150364494}  Liquids: {Slp liquid thickness:13848}  Daily Fluid Restriction: {CHP DME Yes amt example:422861671}  Last Modified Barium Swallow with Video (Video Swallowing Test): {Done Not Done Date:304088012}    Treatments at the Time of Hospital Discharge:   Respiratory Treatments: ***  Oxygen Therapy:  {Therapy; copd oxygen:58581}  Ventilator:    { CC Vent

## 2024-01-08 NOTE — CARE COORDINATION
Patient in Covid Isolation. Attempted to reach by phone; no answer x 2. Will attempt at a later time. Barby Walker RN     Chart reviewed. PT/OT rec Home with HHC. Will offer HHC. Barby Walker RN

## 2024-01-08 NOTE — PROGRESS NOTES
V2.0  [facility] [place]      Name:  Arnie Martinez /Age/Sex: 1955  (68 y.o. male)   MRN & CSN:  [id number] & [id number] Encounter Date/Time: 2024 7:21 AM EST    Location:  -A PCP: Pat Nguyễn MD       Hospital Day: 3    Assessment and Plan:   Arnie Martinez is a 68 y.o. male who presents with Generalized weakness    #Sepsis 2/2 [id number]  -Resp PCR positive for SARS-CoV2  -SIRS criteria: leukopenia, hypotension, source of infection  -Procal 0.332, mildly elevated, will continue antibiotics but deescalate to ceftriaxone/doxy to cover CAP  -Negative MRSA screen  -CXR with increased density involving lower lung zones bilaterally, may be due to low lung volumes however developing infiltrates cannot be excluded  -vanc was stopped  Cefepime switched to ceftriaxone and doxy    # Weight loss  - Endorsed minimal PO intake including fluids over past 1-2 weeks, last meal over 3 days prior. No dysphagia. Had COVID-19 infection the week prior. Family reported over 40 lbs weight loss over past week. EGD on 2023 with mild bleeding only s/p banding.   - Clinically very hypovolemic. Labs overall non-acute. Lost over 43 lbs since  per chart review.   - Labs and infection work-up ordered, IVF, nutritional supplements.      # Severe neutropenia-resolved  - Denied any fevers, cough, wounds or sick contacts. Last chemotherapy .   -  on admission, received Neupogen x2.  Neutropenic precautions.     # Odynophagia  # Dysphagia  -1 week of pain with swallowing and difficulty swallowing.  Speech therapy consulted  GI consulted    # Pancytopenia  # Chronic thrombocytopenia  #Acute on chronic anemia  - Labs overall stable.  -Hb dropped to 6.7  -1U PRBC ordered  Hold DVT ppx if <50k.  Oncology following, appreciate recs     # Poorly differentiated gastric carcinoma  - Hx of duodenal cancer. Followed by H/O, last chemotherapy on .  - H/O consulted per patient 
   01/08/24 1457   Encounter Summary   Encounter Overview/Reason  Attempted Encounter   Service Provided For: Patient not available   Last Encounter  01/08/24   Begin Time 1447   End Time  1457   Total Time Calculated 10 min   Assessment/Intervention/Outcome   Assessment Unable to assess   Plan and Referrals   Plan/Referrals Continue to visit, (comment)       
4 Eyes Skin Assessment     NAME:  Arnie Martinez  YOB: 1955  MEDICAL RECORD NUMBER:  [id number]    The patient is being assessed for  Admission    I agree that at least one RN has performed a thorough Head to Toe Skin Assessment on the patient. ALL assessment sites listed below have been assessed.      Areas assessed by both nurses:    Head, Face, Ears, Shoulders, Back, Chest, Arms, Elbows, Hands, Sacrum. Buttock, Coccyx, Ischium, Legs. Feet and Heels, and Under Medical Devices         Does the Patient have a Wound? No noted wound(s)       Mj Prevention initiated by RN: Yes  Wound Care Orders initiated by RN: No    Pressure Injury (Stage 3,4, Unstageable, DTI, NWPT, and Complex wounds) if present, place Wound referral order by RN under : No    New Ostomies, if present place, Ostomy referral order under : No     Nurse 1 eSignature: Electronically signed by Kenn Warren RN on 1/6/24 at 7:01 AM EST    **SHARE this note so that the co-signing nurse can place an eSignature**    Nurse 2 eSignature: Electronically signed by Radha Villanueva LPN on 1/6/24 at 7:05 AM EST   
Blood reached chest port at 1054  
Comprehensive Nutrition Assessment    Type and Reason for Visit:  Initial, Positive Nutrition Screen (MST: 5)    Nutrition Recommendations/Plan:   Continue regular diet and high calorie oral supplements.   Encourage and record intake   Appetite stimulant if appropriate and nausea control      Malnutrition Assessment:  Malnutrition Status:  Severe malnutrition (01/06/24 2220)    Context:  Acute Illness     Findings of the 6 clinical characteristics of malnutrition:  Energy Intake:  50% or less of estimated energy requirements for 5 or more days  Weight Loss:  Greater than 5% over 1 month     Body Fat Loss:  Unable to assess     Muscle Mass Loss:  Unable to assess    Fluid Accumulation:  Unable to assess     Strength:  Not Performed    Nutrition Assessment:    Pt admitted with generalized weakness. With gastric carcinoma, finished radiation and currently on chemo. On regular diet with supplements ordered. Pt with severely decreased PO intake over the last 1-2 weeks.    Nutrition Related Findings:    Na 132, WBC 1.7, H/H 6.7/19.8 -prior to trasfusion today, neutropenia. Wound Type: None       Current Nutrition Intake & Therapies:    Average Meal Intake: 51-75% (one meal recorded)  Average Supplements Intake: Unable to assess  ADULT DIET; Regular  ADULT ORAL NUTRITION SUPPLEMENT; AM Snack, PM Snack, HS Snack; Standard High Calorie/High Protein Oral Supplement    Anthropometric Measures:  Height: 172.7 cm (5' 8\")  Ideal Body Weight (IBW): 154 lbs (70 kg)       Current Body Weight: 71.3 kg (157 lb 3 oz), 102.1 % IBW.    Current BMI (kg/m2): 23.9  Usual Body Weight: 82.1 kg (181 lb) (12/11/23)  % Weight Change (Calculated): -13.2                    BMI Categories: Normal Weight (BMI 22.0 to 24.9) age over 65    Estimated Daily Nutrient Needs:  Energy Requirements Based On: Kcal/kg  Weight Used for Energy Requirements: Current  Energy (kcal/day): 3920-9591  Weight Used for Protein Requirements: Current  Protein (g/day): 
Hgb critical lab 6.7 await nurse to get written consent in mean time stat type and screen ordered      5:22 RN  has discussed with the patient the rationale for blood component transfusion; its benefits in treating or preventing fatigue, organ damage, or death; and its risk which includes mild transfusion reactions, rare risk of blood borne infection, or more serious but rare reactions. RN  discussed the alternatives to transfusion, including the risk and consequences of not receiving transfusion. The patient had an opportunity to ask questions to remote NP  and had agreed to proceed with transfusion of blood components.    
Occupational Therapy  [facility] ACUTE CARE OCCUPATIONAL THERAPY EVALUATION  Arnie Martinez, 1955, 2128/2128-A, 1/8/2024    Discharge Recommendation: Home with initial 24 hour supervision/assistance, Home Health OT services    History  [place]:  There were no encounter diagnoses.  Patient  has a past medical history of Anemia, Cancer (HCC), Cirrhosis (HCC), Duodenal cancer (HCC), Esophageal varices (HCC), History of blood transfusion, History of external beam radiation therapy, HLD (hyperlipidemia), Hypertension, Nausea vomiting and diarrhea, Neuropathy, and Osteoarthritis.  Patient  has a past surgical history that includes other surgical history; Upper gastrointestinal endoscopy (12/11/2014); Cholecystectomy (2011); hernia repair (2011); Total knee arthroplasty (Left, 02/17/2020); Colonoscopy (03/19/2014); Colonoscopy (03/09/2016); Total knee arthroplasty (Right, 03/08/2021); Colonoscopy (N/A, 11/22/2022); Upper gastrointestinal endoscopy (N/A, 08/18/2023); Upper gastrointestinal endoscopy (N/A, 09/05/2023); Mediport insertion, single; Upper gastrointestinal endoscopy (N/A, 10/24/2023); and Upper gastrointestinal endoscopy (N/A, 12/11/2023).    Subjective:  Patient states:  \"It feels good to be on my feet\"   Pain:  denies pain.    Communication: RN, CM, co-eval with PT Magali  Restrictions: fall risk and droplet-plus precautions    Home Setup/Prior level of function   Social/Functional History  Lives With: Spouse  Type of Home: House  Home Layout: One level  Home Access: Stairs to enter with rails  Entrance Stairs - Number of Steps: 3  Bathroom Shower/Tub: Tub/Shower unit  Bathroom Toilet: Standard  Home Equipment: Cane, Walker, rolling  ADL Assistance: Independent  Homemaking Assistance: Independent  Homemaking Responsibilities: Yes  Ambulation Assistance: Independent (uses no AD)  Transfer Assistance: Independent  Active : Yes  Occupation: Retired, Part time employment  Type of Occupation: 
Outpatient Pharmacy Progress Note for Meds-to-Beds    Total number of Prescriptions Filled: 2  The following medications were dispensed to the patient during the discharge process:  amoxicillin-clavulanate  doxycycline hyclate    Additional Documentation:  Patient's family member picked-up the medication(s) in the OP Pharmacy      Thank you for letting us serve your patients.  [place] [id number]    Phone: [phone]    Fax: [phone]        
PHARMACY VANCOMYCIN MONITORING SERVICE  Pharmacy consulted by Dr. Gregorio for monitoring and adjustment.    Indication for treatment: Vancomycin indication: CAP with risk factors  Goal trough: Trough Goal: 15-20 mcg/mL  AUC/DERIC: 400-600    Risk Factors for MRSA Identified:   Neutropenic    Pertinent Laboratory Values:   Temp Readings from Last 3 Encounters:   01/06/24 98 °F (36.7 °C) (Oral)   12/31/23 97.1 °F (36.2 °C)   12/18/23 97.7 °F (36.5 °C) (Infrared)     Recent Labs     01/05/24  0847 01/05/24 2014 01/06/24  0600   WBC 1.2*  --  1.8*   LACTATE  --  1.5  --      Recent Labs     01/05/24  0847 01/06/24  0600   BUN 14 14   CREATININE 1.1 1.0     Estimated Creatinine Clearance: 68 mL/min (based on SCr of 1 mg/dL).  No intake or output data in the 24 hours ending 01/06/24 1129    Pertinent Cultures:   Date    Source    Results  1/5   Blood    Pending  1/5   MRSA Nasal   Pending    Vancomycin level:   TROUGH:  No results for input(s): \"VANCOTROUGH\" in the last 72 hours.  RANDOM:  No results for input(s): \"VANCORANDOM\" in the last 72 hours.    Assessment:  HPI: Patient with sepsis 2/2 Covid-19 and immunocompromise with last chemotherapy on 12/26/23.  SCr, BUN, and urine output: SCr WNL in range 1-1.1  Day(s) of therapy: 1 of 5  Vancomycin concentration: to be collected    Plan:  Current Vancomycin Dose: 1750 mg loading dose, followed by 1250 mg Q18H. Level will be scheduled after the 2nd dose.   Pharmacy will continue to monitor patient and adjust therapy as indicated    VANCOMYCIN CONCENTRATION SCHEDULED FOR 1/8 @ 0600    Thank you for the consult,  Raeann Christensen RP, PharmD  1/6/2024 11:29 AM    
Patient Name:  Arnie Martinez  Patient :  1955  Patient MRN:  [id number]     Primary Oncologist: John Desir MD  Referring Provider: Pat Nguyễn MD     Date of Service: 2024      HPI:   Arnie Martinez is a pleasant 68 y.o. male with medical history significant for poorly differentiated gastric carcinoma, not a surgical candidate, s/p radiation therapy, currently on chemo with FLOT regimen (received last dose on ), cirrhosis, pancytopenia, HTN and GERD who presented to the ED with minimal PO intake including fluids over past 1-2 weeks, last meal over 3 days prior. No dysphagia. Had COVID-19 infection the week prior.     Family reported over 40 lbs weight loss over past week. Denied any fevers, chills, CP, SOB, cough, worsening abdominal pain or urinary changes. Denied any tobacco or alcohol use.    He is in the process of transferring to ICU for hypotension. I was called to evaluate him for neutropenia.      2024 CXR:   1. Cardiomegaly.  2. Increased density involving the lower lung zones bilaterally, which could  be due to low lung volumes, however, developing infiltrates cannot be excluded.     24  Pt was seen and examined. Not in any acute distress. No overnight events.   Labs today showed Cr 0.9, Ca 8.1, albumin 2.1, WBC 7.8, Hb 7.3, platelet 63    2024 seen and examined.  No acute distress. Feeling better.   2024 creat 0.8, WBC 6.4, Hg 7.3, plat 73.  D/t recent COVID, we will delay chemo as outpatient.      Vital Signs: /60   Pulse 73   Temp 98 °F (36.7 °C) (Oral)   Resp 19   Ht 1.727 m (5' 8\")   Wt 75.6 kg (166 lb 10.7 oz)   SpO2 94%   BMI 25.34 kg/m²      Physical Exam:  CONSTITUTIONAL: awake, alert, cooperative, no apparent distress   EYES: pupils equal and round. Sclera clear and + pallor  ENT: Normocephalic, without obvious abnormality, atraumatic  NECK: supple, symmetrical, no jugular venous distension, no carotid bruits   HEMATOLOGIC/LYMPHATIC: no 
Patient Name:  Arnie Martinez  Patient :  1955  Patient MRN:  [id number]     Primary Oncologist: John Desir MD  Referring Provider: Pat Nguyễn MD     Date of Service: 2024      Reason for Consult: To evaluate the patient with stomach cancer, now with neutropenia and COVID pneumonia.      Chief Complaint:  No chief complaint on file.    Patient Active Problem List:     Splenomegaly     NAFLD (nonalcoholic fatty liver disease)     Carcinoma of duodenum (HCC)     Cirrhosis of liver without ascites (HCC)     Port-A-Cath in place     Leukopenia     Primary malignant neoplasm of small intestine     Thrombocytopenic disorder     Fatigue     S/P total knee arthroplasty, right     Rectal bleeding     Hx of colonic polyps     Benign neoplasm of ascending colon     Benign neoplasm of transverse colon     Gastroesophageal reflux disease     Acute gastric ulcer with hemorrhage     GI bleed     Secondary esophageal varices without bleeding (HCC)     Malignant neoplasm of overlapping sites of stomach (HCC)     Screening for viral disease     Malignant neoplasm of body of stomach (HCC)     Failure to thrive in adult     Severe malnutrition (HCC)     Nausea vomiting and diarrhea     Secondary esophageal varices with bleeding (HCC)     Generalized weakness    HPI:   Arnie Martinez is a 68 y.o. male with medical history significant for poorly differentiated gastric carcinoma, not a surgical candidate, s/p radiation therapy, currently on chemo with FLOT regimen (received last dose on ), cirrhosis, pancytopenia, HTN and GERD who presented to the ED with minimal PO intake including fluids over past 1-2 weeks, last meal over 3 days prior. No dysphagia. Had COVID-19 infection the week prior.     Family reported over 40 lbs weight loss over past week. Denied any fevers, chills, CP, SOB, cough, worsening abdominal pain or urinary changes. Denied any tobacco or alcohol use.    He is in the process of transferring to 
Port heparinized and deaccessed. DC instructions reviewed with pt and spouse. Both verbalized understanding.   
SLP ALL NOTES  Facility/Department: [facility] ICU   CLINICAL BEDSIDE SWALLOW EVALUATION    NAME: Arnie Martinez  : 1955  MRN: [id number]    ADMISSION DATE: 2024  ADMITTING DIAGNOSIS: has Splenomegaly; NAFLD (nonalcoholic fatty liver disease); Carcinoma of duodenum (HCC); Cirrhosis of liver without ascites (HCC); Port-A-Cath in place; Leukopenia; Primary malignant neoplasm of small intestine; Thrombocytopenic disorder; Fatigue; S/P total knee arthroplasty, right; Rectal bleeding; Hx of colonic polyps; Benign neoplasm of ascending colon; Benign neoplasm of transverse colon; Gastroesophageal reflux disease; Acute gastric ulcer with hemorrhage; GI bleed; Secondary esophageal varices without bleeding (HCC); Malignant neoplasm of overlapping sites of stomach (HCC); Screening for viral disease; Malignant neoplasm of body of stomach (HCC); Failure to thrive in adult; Severe malnutrition (HCC); Nausea vomiting and diarrhea; Secondary esophageal varices with bleeding (HCC); and Generalized weakness on their problem list.    Impression  Dysphagia Diagnosis: Swallow function appears WFL;Concerns for esophageal stage dysphagia  Dysphagia Outcome Severity Scale: Level 6: Within functional limits/Modified independence     Arnie Martinez was seen for a clinical bedside swallow evaluation following admission for Sepsis, Covid-19, possible PNA, Severe malnutrition with 40# unplanned weight loss since 23 per/family.  H/o Gastric carcinoma, EGD 23/banding esophageal varices (bleeding), and Liver Cirrhosis.  Pt was alert, pleasant, and cooperative and c/o symptoms c/w esophageal dysphagia including c/o food \"won't go down,\" sensation of food sticking in the proximal esophagus, and regurg of solids from the esophagus.  Pt also c/o pain in the mid-distal, distal, and stomach with PO intake.  Oral motor exam was normal.  Vocal quality and cough strength were WFL with mildly reduced intensity.  PO trials of regular 
This nurse called and gave Walker on ICU report regarding this pt. Pt will be admitted to room 2121  
This nurse spoke with blood bank and was told that blood will have to be hung within 4 hours of receiving it.   
  Continue current diet, Continue Oral Nutrition Supplement     Estefany Kendrick, BC  Contact: [phone]    
Mucus, UA RARE (A) NEGATIVE HPF    Trichomonas NONE SEEN NONE SEEN /HPF    Hyaline Casts, UA 0 /LPF    Ca Oxalate Yazmin, UA RARE /HPF   Comprehensive Metabolic Panel w/ Reflex to MG    Collection Time: 01/06/24  6:00 AM   Result Value Ref Range    Sodium 132 (L) 135 - 145 MMOL/L    Potassium 3.8 3.5 - 5.1 MMOL/L    Chloride 100 99 - 110 mMol/L    CO2 25 21 - 32 MMOL/L    Anion Gap 7 7 - 16    Glucose 124 (H) 70 - 99 MG/DL    BUN 14 6 - 23 MG/DL    Creatinine 1.0 0.9 - 1.3 MG/DL    Est, Glom Filt Rate >60 >60 mL/min/1.73m2    Calcium 8.0 (L) 8.3 - 10.6 MG/DL    Total Protein 4.3 (L) 6.4 - 8.2 GM/DL    Albumin 2.0 (L) 3.4 - 5.0 GM/DL    Total Bilirubin 0.8 0.0 - 1.0 MG/DL    Alkaline Phosphatase 62 40 - 128 IU/L    ALT 6 (L) 10 - 40 U/L    AST 10 (L) 15 - 37 IU/L   CBC with Auto Differential    Collection Time: 01/06/24  6:00 AM   Result Value Ref Range    WBC 1.8 (LL) 4.0 - 10.5 K/CU MM    RBC 2.20 (L) 4.6 - 6.2 M/CU MM    Hemoglobin 6.7 (LL) 13.5 - 18.0 GM/DL    Hematocrit 19.8 (L) 42 - 52 %    MCV 90.0 78 - 100 FL    MCH 30.5 27 - 31 PG    MCHC 33.8 32.0 - 36.0 %    RDW 14.6 11.7 - 14.9 %    Platelets 60 (L) 140 - 440 K/CU MM    MPV 10.4 7.5 - 11.1 FL    Myelocyte Percent 1 (H) 0.0 %    Metamyelocytes Relative 2 (H) 0.0 %    Bands Relative 13 (H) 5 - 11 %    Segs Relative 46.0 36 - 66 %    Lymphocytes % 16.0 (L) 24 - 44 %    Monocytes % 22.0 (H) 0 - 4 %    Myelocytes Absolute 0.02 K/CU MM    Metamyelocytes Absolute 0.04 K/CU MM    Bands Absolute 0.23 K/CU MM    Segs Absolute 0.8 K/CU MM    Lymphocytes Absolute 0.3 K/CU MM    Monocytes Absolute 0.4 K/CU MM    Differential Type MANUAL DIFFERENTIAL     RBC Morphology RARE     WBC Morphology RARE    Protime-INR    Collection Time: 01/06/24  6:00 AM   Result Value Ref Range    Protime 19.0 (H) 11.7 - 14.5 SECONDS    INR 1.6 INDEX        Imaging/Diagnostics Last 24 Hours   No results found.    Electronically signed by Brandie Gregorio MD on 1/6/2024 at 7:21 AM    
today.     Recommend to give PRBC transfusion for hemoglobin <7. Anemia panel on 1/5/24 was reviewed. He has stable platelet count.     Will hold chemo until he is completely recovered from current conditions. Please continue with other management as per primary team.     I answered all her questions and concerns for today. Thank you for allowing me to participate in the care of this very pleasant patient.    Recent imaging and labs were reviewed and discussed with the patient.

## 2024-01-08 NOTE — CONSULTS
[facility]             MD Ryanne Hayes MD Carol Christensen, APRN-CNP       Bertha Ruiz, APRN-CNP             [address] [facility] [address] [place] [address]             [phone] fax [phone]      Physician attestation:  I have personally seen and examined the patient independently. I have reviewed the patient's available data,including medical history and recent test results. Reviewed and discussed note as documented by STEFFEN.  I agree with the physical exam findings, assessment and plan.     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 GI was consulted due to his complaint of dysphagia. Patient is well known to me and has undergon several EGDs with me for his Esophageal Varices. Currently Acknowledges dysphagia to solids and not liquids, Wife present at bedside. +kenia weight loss. Neutropenic due to chemotherapy.       Gen: normal mood, alert  ENT: normal TJ  Cardiovascular: RRR, No M/R/G  Respiratory: CTA BL  Gastrointestinal: Soft,NT ND  Genitourinary: no suprapubic tenderness  Musculoskeletal: no scars  Skin:moist  Neuro: alert and oriented x3    My assessment and plan reveals     #1 Dysphagia - Likely 2/2 to Radiation esophagitis.   Multiple EGD with me with banding done, priorly with no complaint of dysphagia  - Liquid and soft diet.   - No plan for Endoscopy with neutropenia   - Continue to monitor conservatively.     #2 poorly differentiated gastric carcinoma  Patient does have history of duodenal cancer as noted below.  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 Bedoya and surgical oncology Dr. Thomas Mancini.  PET/CT noted.  - Was on chemo and radiation currently held due 
activity/participation limitations):  Observation:  pt supine in bed with call light on upon arrival and agreeable to therapy  Vision:  Wears glasses  Hearing:  WFL  Cardiopulmonary:  no O2 needs  Cognition: WFL, see OT/SLP note for further evaluation.    Musculoskeletal  ROM R/L:  WFL.    Strength R/L:  4+/5, minimal impairment in function and endurance.    Neuro:  WFL      Mobility:  Rolling L/R:  mod I  Supine to sit:  SBA  Transfers: pt completed STS transfer from EOB, to/from commode, and to chair SBA with cues for hand placement and sequencing  Sitting balance:  good.    Standing balance:  fair+.    Gait: pt ambulated 10' + 50' without a device CGA with 4 slight LOBs requiring CGA/min A to recover. Cues provided for pathway throughout    [facility] 6 Clicks Inpatient Mobility:  AM-PAC Inpatient Mobility Raw Score : 19    Safety: patient left in chair, call light within reach, RN notified, gait belt used.    Assessment:  Pt is a 68 y.o. male admitted to the hospital for generalized weakness. Pt is typically independent with all ambulation and transfers without a device. Pt is currently performing bed mobility SBA, transferring SBA, and ambulating 50' without a device min A. Pt is presenting with decreased endurance, impaired transfers, impaired gait, impaired balance, and impaired transfers. Pt would benefit from continued acute care PT as well as [facility] PT upon discharge to continue to address impairments.     Complexity: moderate    Prognosis: Good, no significant barriers to participation at this time.     General Plan: 3-5 times per week       Equipment: none    Goals:  Short Term Goals  Time Frame for Short Term Goals: 1 week  Short Term Goal 1: pt to complete all bed mobility mod I  Short Term Goal 2: pt to complete all STS transfers to/from bed, commode, and chair mod I  Short Term Goal 3: pt to ambulate 100' with LRAD mod I       Treatment plan:  Bed mobility, transfers, balance, gait, TA, TX    Recommendations

## 2024-01-08 NOTE — DISCHARGE INSTR - DIET
1-Day Menu View Nutrient Info  Breakfast 1 cup cooked oatmeal made with:  1 cup soymilk fortified with calcium, vitamin B12, and vitamin D  2 tablespoons ground flaxseeds  ½ cup blueberries  ¼ cup almonds   Morning Snack 1 cup fruit smoothie made with: ½ cup soymilk fortified with calcium, vitamin B12, and vitamin D  ½ cup frozen banana  2 tablespoons peanut butter   Lunch 2 slices whole wheat bread  ½ cup baked tofu  ¼ cup lettuce  2 slices tomato  4 slices avocado  2 teaspoons vegan mayonnaise  ½ cup baby carrots  1 medium apple with:  1 tablespoon almond butter   Afternoon Snack 1 whole wheat yonny bread  ½ cup hummus  1 orange   Evening Meal Tacos made with: 2 corn tortillas (6-inch)  1 cup refried vegetarian beans  ½ cup chopped tomatoes  ½ cup lettuce  2 tablespoons cup salsa  ½ cup brown rice  ½ tablespoon olive oil for rice  ½ cup cooked zucchini  1 cup soymilk fortified with calcium, vitamin B12, and vitamin D   Evening Snack ½ cup raisins  ¼ cup peanuts   Daily Sum  Nutrient Unit Value   Macronutrients   Energy kcal 2984   Energy kJ 05863   Protein g 110   Total lipid (fat) g 121   Carbohydrate, by difference g 410   Fiber, total dietary g 78   Sugars, total g 153   Minerals   Calcium, Ca mg 1754   Iron, Fe mg 25   Sodium, Na mg 2292   Vitamins   Vitamin C, total ascorbic acid mg 342   Vitamin A, IU IU 91099   Vitamin D    Lipids   Fatty acids, total saturated g 17   Fatty acids, total monounsaturated g 57   Fatty acids, total polyunsaturated g 38   Cholesterol mg 2        High-Calorie, High-Protein Vegetarian (Lacto-Ovo) Sample 1-Day Menu View Nutrient Info  Breakfast 1 cup cooked oatmeal made with:  1 cup whole milk  2 tablespoons ground flaxseeds  ½ cup blueberries  1 egg   Morning Snack 1 cup fruit smoothie made with: ½ cup whole milk  ½ cup frozen banana  2 tablespoons almond butter   Lunch 2 slices whole wheat bread  2 ounces cheese  ¼ cup lettuce  2 slices tomato  4 slices avocado  ½ cup baby

## 2024-01-08 NOTE — CARE COORDINATION
01/08/24 1611   Service Assessment   Patient Orientation Alert and Oriented   Cognition Alert   History Provided By Patient;Spouse   Primary Caregiver Self   Support Systems Spouse/Significant Other   Patient's Healthcare Decision Maker is: Legal Next of Kin   PCP Verified by CM Yes   Prior Functional Level Independent in ADLs/IADLs   Current Functional Level Assistance with the following:;Bathing;Toileting;Mobility  (Hospital policy)   Can patient return to prior living arrangement Yes   Ability to make needs known: Good   Family able to assist with home care needs: Yes     Met with patient at bedside; N 95 and gown donned by this CM. His spouse is present; unmasked in room. Patient lives at home; IPA. He has DME - cane and 2 walkers. Has a PCP and insurance that assists with Rx when needed. Discussed PT/OT rec of [facility]- both patient and spouse decline at this time. Plan home when medically stable. Barby Walker RN

## 2024-01-09 ENCOUNTER — CARE COORDINATION (OUTPATIENT)
Dept: CASE MANAGEMENT | Age: 69
End: 2024-01-09

## 2024-01-09 DIAGNOSIS — U07.1 ACUTE COVID-19: Primary | ICD-10-CM

## 2024-01-09 PROCEDURE — 1111F DSCHRG MED/CURRENT MED MERGE: CPT | Performed by: INTERNAL MEDICINE

## 2024-01-09 NOTE — CARE COORDINATION
Care Transitions Initial Follow Up Call    Call within 2 business days of discharge: Yes    Patient Current Location:  Home: [address] [place] [address]   [facility] [address]    Care Transition Nurse contacted the patient by telephone to perform post hospital discharge assessment. Verified name and  with patient as identifiers. Provided introduction to self, and explanation of the Care Transition Nurse role.     Patient: Arnie Martinez Patient : 1955   MRN: [id number]  Reason for Admission: Covid19, Pna, Sepsis   Discharge Date: 24 RARS: Readmission Risk Score: 28.8  Facility: [facility] 24-24    Last Discharge Facility       Date Complaint Diagnosis Description Type Department Provider    24   Admission (Discharged) [facility] ICU Brandie Gregorio MD          Was this an external facility discharge? No     Challenges to be reviewed by the provider   Additional needs identified to be addressed with provider: No       Method of communication with provider: none.    Patient reports doing well. Reports recovering well from Covid19, Pna. Reports occasional n/p cough. Denies sob, fever, chills, malaise, weakness, ac distress. Noted to be speaking in complete, unlabored sentences. Reports taking Augmentin, Doxycycline. Med ed provided. Advised to not skip doses and complete entire course unless otherwise directed by MD.Advised proper hydration, nutrition. Patient on soft/liquid diet d/t pain w/ swallowing w/ Dr Haile attributes to radiation esophagitis. Has f/u w/ Dr Haile 24. Reports decreased pain when swallowing this morning. Denies choking episodes. Reports appetite, fluid intake good w/ b&b wnl. Lives w/ Wife, independent adls, uses cane/walker prn. Denies resource needs including hhc, dme, community assistance.     Care Transition Nurse reviewed discharge instructions, medical action plan, and red flags with patient who verbalized understanding. The patient was given an opportunity to ask questions and does

## 2024-01-10 LAB
CULTURE: NORMAL
CULTURE: NORMAL
Lab: NORMAL
Lab: NORMAL
SPECIMEN: NORMAL
SPECIMEN: NORMAL

## 2024-01-11 NOTE — DISCHARGE SUMMARY
volumes.  The heart size is enlarged but unchanged.  The pulmonary vasculature is within normal limits.  There is increased density involving the lower lung zones.  There is a left chest wall MediPort with tip in the superior vena cava.  No pneumothorax is seen.     1. Cardiomegaly. 2. Increased density involving the lower lung zones bilaterally, which could be due to low lung volumes, however, developing infiltrates cannot be excluded.       CBC:   Recent Labs     01/08/24  0540   WBC 6.4   HGB 7.3*   PLT 73*     BMP:    Recent Labs     01/08/24  0540      K 3.6      CO2 26   BUN 11   CREATININE 0.8*   GLUCOSE 134*     Hepatic: No results for input(s): \"AST\", \"ALT\", \"ALB\", \"BILITOT\", \"ALKPHOS\" in the last 72 hours.  Lipids:   Lab Results   Component Value Date/Time    CHOL 159 02/11/2015 12:45 PM    HDL 35 02/11/2015 12:45 PM    TRIG 137 02/11/2015 12:45 PM     Hemoglobin A1C:   Lab Results   Component Value Date/Time    LABA1C 5.0 09/07/2023 08:37 AM     TSH: No results found for: \"TSH\"  Troponin: No results found for: \"TROPONINT\"  Lactic Acid: No results for input(s): \"LACTA\" in the last 72 hours.  BNP: No results for input(s): \"PROBNP\" in the last 72 hours.  UA:  Lab Results   Component Value Date/Time    NITRU NEGATIVE 01/05/2024 09:37 PM    COLORU YELLOW 01/05/2024 09:37 PM    WBCUA 1 01/05/2024 09:37 PM    RBCUA 1 01/05/2024 09:37 PM    MUCUS RARE 01/05/2024 09:37 PM    TRICHOMONAS NONE SEEN 01/05/2024 09:37 PM    BACTERIA NEGATIVE 01/05/2024 09:37 PM    CLARITYU CLEAR 01/05/2024 09:37 PM    SPECGRAV 1.020 01/05/2024 09:37 PM    LEUKOCYTESUR NEGATIVE 01/05/2024 09:37 PM    UROBILINOGEN 1.0 01/05/2024 09:37 PM    BILIRUBINUR NEGATIVE 01/05/2024 09:37 PM    BLOODU NEGATIVE 01/05/2024 09:37 PM    KETUA TRACE 01/05/2024 09:37 PM     Urine Cultures: No results found for: \"LABURIN\"  Blood Cultures: No results found for: \"BC\"  No results found for: \"BLOODCULT2\"  Organism: No results found for:

## 2024-01-15 ENCOUNTER — OFFICE VISIT (OUTPATIENT)
Dept: ONCOLOGY | Age: 69
End: 2024-01-15
Payer: MEDICARE

## 2024-01-15 ENCOUNTER — HOSPITAL ENCOUNTER (OUTPATIENT)
Dept: INFUSION THERAPY | Age: 69
Discharge: HOME | End: 2024-01-15
Payer: MEDICARE

## 2024-01-15 VITALS
TEMPERATURE: 97.5 F | WEIGHT: 168.2 LBS | BODY MASS INDEX: 25.49 KG/M2 | HEART RATE: 77 BPM | HEIGHT: 68 IN | RESPIRATION RATE: 18 BRPM | OXYGEN SATURATION: 97 % | SYSTOLIC BLOOD PRESSURE: 118 MMHG | DIASTOLIC BLOOD PRESSURE: 57 MMHG

## 2024-01-15 DIAGNOSIS — C16.2 MALIGNANT NEOPLASM OF BODY OF STOMACH (HCC): ICD-10-CM

## 2024-01-15 DIAGNOSIS — C16.2 MALIGNANT NEOPLASM OF BODY OF STOMACH (HCC): Primary | ICD-10-CM

## 2024-01-15 DIAGNOSIS — C16.9 MALIGNANT NEOPLASM OF STOMACH, UNSPECIFIED LOCATION (HCC): ICD-10-CM

## 2024-01-15 LAB
ALBUMIN SERPL-MCNC: 2.5 GM/DL (ref 3.4–5)
ALP BLD-CCNC: 100 IU/L (ref 40–129)
ALT SERPL-CCNC: 12 U/L (ref 10–40)
ANION GAP SERPL CALCULATED.3IONS-SCNC: 5 MMOL/L (ref 7–16)
AST SERPL-CCNC: 21 IU/L (ref 15–37)
BASOPHILS ABSOLUTE: 0.1 K/CU MM
BASOPHILS RELATIVE PERCENT: 2.3 % (ref 0–1)
BILIRUB SERPL-MCNC: 0.7 MG/DL (ref 0–1)
BUN SERPL-MCNC: 10 MG/DL (ref 6–23)
CALCIUM SERPL-MCNC: 8.6 MG/DL (ref 8.3–10.6)
CHLORIDE BLD-SCNC: 105 MMOL/L (ref 99–110)
CO2: 28 MMOL/L (ref 21–32)
CREAT SERPL-MCNC: 0.8 MG/DL (ref 0.9–1.3)
DIFFERENTIAL TYPE: ABNORMAL
EOSINOPHILS ABSOLUTE: 0 K/CU MM
EOSINOPHILS RELATIVE PERCENT: 0.2 % (ref 0–3)
GFR SERPL CREATININE-BSD FRML MDRD: >60 ML/MIN/1.73M2
GLUCOSE SERPL-MCNC: 160 MG/DL (ref 70–99)
HCT VFR BLD CALC: 29.8 % (ref 42–52)
HEMOGLOBIN: 9.3 GM/DL (ref 13.5–18)
LYMPHOCYTES ABSOLUTE: 0.8 K/CU MM
LYMPHOCYTES RELATIVE PERCENT: 13.1 % (ref 24–44)
MCH RBC QN AUTO: 29.8 PG (ref 27–31)
MCHC RBC AUTO-ENTMCNC: 31.2 % (ref 32–36)
MCV RBC AUTO: 95.5 FL (ref 78–100)
MONOCYTES ABSOLUTE: 0.5 K/CU MM
MONOCYTES RELATIVE PERCENT: 9.2 % (ref 0–4)
PDW BLD-RTO: 18.7 % (ref 11.7–14.9)
PLATELET # BLD: 142 K/CU MM (ref 140–440)
PMV BLD AUTO: 9 FL (ref 7.5–11.1)
POTASSIUM SERPL-SCNC: 4.4 MMOL/L (ref 3.5–5.1)
RBC # BLD: 3.12 M/CU MM (ref 4.6–6.2)
SEGMENTED NEUTROPHILS ABSOLUTE COUNT: 4.3 K/CU MM
SEGMENTED NEUTROPHILS RELATIVE PERCENT: 75.2 % (ref 36–66)
SODIUM BLD-SCNC: 138 MMOL/L (ref 135–145)
TOTAL PROTEIN: 5.6 GM/DL (ref 6.4–8.2)
WBC # BLD: 5.7 K/CU MM (ref 4–10.5)

## 2024-01-15 PROCEDURE — 99214 OFFICE O/P EST MOD 30 MIN: CPT | Performed by: INTERNAL MEDICINE

## 2024-01-15 PROCEDURE — 1123F ACP DISCUSS/DSCN MKR DOCD: CPT | Performed by: INTERNAL MEDICINE

## 2024-01-15 PROCEDURE — 85025 COMPLETE CBC W/AUTO DIFF WBC: CPT

## 2024-01-15 PROCEDURE — 80053 COMPREHEN METABOLIC PANEL: CPT

## 2024-01-15 PROCEDURE — 36415 COLL VENOUS BLD VENIPUNCTURE: CPT

## 2024-01-15 PROCEDURE — 99211 OFF/OP EST MAY X REQ PHY/QHP: CPT

## 2024-01-15 NOTE — PROGRESS NOTES
MA Rooming Questions  Patient: Arnie Martinez  MRN: [id number]    Date: 1/15/2024        1. Do you have any new issues?   yes - pt recently hospitalized; covid positive         2. Do you need any refills on medications?    no    3. Have you had any imaging done since your last visit?   yes - [facility] 1/5    4. Have you been hospitalized or seen in the emergency room since your last visit here?   yes - [facility] 1/5    5. Did the patient have a depression screening completed today? No    No data recorded     PHQ-9 Given to (if applicable):               PHQ-9 Score (if applicable):                     [] Positive     []  Negative              Does question #9 need addressed (if applicable)                     [] Yes    []  No               Emilia Ziegler MA      
of Systems:  \"Per interval history; otherwise 10 point ROS is negative.\"     Vital Signs:  BP (!) 118/57 (Site: Left Upper Arm, Position: Sitting, Cuff Size: Medium Adult)   Pulse 77   Temp 97.5 °F (36.4 °C) (Infrared)   Resp 18   Ht 1.727 m (5' 8\")   Wt 76.3 kg (168 lb 3.2 oz)   SpO2 97%   BMI 25.57 kg/m²     Physical Exam:  CONSTITUTIONAL: awake, alert, cooperative, no apparent distress   EYES: pupils equal and round. Sclera clear and + pallor  ENT: Normocephalic, without obvious abnormality, atraumatic  NECK: supple, symmetrical, no jugular venous distension and no carotid bruits   HEMATOLOGIC/LYMPHATIC: no cervical, supraclavicular or axillary lymphadenopathy   LUNGS: clear to auscultation and percussion bilaterally  CARDIOVASCULAR: regular rate and rhythm, normal S1 and S2, no murmur   ABDOMEN: bowel sound present, soft, mild distension, non-tender, no organomegaly.  MUSCULOSKELETAL: full range of motion noted, tone is normal  NEUROLOGIC: Motor skills grossly intact. CN II - XII grossly intact.   SKIN: No jaundice. No petechial rash. Dry and warm skin  EXTREMITIES: +1 b/l LE edema. No cyanosis. Homans negative.    Labs:  Hematology:  Lab Results   Component Value Date    WBC 6.4 01/08/2024    RBC 2.52 (L) 01/08/2024    HGB 7.3 (L) 01/08/2024    HCT 23.1 (L) 01/08/2024    MCV 91.7 01/08/2024    MCH 29.0 01/08/2024    MCHC 31.6 (L) 01/08/2024    RDW 16.9 (H) 01/08/2024    PLT 73 (L) 01/08/2024    MPV 9.9 01/08/2024    BANDSPCT 13 (H) 01/06/2024    SEGSPCT 46.0 01/06/2024    EOSRELPCT 3.0 12/31/2023    BASOPCT 0.2 12/26/2023    LYMPHOPCT 16.0 (L) 01/06/2024    MONOPCT 22.0 (H) 01/06/2024    BANDABS 0.23 01/06/2024    SEGSABS 0.8 01/06/2024    EOSABS 0.0 12/31/2023    BASOSABS 0.0 12/26/2023    LYMPHSABS 0.3 01/06/2024    MONOSABS 0.4 01/06/2024    DIFFTYPE MANUAL DIFFERENTIAL 01/06/2024    ANISOCYTOSIS 1+ 01/05/2024    POLYCHROM 1+ 01/05/2024    WBCMORP RARE 01/06/2024    PLTM OCCASIONAL LARGE PLATELETS

## 2024-01-16 ENCOUNTER — CARE COORDINATION (OUTPATIENT)
Dept: CASE MANAGEMENT | Age: 69
End: 2024-01-16

## 2024-01-16 NOTE — CARE COORDINATION
Care Transitions Follow Up Call    Patient Current Location:  Home: [address] [place] [address]   [facility] [address]    Care Transition Nurse contacted the patient by telephone to follow up after admission on 24-24 .  Verified name and  with patient as identifiers.    Patient: Arnie Martinez  Patient : 1955   MRN: [id number]  Reason for Admission:  Covid19, Pna, Sepsis    Discharge Date: 24 RARS: Readmission Risk Score: 28.8  Facility: [facility]     Needs to be reviewed by the provider   Additional needs identified to be addressed with provider: No     Method of communication with provider: none.    Patient reports doing well. Reports he feels recovered from Covid19, Pna. Denies cough, sob, fever, chills, malaise. Reports he completed Augmentin, Doxycycline as directed. Noted to be speaking in complete, unlabored sentences. Reports appetite, fluid intake good w/ b&b wnl. Denies pain w/ swallowing or choking episodes. Reports tolerating regular diet well. Denies resource needs.       Future Appointments   Date Time Provider Department Center   2024  9:45 AM SCHEDULE, [facility] LAB [facility] [place]   2024 10:00 AM [facility], RAD ONC NURSE 2 [facility] [place]   2024  9:30 AM SCHEDULE, [facility] TREATMENT [facility] [place]   2024  9:45 AM Ryanne Haile MD [facility]   2024 12:00 PM SCHEDULE, [facility] INJECTION [facility] [place]   2024  9:45 AM SCHEDULE, [facility] ONC LAB [facility] [place]   2024  9:00 AM SCHEDULE, [facility] ONC TREATMENT [facility] [place]   2024 10:00 AM Jonathan Bedoya MD [facility]   2024 12:00 PM SCHEDULE, [facility] ONC INJECTION [facility] [place]   3/6/2024  8:00 AM Ryanne Haile MD [facility]     Care Transition Nurse reviewed medical action plan and red flags with patient and discussed any barriers to care and/or understanding of plan of care after discharge. Discussed appropriate site of

## 2024-01-19 DIAGNOSIS — Z11.59 SCREENING FOR VIRAL DISEASE: Primary | ICD-10-CM

## 2024-01-19 DIAGNOSIS — C16.8 MALIGNANT NEOPLASM OF OVERLAPPING SITES OF STOMACH (HCC): ICD-10-CM

## 2024-01-22 ENCOUNTER — HOSPITAL ENCOUNTER (OUTPATIENT)
Dept: INFUSION THERAPY | Age: 69
Discharge: HOME | End: 2024-01-22
Payer: MEDICARE

## 2024-01-22 ENCOUNTER — HOSPITAL ENCOUNTER (OUTPATIENT)
Dept: RADIATION ONCOLOGY | Age: 69
Discharge: HOME | End: 2024-01-22

## 2024-01-22 VITALS
SYSTOLIC BLOOD PRESSURE: 110 MMHG | OXYGEN SATURATION: 95 % | DIASTOLIC BLOOD PRESSURE: 57 MMHG | TEMPERATURE: 97 F | HEART RATE: 66 BPM

## 2024-01-22 DIAGNOSIS — C16.8 MALIGNANT NEOPLASM OF OVERLAPPING SITES OF STOMACH (HCC): ICD-10-CM

## 2024-01-22 DIAGNOSIS — Z11.59 SCREENING FOR VIRAL DISEASE: ICD-10-CM

## 2024-01-22 LAB
ALBUMIN SERPL-MCNC: 2.5 GM/DL (ref 3.4–5)
ALP BLD-CCNC: 109 IU/L (ref 40–129)
ALT SERPL-CCNC: 16 U/L (ref 10–40)
ANION GAP SERPL CALCULATED.3IONS-SCNC: 7 MMOL/L (ref 7–16)
AST SERPL-CCNC: 23 IU/L (ref 15–37)
BASOPHILS ABSOLUTE: 0.1 K/CU MM
BASOPHILS RELATIVE PERCENT: 2.5 % (ref 0–1)
BILIRUB SERPL-MCNC: 0.8 MG/DL (ref 0–1)
BUN SERPL-MCNC: 9 MG/DL (ref 6–23)
CALCIUM SERPL-MCNC: 8.4 MG/DL (ref 8.3–10.6)
CHLORIDE BLD-SCNC: 105 MMOL/L (ref 99–110)
CO2: 28 MMOL/L (ref 21–32)
CREAT SERPL-MCNC: 0.8 MG/DL (ref 0.9–1.3)
DIFFERENTIAL TYPE: ABNORMAL
EOSINOPHILS ABSOLUTE: 0.2 K/CU MM
EOSINOPHILS RELATIVE PERCENT: 4.5 % (ref 0–3)
GFR SERPL CREATININE-BSD FRML MDRD: >60 ML/MIN/1.73M2
GLUCOSE SERPL-MCNC: 113 MG/DL (ref 70–99)
HCT VFR BLD CALC: 30.8 % (ref 42–52)
HEMOGLOBIN: 9.6 GM/DL (ref 13.5–18)
LYMPHOCYTES ABSOLUTE: 0.6 K/CU MM
LYMPHOCYTES RELATIVE PERCENT: 12.6 % (ref 24–44)
MCH RBC QN AUTO: 30.4 PG (ref 27–31)
MCHC RBC AUTO-ENTMCNC: 31.2 % (ref 32–36)
MCV RBC AUTO: 97.5 FL (ref 78–100)
MONOCYTES ABSOLUTE: 0.5 K/CU MM
MONOCYTES RELATIVE PERCENT: 9.9 % (ref 0–4)
PDW BLD-RTO: 19.7 % (ref 11.7–14.9)
PLATELET # BLD: 125 K/CU MM (ref 140–440)
PMV BLD AUTO: 9 FL (ref 7.5–11.1)
POTASSIUM SERPL-SCNC: 4.6 MMOL/L (ref 3.5–5.1)
RBC # BLD: 3.16 M/CU MM (ref 4.6–6.2)
SEGMENTED NEUTROPHILS ABSOLUTE COUNT: 3.4 K/CU MM
SEGMENTED NEUTROPHILS RELATIVE PERCENT: 70.5 % (ref 36–66)
SODIUM BLD-SCNC: 140 MMOL/L (ref 135–145)
TOTAL PROTEIN: 5.5 GM/DL (ref 6.4–8.2)
WBC # BLD: 4.9 K/CU MM (ref 4–10.5)

## 2024-01-22 PROCEDURE — 85025 COMPLETE CBC W/AUTO DIFF WBC: CPT

## 2024-01-22 PROCEDURE — 80053 COMPREHEN METABOLIC PANEL: CPT

## 2024-01-22 PROCEDURE — 36415 COLL VENOUS BLD VENIPUNCTURE: CPT

## 2024-01-22 NOTE — PROGRESS NOTES
Arnie GARRICK Juan  1/22/2024    Patient is seen today for follow up.     Vitals:    01/22/24 0954   BP: (!) 110/57   Pulse: 66   Temp: 97 °F (36.1 °C)   SpO2: 95%        Oxygen Therapy  SpO2: 95 %  Pulse Oximeter Device Mode: Intermittent  Pulse Oximeter Device Location: Finger  O2 Device: None (Room air)  Skin Assessment: Clean, dry, & intact    Wt Readings from Last 3 Encounters:   01/15/24 76.3 kg (168 lb 3.2 oz)   01/08/24 75.6 kg (166 lb 10.7 oz)   01/03/24 74.8 kg (164 lb 12.8 oz)       Pain Assessment  Pain Assessment: None - Denies Pain  Denies Need for Intervention       Allergies   Allergen Reactions    Erythromycin      vomitting    Lorazepam Itching    Zolpidem Itching    Adhesive Tape Rash    Macrolides And Ketolides Rash    Remeron [Mirtazapine] Itching and Rash        Current Outpatient Medications   Medication Sig Dispense Refill    carvedilol (COREG) 3.125 MG tablet Take 1 tablet by mouth 2 times daily 60 tablet 3    albuterol sulfate HFA (PROVENTIL;VENTOLIN;PROAIR) 108 (90 Base) MCG/ACT inhaler Inhale 2 puffs into the lungs every 4 hours as needed      spironolactone (ALDACTONE) 25 MG tablet Take 1 tablet by mouth daily 90 tablet 1    pantoprazole (PROTONIX) 40 MG tablet Take 1 tablet by mouth 2 times daily 60 tablet 3    aspirin-acetaminophen-caffeine (EXCEDRIN MIGRAINE) 250-250-65 MG per tablet Take 1 tablet by mouth every 6 hours as needed for Headaches 90 tablet 3    Cholecalciferol (VITAMIN D) 50 MCG (2000 UT) CAPS capsule Take 2,000 Units by mouth daily      vitamin A 3 MG (58026 UT) capsule Take 1 capsule by mouth daily      ondansetron (ZOFRAN) 8 MG tablet Take 1 tablet by mouth every 8 hours as needed for Nausea or Vomiting Take 1 tablet by mouth every 8 hours as needed for nausea or vomiting. 90 tablet 3    promethazine (PHENERGAN) 25 MG tablet Take 1 tablet by mouth every 6 hours as needed for Nausea 40 tablet 3    OLANZapine (ZYPREXA) 2.5 MG tablet Take 1 tablet by mouth nightly 30 
return to see me in 6 months or sooner as needed.    The patient is to continue to follow CDC's Covid 19 precautionary measures.      Electronically signed by Laura Pagan MD on 1/22/2024 at 9:48 AM

## 2024-01-23 ENCOUNTER — HOSPITAL ENCOUNTER (OUTPATIENT)
Dept: INFUSION THERAPY | Age: 69
Discharge: HOME | End: 2024-01-23
Payer: MEDICARE

## 2024-01-23 VITALS
TEMPERATURE: 97.2 F | OXYGEN SATURATION: 96 % | WEIGHT: 179.2 LBS | HEART RATE: 62 BPM | SYSTOLIC BLOOD PRESSURE: 155 MMHG | BODY MASS INDEX: 27.16 KG/M2 | RESPIRATION RATE: 18 BRPM | DIASTOLIC BLOOD PRESSURE: 72 MMHG | HEIGHT: 68 IN

## 2024-01-23 DIAGNOSIS — C16.8 MALIGNANT NEOPLASM OF OVERLAPPING SITES OF STOMACH (HCC): Primary | ICD-10-CM

## 2024-01-23 DIAGNOSIS — Z11.59 SCREENING FOR VIRAL DISEASE: ICD-10-CM

## 2024-01-23 PROCEDURE — 2580000003 HC RX 258: Performed by: INTERNAL MEDICINE

## 2024-01-23 PROCEDURE — 96375 TX/PRO/DX INJ NEW DRUG ADDON: CPT

## 2024-01-23 PROCEDURE — 96415 CHEMO IV INFUSION ADDL HR: CPT

## 2024-01-23 PROCEDURE — 96417 CHEMO IV INFUS EACH ADDL SEQ: CPT

## 2024-01-23 PROCEDURE — G0498 CHEMO EXTEND IV INFUS W/PUMP: HCPCS

## 2024-01-23 PROCEDURE — 96368 THER/DIAG CONCURRENT INF: CPT

## 2024-01-23 PROCEDURE — 6360000002 HC RX W HCPCS: Performed by: INTERNAL MEDICINE

## 2024-01-23 PROCEDURE — 96367 TX/PROPH/DG ADDL SEQ IV INF: CPT

## 2024-01-23 PROCEDURE — 96413 CHEMO IV INFUSION 1 HR: CPT

## 2024-01-23 RX ORDER — FAMOTIDINE 10 MG/ML
20 INJECTION, SOLUTION INTRAVENOUS
Status: CANCELLED | OUTPATIENT
Start: 2024-01-23

## 2024-01-23 RX ORDER — DEXTROSE MONOHYDRATE 50 MG/ML
5-250 INJECTION, SOLUTION INTRAVENOUS PRN
Status: CANCELLED | OUTPATIENT
Start: 2024-01-23

## 2024-01-23 RX ORDER — HEPARIN SODIUM (PORCINE) LOCK FLUSH IV SOLN 100 UNIT/ML 100 UNIT/ML
500 SOLUTION INTRAVENOUS PRN
Status: CANCELLED | OUTPATIENT
Start: 2024-01-23

## 2024-01-23 RX ORDER — SODIUM CHLORIDE 0.9 % (FLUSH) 0.9 %
5-40 SYRINGE (ML) INJECTION PRN
Status: DISCONTINUED | OUTPATIENT
Start: 2024-01-23 | End: 2024-01-24 | Stop reason: HOSPADM

## 2024-01-23 RX ORDER — ACETAMINOPHEN 325 MG/1
650 TABLET ORAL
Status: CANCELLED | OUTPATIENT
Start: 2024-01-23

## 2024-01-23 RX ORDER — EPINEPHRINE 1 MG/ML
0.3 INJECTION, SOLUTION, CONCENTRATE INTRAVENOUS PRN
Status: CANCELLED | OUTPATIENT
Start: 2024-01-23

## 2024-01-23 RX ORDER — SODIUM CHLORIDE 0.9 % (FLUSH) 0.9 %
5-40 SYRINGE (ML) INJECTION PRN
Status: CANCELLED | OUTPATIENT
Start: 2024-01-23

## 2024-01-23 RX ORDER — DEXTROSE MONOHYDRATE 50 MG/ML
5-250 INJECTION, SOLUTION INTRAVENOUS PRN
Status: DISCONTINUED | OUTPATIENT
Start: 2024-01-23 | End: 2024-01-24 | Stop reason: HOSPADM

## 2024-01-23 RX ORDER — SODIUM CHLORIDE 9 MG/ML
5-250 INJECTION, SOLUTION INTRAVENOUS PRN
Status: CANCELLED | OUTPATIENT
Start: 2024-01-23

## 2024-01-23 RX ORDER — SODIUM CHLORIDE 9 MG/ML
INJECTION, SOLUTION INTRAVENOUS CONTINUOUS
Status: CANCELLED | OUTPATIENT
Start: 2024-01-23

## 2024-01-23 RX ORDER — ONDANSETRON 2 MG/ML
8 INJECTION INTRAMUSCULAR; INTRAVENOUS
Status: CANCELLED | OUTPATIENT
Start: 2024-01-23

## 2024-01-23 RX ORDER — PALONOSETRON 0.05 MG/ML
0.25 INJECTION, SOLUTION INTRAVENOUS ONCE
Status: COMPLETED | OUTPATIENT
Start: 2024-01-23 | End: 2024-01-23

## 2024-01-23 RX ORDER — ALBUTEROL SULFATE 90 UG/1
4 AEROSOL, METERED RESPIRATORY (INHALATION) PRN
Status: CANCELLED | OUTPATIENT
Start: 2024-01-23

## 2024-01-23 RX ORDER — MEPERIDINE HYDROCHLORIDE 50 MG/ML
12.5 INJECTION INTRAMUSCULAR; INTRAVENOUS; SUBCUTANEOUS PRN
Status: CANCELLED | OUTPATIENT
Start: 2024-01-23

## 2024-01-23 RX ORDER — DIPHENHYDRAMINE HYDROCHLORIDE 50 MG/ML
50 INJECTION INTRAMUSCULAR; INTRAVENOUS
Status: CANCELLED | OUTPATIENT
Start: 2024-01-23

## 2024-01-23 RX ORDER — PALONOSETRON 0.05 MG/ML
0.25 INJECTION, SOLUTION INTRAVENOUS ONCE
Status: CANCELLED | OUTPATIENT
Start: 2024-01-23 | End: 2024-01-23

## 2024-01-23 RX ADMIN — FLUOROURACIL 4225 MG: 50 INJECTION, SOLUTION INTRAVENOUS at 15:51

## 2024-01-23 RX ADMIN — DEXAMETHASONE SODIUM PHOSPHATE 12 MG: 4 INJECTION, SOLUTION INTRA-ARTICULAR; INTRALESIONAL; INTRAMUSCULAR; INTRAVENOUS; SOFT TISSUE at 10:12

## 2024-01-23 RX ADMIN — DOCETAXEL 80 MG: 20 INJECTION, SOLUTION INTRAVENOUS at 10:52

## 2024-01-23 RX ADMIN — PALONOSETRON 0.25 MG: 0.25 INJECTION, SOLUTION INTRAVENOUS at 10:11

## 2024-01-23 RX ADMIN — DEXTROSE MONOHYDRATE 20 ML/HR: 50 INJECTION, SOLUTION INTRAVENOUS at 13:30

## 2024-01-23 RX ADMIN — OXALIPLATIN 140 MG: 5 INJECTION, SOLUTION INTRAVENOUS at 13:31

## 2024-01-23 RX ADMIN — LEUCOVORIN CALCIUM 300 MG: 200 INJECTION, POWDER, LYOPHILIZED, FOR SUSPENSION INTRAMUSCULAR; INTRAVENOUS at 13:31

## 2024-01-23 NOTE — PROGRESS NOTES
Patient arrived to treatment suite for pre-medications, chemotherapy infusion, and connection of a home infusion pump.  Left chest mediport accessed and good blood return noted.  Patient and spouse concerned about patient getting sick and having to go back to the hospital.  They also stated that the nephrologist reduced his \"water pill\" back to 25mg after Dr. Castillo had increased it to 50mg.  Discussed concerns with Dr. Castillo who reduced treatment by 10%.  Treatment approved and given.  Patient tolerated well.  Left treatment suite ambulatory.  Discharge instructions provided at check-out due to doctor appointment.  Mediport left connected for home infusion.     Patient's status assessed and documented appropriately.  All labs and required results were also reviewed today.  Treatment parameters have been reviewed.  Today's treatment has been approved by the provider.  Treatment orders and medication sequencing (when applicable) was verified by 2 registered nurses.  The treatment plan was confirmed with the patient prior to administration, and the patient understands the need to report any treatment-related symptoms.     Prior to administration, when applicable, the following 8 elements of medication administration were reviewed with 2nd Registered Nurse prior to dosing: drug name, drug dose, infusion volume when prepared in a syringe, rate of administration, expiration dates and/or times, appearance and integrity of drug(s), and rate of pump for infusion.  The 5 rights of medication administration have been verified.

## 2024-01-24 ENCOUNTER — HOSPITAL ENCOUNTER (OUTPATIENT)
Dept: INFUSION THERAPY | Age: 69
Discharge: HOME | End: 2024-01-24
Payer: MEDICARE

## 2024-01-24 DIAGNOSIS — Z11.59 SCREENING FOR VIRAL DISEASE: ICD-10-CM

## 2024-01-24 DIAGNOSIS — C16.8 MALIGNANT NEOPLASM OF OVERLAPPING SITES OF STOMACH (HCC): Primary | ICD-10-CM

## 2024-01-24 PROCEDURE — 2580000003 HC RX 258: Performed by: INTERNAL MEDICINE

## 2024-01-24 PROCEDURE — 96523 IRRIG DRUG DELIVERY DEVICE: CPT

## 2024-01-24 PROCEDURE — 6360000002 HC RX W HCPCS: Performed by: INTERNAL MEDICINE

## 2024-01-24 RX ORDER — SODIUM CHLORIDE 0.9 % (FLUSH) 0.9 %
5-40 SYRINGE (ML) INJECTION PRN
Status: DISCONTINUED | OUTPATIENT
Start: 2024-01-24 | End: 2024-01-25 | Stop reason: HOSPADM

## 2024-01-24 RX ORDER — HEPARIN 100 UNIT/ML
500 SYRINGE INTRAVENOUS PRN
Status: DISCONTINUED | OUTPATIENT
Start: 2024-01-24 | End: 2024-01-25 | Stop reason: HOSPADM

## 2024-01-24 RX ADMIN — SODIUM CHLORIDE, PRESERVATIVE FREE 20 ML: 5 INJECTION INTRAVENOUS at 15:49

## 2024-01-24 RX ADMIN — HEPARIN 500 UNITS: 100 SYRINGE at 15:49

## 2024-01-26 ENCOUNTER — CARE COORDINATION (OUTPATIENT)
Dept: CASE MANAGEMENT | Age: 69
End: 2024-01-26

## 2024-01-26 NOTE — CARE COORDINATION
Care Transitions Follow Up Call    Patient Current Location:  Home: [address] [place] [address] [place] [address]    Care Transition Nurse contacted the patient by telephone to follow up after admission on 24-24.  Verified name and  with patient as identifiers.    Patient: Arnie Martinez  Patient : 1955   MRN: [id number]  Reason for Admission: Covid19, Pna, Sepsis     Discharge Date: 24 RARS: Readmission Risk Score: 28.8  Facility: [facility]      Needs to be reviewed by the provider   Additional needs identified to be addressed with provider: No     Method of communication with provider: none.    Patient reports doing well. Reports recovering well from recent Covid, Pna. Reports minimal n/p cough. Denies sob, wheezing, malaise, ac distress. Noted to be speaking in complete, unlabored sentences. Reports appetite, fluid intake good w/ b&b wnl. On chemotherapy for duodenum cancer--[facility]       Future Appointments   Date Time Provider Department Center   2024  9:45 AM SCHEDULE, [facility] LAB [facility] [place]   2024  9:00 AM [facility] [place]   2024 10:00 AM Jonathan Bedoya MD [facility]   2024 12:00 PM SCHEDULE, [facility] ONC INJECTION [facility] [place]   2024  9:45 AM SCHEDULE, [facility] ONC LAB [facility] [place]   2024  9:00 AM SCHEDULE, [facility] ONC TREATMENT [facility] [place]   2024 12:00 PM SCHEDULE, [facility] ONC INJECTION [facility] [place]   2024  8:30 AM Ryanne Haile MD [facility]   3/12/2024  9:20 AM REYNALDO HERNANDES ONC NURSE 2 [facility] [place]     Care Transition Nurse reviewed medical action plan and red flags with patient and discussed any barriers to care and/or understanding of plan of care after discharge. Discussed appropriate site of care based on symptoms and resources available to patient including: PCP  Specialist  Urgent care clinics. The patient

## 2024-02-02 ENCOUNTER — CLINICAL DOCUMENTATION (OUTPATIENT)
Dept: ONCOLOGY | Age: 69
End: 2024-02-02

## 2024-02-02 ENCOUNTER — HOSPITAL ENCOUNTER (OUTPATIENT)
Age: 69
Discharge: HOME | End: 2024-02-03
Attending: INTERNAL MEDICINE | Admitting: INTERNAL MEDICINE
Payer: MEDICARE

## 2024-02-02 ENCOUNTER — HOSPITAL ENCOUNTER (OUTPATIENT)
Dept: INFUSION THERAPY | Age: 69
Discharge: HOME | End: 2024-02-02
Payer: MEDICARE

## 2024-02-02 ENCOUNTER — CARE COORDINATION (OUTPATIENT)
Dept: CASE MANAGEMENT | Age: 69
End: 2024-02-02

## 2024-02-02 VITALS
BODY MASS INDEX: 27.25 KG/M2 | TEMPERATURE: 99.6 F | HEIGHT: 68 IN | RESPIRATION RATE: 18 BRPM | HEART RATE: 92 BPM | SYSTOLIC BLOOD PRESSURE: 145 MMHG | OXYGEN SATURATION: 97 % | DIASTOLIC BLOOD PRESSURE: 67 MMHG

## 2024-02-02 VITALS
TEMPERATURE: 98.4 F | DIASTOLIC BLOOD PRESSURE: 65 MMHG | OXYGEN SATURATION: 95 % | SYSTOLIC BLOOD PRESSURE: 126 MMHG | BODY MASS INDEX: 25.47 KG/M2 | HEART RATE: 82 BPM | RESPIRATION RATE: 18 BRPM | HEIGHT: 67 IN | WEIGHT: 162.26 LBS

## 2024-02-02 DIAGNOSIS — D70.1 CHEMOTHERAPY INDUCED NEUTROPENIA (HCC): ICD-10-CM

## 2024-02-02 DIAGNOSIS — Z11.59 SCREENING FOR VIRAL DISEASE: Primary | ICD-10-CM

## 2024-02-02 DIAGNOSIS — C17.0 CARCINOMA OF DUODENUM (HCC): Primary | ICD-10-CM

## 2024-02-02 DIAGNOSIS — C16.2 MALIGNANT NEOPLASM OF BODY OF STOMACH (HCC): Primary | ICD-10-CM

## 2024-02-02 DIAGNOSIS — T45.1X5A CHEMOTHERAPY INDUCED NEUTROPENIA (HCC): ICD-10-CM

## 2024-02-02 DIAGNOSIS — C16.8 MALIGNANT NEOPLASM OF OVERLAPPING SITES OF STOMACH (HCC): ICD-10-CM

## 2024-02-02 LAB
ALBUMIN SERPL-MCNC: 2.4 GM/DL (ref 3.4–5)
ALP BLD-CCNC: 95 IU/L (ref 40–129)
ALT SERPL-CCNC: 10 U/L (ref 10–40)
ANION GAP SERPL CALCULATED.3IONS-SCNC: 10 MMOL/L (ref 7–16)
AST SERPL-CCNC: 11 IU/L (ref 15–37)
BASOPHILS ABSOLUTE: 0 K/CU MM
BASOPHILS RELATIVE PERCENT: 1 % (ref 0–1)
BILIRUB SERPL-MCNC: 1.7 MG/DL (ref 0–1)
BUN SERPL-MCNC: 15 MG/DL (ref 6–23)
CALCIUM SERPL-MCNC: 8.1 MG/DL (ref 8.3–10.6)
CHLORIDE BLD-SCNC: 103 MMOL/L (ref 99–110)
CO2: 21 MMOL/L (ref 21–32)
CREAT SERPL-MCNC: 0.8 MG/DL (ref 0.9–1.3)
DIFFERENTIAL TYPE: ABNORMAL
EOSINOPHILS ABSOLUTE: 0 K/CU MM
EOSINOPHILS RELATIVE PERCENT: 1 % (ref 0–3)
GFR SERPL CREATININE-BSD FRML MDRD: >60 ML/MIN/1.73M2
GLUCOSE SERPL-MCNC: 163 MG/DL (ref 70–99)
HCT VFR BLD CALC: 21.9 % (ref 42–52)
HCT VFR BLD CALC: 22.6 % (ref 42–52)
HEMOGLOBIN: 7.2 GM/DL (ref 13.5–18)
HEMOGLOBIN: 7.2 GM/DL (ref 13.5–18)
LYMPHOCYTES ABSOLUTE: 0.4 K/CU MM
LYMPHOCYTES RELATIVE PERCENT: 40.2 % (ref 24–44)
MCH RBC QN AUTO: 29.8 PG (ref 27–31)
MCHC RBC AUTO-ENTMCNC: 32.9 % (ref 32–36)
MCV RBC AUTO: 90.5 FL (ref 78–100)
MONOCYTES ABSOLUTE: 0.5 K/CU MM
MONOCYTES RELATIVE PERCENT: 54.6 % (ref 0–4)
PDW BLD-RTO: 19.1 % (ref 11.7–14.9)
PLATELET # BLD: 79 K/CU MM (ref 140–440)
PMV BLD AUTO: 8.6 FL (ref 7.5–11.1)
POTASSIUM SERPL-SCNC: 4.1 MMOL/L (ref 3.5–5.1)
RBC # BLD: 2.42 M/CU MM (ref 4.6–6.2)
SEGMENTED NEUTROPHILS ABSOLUTE COUNT: 0 K/CU MM
SEGMENTED NEUTROPHILS RELATIVE PERCENT: 3.2 % (ref 36–66)
SODIUM BLD-SCNC: 134 MMOL/L (ref 135–145)
TOTAL PROTEIN: 4.9 GM/DL (ref 6.4–8.2)
WBC # BLD: 1 K/CU MM (ref 4–10.5)

## 2024-02-02 PROCEDURE — 2580000003 HC RX 258: Performed by: INTERNAL MEDICINE

## 2024-02-02 PROCEDURE — 96372 THER/PROPH/DIAG INJ SC/IM: CPT

## 2024-02-02 PROCEDURE — 36430 TRANSFUSION BLD/BLD COMPNT: CPT

## 2024-02-02 PROCEDURE — 96374 THER/PROPH/DIAG INJ IV PUSH: CPT

## 2024-02-02 PROCEDURE — 96361 HYDRATE IV INFUSION ADD-ON: CPT

## 2024-02-02 PROCEDURE — P9016 RBC LEUKOCYTES REDUCED: HCPCS

## 2024-02-02 PROCEDURE — 6360000002 HC RX W HCPCS: Performed by: INTERNAL MEDICINE

## 2024-02-02 PROCEDURE — 85018 HEMOGLOBIN: CPT

## 2024-02-02 PROCEDURE — 85014 HEMATOCRIT: CPT

## 2024-02-02 PROCEDURE — 6370000000 HC RX 637 (ALT 250 FOR IP): Performed by: INTERNAL MEDICINE

## 2024-02-02 PROCEDURE — 94761 N-INVAS EAR/PLS OXIMETRY MLT: CPT

## 2024-02-02 PROCEDURE — 96375 TX/PRO/DX INJ NEW DRUG ADDON: CPT

## 2024-02-02 RX ORDER — DIPHENHYDRAMINE HYDROCHLORIDE 50 MG/ML
50 INJECTION INTRAMUSCULAR; INTRAVENOUS
Status: DISCONTINUED | OUTPATIENT
Start: 2024-02-02 | End: 2024-02-03 | Stop reason: HOSPADM

## 2024-02-02 RX ORDER — HEPARIN 100 UNIT/ML
500 SYRINGE INTRAVENOUS PRN
OUTPATIENT
Start: 2024-02-02

## 2024-02-02 RX ORDER — ONDANSETRON 2 MG/ML
8 INJECTION INTRAMUSCULAR; INTRAVENOUS
Status: DISCONTINUED | OUTPATIENT
Start: 2024-02-02 | End: 2024-02-03 | Stop reason: HOSPADM

## 2024-02-02 RX ORDER — METHYLPREDNISOLONE SODIUM SUCCINATE 40 MG/ML
20 INJECTION, POWDER, LYOPHILIZED, FOR SOLUTION INTRAMUSCULAR; INTRAVENOUS DAILY
Status: DISCONTINUED | OUTPATIENT
Start: 2024-02-02 | End: 2024-02-03 | Stop reason: HOSPADM

## 2024-02-02 RX ORDER — 0.9 % SODIUM CHLORIDE 0.9 %
500 INTRAVENOUS SOLUTION INTRAVENOUS ONCE
Status: CANCELLED | OUTPATIENT
Start: 2024-02-02 | End: 2024-02-02

## 2024-02-02 RX ORDER — ALBUTEROL SULFATE 90 UG/1
4 AEROSOL, METERED RESPIRATORY (INHALATION) PRN
OUTPATIENT
Start: 2024-02-02

## 2024-02-02 RX ORDER — EPINEPHRINE 1 MG/ML
0.3 INJECTION, SOLUTION, CONCENTRATE INTRAVENOUS PRN
OUTPATIENT
Start: 2024-02-02

## 2024-02-02 RX ORDER — DIPHENHYDRAMINE HYDROCHLORIDE 50 MG/ML
50 INJECTION INTRAMUSCULAR; INTRAVENOUS
OUTPATIENT
Start: 2024-02-03

## 2024-02-02 RX ORDER — SODIUM CHLORIDE 9 MG/ML
5-250 INJECTION, SOLUTION INTRAVENOUS PRN
Status: CANCELLED | OUTPATIENT
Start: 2024-02-02

## 2024-02-02 RX ORDER — SODIUM CHLORIDE 9 MG/ML
INJECTION, SOLUTION INTRAVENOUS CONTINUOUS
OUTPATIENT
Start: 2024-02-02

## 2024-02-02 RX ORDER — ONDANSETRON 2 MG/ML
8 INJECTION INTRAMUSCULAR; INTRAVENOUS EVERY 6 HOURS PRN
Status: DISCONTINUED | OUTPATIENT
Start: 2024-02-02 | End: 2024-02-03 | Stop reason: HOSPADM

## 2024-02-02 RX ORDER — ALBUTEROL SULFATE 90 UG/1
4 AEROSOL, METERED RESPIRATORY (INHALATION) PRN
Status: CANCELLED | OUTPATIENT
Start: 2024-02-02

## 2024-02-02 RX ORDER — SODIUM CHLORIDE 9 MG/ML
25 INJECTION, SOLUTION INTRAVENOUS PRN
Status: DISCONTINUED | OUTPATIENT
Start: 2024-02-02 | End: 2024-02-03 | Stop reason: HOSPADM

## 2024-02-02 RX ORDER — ONDANSETRON 2 MG/ML
8 INJECTION INTRAMUSCULAR; INTRAVENOUS
OUTPATIENT
Start: 2024-02-03

## 2024-02-02 RX ORDER — ALBUTEROL SULFATE 90 UG/1
4 AEROSOL, METERED RESPIRATORY (INHALATION) PRN
OUTPATIENT
Start: 2024-02-03

## 2024-02-02 RX ORDER — ACETAMINOPHEN 325 MG/1
650 TABLET ORAL
OUTPATIENT
Start: 2024-02-02

## 2024-02-02 RX ORDER — DIPHENHYDRAMINE HCL 25 MG
25 TABLET ORAL SEE ADMIN INSTRUCTIONS
Status: DISCONTINUED | OUTPATIENT
Start: 2024-02-02 | End: 2024-02-03 | Stop reason: HOSPADM

## 2024-02-02 RX ORDER — ACETAMINOPHEN 325 MG/1
650 TABLET ORAL
Status: CANCELLED | OUTPATIENT
Start: 2024-02-02

## 2024-02-02 RX ORDER — FAMOTIDINE 10 MG/ML
20 INJECTION, SOLUTION INTRAVENOUS
Status: CANCELLED | OUTPATIENT
Start: 2024-02-02

## 2024-02-02 RX ORDER — EPINEPHRINE 1 MG/ML
0.3 INJECTION, SOLUTION, CONCENTRATE INTRAVENOUS PRN
Status: CANCELLED | OUTPATIENT
Start: 2024-02-02

## 2024-02-02 RX ORDER — DIPHENHYDRAMINE HCL 25 MG
25 TABLET ORAL ONCE
Status: COMPLETED | OUTPATIENT
Start: 2024-02-02 | End: 2024-02-02

## 2024-02-02 RX ORDER — FAMOTIDINE 10 MG/ML
20 INJECTION, SOLUTION INTRAVENOUS
OUTPATIENT
Start: 2024-02-02

## 2024-02-02 RX ORDER — DIPHENHYDRAMINE HYDROCHLORIDE 50 MG/ML
50 INJECTION INTRAMUSCULAR; INTRAVENOUS
OUTPATIENT
Start: 2024-02-02

## 2024-02-02 RX ORDER — ONDANSETRON 2 MG/ML
8 INJECTION INTRAMUSCULAR; INTRAVENOUS EVERY 6 HOURS PRN
Start: 2024-02-02

## 2024-02-02 RX ORDER — SODIUM CHLORIDE 9 MG/ML
25 INJECTION, SOLUTION INTRAVENOUS PRN
Status: CANCELLED | OUTPATIENT
Start: 2024-02-02

## 2024-02-02 RX ORDER — DEXAMETHASONE SODIUM PHOSPHATE 4 MG/ML
4 INJECTION, SOLUTION INTRA-ARTICULAR; INTRALESIONAL; INTRAMUSCULAR; INTRAVENOUS; SOFT TISSUE EVERY 6 HOURS
Status: DISCONTINUED | OUTPATIENT
Start: 2024-02-02 | End: 2024-02-03 | Stop reason: HOSPADM

## 2024-02-02 RX ORDER — 0.9 % SODIUM CHLORIDE 0.9 %
500 INTRAVENOUS SOLUTION INTRAVENOUS ONCE
Status: COMPLETED | OUTPATIENT
Start: 2024-02-02 | End: 2024-02-02

## 2024-02-02 RX ORDER — SODIUM CHLORIDE 9 MG/ML
INJECTION, SOLUTION INTRAVENOUS PRN
Status: DISCONTINUED | OUTPATIENT
Start: 2024-02-02 | End: 2024-02-03 | Stop reason: HOSPADM

## 2024-02-02 RX ORDER — ACETAMINOPHEN 325 MG/1
650 TABLET ORAL ONCE
Status: CANCELLED | OUTPATIENT
Start: 2024-02-02 | End: 2024-02-02

## 2024-02-02 RX ORDER — EPINEPHRINE 1 MG/ML
0.3 INJECTION, SOLUTION, CONCENTRATE INTRAVENOUS PRN
Status: DISCONTINUED | OUTPATIENT
Start: 2024-02-02 | End: 2024-02-03 | Stop reason: HOSPADM

## 2024-02-02 RX ORDER — SODIUM CHLORIDE 9 MG/ML
INJECTION, SOLUTION INTRAVENOUS CONTINUOUS
Status: CANCELLED | OUTPATIENT
Start: 2024-02-02

## 2024-02-02 RX ORDER — SODIUM CHLORIDE 9 MG/ML
20 INJECTION, SOLUTION INTRAVENOUS CONTINUOUS
Status: DISCONTINUED | OUTPATIENT
Start: 2024-02-02 | End: 2024-02-03 | Stop reason: HOSPADM

## 2024-02-02 RX ORDER — SODIUM CHLORIDE 0.9 % (FLUSH) 0.9 %
5-40 SYRINGE (ML) INJECTION PRN
Status: DISCONTINUED | OUTPATIENT
Start: 2024-02-02 | End: 2024-02-03 | Stop reason: HOSPADM

## 2024-02-02 RX ORDER — SODIUM CHLORIDE 9 MG/ML
INJECTION, SOLUTION INTRAVENOUS CONTINUOUS
OUTPATIENT
Start: 2024-02-03

## 2024-02-02 RX ORDER — EPINEPHRINE 1 MG/ML
0.3 INJECTION, SOLUTION, CONCENTRATE INTRAVENOUS PRN
OUTPATIENT
Start: 2024-02-03

## 2024-02-02 RX ORDER — ONDANSETRON 2 MG/ML
8 INJECTION INTRAMUSCULAR; INTRAVENOUS
OUTPATIENT
Start: 2024-02-02

## 2024-02-02 RX ORDER — DIPHENHYDRAMINE HYDROCHLORIDE 50 MG/ML
50 INJECTION INTRAMUSCULAR; INTRAVENOUS
Status: CANCELLED | OUTPATIENT
Start: 2024-02-02

## 2024-02-02 RX ORDER — ACETAMINOPHEN 325 MG/1
650 TABLET ORAL
OUTPATIENT
Start: 2024-02-03

## 2024-02-02 RX ORDER — ACETAMINOPHEN 325 MG/1
650 TABLET ORAL ONCE
Status: COMPLETED | OUTPATIENT
Start: 2024-02-02 | End: 2024-02-02

## 2024-02-02 RX ORDER — SODIUM CHLORIDE 0.9 % (FLUSH) 0.9 %
5-40 SYRINGE (ML) INJECTION PRN
Status: CANCELLED | OUTPATIENT
Start: 2024-02-02

## 2024-02-02 RX ORDER — SODIUM CHLORIDE 9 MG/ML
5-250 INJECTION, SOLUTION INTRAVENOUS PRN
OUTPATIENT
Start: 2024-02-02

## 2024-02-02 RX ORDER — FAMOTIDINE 10 MG/ML
20 INJECTION, SOLUTION INTRAVENOUS
Status: DISCONTINUED | OUTPATIENT
Start: 2024-02-02 | End: 2024-02-03 | Stop reason: HOSPADM

## 2024-02-02 RX ORDER — ACETAMINOPHEN 325 MG/1
650 TABLET ORAL
Status: DISCONTINUED | OUTPATIENT
Start: 2024-02-02 | End: 2024-02-03 | Stop reason: HOSPADM

## 2024-02-02 RX ORDER — ONDANSETRON 2 MG/ML
8 INJECTION INTRAMUSCULAR; INTRAVENOUS
Status: CANCELLED | OUTPATIENT
Start: 2024-02-02

## 2024-02-02 RX ORDER — SODIUM CHLORIDE 9 MG/ML
INJECTION, SOLUTION INTRAVENOUS CONTINUOUS
Status: DISCONTINUED | OUTPATIENT
Start: 2024-02-02 | End: 2024-02-03 | Stop reason: HOSPADM

## 2024-02-02 RX ORDER — ACETAMINOPHEN 325 MG/1
650 TABLET ORAL SEE ADMIN INSTRUCTIONS
Status: DISCONTINUED | OUTPATIENT
Start: 2024-02-02 | End: 2024-02-03 | Stop reason: HOSPADM

## 2024-02-02 RX ORDER — ALBUTEROL SULFATE 90 UG/1
4 AEROSOL, METERED RESPIRATORY (INHALATION) PRN
Status: DISCONTINUED | OUTPATIENT
Start: 2024-02-02 | End: 2024-02-03 | Stop reason: HOSPADM

## 2024-02-02 RX ORDER — FAMOTIDINE 10 MG/ML
20 INJECTION, SOLUTION INTRAVENOUS
OUTPATIENT
Start: 2024-02-03

## 2024-02-02 RX ORDER — HEPARIN 100 UNIT/ML
500 SYRINGE INTRAVENOUS PRN
Status: DISCONTINUED | OUTPATIENT
Start: 2024-02-02 | End: 2024-02-03 | Stop reason: HOSPADM

## 2024-02-02 RX ORDER — DIPHENHYDRAMINE HCL 25 MG
25 TABLET ORAL ONCE
Status: CANCELLED | OUTPATIENT
Start: 2024-02-02 | End: 2024-02-02

## 2024-02-02 RX ORDER — SODIUM CHLORIDE 9 MG/ML
20 INJECTION, SOLUTION INTRAVENOUS CONTINUOUS
Status: CANCELLED | OUTPATIENT
Start: 2024-02-02

## 2024-02-02 RX ORDER — SODIUM CHLORIDE 0.9 % (FLUSH) 0.9 %
5-40 SYRINGE (ML) INJECTION PRN
OUTPATIENT
Start: 2024-02-02

## 2024-02-02 RX ORDER — DEXAMETHASONE SODIUM PHOSPHATE 4 MG/ML
4 INJECTION, SOLUTION INTRA-ARTICULAR; INTRALESIONAL; INTRAMUSCULAR; INTRAVENOUS; SOFT TISSUE EVERY 6 HOURS
Start: 2024-02-02

## 2024-02-02 RX ADMIN — HEPARIN 500 UNITS: 100 SYRINGE at 15:26

## 2024-02-02 RX ADMIN — ACETAMINOPHEN 650 MG: 325 TABLET ORAL at 20:13

## 2024-02-02 RX ADMIN — SODIUM CHLORIDE, PRESERVATIVE FREE 10 ML: 5 INJECTION INTRAVENOUS at 22:46

## 2024-02-02 RX ADMIN — DEXAMETHASONE 10 MG: 4 TABLET ORAL at 22:47

## 2024-02-02 RX ADMIN — DEXAMETHASONE SODIUM PHOSPHATE 4 MG: 4 INJECTION, SOLUTION INTRAMUSCULAR; INTRAVENOUS at 15:03

## 2024-02-02 RX ADMIN — SODIUM CHLORIDE 500 ML: 9 INJECTION, SOLUTION INTRAVENOUS at 14:34

## 2024-02-02 RX ADMIN — DIPHENHYDRAMINE HYDROCHLORIDE 25 MG: 25 TABLET ORAL at 20:13

## 2024-02-02 RX ADMIN — ONDANSETRON 8 MG: 2 INJECTION INTRAMUSCULAR; INTRAVENOUS at 15:03

## 2024-02-02 ASSESSMENT — PAIN SCALES - GENERAL: PAINLEVEL_OUTOF10: 0

## 2024-02-02 NOTE — PROGRESS NOTES
Patient arrived to unit via WC from [facility] for Blood Transfusion. Signed and held orders released. Care continues.

## 2024-02-02 NOTE — CONSENT
Informed Consent for Blood Component Transfusion Note    I have discussed with the patient the rationale for blood component transfusion; its benefits in treating or preventing fatigue, organ damage, or death; and its risk which includes mild transfusion reactions, rare risk of blood borne infection, or more serious but rare reactions. I have discussed the alternatives to transfusion, including the risk and consequences of not receiving transfusion. The patient had an opportunity to ask questions and had agreed to proceed with transfusion of blood components.    Electronically signed by John Desir MD on 2/2/24 at 6:41 PM EST

## 2024-02-02 NOTE — PROGRESS NOTES
Hgb 7.2; per Dr Castillo 1 unit of PRBC, called nursing supervisor, spoke to Chelsi, patient scheduled for today, directly for an inpatient bed as an outpatient procedure.  patient advised of appointment time and signed consent, blood banded and sent for T&S, band on patient (verifed), also advised to keep band on until they receive their infusion and patient states understanding.

## 2024-02-02 NOTE — PROGRESS NOTES
Patient arrived to treatment suite as add-on for IVF.  Patient states weakness, N/V, diarrhea, and overall feeling unwell.  Left chest mediport accessed and good blood return noted.  Discussed patient symptoms with Dr. Castillo and 500ml of normal saline, 8 mg of Zofran, and 4 mg of Decadron ordered.  Treatment given.  CBC and CMP drawn and sent to lab for processing.  WBC 1.0 and Hgb 7.2.  One unit of PRBC's and Neupogen ordered for patient.  Neupogen approved and given subcutaneously in right arm, band-aid applied.  Patient left to go to hospital for blood transfusion.  Left treatment suite with assistance in wheelchair.

## 2024-02-02 NOTE — PROGRESS NOTES
Patient scheduled for blood transfusion at [facility] as IP bed for OP services today 02/02/2024. Order placed for 1 unit PRBC per physician order. Blood therapy plan added.

## 2024-02-02 NOTE — PROGRESS NOTES
This nurse reached out to Dr. Desir who is on call and had to leave message in regards to having to have electronic consent in chart before I can administer the blood that Dr. Bedoya ordered. I left my call back number.

## 2024-02-03 LAB
ABO/RH: NORMAL
ANTIBODY SCREEN: NEGATIVE
COMPONENT: NORMAL
CROSSMATCH RESULT: NORMAL
STATUS: NORMAL
TRANSFUSION STATUS: NORMAL
UNIT DIVISION: 0
UNIT NUMBER: NORMAL

## 2024-02-03 PROCEDURE — 6360000002 HC RX W HCPCS: Performed by: NURSE PRACTITIONER

## 2024-02-03 RX ORDER — HEPARIN 100 UNIT/ML
100 SYRINGE INTRAVENOUS PRN
Status: DISCONTINUED | OUTPATIENT
Start: 2024-02-03 | End: 2024-02-03 | Stop reason: HOSPADM

## 2024-02-03 RX ADMIN — Medication 100 UNITS: at 01:00

## 2024-02-03 NOTE — PROGRESS NOTES
4 Eyes Skin Assessment     NAME:  Arnie Martinez  YOB: 1955  MEDICAL RECORD NUMBER:  [id number]    The patient is being assessed for  Admission    I agree that at least one RN has performed a thorough Head to Toe Skin Assessment on the patient. ALL assessment sites listed below have been assessed.      Areas assessed by both nurses:    Head, Face, Ears, Shoulders, Back, Chest, Arms, Elbows, Hands, Sacrum. Buttock, Coccyx, Ischium, Legs. Feet and Heels, and Under Medical Devices         Does the Patient have a Wound? No noted wound(s)       Mj Prevention initiated by RN: Yes  Wound Care Orders initiated by RN: No    Pressure Injury (Stage 3,4, Unstageable, DTI, NWPT, and Complex wounds) if present, place Wound referral order by RN under : No    New Ostomies, if present place, Ostomy referral order under : No     Nurse 1 eSignature: Electronically signed by Radha Real RN on 2/2/24 at 11:49 PM EST    **SHARE this note so that the co-signing nurse can place an eSignature**    Nurse 2 eSignature: Electronically signed by Gracie Martin LPN on 2/3/24 at 1:26 AM EST

## 2024-02-03 NOTE — PROGRESS NOTES
This patient received 1 unit PRBC, repeated Hgb 7.2. Dr. Desir informed thru phone call and ordered patient can be discharged anytime since [facility] has [facility] (Monday) and patient also wanted to go home.     Chest port De-accessed

## 2024-02-05 ENCOUNTER — HOSPITAL ENCOUNTER (OUTPATIENT)
Dept: INFUSION THERAPY | Age: 69
Setting detail: INFUSION SERIES
End: 2024-02-05

## 2024-02-05 ENCOUNTER — HOSPITAL ENCOUNTER (OUTPATIENT)
Dept: INFUSION THERAPY | Age: 69
Discharge: HOME | End: 2024-02-05
Payer: MEDICARE

## 2024-02-05 ENCOUNTER — CARE COORDINATION (OUTPATIENT)
Dept: CASE MANAGEMENT | Age: 69
End: 2024-02-05

## 2024-02-05 DIAGNOSIS — Z11.59 SCREENING FOR VIRAL DISEASE: ICD-10-CM

## 2024-02-05 DIAGNOSIS — C16.8 MALIGNANT NEOPLASM OF OVERLAPPING SITES OF STOMACH (HCC): ICD-10-CM

## 2024-02-05 LAB
ALBUMIN SERPL-MCNC: 2.5 GM/DL (ref 3.4–5)
ALP BLD-CCNC: 109 IU/L (ref 40–128)
ALT SERPL-CCNC: 11 U/L (ref 10–40)
ANION GAP SERPL CALCULATED.3IONS-SCNC: 6 MMOL/L (ref 7–16)
ANISOCYTOSIS: ABNORMAL
AST SERPL-CCNC: 17 IU/L (ref 15–37)
BANDED NEUTROPHILS ABSOLUTE COUNT: 0.2 K/CU MM
BANDED NEUTROPHILS RELATIVE PERCENT: 3 % (ref 5–11)
BILIRUB SERPL-MCNC: 1 MG/DL (ref 0–1)
BUN SERPL-MCNC: 15 MG/DL (ref 6–23)
CALCIUM SERPL-MCNC: 8.4 MG/DL (ref 8.3–10.6)
CHLORIDE BLD-SCNC: 100 MMOL/L (ref 99–110)
CO2: 26 MMOL/L (ref 21–32)
CREAT SERPL-MCNC: 1 MG/DL (ref 0.9–1.3)
DIFFERENTIAL TYPE: ABNORMAL
GFR SERPL CREATININE-BSD FRML MDRD: >60 ML/MIN/1.73M2
GLUCOSE SERPL-MCNC: 108 MG/DL (ref 70–99)
HCT VFR BLD CALC: 32.1 % (ref 42–52)
HEMOGLOBIN: 10.1 GM/DL (ref 13.5–18)
LYMPHOCYTES ABSOLUTE: 0.5 K/CU MM
LYMPHOCYTES RELATIVE PERCENT: 8 % (ref 24–44)
MCH RBC QN AUTO: 28.7 PG (ref 27–31)
MCHC RBC AUTO-ENTMCNC: 31.5 % (ref 32–36)
MCV RBC AUTO: 91.2 FL (ref 78–100)
METAMYELOCYTES ABSOLUTE COUNT: 0.2 K/CU MM
METAMYELOCYTES PERCENT: 3 %
MONOCYTES ABSOLUTE: 1.3 K/CU MM
MONOCYTES RELATIVE PERCENT: 20 % (ref 0–4)
OVALOCYTES: ABNORMAL
PDW BLD-RTO: 19.3 % (ref 11.7–14.9)
PLATELET # BLD: 120 K/CU MM (ref 140–440)
PMV BLD AUTO: 9 FL (ref 7.5–11.1)
POLYCHROMASIA: ABNORMAL
POTASSIUM SERPL-SCNC: 4 MMOL/L (ref 3.5–5.1)
RBC # BLD: 3.52 M/CU MM (ref 4.6–6.2)
RBC # BLD: ABNORMAL 10*6/UL
SEGMENTED NEUTROPHILS ABSOLUTE COUNT: 4.3 K/CU MM
SEGMENTED NEUTROPHILS RELATIVE PERCENT: 66 % (ref 36–66)
SODIUM BLD-SCNC: 132 MMOL/L (ref 135–145)
TOTAL PROTEIN: 4.9 GM/DL (ref 6.4–8.2)
WBC # BLD: 6.5 K/CU MM (ref 4–10.5)

## 2024-02-05 PROCEDURE — 36415 COLL VENOUS BLD VENIPUNCTURE: CPT

## 2024-02-05 PROCEDURE — 80053 COMPREHEN METABOLIC PANEL: CPT

## 2024-02-05 PROCEDURE — 85027 COMPLETE CBC AUTOMATED: CPT

## 2024-02-05 PROCEDURE — 85007 BL SMEAR W/DIFF WBC COUNT: CPT

## 2024-02-05 RX ORDER — FAMOTIDINE 10 MG/ML
20 INJECTION, SOLUTION INTRAVENOUS
Status: CANCELLED | OUTPATIENT
Start: 2024-02-05

## 2024-02-05 RX ORDER — ACETAMINOPHEN 325 MG/1
650 TABLET ORAL
Status: CANCELLED | OUTPATIENT
Start: 2024-02-05

## 2024-02-05 RX ORDER — ONDANSETRON 2 MG/ML
8 INJECTION INTRAMUSCULAR; INTRAVENOUS
Status: CANCELLED | OUTPATIENT
Start: 2024-02-05

## 2024-02-05 RX ORDER — DIPHENHYDRAMINE HYDROCHLORIDE 50 MG/ML
50 INJECTION INTRAMUSCULAR; INTRAVENOUS
Status: CANCELLED | OUTPATIENT
Start: 2024-02-05

## 2024-02-05 RX ORDER — SODIUM CHLORIDE 9 MG/ML
INJECTION, SOLUTION INTRAVENOUS CONTINUOUS
Status: CANCELLED | OUTPATIENT
Start: 2024-02-05

## 2024-02-05 RX ORDER — EPINEPHRINE 1 MG/ML
0.3 INJECTION, SOLUTION, CONCENTRATE INTRAVENOUS PRN
Status: CANCELLED | OUTPATIENT
Start: 2024-02-05

## 2024-02-05 RX ORDER — ALBUTEROL SULFATE 90 UG/1
4 AEROSOL, METERED RESPIRATORY (INHALATION) PRN
Status: CANCELLED | OUTPATIENT
Start: 2024-02-05

## 2024-02-06 ENCOUNTER — OFFICE VISIT (OUTPATIENT)
Dept: ONCOLOGY | Age: 69
End: 2024-02-06
Payer: MEDICARE

## 2024-02-06 ENCOUNTER — HOSPITAL ENCOUNTER (OUTPATIENT)
Dept: INFUSION THERAPY | Age: 69
Discharge: HOME | End: 2024-02-06
Payer: MEDICARE

## 2024-02-06 VITALS
TEMPERATURE: 97.5 F | SYSTOLIC BLOOD PRESSURE: 182 MMHG | HEIGHT: 67 IN | HEART RATE: 99 BPM | WEIGHT: 160.2 LBS | OXYGEN SATURATION: 100 % | BODY MASS INDEX: 25.15 KG/M2 | DIASTOLIC BLOOD PRESSURE: 82 MMHG

## 2024-02-06 VITALS
SYSTOLIC BLOOD PRESSURE: 155 MMHG | HEIGHT: 67 IN | DIASTOLIC BLOOD PRESSURE: 72 MMHG | WEIGHT: 160.2 LBS | BODY MASS INDEX: 25.15 KG/M2 | OXYGEN SATURATION: 99 % | TEMPERATURE: 97.5 F | HEART RATE: 60 BPM

## 2024-02-06 DIAGNOSIS — C16.2 MALIGNANT NEOPLASM OF BODY OF STOMACH (HCC): Primary | ICD-10-CM

## 2024-02-06 DIAGNOSIS — C17.0 CARCINOMA OF DUODENUM (HCC): Primary | ICD-10-CM

## 2024-02-06 PROCEDURE — 6360000002 HC RX W HCPCS: Performed by: INTERNAL MEDICINE

## 2024-02-06 PROCEDURE — 2580000003 HC RX 258: Performed by: INTERNAL MEDICINE

## 2024-02-06 PROCEDURE — 96375 TX/PRO/DX INJ NEW DRUG ADDON: CPT

## 2024-02-06 PROCEDURE — 96374 THER/PROPH/DIAG INJ IV PUSH: CPT

## 2024-02-06 PROCEDURE — 96361 HYDRATE IV INFUSION ADD-ON: CPT

## 2024-02-06 PROCEDURE — 99214 OFFICE O/P EST MOD 30 MIN: CPT | Performed by: INTERNAL MEDICINE

## 2024-02-06 PROCEDURE — 1123F ACP DISCUSS/DSCN MKR DOCD: CPT | Performed by: INTERNAL MEDICINE

## 2024-02-06 RX ORDER — ONDANSETRON 2 MG/ML
8 INJECTION INTRAMUSCULAR; INTRAVENOUS ONCE
Status: COMPLETED | OUTPATIENT
Start: 2024-02-06 | End: 2024-02-06

## 2024-02-06 RX ORDER — SODIUM CHLORIDE 0.9 % (FLUSH) 0.9 %
5-40 SYRINGE (ML) INJECTION PRN
Status: DISCONTINUED | OUTPATIENT
Start: 2024-02-06 | End: 2024-02-07 | Stop reason: HOSPADM

## 2024-02-06 RX ORDER — EPINEPHRINE 1 MG/ML
0.3 INJECTION, SOLUTION, CONCENTRATE INTRAVENOUS PRN
OUTPATIENT
Start: 2024-02-06

## 2024-02-06 RX ORDER — FAMOTIDINE 10 MG/ML
20 INJECTION, SOLUTION INTRAVENOUS
OUTPATIENT
Start: 2024-02-06

## 2024-02-06 RX ORDER — ONDANSETRON 2 MG/ML
8 INJECTION INTRAMUSCULAR; INTRAVENOUS
OUTPATIENT
Start: 2024-02-06

## 2024-02-06 RX ORDER — SODIUM CHLORIDE 9 MG/ML
5-250 INJECTION, SOLUTION INTRAVENOUS PRN
OUTPATIENT
Start: 2024-02-06

## 2024-02-06 RX ORDER — DIPHENHYDRAMINE HYDROCHLORIDE 50 MG/ML
50 INJECTION INTRAMUSCULAR; INTRAVENOUS
OUTPATIENT
Start: 2024-02-06

## 2024-02-06 RX ORDER — ACETAMINOPHEN 325 MG/1
650 TABLET ORAL
OUTPATIENT
Start: 2024-02-06

## 2024-02-06 RX ORDER — 0.9 % SODIUM CHLORIDE 0.9 %
1000 INTRAVENOUS SOLUTION INTRAVENOUS ONCE
Status: COMPLETED | OUTPATIENT
Start: 2024-02-06 | End: 2024-02-06

## 2024-02-06 RX ORDER — SODIUM CHLORIDE 9 MG/ML
INJECTION, SOLUTION INTRAVENOUS CONTINUOUS
OUTPATIENT
Start: 2024-02-06

## 2024-02-06 RX ORDER — DULOXETIN HYDROCHLORIDE 30 MG/1
30 CAPSULE, DELAYED RELEASE ORAL DAILY
Qty: 90 CAPSULE | Refills: 1 | Status: SHIPPED | OUTPATIENT
Start: 2024-02-06

## 2024-02-06 RX ORDER — HEPARIN 100 UNIT/ML
500 SYRINGE INTRAVENOUS PRN
Status: DISCONTINUED | OUTPATIENT
Start: 2024-02-06 | End: 2024-02-07 | Stop reason: HOSPADM

## 2024-02-06 RX ORDER — 0.9 % SODIUM CHLORIDE 0.9 %
1000 INTRAVENOUS SOLUTION INTRAVENOUS ONCE
Start: 2024-02-06

## 2024-02-06 RX ORDER — ONDANSETRON 2 MG/ML
8 INJECTION INTRAMUSCULAR; INTRAVENOUS EVERY 6 HOURS PRN
Start: 2024-02-06

## 2024-02-06 RX ORDER — HEPARIN 100 UNIT/ML
500 SYRINGE INTRAVENOUS PRN
OUTPATIENT
Start: 2024-02-06

## 2024-02-06 RX ORDER — SODIUM CHLORIDE 0.9 % (FLUSH) 0.9 %
5-40 SYRINGE (ML) INJECTION PRN
OUTPATIENT
Start: 2024-02-06

## 2024-02-06 RX ORDER — DEXAMETHASONE SODIUM PHOSPHATE 4 MG/ML
4 INJECTION, SOLUTION INTRA-ARTICULAR; INTRALESIONAL; INTRAMUSCULAR; INTRAVENOUS; SOFT TISSUE EVERY 6 HOURS
Start: 2024-02-06

## 2024-02-06 RX ORDER — ALBUTEROL SULFATE 90 UG/1
4 AEROSOL, METERED RESPIRATORY (INHALATION) PRN
OUTPATIENT
Start: 2024-02-06

## 2024-02-06 RX ORDER — DEXAMETHASONE SODIUM PHOSPHATE 4 MG/ML
4 INJECTION, SOLUTION INTRA-ARTICULAR; INTRALESIONAL; INTRAMUSCULAR; INTRAVENOUS; SOFT TISSUE ONCE
Status: COMPLETED | OUTPATIENT
Start: 2024-02-06 | End: 2024-02-06

## 2024-02-06 RX ADMIN — HEPARIN 500 UNITS: 100 SYRINGE at 10:55

## 2024-02-06 RX ADMIN — DEXAMETHASONE SODIUM PHOSPHATE 4 MG: 4 INJECTION, SOLUTION INTRAMUSCULAR; INTRAVENOUS at 10:51

## 2024-02-06 RX ADMIN — SODIUM CHLORIDE 1000 ML: 9 INJECTION, SOLUTION INTRAVENOUS at 09:29

## 2024-02-06 RX ADMIN — ONDANSETRON 8 MG: 2 INJECTION INTRAMUSCULAR; INTRAVENOUS at 10:51

## 2024-02-06 NOTE — PROGRESS NOTES
Patient arrrived to treatment suite for pre-medications, chemotherapy infusion, and connection of a home infusion pump.  Left chest mediport accessed and good blood return noted.  Patient is nauseated, has poor appetite, is feeling weak, not drinking fluids, and has vomited.  Dr. Castillo ordered pre-medications and fluids and is seeing the patient this morning.  Orders given.  Treatment delayed until Dr. Castillo resumes.  Left treatment suite with assistance in wheelchair.  Discharge instructions provided at check-out.

## 2024-02-06 NOTE — PROGRESS NOTES
MA Rooming Questions  Patient: Arnie Martinez  MRN: [id number]    Date: 2/6/2024        1. Do you have any new issues?   no         2. Do you need any refills on medications?    no    3. Have you had any imaging done since your last visit?   no    4. Have you been hospitalized or seen in the emergency room since your last visit here?   no    5. Did the patient have a depression screening completed today? No    No data recorded     PHQ-9 Given to (if applicable):               PHQ-9 Score (if applicable):                     [] Positive     []  Negative              Does question #9 need addressed (if applicable)                     [] Yes    []  No               Madelyn Chapa MA      
Patient Name: Arnie Martinez  Patient : 1955  Patient MRN: [id number]     Primary Oncologist: Jonathan Bedoya MD  Referring Provider: Pat Nguyễn MD     Date of Service: 2024      Chief Complaint:   No chief complaint on file.    He came in for follow-up visit.     Patient Active Problem List:     Splenomegaly     NAFLD (nonalcoholic fatty liver disease)     Carcinoma of duodenum      Liver cirrhosis secondary to MONGE (nonalcoholic steatohepatitis)     Port-A-Cath in place     Bilateral primary osteoarthritis of knee     Arthritis of both knees     Leukopenia     Primary malignant neoplasm of small intestine     Thrombocytopenic disorder     Fatigue     HPI:  Arnie Dutton is a pleasant 68 year old  male patient with underlying liver cirrhosis/MONGE with esophageal varices followed by Dr Healy, who was referred back for evaluation of pancytopenia.  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 He had mismatch MMR. He received adjuvant FOLFOX from 2011 through 2012 followed by RT till 2012.  CT scan in May 2014 showed no recurrent disease. US in 2014 showed 17 cm spleen, previously 16 cm with hepatic steatosis.  Platelet was 80 - 130 since  through . CEA was 2.4.  He was seen by Dr Healy in 2019. AFP in 2019 was 4.6.  2019, calcium 9.4, LFTs were unremarkable. Creatinine  0.8.  Total bilirubin was 0.8. Alpha-fetoprotein 3. INR 1.21.    2019 CMP grossly unremarkable except for glucose 125.  WBC  2.1, RBC count 3.68, hemoglobin 11.7, hematocrit 34.1, MCV 92.6, plat 64. At the time he just got over flu.  If pancytopenia continues to get worse, he may consider bone marrow study.    He had left TKR in 2019 without complication. He did not receive blood product transfusion.  He plans to have right knee replacement in 
81.  8/2023 Peripheral blood; Flow Cytometry Analysis: Increased CD4:CD8 ratio (17.5:1). No other diagnostic immunophenotypic abnormalities detected.   I will continue to monitor CBC    2.  Anemic work-up in November 2019 was grossly unremarkable.   08/15/23 Ferritin: 189 Iron: 57 (L)  TIBC: 174 (L) Transferrin %: 33  FOLATE: 10.3 Vitamin B-12: >2000 (H)  He has anemia of chronic disease.    3. 11/2022 DEXA osteopenia. I recommend to  take vit D and calcium.  11/2022 colonoscopy by Dr Healy. F/u in 5 years.    4.  He had left knee replacement in 2019 without complication. He had right knee replacement in 2021 without bleeding complication.    5.  He has liver cirrhosis/MONGE. 8/20/2023 AFP 1.6.    6. H/o duodenal ca s/p whipple surgery in 9/2011 followed by adjuvant chemo and RT.    7. Diagnosis of gastric cancer in 8/2023. He has FU with surgical oncologist at  Dr Otis Mancini on 9.6.2023. I order PET scan. Likely he will need perioperative chemotherapy.  9/2023 LFTs grossly wnl. WBC 2.8, Hg 12, MCV 99.7, plat 71.   8/31/2023 PET as above.  He was seen by  surgical oncologist who recommended neoadjuvant chemotherapy for gastric cancer and adjuvant chemotherapy.   D/t liver cirrhosis and pancytopenia, I offered         regimen.   CARIS as above.  10/3/2023 C1.   Surgeon from  did not consider surgery.  10/2023 MAREK homogenous, 1 : 640  10/16/2023 creat 0.7, albumin 3.3, , AST 42, WBC 4, Hg 11,5, , plat 62.  11/31/2023 due for C3. So far he has been tolerating chemo well.  He started RT on 11/31/2023.   He was hospitalized d/t GI bleed s/p EGD. He has banding.  12/13/2023 WBC 4.2, Hg 10.1, MCV 99.1, plat 77.   He completed RT on 12/21/2023.  Recently hospitalized d/t COVID. He has G-CSF.  1/8/2024 creat 0.8. WBC 6.4, Hg 7.3, plat 73.   1/9/2024 C5 with dose reduction d/t fatigue.  2/2/2023 Hg 7.2, WBC 1, plat 79. Blood transfusion X1 given.   After C5 of chemo he felt weak and required IVF

## 2024-02-07 ENCOUNTER — TELEPHONE (OUTPATIENT)
Dept: ONCOLOGY | Age: 69
End: 2024-02-07

## 2024-02-07 ENCOUNTER — CARE COORDINATION (OUTPATIENT)
Dept: CASE MANAGEMENT | Age: 69
End: 2024-02-07

## 2024-02-07 NOTE — CARE COORDINATION
for questions related to  healthcare.     Offered patient enrollment in the Remote Patient Monitoring (RPM) program for in-home monitoring: declined on previous call.     Care Transitions Subsequent and Final Call    Schedule Follow Up Appointment with PCP: Completed  Subsequent and Final Calls  Do you have any ongoing symptoms?: No  Have your medications changed?: No  Do you have any questions related to your medications?: No  Do you currently have any active services?: No  Do you have any needs or concerns that I can assist you with?: No  Identified Barriers: Other, Medication Side Effects  Care Transitions Interventions   Home Care Waiver: Declined        Transportation Support: Declined     Registered Dietician: Declined DME Assistance: Declined     Senior Services: Declined    Other Interventions:             Care Transition Nurse provided contact information for future needs. No further follow-up call indicated based on severity of symptoms and risk factors. CT program closed.     Jeane Henderson RN

## 2024-02-07 NOTE — TELEPHONE ENCOUNTER
Patient called given time and prep for CT scans to be done on 2/26/24 [facility] arrival at 10 AM.

## 2024-02-16 DIAGNOSIS — Z11.59 SCREENING FOR VIRAL DISEASE: Primary | ICD-10-CM

## 2024-02-16 DIAGNOSIS — C16.8 MALIGNANT NEOPLASM OF OVERLAPPING SITES OF STOMACH (HCC): ICD-10-CM

## 2024-02-23 ENCOUNTER — APPOINTMENT (OUTPATIENT)
Dept: CT IMAGING | Age: 69
DRG: 871 | End: 2024-02-23
Payer: MEDICARE

## 2024-02-23 ENCOUNTER — APPOINTMENT (OUTPATIENT)
Dept: GENERAL RADIOLOGY | Age: 69
DRG: 871 | End: 2024-02-23
Payer: MEDICARE

## 2024-02-23 ENCOUNTER — HOSPITAL ENCOUNTER (INPATIENT)
Age: 69
LOS: 9 days | Discharge: HOSPICE | DRG: 871 | End: 2024-03-03
Attending: STUDENT IN AN ORGANIZED HEALTH CARE EDUCATION/TRAINING PROGRAM | Admitting: STUDENT IN AN ORGANIZED HEALTH CARE EDUCATION/TRAINING PROGRAM
Payer: MEDICARE

## 2024-02-23 ENCOUNTER — APPOINTMENT (OUTPATIENT)
Dept: NON INVASIVE DIAGNOSTICS | Age: 69
DRG: 871 | End: 2024-02-23
Payer: MEDICARE

## 2024-02-23 DIAGNOSIS — J96.01 ACUTE RESPIRATORY FAILURE WITH HYPOXIA (HCC): Primary | ICD-10-CM

## 2024-02-23 DIAGNOSIS — A41.9 SEPTICEMIA (HCC): ICD-10-CM

## 2024-02-23 DIAGNOSIS — R60.9 SWELLING: ICD-10-CM

## 2024-02-23 LAB
ABO/RH: NORMAL
ALBUMIN SERPL-MCNC: 2.2 GM/DL (ref 3.4–5)
ALP BLD-CCNC: 120 IU/L (ref 40–129)
ALT SERPL-CCNC: 10 U/L (ref 10–40)
ANION GAP SERPL CALCULATED.3IONS-SCNC: 8 MMOL/L (ref 7–16)
ANTIBODY SCREEN: NEGATIVE
APTT: 42.2 SECONDS (ref 25.1–37.1)
AST SERPL-CCNC: 21 IU/L (ref 15–37)
BASE EXCESS MIXED: 5.8 (ref 0–3)
BASOPHILS ABSOLUTE: 0.1 K/CU MM
BASOPHILS RELATIVE PERCENT: 0.4 % (ref 0–1)
BILIRUB SERPL-MCNC: 2 MG/DL (ref 0–1)
BUN SERPL-MCNC: 19 MG/DL (ref 6–23)
CALCIUM SERPL-MCNC: 9.1 MG/DL (ref 8.3–10.6)
CHLORIDE BLD-SCNC: 97 MMOL/L (ref 99–110)
CO2: 28 MMOL/L (ref 21–32)
COMMENT: ABNORMAL
CREAT SERPL-MCNC: 0.7 MG/DL (ref 0.9–1.3)
DIFFERENTIAL TYPE: ABNORMAL
ECHO AO ROOT DIAM: 3.3 CM
ECHO AO ROOT INDEX: 1.79 CM/M2
ECHO AV AREA PEAK VELOCITY: 3 CM2
ECHO AV AREA VTI: 3.2 CM2
ECHO AV AREA/BSA PEAK VELOCITY: 1.6 CM2/M2
ECHO AV AREA/BSA VTI: 1.7 CM2/M2
ECHO AV MEAN GRADIENT: 7 MMHG
ECHO AV MEAN VELOCITY: 1.3 M/S
ECHO AV PEAK GRADIENT: 14 MMHG
ECHO AV PEAK VELOCITY: 1.9 M/S
ECHO AV VELOCITY RATIO: 0.79
ECHO AV VTI: 34.2 CM
ECHO BSA: 1.85 M2
ECHO EST RA PRESSURE: 3 MMHG
ECHO LA AREA 4C: 23.8 CM2
ECHO LA DIAMETER INDEX: 2.39 CM/M2
ECHO LA DIAMETER: 4.4 CM
ECHO LA MAJOR AXIS: 7 CM
ECHO LA TO AORTIC ROOT RATIO: 1.33
ECHO LA VOL MOD A4C: 68 ML (ref 18–58)
ECHO LA VOLUME INDEX MOD A4C: 37 ML/M2 (ref 16–34)
ECHO LV E' LATERAL VELOCITY: 12 CM/S
ECHO LV E' SEPTAL VELOCITY: 7 CM/S
ECHO LV EDV A4C: 79 ML
ECHO LV EDV INDEX A4C: 43 ML/M2
ECHO LV EJECTION FRACTION A4C: 68 %
ECHO LV ESV A4C: 25 ML
ECHO LV ESV INDEX A4C: 14 ML/M2
ECHO LV FRACTIONAL SHORTENING: 41 % (ref 28–44)
ECHO LV INTERNAL DIMENSION DIASTOLE INDEX: 2.12 CM/M2
ECHO LV INTERNAL DIMENSION DIASTOLIC: 3.9 CM (ref 4.2–5.9)
ECHO LV INTERNAL DIMENSION SYSTOLIC INDEX: 1.25 CM/M2
ECHO LV INTERNAL DIMENSION SYSTOLIC: 2.3 CM
ECHO LV IVSD: 1.2 CM (ref 0.6–1)
ECHO LV MASS 2D: 131 G (ref 88–224)
ECHO LV MASS INDEX 2D: 71.2 G/M2 (ref 49–115)
ECHO LV POSTERIOR WALL DIASTOLIC: 0.9 CM (ref 0.6–1)
ECHO LV RELATIVE WALL THICKNESS RATIO: 0.46
ECHO LVOT AREA: 3.8 CM2
ECHO LVOT AV VTI INDEX: 0.84
ECHO LVOT DIAM: 2.2 CM
ECHO LVOT MEAN GRADIENT: 4 MMHG
ECHO LVOT PEAK GRADIENT: 9 MMHG
ECHO LVOT PEAK VELOCITY: 1.5 M/S
ECHO LVOT STROKE VOLUME INDEX: 59.1 ML/M2
ECHO LVOT SV: 108.7 ML
ECHO LVOT VTI: 28.6 CM
ECHO MV A VELOCITY: 0.73 M/S
ECHO MV E VELOCITY: 0.91 M/S
ECHO MV E/A RATIO: 1.25
ECHO MV E/E' LATERAL: 7.58
ECHO MV E/E' RATIO (AVERAGED): 10.29
ECHO RIGHT VENTRICULAR SYSTOLIC PRESSURE (RVSP): 32 MMHG
ECHO RV MID DIMENSION: 3.6 CM
ECHO TV REGURGITANT MAX VELOCITY: 2.68 M/S
ECHO TV REGURGITANT PEAK GRADIENT: 29 MMHG
EOSINOPHILS ABSOLUTE: 0 K/CU MM
EOSINOPHILS RELATIVE PERCENT: 0.2 % (ref 0–3)
GFR SERPL CREATININE-BSD FRML MDRD: >60 ML/MIN/1.73M2
GLUCOSE SERPL-MCNC: 126 MG/DL (ref 70–99)
HCO3 VENOUS: 30.6 MMOL/L (ref 22–29)
HCT VFR BLD CALC: 33.1 % (ref 42–52)
HEMOGLOBIN: 10.3 GM/DL (ref 13.5–18)
IMMATURE NEUTROPHIL %: 0.3 % (ref 0–0.43)
INR BLD: 1.6 INDEX
LACTIC ACID, SEPSIS: 1.6 MMOL/L (ref 0.4–2)
LACTIC ACID, SEPSIS: 2.3 MMOL/L (ref 0.4–2)
LYMPHOCYTES ABSOLUTE: 0.9 K/CU MM
LYMPHOCYTES RELATIVE PERCENT: 7.8 % (ref 24–44)
MCH RBC QN AUTO: 28.2 PG (ref 27–31)
MCHC RBC AUTO-ENTMCNC: 31.1 % (ref 32–36)
MCV RBC AUTO: 90.7 FL (ref 78–100)
MONOCYTES ABSOLUTE: 0.7 K/CU MM
MONOCYTES RELATIVE PERCENT: 6.3 % (ref 0–4)
MRSA, DNA, NASAL: NEGATIVE
NUCLEATED RBC %: 0 %
O2 SAT, VEN: 84.8 % (ref 50–70)
PCO2, VEN: 44 MMHG (ref 41–51)
PDW BLD-RTO: 20.8 % (ref 11.7–14.9)
PH VENOUS: 7.45 (ref 7.32–7.43)
PLATELET # BLD: 164 K/CU MM (ref 140–440)
PMV BLD AUTO: 9.4 FL (ref 7.5–11.1)
PO2, VEN: 61 MMHG (ref 28–48)
POTASSIUM SERPL-SCNC: 5.1 MMOL/L (ref 3.5–5.1)
PRO-BNP: 2687 PG/ML
PROCALCITONIN SERPL-MCNC: 0.26 NG/ML
PROTHROMBIN TIME: 19.6 SECONDS (ref 11.7–14.5)
RBC # BLD: 3.65 M/CU MM (ref 4.6–6.2)
SEGMENTED NEUTROPHILS ABSOLUTE COUNT: 9.8 K/CU MM
SEGMENTED NEUTROPHILS RELATIVE PERCENT: 85 % (ref 36–66)
SODIUM BLD-SCNC: 133 MMOL/L (ref 135–145)
SPECIMEN DESCRIPTION: NORMAL
TOTAL IMMATURE NEUTOROPHIL: 0.04 K/CU MM
TOTAL NUCLEATED RBC: 0 K/CU MM
TOTAL PROTEIN: 5.1 GM/DL (ref 6.4–8.2)
TROPONIN, HIGH SENSITIVITY: 56 NG/L (ref 0–22)
TROPONIN, HIGH SENSITIVITY: 60 NG/L (ref 0–22)
TROPONIN, HIGH SENSITIVITY: 60 NG/L (ref 0–22)
WBC # BLD: 11.5 K/CU MM (ref 4–10.5)

## 2024-02-23 PROCEDURE — 81003 URINALYSIS AUTO W/O SCOPE: CPT

## 2024-02-23 PROCEDURE — 71045 X-RAY EXAM CHEST 1 VIEW: CPT

## 2024-02-23 PROCEDURE — 6370000000 HC RX 637 (ALT 250 FOR IP): Performed by: INTERNAL MEDICINE

## 2024-02-23 PROCEDURE — 82805 BLOOD GASES W/O2 SATURATION: CPT

## 2024-02-23 PROCEDURE — 87641 MR-STAPH DNA AMP PROBE: CPT

## 2024-02-23 PROCEDURE — 85610 PROTHROMBIN TIME: CPT

## 2024-02-23 PROCEDURE — 2700000000 HC OXYGEN THERAPY PER DAY

## 2024-02-23 PROCEDURE — 70450 CT HEAD/BRAIN W/O DYE: CPT

## 2024-02-23 PROCEDURE — 84484 ASSAY OF TROPONIN QUANT: CPT

## 2024-02-23 PROCEDURE — 86900 BLOOD TYPING SEROLOGIC ABO: CPT

## 2024-02-23 PROCEDURE — 85025 COMPLETE CBC W/AUTO DIFF WBC: CPT

## 2024-02-23 PROCEDURE — 2140000000 HC CCU INTERMEDIATE R&B

## 2024-02-23 PROCEDURE — 6360000004 HC RX CONTRAST MEDICATION: Performed by: STUDENT IN AN ORGANIZED HEALTH CARE EDUCATION/TRAINING PROGRAM

## 2024-02-23 PROCEDURE — 93306 TTE W/DOPPLER COMPLETE: CPT

## 2024-02-23 PROCEDURE — 6360000002 HC RX W HCPCS: Performed by: INTERNAL MEDICINE

## 2024-02-23 PROCEDURE — 99291 CRITICAL CARE FIRST HOUR: CPT

## 2024-02-23 PROCEDURE — 86850 RBC ANTIBODY SCREEN: CPT

## 2024-02-23 PROCEDURE — 87086 URINE CULTURE/COLONY COUNT: CPT

## 2024-02-23 PROCEDURE — 86901 BLOOD TYPING SEROLOGIC RH(D): CPT

## 2024-02-23 PROCEDURE — 87899 AGENT NOS ASSAY W/OPTIC: CPT

## 2024-02-23 PROCEDURE — 93306 TTE W/DOPPLER COMPLETE: CPT | Performed by: INTERNAL MEDICINE

## 2024-02-23 PROCEDURE — 71260 CT THORAX DX C+: CPT

## 2024-02-23 PROCEDURE — 87040 BLOOD CULTURE FOR BACTERIA: CPT

## 2024-02-23 PROCEDURE — 84145 PROCALCITONIN (PCT): CPT

## 2024-02-23 PROCEDURE — 6360000002 HC RX W HCPCS: Performed by: STUDENT IN AN ORGANIZED HEALTH CARE EDUCATION/TRAINING PROGRAM

## 2024-02-23 PROCEDURE — 83880 ASSAY OF NATRIURETIC PEPTIDE: CPT

## 2024-02-23 PROCEDURE — 2580000003 HC RX 258: Performed by: STUDENT IN AN ORGANIZED HEALTH CARE EDUCATION/TRAINING PROGRAM

## 2024-02-23 PROCEDURE — 85730 THROMBOPLASTIN TIME PARTIAL: CPT

## 2024-02-23 PROCEDURE — 96368 THER/DIAG CONCURRENT INF: CPT

## 2024-02-23 PROCEDURE — 96366 THER/PROPH/DIAG IV INF ADDON: CPT

## 2024-02-23 PROCEDURE — 2580000003 HC RX 258: Performed by: INTERNAL MEDICINE

## 2024-02-23 PROCEDURE — 93005 ELECTROCARDIOGRAM TRACING: CPT | Performed by: STUDENT IN AN ORGANIZED HEALTH CARE EDUCATION/TRAINING PROGRAM

## 2024-02-23 PROCEDURE — 74177 CT ABD & PELVIS W/CONTRAST: CPT

## 2024-02-23 PROCEDURE — 87449 NOS EACH ORGANISM AG IA: CPT

## 2024-02-23 PROCEDURE — 96365 THER/PROPH/DIAG IV INF INIT: CPT

## 2024-02-23 PROCEDURE — 83605 ASSAY OF LACTIC ACID: CPT

## 2024-02-23 PROCEDURE — 80053 COMPREHEN METABOLIC PANEL: CPT

## 2024-02-23 PROCEDURE — APPNB30 APP NON BILLABLE TIME 0-30 MINS

## 2024-02-23 PROCEDURE — 99221 1ST HOSP IP/OBS SF/LOW 40: CPT | Performed by: INTERNAL MEDICINE

## 2024-02-23 RX ORDER — SODIUM CHLORIDE 0.9 % (FLUSH) 0.9 %
5-40 SYRINGE (ML) INJECTION PRN
Status: DISCONTINUED | OUTPATIENT
Start: 2024-02-23 | End: 2024-03-03 | Stop reason: HOSPADM

## 2024-02-23 RX ORDER — ACETAMINOPHEN 325 MG/1
650 TABLET ORAL EVERY 6 HOURS PRN
Status: DISCONTINUED | OUTPATIENT
Start: 2024-02-23 | End: 2024-03-03 | Stop reason: HOSPADM

## 2024-02-23 RX ORDER — ALBUTEROL SULFATE 90 UG/1
2 AEROSOL, METERED RESPIRATORY (INHALATION) EVERY 4 HOURS PRN
Status: DISCONTINUED | OUTPATIENT
Start: 2024-02-23 | End: 2024-03-03 | Stop reason: HOSPADM

## 2024-02-23 RX ORDER — PANTOPRAZOLE SODIUM 40 MG/1
40 TABLET, DELAYED RELEASE ORAL 2 TIMES DAILY
Status: DISCONTINUED | OUTPATIENT
Start: 2024-02-23 | End: 2024-03-03 | Stop reason: HOSPADM

## 2024-02-23 RX ORDER — OLANZAPINE 2.5 MG/1
2.5 TABLET, FILM COATED ORAL NIGHTLY
Status: DISCONTINUED | OUTPATIENT
Start: 2024-02-23 | End: 2024-02-23 | Stop reason: CLARIF

## 2024-02-23 RX ORDER — 0.9 % SODIUM CHLORIDE 0.9 %
30 INTRAVENOUS SOLUTION INTRAVENOUS ONCE
Status: COMPLETED | OUTPATIENT
Start: 2024-02-23 | End: 2024-02-23

## 2024-02-23 RX ORDER — SODIUM CHLORIDE 0.9 % (FLUSH) 0.9 %
5-40 SYRINGE (ML) INJECTION EVERY 12 HOURS SCHEDULED
Status: DISCONTINUED | OUTPATIENT
Start: 2024-02-23 | End: 2024-03-03 | Stop reason: HOSPADM

## 2024-02-23 RX ORDER — CARVEDILOL 6.25 MG/1
3.12 TABLET ORAL 2 TIMES DAILY
Status: DISCONTINUED | OUTPATIENT
Start: 2024-02-23 | End: 2024-03-03 | Stop reason: HOSPADM

## 2024-02-23 RX ORDER — FERROUS SULFATE 325(65) MG
325 TABLET ORAL
Status: DISCONTINUED | OUTPATIENT
Start: 2024-02-24 | End: 2024-03-03 | Stop reason: HOSPADM

## 2024-02-23 RX ORDER — PROMETHAZINE HYDROCHLORIDE 25 MG/1
25 TABLET ORAL EVERY 6 HOURS PRN
Status: DISCONTINUED | OUTPATIENT
Start: 2024-02-23 | End: 2024-02-29

## 2024-02-23 RX ORDER — SODIUM CHLORIDE 9 MG/ML
INJECTION, SOLUTION INTRAVENOUS PRN
Status: DISCONTINUED | OUTPATIENT
Start: 2024-02-23 | End: 2024-03-03 | Stop reason: HOSPADM

## 2024-02-23 RX ORDER — ENOXAPARIN SODIUM 100 MG/ML
40 INJECTION SUBCUTANEOUS DAILY
Status: DISCONTINUED | OUTPATIENT
Start: 2024-02-23 | End: 2024-03-03 | Stop reason: HOSPADM

## 2024-02-23 RX ORDER — DULOXETIN HYDROCHLORIDE 30 MG/1
30 CAPSULE, DELAYED RELEASE ORAL DAILY
Status: DISCONTINUED | OUTPATIENT
Start: 2024-02-23 | End: 2024-03-03 | Stop reason: HOSPADM

## 2024-02-23 RX ORDER — ACETAMINOPHEN 650 MG/1
650 SUPPOSITORY RECTAL EVERY 6 HOURS PRN
Status: DISCONTINUED | OUTPATIENT
Start: 2024-02-23 | End: 2024-03-03 | Stop reason: HOSPADM

## 2024-02-23 RX ADMIN — VANCOMYCIN HYDROCHLORIDE 1750 MG: 5 INJECTION, POWDER, LYOPHILIZED, FOR SOLUTION INTRAVENOUS at 11:59

## 2024-02-23 RX ADMIN — SODIUM CHLORIDE, PRESERVATIVE FREE 10 ML: 5 INJECTION INTRAVENOUS at 21:24

## 2024-02-23 RX ADMIN — IOPAMIDOL 75 ML: 755 INJECTION, SOLUTION INTRAVENOUS at 13:32

## 2024-02-23 RX ADMIN — SODIUM CHLORIDE 2181 ML: 9 INJECTION, SOLUTION INTRAVENOUS at 11:08

## 2024-02-23 RX ADMIN — CEFEPIME 2000 MG: 2 INJECTION, POWDER, FOR SOLUTION INTRAVENOUS at 11:12

## 2024-02-23 RX ADMIN — DULOXETINE HYDROCHLORIDE 30 MG: 30 CAPSULE, DELAYED RELEASE ORAL at 20:28

## 2024-02-23 RX ADMIN — PANTOPRAZOLE SODIUM 40 MG: 40 TABLET, DELAYED RELEASE ORAL at 21:23

## 2024-02-23 RX ADMIN — CEFEPIME 2000 MG: 2 INJECTION, POWDER, FOR SOLUTION INTRAVENOUS at 20:13

## 2024-02-23 ASSESSMENT — PAIN SCALES - GENERAL
PAINLEVEL_OUTOF10: 0
PAINLEVEL_OUTOF10: 0

## 2024-02-23 NOTE — ED NOTES
ED TO INPATIENT SBAR HANDOFF    Patient Name: Arnie Martinez   :  1955  68 y.o.   Preferred Name  Arnie   Family/Caregiver Present no   Restraints no   C-SSRS: Risk of Suicide: No Risk  Sitter no   Sepsis Risk Score Sepsis Risk Score: 1.75      Situation  Chief Complaint   Patient presents with    Shortness of Breath     Was hypoxic in the 60's on EMS arrival- arrives wearing 15LPM/ non-rebreather, cancer patient      Brief Description of Patient's Condition: Patient to the ED with complaints of SOB. Patient is a cancer patient and chemo was recently stopped. EMS reports that patients O2 was 60's on arrival. Patient at 100% on non-rebreather. BNP elevated, possible mets to lungs, bilateral pleural effusions. A&O.  Mental Status: oriented, alert, coherent, logical, thought processes intact, and able to concentrate and follow conversation  Arrived from: home    Imaging:   CT CHEST PULMONARY EMBOLISM W CONTRAST   Preliminary Result   1. No evidence of pulmonary embolism with artifact limiting evaluation of the   distal pulmonary arteries.   2. Extensive ground-glass opacities throughout both lungs, which could be   related to cardiogenic pulmonary edema, ARDS, or diffuse infection.   3. Small bilateral pleural effusions, right greater than left.   4. Mild to moderate mediastinal lymphadenopathy.   5. Mild diffuse esophageal wall thickening, which could be related to   esophagitis.   6. 17 x 11 mm nodular opacity in the right lung anteriorly, with metastatic   disease not excluded.   7. Cirrhotic appearance of the liver with moderate to large amount of upper   abdominal ascites.         CT ABDOMEN PELVIS W IV CONTRAST Additional Contrast? None   Final Result   1.  Nonspecific multifocal colitis which may be infectious/inflammatory in   nature or related to portal enteropathy.      2.  Hepatic cirrhosis with portal hypertension and splenomegaly as well as   moderate to large amount of ascites.  Mild anasarca.

## 2024-02-23 NOTE — ED PROVIDER NOTES
Lymphocytes Absolute 0.9 K/CU MM    Monocytes Absolute 0.7 K/CU MM    Eosinophils Absolute 0.0 K/CU MM    Basophils Absolute 0.1 K/CU MM    Nucleated RBC % 0.0 %    Total Nucleated RBC 0.0 K/CU MM    Total Immature Neutrophil 0.04 K/CU MM    Immature Neutrophil % 0.3 0 - 0.43 %   Comprehensive Metabolic Panel   Result Value Ref Range    Sodium 133 (L) 135 - 145 MMOL/L    Potassium 5.1 3.5 - 5.1 MMOL/L    Chloride 97 (L) 99 - 110 mMol/L    CO2 28 21 - 32 MMOL/L    Anion Gap 8 7 - 16    Glucose 126 (H) 70 - 99 MG/DL    BUN 19 6 - 23 MG/DL    Creatinine 0.7 (L) 0.9 - 1.3 MG/DL    Est, Glom Filt Rate >60 >60 mL/min/1.73m2    Calcium 9.1 8.3 - 10.6 MG/DL    Total Protein 5.1 (L) 6.4 - 8.2 GM/DL    Albumin 2.2 (L) 3.4 - 5.0 GM/DL    Total Bilirubin 2.0 (H) 0.0 - 1.0 MG/DL    Alkaline Phosphatase 120 40 - 129 IU/L    ALT 10 10 - 40 U/L    AST 21 15 - 37 IU/L   Lactate, Sepsis   Result Value Ref Range    Lactic Acid, Sepsis 2.3 (HH) 0.4 - 2.0 mMOL/L   Lactate, Sepsis   Result Value Ref Range    Lactic Acid, Sepsis 1.6 0.4 - 2.0 mMOL/L   Procalcitonin   Result Value Ref Range    Procalcitonin 0.255    Troponin   Result Value Ref Range    Troponin, High Sensitivity 56 (HH) 0 - 22 ng/L   Troponin   Result Value Ref Range    Troponin, High Sensitivity 60 (HH) 0 - 22 ng/L   Protime/INR & PTT   Result Value Ref Range    Protime 19.6 (H) 11.7 - 14.5 SECONDS    INR 1.6 INDEX    aPTT 42.2 (H) 25.1 - 37.1 SECONDS   Blood Gas, Venous   Result Value Ref Range    pH, Anibal 7.45 (H) 7.32 - 7.43    pCO2, Anibal 44 41 - 51 mmHG    pO2, Anibal 61 (H) 28 - 48 mmHG    Base Exc, Mixed 5.8 (H) 0 - 3.0    HCO3, Venous 30.6 (H) 22 - 29 MMOL/L    O2 Sat, Anibal 84.8 (H) 50 - 70 %    Comment VBG    Brain Natriuretic Peptide   Result Value Ref Range    Pro-BNP 2,687 (H) <300 PG/ML   Troponin   Result Value Ref Range    Troponin, High Sensitivity 60 (HH) 0 - 22 ng/L   EKG 12 Lead   Result Value Ref Range    Ventricular Rate 101 BPM    Atrial Rate 101 BPM

## 2024-02-23 NOTE — H&P
diverticulosis without acute diverticulitis.  Normal appendix.  Mild circumferential wall thickening of the ascending, proximal/mid transverse and mid sigmoid colonic segments.  No acute obstruction.  No pneumatosis.  No mesenteric venous gas.  No free air.  No drainable fluid collection identified.  Mild distal esophageal wall thickening. Pelvis:   Hysterectomy.  Free fluid.  Otherwise unremarkable. Peritoneum/Retroperitoneum:   Moderate to large amount of free fluid. Moderate atherosclerotic calcification of the abdominal aorta and major branch vessels.  Scattered portosystemic varices within the upper abdomen most pronounced in the gastrohepatic ligament region. Bones/Soft Tissues:   No acute fracture or aggressive osseous lesion. Moderate to severe multilevel disc disease.  Mild body wall anasarca. Moderate amount of ascites within the left inguinal canal.  Few small fluid containing ventral abdominal wall hernias.     1.  Nonspecific multifocal colitis which may be infectious/inflammatory in nature or related to portal enteropathy. 2.  Hepatic cirrhosis with portal hypertension and splenomegaly as well as moderate to large amount of ascites.  Mild anasarca. 3.  Bilateral pleural effusions with chronic interstitial lung disease and superimposed ground-glass densities bilaterally possibly representing atypical infection/pneumonitis versus organizing pneumonia versus edema. 4.  Additional findings, as above.     CT HEAD WO CONTRAST    Result Date: 2/23/2024  EXAMINATION: CT OF THE HEAD WITHOUT CONTRAST  2/23/2024 1:39 pm TECHNIQUE: CT of the head was performed without the administration of intravenous contrast. Automated exposure control, iterative reconstruction, and/or weight based adjustment of the mA/kV was utilized to reduce the radiation dose to as low as reasonably achievable. COMPARISON: 11/09/2023. HISTORY: ORDERING SYSTEM PROVIDED HISTORY: AMS, r/o mets to brain TECHNOLOGIST PROVIDED HISTORY: Reason for

## 2024-02-24 LAB
ANION GAP SERPL CALCULATED.3IONS-SCNC: 6 MMOL/L (ref 7–16)
B PARAP IS1001 DNA NPH QL NAA+NON-PROBE: NOT DETECTED
B PERT.PT PRMT NPH QL NAA+NON-PROBE: NOT DETECTED
BASOPHILS ABSOLUTE: 0 K/CU MM
BASOPHILS RELATIVE PERCENT: 0.4 % (ref 0–1)
BUN SERPL-MCNC: 21 MG/DL (ref 6–23)
C PNEUM DNA NPH QL NAA+NON-PROBE: NOT DETECTED
CALCIUM SERPL-MCNC: 8.8 MG/DL (ref 8.3–10.6)
CHLORIDE BLD-SCNC: 102 MMOL/L (ref 99–110)
CO2: 27 MMOL/L (ref 21–32)
CREAT SERPL-MCNC: 0.7 MG/DL (ref 0.9–1.3)
DIFFERENTIAL TYPE: ABNORMAL
EOSINOPHILS ABSOLUTE: 0.4 K/CU MM
EOSINOPHILS RELATIVE PERCENT: 5 % (ref 0–3)
FLUAV H1 2009 PAN RNA NPH NAA+NON-PROBE: NOT DETECTED
FLUAV H1 RNA NPH QL NAA+NON-PROBE: NOT DETECTED
FLUAV H3 RNA NPH QL NAA+NON-PROBE: NOT DETECTED
FLUAV RNA NPH QL NAA+NON-PROBE: NOT DETECTED
FLUBV RNA NPH QL NAA+NON-PROBE: NOT DETECTED
GFR SERPL CREATININE-BSD FRML MDRD: >60 ML/MIN/1.73M2
GLUCOSE SERPL-MCNC: 108 MG/DL (ref 70–99)
HADV DNA NPH QL NAA+NON-PROBE: NOT DETECTED
HCOV 229E RNA NPH QL NAA+NON-PROBE: NOT DETECTED
HCOV HKU1 RNA NPH QL NAA+NON-PROBE: NOT DETECTED
HCOV NL63 RNA NPH QL NAA+NON-PROBE: NOT DETECTED
HCOV OC43 RNA NPH QL NAA+NON-PROBE: NOT DETECTED
HCT VFR BLD CALC: 29.9 % (ref 42–52)
HEMOGLOBIN: 9.1 GM/DL (ref 13.5–18)
HMPV RNA NPH QL NAA+NON-PROBE: NOT DETECTED
HPIV1 RNA NPH QL NAA+NON-PROBE: NOT DETECTED
HPIV2 RNA NPH QL NAA+NON-PROBE: NOT DETECTED
HPIV3 RNA NPH QL NAA+NON-PROBE: NOT DETECTED
HPIV4 RNA NPH QL NAA+NON-PROBE: NOT DETECTED
IMMATURE NEUTROPHIL %: 0.2 % (ref 0–0.43)
L PNEUMO AG UR QL IA: NEGATIVE
LYMPHOCYTES ABSOLUTE: 0.5 K/CU MM
LYMPHOCYTES RELATIVE PERCENT: 5.7 % (ref 24–44)
M PNEUMO DNA NPH QL NAA+NON-PROBE: NOT DETECTED
MAGNESIUM: 1.7 MG/DL (ref 1.8–2.4)
MCH RBC QN AUTO: 28.5 PG (ref 27–31)
MCHC RBC AUTO-ENTMCNC: 30.4 % (ref 32–36)
MCV RBC AUTO: 93.7 FL (ref 78–100)
MONOCYTES ABSOLUTE: 0.6 K/CU MM
MONOCYTES RELATIVE PERCENT: 6.7 % (ref 0–4)
NUCLEATED RBC %: 0 %
PDW BLD-RTO: 21 % (ref 11.7–14.9)
PHOSPHORUS: 3.7 MG/DL (ref 2.5–4.9)
PLATELET # BLD: 110 K/CU MM (ref 140–440)
PMV BLD AUTO: 9.1 FL (ref 7.5–11.1)
POTASSIUM SERPL-SCNC: 4.5 MMOL/L (ref 3.5–5.1)
PRO-BNP: 2865 PG/ML
RBC # BLD: 3.19 M/CU MM (ref 4.6–6.2)
RSV RNA NPH QL NAA+NON-PROBE: NOT DETECTED
RV+EV RNA NPH QL NAA+NON-PROBE: NOT DETECTED
S PNEUM AG CSF QL: NORMAL
SARS-COV-2 RNA NPH QL NAA+NON-PROBE: NOT DETECTED
SEGMENTED NEUTROPHILS ABSOLUTE COUNT: 6.9 K/CU MM
SEGMENTED NEUTROPHILS RELATIVE PERCENT: 82 % (ref 36–66)
SODIUM BLD-SCNC: 135 MMOL/L (ref 135–145)
TOTAL IMMATURE NEUTOROPHIL: 0.02 K/CU MM
TOTAL NUCLEATED RBC: 0 K/CU MM
WBC # BLD: 8.4 K/CU MM (ref 4–10.5)

## 2024-02-24 PROCEDURE — 92610 EVALUATE SWALLOWING FUNCTION: CPT

## 2024-02-24 PROCEDURE — 2700000000 HC OXYGEN THERAPY PER DAY

## 2024-02-24 PROCEDURE — 36415 COLL VENOUS BLD VENIPUNCTURE: CPT

## 2024-02-24 PROCEDURE — 80048 BASIC METABOLIC PNL TOTAL CA: CPT

## 2024-02-24 PROCEDURE — 84100 ASSAY OF PHOSPHORUS: CPT

## 2024-02-24 PROCEDURE — 6360000002 HC RX W HCPCS: Performed by: INTERNAL MEDICINE

## 2024-02-24 PROCEDURE — 94761 N-INVAS EAR/PLS OXIMETRY MLT: CPT

## 2024-02-24 PROCEDURE — 83880 ASSAY OF NATRIURETIC PEPTIDE: CPT

## 2024-02-24 PROCEDURE — 85025 COMPLETE CBC W/AUTO DIFF WBC: CPT

## 2024-02-24 PROCEDURE — 2580000003 HC RX 258: Performed by: INTERNAL MEDICINE

## 2024-02-24 PROCEDURE — 0202U NFCT DS 22 TRGT SARS-COV-2: CPT

## 2024-02-24 PROCEDURE — 6360000002 HC RX W HCPCS: Performed by: STUDENT IN AN ORGANIZED HEALTH CARE EDUCATION/TRAINING PROGRAM

## 2024-02-24 PROCEDURE — 2140000000 HC CCU INTERMEDIATE R&B

## 2024-02-24 PROCEDURE — 83735 ASSAY OF MAGNESIUM: CPT

## 2024-02-24 PROCEDURE — 6370000000 HC RX 637 (ALT 250 FOR IP): Performed by: INTERNAL MEDICINE

## 2024-02-24 PROCEDURE — 5A09357 ASSISTANCE WITH RESPIRATORY VENTILATION, LESS THAN 24 CONSECUTIVE HOURS, CONTINUOUS POSITIVE AIRWAY PRESSURE: ICD-10-PCS | Performed by: INTERNAL MEDICINE

## 2024-02-24 PROCEDURE — 94660 CPAP INITIATION&MGMT: CPT

## 2024-02-24 RX ORDER — FUROSEMIDE 10 MG/ML
40 INJECTION INTRAMUSCULAR; INTRAVENOUS 3 TIMES DAILY
Status: DISCONTINUED | OUTPATIENT
Start: 2024-02-24 | End: 2024-03-03 | Stop reason: HOSPADM

## 2024-02-24 RX ADMIN — CEFEPIME 2000 MG: 2 INJECTION, POWDER, FOR SOLUTION INTRAVENOUS at 02:57

## 2024-02-24 RX ADMIN — DULOXETINE HYDROCHLORIDE 30 MG: 30 CAPSULE, DELAYED RELEASE ORAL at 21:37

## 2024-02-24 RX ADMIN — PANTOPRAZOLE SODIUM 40 MG: 40 TABLET, DELAYED RELEASE ORAL at 21:32

## 2024-02-24 RX ADMIN — PANTOPRAZOLE SODIUM 40 MG: 40 TABLET, DELAYED RELEASE ORAL at 08:35

## 2024-02-24 RX ADMIN — SODIUM CHLORIDE, PRESERVATIVE FREE 10 ML: 5 INJECTION INTRAVENOUS at 08:35

## 2024-02-24 RX ADMIN — CARVEDILOL 3.12 MG: 6.25 TABLET, FILM COATED ORAL at 21:52

## 2024-02-24 RX ADMIN — SODIUM CHLORIDE, PRESERVATIVE FREE 10 ML: 5 INJECTION INTRAVENOUS at 21:32

## 2024-02-24 RX ADMIN — CARVEDILOL 3.12 MG: 6.25 TABLET, FILM COATED ORAL at 08:34

## 2024-02-24 RX ADMIN — FUROSEMIDE 40 MG: 10 INJECTION, SOLUTION INTRAMUSCULAR; INTRAVENOUS at 21:32

## 2024-02-24 RX ADMIN — FERROUS SULFATE TAB 325 MG (65 MG ELEMENTAL FE) 325 MG: 325 (65 FE) TAB at 08:35

## 2024-02-24 RX ADMIN — CEFEPIME 2000 MG: 2 INJECTION, POWDER, FOR SOLUTION INTRAVENOUS at 18:48

## 2024-02-24 RX ADMIN — FUROSEMIDE 40 MG: 10 INJECTION, SOLUTION INTRAMUSCULAR; INTRAVENOUS at 13:27

## 2024-02-24 RX ADMIN — SODIUM CHLORIDE, PRESERVATIVE FREE 10 ML: 5 INJECTION INTRAVENOUS at 21:36

## 2024-02-24 RX ADMIN — CEFEPIME 2000 MG: 2 INJECTION, POWDER, FOR SOLUTION INTRAVENOUS at 11:36

## 2024-02-24 ASSESSMENT — PAIN SCALES - GENERAL
PAINLEVEL_OUTOF10: 0

## 2024-02-24 NOTE — PLAN OF CARE
Problem: Discharge Planning  Goal: Discharge to home or other facility with appropriate resources  2/24/2024 1025 by Mariaelena Tavarez, RN  Outcome: Progressing  2/24/2024 0133 by Joselin Blair RN  Outcome: Progressing     Problem: Skin/Tissue Integrity  Goal: Absence of new skin breakdown  Description: 1.  Monitor for areas of redness and/or skin breakdown  2.  Assess vascular access sites hourly  3.  Every 4-6 hours minimum:  Change oxygen saturation probe site  4.  Every 4-6 hours:  If on nasal continuous positive airway pressure, respiratory therapy assess nares and determine need for appliance change or resting period.  2/24/2024 1025 by Mariaelena Tavarez, RN  Outcome: Progressing  2/24/2024 0133 by Joselin Blair RN  Outcome: Progressing     Problem: Safety - Adult  Goal: Free from fall injury  2/24/2024 1025 by Mariaelena Tavarez, RN  Outcome: Progressing  2/24/2024 0133 by Joselin Blair RN  Outcome: Progressing

## 2024-02-25 LAB
ANION GAP SERPL CALCULATED.3IONS-SCNC: 8 MMOL/L (ref 7–16)
BASOPHILS ABSOLUTE: 0.1 K/CU MM
BASOPHILS RELATIVE PERCENT: 0.6 % (ref 0–1)
BUN SERPL-MCNC: 20 MG/DL (ref 6–23)
CALCIUM SERPL-MCNC: 9.1 MG/DL (ref 8.3–10.6)
CHLORIDE BLD-SCNC: 97 MMOL/L (ref 99–110)
CO2: 31 MMOL/L (ref 21–32)
CREAT SERPL-MCNC: 0.6 MG/DL (ref 0.9–1.3)
CULTURE: NORMAL
DIFFERENTIAL TYPE: ABNORMAL
EOSINOPHILS ABSOLUTE: 1.2 K/CU MM
EOSINOPHILS RELATIVE PERCENT: 13 % (ref 0–3)
GFR SERPL CREATININE-BSD FRML MDRD: >60 ML/MIN/1.73M2
GLUCOSE SERPL-MCNC: 128 MG/DL (ref 70–99)
HCT VFR BLD CALC: 34.3 % (ref 42–52)
HEMOGLOBIN: 10.5 GM/DL (ref 13.5–18)
IMMATURE NEUTROPHIL %: 0.4 % (ref 0–0.43)
LYMPHOCYTES ABSOLUTE: 0.5 K/CU MM
LYMPHOCYTES RELATIVE PERCENT: 4.9 % (ref 24–44)
Lab: NORMAL
MAGNESIUM: 1.8 MG/DL (ref 1.8–2.4)
MCH RBC QN AUTO: 28.7 PG (ref 27–31)
MCHC RBC AUTO-ENTMCNC: 30.6 % (ref 32–36)
MCV RBC AUTO: 93.7 FL (ref 78–100)
MONOCYTES ABSOLUTE: 0.6 K/CU MM
MONOCYTES RELATIVE PERCENT: 5.9 % (ref 0–4)
NUCLEATED RBC %: 0 %
PDW BLD-RTO: 20.9 % (ref 11.7–14.9)
PHOSPHORUS: 3.2 MG/DL (ref 2.5–4.9)
PLATELET # BLD: 121 K/CU MM (ref 140–440)
PMV BLD AUTO: 9.6 FL (ref 7.5–11.1)
POTASSIUM SERPL-SCNC: 3.9 MMOL/L (ref 3.5–5.1)
PRO-BNP: 1691 PG/ML
RBC # BLD: 3.66 M/CU MM (ref 4.6–6.2)
SEGMENTED NEUTROPHILS ABSOLUTE COUNT: 7.2 K/CU MM
SEGMENTED NEUTROPHILS RELATIVE PERCENT: 75.2 % (ref 36–66)
SODIUM BLD-SCNC: 136 MMOL/L (ref 135–145)
SPECIMEN: NORMAL
TOTAL IMMATURE NEUTOROPHIL: 0.04 K/CU MM
TOTAL NUCLEATED RBC: 0 K/CU MM
WBC # BLD: 9.6 K/CU MM (ref 4–10.5)

## 2024-02-25 PROCEDURE — 2700000000 HC OXYGEN THERAPY PER DAY

## 2024-02-25 PROCEDURE — 85025 COMPLETE CBC W/AUTO DIFF WBC: CPT

## 2024-02-25 PROCEDURE — 2140000000 HC CCU INTERMEDIATE R&B

## 2024-02-25 PROCEDURE — 83735 ASSAY OF MAGNESIUM: CPT

## 2024-02-25 PROCEDURE — 83880 ASSAY OF NATRIURETIC PEPTIDE: CPT

## 2024-02-25 PROCEDURE — 99223 1ST HOSP IP/OBS HIGH 75: CPT | Performed by: INTERNAL MEDICINE

## 2024-02-25 PROCEDURE — 2580000003 HC RX 258: Performed by: INTERNAL MEDICINE

## 2024-02-25 PROCEDURE — 94761 N-INVAS EAR/PLS OXIMETRY MLT: CPT

## 2024-02-25 PROCEDURE — 36415 COLL VENOUS BLD VENIPUNCTURE: CPT

## 2024-02-25 PROCEDURE — 6370000000 HC RX 637 (ALT 250 FOR IP): Performed by: INTERNAL MEDICINE

## 2024-02-25 PROCEDURE — 80048 BASIC METABOLIC PNL TOTAL CA: CPT

## 2024-02-25 PROCEDURE — 84100 ASSAY OF PHOSPHORUS: CPT

## 2024-02-25 PROCEDURE — 6360000002 HC RX W HCPCS: Performed by: STUDENT IN AN ORGANIZED HEALTH CARE EDUCATION/TRAINING PROGRAM

## 2024-02-25 PROCEDURE — 6360000002 HC RX W HCPCS: Performed by: INTERNAL MEDICINE

## 2024-02-25 PROCEDURE — 6370000000 HC RX 637 (ALT 250 FOR IP): Performed by: STUDENT IN AN ORGANIZED HEALTH CARE EDUCATION/TRAINING PROGRAM

## 2024-02-25 RX ORDER — PREDNISONE 10 MG/1
10 TABLET ORAL DAILY
Status: DISCONTINUED | OUTPATIENT
Start: 2024-02-25 | End: 2024-03-03 | Stop reason: HOSPADM

## 2024-02-25 RX ORDER — SPIRONOLACTONE 50 MG/1
25 TABLET, FILM COATED ORAL DAILY
Status: DISCONTINUED | OUTPATIENT
Start: 2024-02-25 | End: 2024-03-03 | Stop reason: HOSPADM

## 2024-02-25 RX ADMIN — CARVEDILOL 3.12 MG: 6.25 TABLET, FILM COATED ORAL at 08:47

## 2024-02-25 RX ADMIN — CEFEPIME 2000 MG: 2 INJECTION, POWDER, FOR SOLUTION INTRAVENOUS at 02:46

## 2024-02-25 RX ADMIN — PREDNISONE 10 MG: 10 TABLET ORAL at 11:19

## 2024-02-25 RX ADMIN — FUROSEMIDE 40 MG: 10 INJECTION, SOLUTION INTRAMUSCULAR; INTRAVENOUS at 08:46

## 2024-02-25 RX ADMIN — ACETAMINOPHEN, ASPIRIN, CAFFEINE 1 TABLET: 250; 65; 250 TABLET, FILM COATED ORAL at 22:44

## 2024-02-25 RX ADMIN — CEFEPIME 2000 MG: 2 INJECTION, POWDER, FOR SOLUTION INTRAVENOUS at 19:50

## 2024-02-25 RX ADMIN — SODIUM CHLORIDE, PRESERVATIVE FREE 10 ML: 5 INJECTION INTRAVENOUS at 08:47

## 2024-02-25 RX ADMIN — PANTOPRAZOLE SODIUM 40 MG: 40 TABLET, DELAYED RELEASE ORAL at 19:47

## 2024-02-25 RX ADMIN — FUROSEMIDE 40 MG: 10 INJECTION, SOLUTION INTRAMUSCULAR; INTRAVENOUS at 15:27

## 2024-02-25 RX ADMIN — SPIRONOLACTONE 25 MG: 50 TABLET ORAL at 11:19

## 2024-02-25 RX ADMIN — FUROSEMIDE 40 MG: 10 INJECTION, SOLUTION INTRAMUSCULAR; INTRAVENOUS at 19:50

## 2024-02-25 RX ADMIN — FERROUS SULFATE TAB 325 MG (65 MG ELEMENTAL FE) 325 MG: 325 (65 FE) TAB at 08:47

## 2024-02-25 RX ADMIN — DULOXETINE HYDROCHLORIDE 30 MG: 30 CAPSULE, DELAYED RELEASE ORAL at 19:47

## 2024-02-25 RX ADMIN — CARVEDILOL 3.12 MG: 6.25 TABLET, FILM COATED ORAL at 22:44

## 2024-02-25 RX ADMIN — ENOXAPARIN SODIUM 40 MG: 100 INJECTION SUBCUTANEOUS at 08:46

## 2024-02-25 RX ADMIN — PANTOPRAZOLE SODIUM 40 MG: 40 TABLET, DELAYED RELEASE ORAL at 08:47

## 2024-02-25 RX ADMIN — SODIUM CHLORIDE, PRESERVATIVE FREE 10 ML: 5 INJECTION INTRAVENOUS at 19:48

## 2024-02-25 RX ADMIN — ACETAMINOPHEN 650 MG: 325 TABLET ORAL at 13:20

## 2024-02-25 RX ADMIN — CEFEPIME 2000 MG: 2 INJECTION, POWDER, FOR SOLUTION INTRAVENOUS at 11:09

## 2024-02-25 ASSESSMENT — PAIN SCALES - GENERAL
PAINLEVEL_OUTOF10: 4
PAINLEVEL_OUTOF10: 0
PAINLEVEL_OUTOF10: 9

## 2024-02-25 ASSESSMENT — PAIN DESCRIPTION - LOCATION
LOCATION: HEAD
LOCATION: HEAD

## 2024-02-25 ASSESSMENT — PAIN DESCRIPTION - DESCRIPTORS
DESCRIPTORS: ACHING
DESCRIPTORS: ACHING

## 2024-02-26 LAB
ANION GAP SERPL CALCULATED.3IONS-SCNC: 5 MMOL/L (ref 7–16)
BASOPHILS ABSOLUTE: 0 K/CU MM
BASOPHILS RELATIVE PERCENT: 0.5 % (ref 0–1)
BUN SERPL-MCNC: 23 MG/DL (ref 6–23)
CALCIUM SERPL-MCNC: 8.9 MG/DL (ref 8.3–10.6)
CHLORIDE BLD-SCNC: 94 MMOL/L (ref 99–110)
CO2: 34 MMOL/L (ref 21–32)
CREAT SERPL-MCNC: 0.7 MG/DL (ref 0.9–1.3)
DIFFERENTIAL TYPE: ABNORMAL
EKG ATRIAL RATE: 101 BPM
EKG DIAGNOSIS: NORMAL
EKG P AXIS: 54 DEGREES
EKG P-R INTERVAL: 216 MS
EKG Q-T INTERVAL: 346 MS
EKG QRS DURATION: 76 MS
EKG QTC CALCULATION (BAZETT): 448 MS
EKG R AXIS: 24 DEGREES
EKG T AXIS: -40 DEGREES
EKG VENTRICULAR RATE: 101 BPM
EOSINOPHILS ABSOLUTE: 0.4 K/CU MM
EOSINOPHILS RELATIVE PERCENT: 5.8 % (ref 0–3)
GFR SERPL CREATININE-BSD FRML MDRD: >60 ML/MIN/1.73M2
GLUCOSE SERPL-MCNC: 124 MG/DL (ref 70–99)
HCT VFR BLD CALC: 30.8 % (ref 42–52)
HEMOGLOBIN: 9.6 GM/DL (ref 13.5–18)
IMMATURE NEUTROPHIL %: 0.3 % (ref 0–0.43)
LYMPHOCYTES ABSOLUTE: 0.4 K/CU MM
LYMPHOCYTES RELATIVE PERCENT: 7 % (ref 24–44)
MAGNESIUM: 1.8 MG/DL (ref 1.8–2.4)
MCH RBC QN AUTO: 29.3 PG (ref 27–31)
MCHC RBC AUTO-ENTMCNC: 31.2 % (ref 32–36)
MCV RBC AUTO: 93.9 FL (ref 78–100)
MONOCYTES ABSOLUTE: 0.4 K/CU MM
MONOCYTES RELATIVE PERCENT: 5.7 % (ref 0–4)
NUCLEATED RBC %: 0 %
PDW BLD-RTO: 20.5 % (ref 11.7–14.9)
PHOSPHORUS: 3.2 MG/DL (ref 2.5–4.9)
PLATELET # BLD: ABNORMAL K/CU MM (ref 140–440)
POTASSIUM SERPL-SCNC: 3.8 MMOL/L (ref 3.5–5.1)
PRO-BNP: 1370 PG/ML
RBC # BLD: 3.28 M/CU MM (ref 4.6–6.2)
SEGMENTED NEUTROPHILS ABSOLUTE COUNT: 5 K/CU MM
SEGMENTED NEUTROPHILS RELATIVE PERCENT: 80.7 % (ref 36–66)
SODIUM BLD-SCNC: 133 MMOL/L (ref 135–145)
TOTAL IMMATURE NEUTOROPHIL: 0.02 K/CU MM
TOTAL NUCLEATED RBC: 0 K/CU MM
WBC # BLD: 6.2 K/CU MM (ref 4–10.5)

## 2024-02-26 PROCEDURE — 6360000002 HC RX W HCPCS: Performed by: STUDENT IN AN ORGANIZED HEALTH CARE EDUCATION/TRAINING PROGRAM

## 2024-02-26 PROCEDURE — 6370000000 HC RX 637 (ALT 250 FOR IP): Performed by: STUDENT IN AN ORGANIZED HEALTH CARE EDUCATION/TRAINING PROGRAM

## 2024-02-26 PROCEDURE — 84100 ASSAY OF PHOSPHORUS: CPT

## 2024-02-26 PROCEDURE — 6370000000 HC RX 637 (ALT 250 FOR IP): Performed by: INTERNAL MEDICINE

## 2024-02-26 PROCEDURE — 2140000000 HC CCU INTERMEDIATE R&B

## 2024-02-26 PROCEDURE — 80048 BASIC METABOLIC PNL TOTAL CA: CPT

## 2024-02-26 PROCEDURE — 94761 N-INVAS EAR/PLS OXIMETRY MLT: CPT

## 2024-02-26 PROCEDURE — 93010 ELECTROCARDIOGRAM REPORT: CPT | Performed by: INTERNAL MEDICINE

## 2024-02-26 PROCEDURE — 2580000003 HC RX 258: Performed by: INTERNAL MEDICINE

## 2024-02-26 PROCEDURE — 2700000000 HC OXYGEN THERAPY PER DAY

## 2024-02-26 PROCEDURE — 83880 ASSAY OF NATRIURETIC PEPTIDE: CPT

## 2024-02-26 PROCEDURE — 85025 COMPLETE CBC W/AUTO DIFF WBC: CPT

## 2024-02-26 PROCEDURE — 36415 COLL VENOUS BLD VENIPUNCTURE: CPT

## 2024-02-26 PROCEDURE — 83735 ASSAY OF MAGNESIUM: CPT

## 2024-02-26 PROCEDURE — 6360000002 HC RX W HCPCS: Performed by: INTERNAL MEDICINE

## 2024-02-26 PROCEDURE — 99232 SBSQ HOSP IP/OBS MODERATE 35: CPT | Performed by: PHYSICIAN ASSISTANT

## 2024-02-26 RX ADMIN — SODIUM CHLORIDE, PRESERVATIVE FREE 10 ML: 5 INJECTION INTRAVENOUS at 09:06

## 2024-02-26 RX ADMIN — PREDNISONE 10 MG: 10 TABLET ORAL at 09:05

## 2024-02-26 RX ADMIN — DULOXETINE HYDROCHLORIDE 30 MG: 30 CAPSULE, DELAYED RELEASE ORAL at 20:00

## 2024-02-26 RX ADMIN — CARVEDILOL 3.12 MG: 6.25 TABLET, FILM COATED ORAL at 09:05

## 2024-02-26 RX ADMIN — ACETAMINOPHEN, ASPIRIN, CAFFEINE 1 TABLET: 250; 65; 250 TABLET, FILM COATED ORAL at 11:38

## 2024-02-26 RX ADMIN — ENOXAPARIN SODIUM 40 MG: 100 INJECTION SUBCUTANEOUS at 09:05

## 2024-02-26 RX ADMIN — SODIUM CHLORIDE, PRESERVATIVE FREE 10 ML: 5 INJECTION INTRAVENOUS at 20:06

## 2024-02-26 RX ADMIN — CEFEPIME 2000 MG: 2 INJECTION, POWDER, FOR SOLUTION INTRAVENOUS at 19:57

## 2024-02-26 RX ADMIN — CEFEPIME 2000 MG: 2 INJECTION, POWDER, FOR SOLUTION INTRAVENOUS at 11:47

## 2024-02-26 RX ADMIN — PANTOPRAZOLE SODIUM 40 MG: 40 TABLET, DELAYED RELEASE ORAL at 09:04

## 2024-02-26 RX ADMIN — FUROSEMIDE 40 MG: 10 INJECTION, SOLUTION INTRAMUSCULAR; INTRAVENOUS at 17:05

## 2024-02-26 RX ADMIN — FERROUS SULFATE TAB 325 MG (65 MG ELEMENTAL FE) 325 MG: 325 (65 FE) TAB at 09:05

## 2024-02-26 RX ADMIN — CEFEPIME 2000 MG: 2 INJECTION, POWDER, FOR SOLUTION INTRAVENOUS at 02:53

## 2024-02-26 RX ADMIN — CARVEDILOL 3.12 MG: 6.25 TABLET, FILM COATED ORAL at 19:59

## 2024-02-26 RX ADMIN — SPIRONOLACTONE 25 MG: 50 TABLET ORAL at 09:04

## 2024-02-26 RX ADMIN — FUROSEMIDE 40 MG: 10 INJECTION, SOLUTION INTRAMUSCULAR; INTRAVENOUS at 20:02

## 2024-02-26 RX ADMIN — SODIUM CHLORIDE: 9 INJECTION, SOLUTION INTRAVENOUS at 11:41

## 2024-02-26 RX ADMIN — PANTOPRAZOLE SODIUM 40 MG: 40 TABLET, DELAYED RELEASE ORAL at 19:59

## 2024-02-26 RX ADMIN — FUROSEMIDE 40 MG: 10 INJECTION, SOLUTION INTRAMUSCULAR; INTRAVENOUS at 09:05

## 2024-02-26 ASSESSMENT — PAIN - FUNCTIONAL ASSESSMENT
PAIN_FUNCTIONAL_ASSESSMENT: ACTIVITIES ARE NOT PREVENTED
PAIN_FUNCTIONAL_ASSESSMENT: ACTIVITIES ARE NOT PREVENTED

## 2024-02-26 ASSESSMENT — PAIN DESCRIPTION - DESCRIPTORS
DESCRIPTORS: DISCOMFORT
DESCRIPTORS: ACHING

## 2024-02-26 ASSESSMENT — PAIN DESCRIPTION - LOCATION
LOCATION: HEAD
LOCATION: HEAD

## 2024-02-26 ASSESSMENT — PAIN SCALES - GENERAL
PAINLEVEL_OUTOF10: 8
PAINLEVEL_OUTOF10: 0
PAINLEVEL_OUTOF10: 3
PAINLEVEL_OUTOF10: 7

## 2024-02-26 NOTE — CARE COORDINATION
02/26/24 1318   Service Assessment   Patient Orientation Alert and Oriented;Person;Place;Situation   Cognition Alert   History Provided By Patient;Significant Other;Medical Record   Primary Caregiver Spouse   Accompanied By/Relationship pt's wife   Support Systems Spouse/Significant Other   Patient's Healthcare Decision Maker is: Named in Scanned ACP Document   PCP Verified by CM Yes   Last Visit to PCP   (unknown)   Prior Functional Level Assistance with the following:;Bathing;Dressing;Toileting;Mobility   Current Functional Level Assistance with the following:;Bathing;Dressing;Toileting;Mobility   Can patient return to prior living arrangement Yes   Ability to make needs known: Good   Family able to assist with home care needs: Yes   Would you like for me to discuss the discharge plan with any other family members/significant others, and if so, who? Yes  (pt's wife)   Financial Resources Medicare   Community Resources None   CM/SW Referral Other (see comment)  (dc planning)     CM reviewed chart and met with pt at bedside. Pt has PCP/Ins. From home; lives with wife and 90 year old mother. Has walker, cane, and shower chair. Pt's wife states that pt would benefit from a rolling walker with a chair on it. Pt is not willing to go to SNF if it is recommended but is willing to have HHC. Pt's wife is his primary caretaker and assists with all care; transportation, ADL's, and cares for pt's 90 year old mother. Pt's wife states that they have arranged for pt's mother to go live with pt's sister because it has become too much on pt's wife. CM notes that chemotherapy was paused on 1/9 until further notice. Pt does not want to do chemo unless he gets his mobility back. CM will follow for further dc needs. PS sent to Dr for 4 wheeled rolling walker.      Arnie Martinez was evaluated today and a DME order was entered for a wheeled walker with seat because he requires this to successfully complete daily living tasks of

## 2024-02-26 NOTE — CARE COORDINATION
Pt does not have a DME store preference.  [facility] does not accept pt's insurance.  Referral made to [facility] for walker with seat d/t they accept pt's insurance. Notified pt and provided him with the number and address of [facility]. TE

## 2024-02-26 NOTE — PLAN OF CARE
Problem: Discharge Planning  Goal: Discharge to home or other facility with appropriate resources  2/26/2024 1617 by Megan Gomes RN  Outcome: Progressing  2/26/2024 1617 by Megan Gomes RN  Outcome: Progressing     Problem: Skin/Tissue Integrity  Goal: Absence of new skin breakdown  Description: 1.  Monitor for areas of redness and/or skin breakdown  2.  Assess vascular access sites hourly  3.  Every 4-6 hours minimum:  Change oxygen saturation probe site  4.  Every 4-6 hours:  If on nasal continuous positive airway pressure, respiratory therapy assess nares and determine need for appliance change or resting period.  2/26/2024 1617 by Megan Gomes RN  Outcome: Progressing  2/26/2024 1617 by Megan Gomes RN  Outcome: Progressing     Problem: Safety - Adult  Goal: Free from fall injury  2/26/2024 1617 by Megan Gomes RN  Outcome: Progressing  2/26/2024 1617 by Megan Gomes RN  Outcome: Progressing     Problem: Pain  Goal: Verbalizes/displays adequate comfort level or baseline comfort level  2/26/2024 1617 by Megan Gomes RN  Outcome: Progressing  2/26/2024 1617 by Megan Gomes RN  Outcome: Progressing

## 2024-02-27 ENCOUNTER — APPOINTMENT (OUTPATIENT)
Dept: ULTRASOUND IMAGING | Age: 69
DRG: 871 | End: 2024-02-27
Payer: MEDICARE

## 2024-02-27 LAB
ANION GAP SERPL CALCULATED.3IONS-SCNC: 7 MMOL/L (ref 7–16)
BUN SERPL-MCNC: 25 MG/DL (ref 6–23)
CALCIUM SERPL-MCNC: 9.5 MG/DL (ref 8.3–10.6)
CHLORIDE BLD-SCNC: 91 MMOL/L (ref 99–110)
CO2: 37 MMOL/L (ref 21–32)
CREAT SERPL-MCNC: 0.6 MG/DL (ref 0.9–1.3)
GFR SERPL CREATININE-BSD FRML MDRD: >60 ML/MIN/1.73M2
GLUCOSE SERPL-MCNC: 135 MG/DL (ref 70–99)
POTASSIUM SERPL-SCNC: 3.6 MMOL/L (ref 3.5–5.1)
PRO-BNP: 965 PG/ML
SODIUM BLD-SCNC: 135 MMOL/L (ref 135–145)

## 2024-02-27 PROCEDURE — 6360000002 HC RX W HCPCS: Performed by: STUDENT IN AN ORGANIZED HEALTH CARE EDUCATION/TRAINING PROGRAM

## 2024-02-27 PROCEDURE — 97112 NEUROMUSCULAR REEDUCATION: CPT

## 2024-02-27 PROCEDURE — 80048 BASIC METABOLIC PNL TOTAL CA: CPT

## 2024-02-27 PROCEDURE — 97530 THERAPEUTIC ACTIVITIES: CPT

## 2024-02-27 PROCEDURE — 36415 COLL VENOUS BLD VENIPUNCTURE: CPT

## 2024-02-27 PROCEDURE — 83880 ASSAY OF NATRIURETIC PEPTIDE: CPT

## 2024-02-27 PROCEDURE — 6360000002 HC RX W HCPCS: Performed by: INTERNAL MEDICINE

## 2024-02-27 PROCEDURE — 94761 N-INVAS EAR/PLS OXIMETRY MLT: CPT

## 2024-02-27 PROCEDURE — 2700000000 HC OXYGEN THERAPY PER DAY

## 2024-02-27 PROCEDURE — 2140000000 HC CCU INTERMEDIATE R&B

## 2024-02-27 PROCEDURE — 6370000000 HC RX 637 (ALT 250 FOR IP): Performed by: STUDENT IN AN ORGANIZED HEALTH CARE EDUCATION/TRAINING PROGRAM

## 2024-02-27 PROCEDURE — 93971 EXTREMITY STUDY: CPT

## 2024-02-27 PROCEDURE — 2580000003 HC RX 258: Performed by: INTERNAL MEDICINE

## 2024-02-27 PROCEDURE — 99232 SBSQ HOSP IP/OBS MODERATE 35: CPT | Performed by: INTERNAL MEDICINE

## 2024-02-27 PROCEDURE — 97162 PT EVAL MOD COMPLEX 30 MIN: CPT

## 2024-02-27 PROCEDURE — 97166 OT EVAL MOD COMPLEX 45 MIN: CPT

## 2024-02-27 PROCEDURE — 6370000000 HC RX 637 (ALT 250 FOR IP): Performed by: INTERNAL MEDICINE

## 2024-02-27 RX ORDER — SENNOSIDES A AND B 8.6 MG/1
1 TABLET, FILM COATED ORAL NIGHTLY
Status: DISCONTINUED | OUTPATIENT
Start: 2024-02-27 | End: 2024-03-03 | Stop reason: HOSPADM

## 2024-02-27 RX ORDER — POLYETHYLENE GLYCOL 3350 17 G/17G
17 POWDER, FOR SOLUTION ORAL DAILY
Status: DISCONTINUED | OUTPATIENT
Start: 2024-02-27 | End: 2024-03-03 | Stop reason: HOSPADM

## 2024-02-27 RX ADMIN — POLYETHYLENE GLYCOL (3350) 17 G: 17 POWDER, FOR SOLUTION ORAL at 22:16

## 2024-02-27 RX ADMIN — SPIRONOLACTONE 25 MG: 50 TABLET ORAL at 08:34

## 2024-02-27 RX ADMIN — CEFEPIME 2000 MG: 2 INJECTION, POWDER, FOR SOLUTION INTRAVENOUS at 23:01

## 2024-02-27 RX ADMIN — ACETAMINOPHEN, ASPIRIN, CAFFEINE 1 TABLET: 250; 65; 250 TABLET, FILM COATED ORAL at 08:34

## 2024-02-27 RX ADMIN — FUROSEMIDE 40 MG: 10 INJECTION, SOLUTION INTRAMUSCULAR; INTRAVENOUS at 22:17

## 2024-02-27 RX ADMIN — PANTOPRAZOLE SODIUM 40 MG: 40 TABLET, DELAYED RELEASE ORAL at 22:16

## 2024-02-27 RX ADMIN — FUROSEMIDE 40 MG: 10 INJECTION, SOLUTION INTRAMUSCULAR; INTRAVENOUS at 08:35

## 2024-02-27 RX ADMIN — PREDNISONE 10 MG: 10 TABLET ORAL at 08:35

## 2024-02-27 RX ADMIN — DULOXETINE HYDROCHLORIDE 30 MG: 30 CAPSULE, DELAYED RELEASE ORAL at 22:16

## 2024-02-27 RX ADMIN — CEFEPIME 2000 MG: 2 INJECTION, POWDER, FOR SOLUTION INTRAVENOUS at 04:42

## 2024-02-27 RX ADMIN — SENNOSIDES 8.6 MG: 8.6 TABLET, FILM COATED ORAL at 22:16

## 2024-02-27 RX ADMIN — CARVEDILOL 3.12 MG: 6.25 TABLET, FILM COATED ORAL at 08:35

## 2024-02-27 RX ADMIN — FERROUS SULFATE TAB 325 MG (65 MG ELEMENTAL FE) 325 MG: 325 (65 FE) TAB at 08:35

## 2024-02-27 RX ADMIN — PANTOPRAZOLE SODIUM 40 MG: 40 TABLET, DELAYED RELEASE ORAL at 08:34

## 2024-02-27 RX ADMIN — FUROSEMIDE 40 MG: 10 INJECTION, SOLUTION INTRAMUSCULAR; INTRAVENOUS at 15:48

## 2024-02-27 RX ADMIN — CEFEPIME 2000 MG: 2 INJECTION, POWDER, FOR SOLUTION INTRAVENOUS at 11:13

## 2024-02-27 RX ADMIN — SODIUM CHLORIDE, PRESERVATIVE FREE 10 ML: 5 INJECTION INTRAVENOUS at 22:17

## 2024-02-27 RX ADMIN — ENOXAPARIN SODIUM 40 MG: 100 INJECTION SUBCUTANEOUS at 08:34

## 2024-02-27 RX ADMIN — CARVEDILOL 3.12 MG: 6.25 TABLET, FILM COATED ORAL at 22:15

## 2024-02-27 RX ADMIN — ACETAMINOPHEN 650 MG: 325 TABLET ORAL at 22:16

## 2024-02-27 RX ADMIN — SODIUM CHLORIDE, PRESERVATIVE FREE 10 ML: 5 INJECTION INTRAVENOUS at 08:33

## 2024-02-27 ASSESSMENT — PAIN DESCRIPTION - ORIENTATION: ORIENTATION: RIGHT;LEFT

## 2024-02-27 ASSESSMENT — PAIN DESCRIPTION - LOCATION
LOCATION: HEAD
LOCATION: HEAD
LOCATION: GENERALIZED

## 2024-02-27 ASSESSMENT — PAIN DESCRIPTION - DESCRIPTORS
DESCRIPTORS: DISCOMFORT
DESCRIPTORS: ACHING
DESCRIPTORS: ACHING

## 2024-02-27 ASSESSMENT — PAIN SCALES - GENERAL
PAINLEVEL_OUTOF10: 3
PAINLEVEL_OUTOF10: 3
PAINLEVEL_OUTOF10: 9
PAINLEVEL_OUTOF10: 0

## 2024-02-27 NOTE — CONSULTS
CARDIOLOGY CONSULT NOTE   Reason for consultation: Elevated troponin    Referring physician:  Garrett Eller MD     Primary care physician: Pat Nguyễn MD      Dear Garrett Eller MD   Thanks for the consult.    History of present illness:Arnie is a 68 y.o.year old who  presents with for shortness of breath which is SEVERE, for FEW WEEKSs, intermittent, self limiting, not associated with cough or fever, gets worse with activity and better with rest, patient has cancer and is getting chemotherapy, cardiac and surgical evaluated troponin rule out non-STEMI, patient denies chest pain, patient on nonrebreather, very poor historian, all information obtained after review medical record discussion with staff and the family was present at bedside    Chief Complaint   Patient presents with    Shortness of Breath     Was hypoxic in the 60's on EMS arrival- arrives wearing 15LPM/ non-rebreather, cancer patient      Blood pressure, cholesterol, blood glucose and weight are well controlled.    Past medical history:    has a past medical history of Anemia, Cancer (HCC), Cirrhosis (HCC), Duodenal cancer (HCC), Esophageal varices (HCC), History of blood transfusion, History of external beam radiation therapy, HLD (hyperlipidemia), Hypertension, Nausea vomiting and diarrhea, Neuropathy, and Osteoarthritis.  Past surgical history:   has a past surgical history that includes other surgical history; Upper gastrointestinal endoscopy (12/11/2014); Cholecystectomy (2011); hernia repair (2011); Total knee arthroplasty (Left, 02/17/2020); Colonoscopy (03/19/2014); Colonoscopy (03/09/2016); Total knee arthroplasty (Right, 03/08/2021); Colonoscopy (N/A, 11/22/2022); Upper gastrointestinal endoscopy (N/A, 08/18/2023); Upper gastrointestinal endoscopy (N/A, 09/05/2023); Mediport insertion, single; Upper gastrointestinal endoscopy (N/A, 10/24/2023); and Upper gastrointestinal endoscopy (N/A, 12/11/2023).  Social History:   reports 
Patient Name:  Arnie Martinez  Patient :  1955  Patient MRN:  [id number]     Primary Oncologist: John Desir MD  Referring Provider: Pat Nguyễn MD     Date of Service: 2024      Reason for Consult: To evaluate the patient with gastric cancer.      Chief Complaint:    Chief Complaint   Patient presents with    Shortness of Breath     Was hypoxic in the 60's on EMS arrival- arrives wearing 15LPM/ non-rebreather, cancer patient      HPI:   Arnie Martinez is a 68 y.o. male with medical history significant for poorly differentiated gastric carcinoma, not a surgical candidate, s/p radiation therapy, currently on chemo with FLOT regimen (received last dose on 2024), cirrhosis, pancytopenia, HTN and GERD who presented to [facility] ED on 24 with feeling weaker and weaker over the past few weeks, got to the point where he couldn't even transfer from bed in the AM today.     He also complained of progressively worsening SOB. He was found by EMS to have a SpO2 of 60%.       He was admitted for acute hypoxic respiratory failure, severe sepsis, community acquired pneumonia and possible heart failure.     I was called to evaluate him. He's still not eating much. He has tried marinol last few months without improvement.      Past Medical History:     Past Medical History:   Diagnosis Date    Anemia     Cancer (HCC)     whipple    Cirrhosis (HCC) Dx:     Duodenal cancer (HCC)     Esophageal varices (HCC) 2014    History of blood transfusion     with Whipple procedure    History of external beam radiation therapy     Pancreas 5,040 cGy in 28 fractions    HLD (hyperlipidemia)     Hypertension     Follows with PCP    Nausea vomiting and diarrhea 11/10/2023    Neuropathy     Bilat feet    Osteoarthritis     Bilat knees        Past Surgery History:    Past Surgical History:   Procedure Laterality Date    CHOLECYSTECTOMY  2011    COLONOSCOPY  2014    polpectomy    COLONOSCOPY  
Session ID: [id number]  Language: Cherelle Olivera   ID: #[id number]   Name: Radha
employment    Examination of body systems (includes body structures/functions, activity/participation limitations):  Observation:  pt supine in bed with spouse present upon arrival and agreeable to therapy  Vision:  Wears glasses  Hearing:  WFL  Cardiopulmonary: 8.5 L O2 upon entry, when sitting EOB pt destatted to 84%, O2 increased to 15L and pt improved to 86-90% sitting EOB, pt on 8.5 L at end of session when supine in bed at 94%  Cognition: WFL, see OT/SLP note for further evaluation.    Musculoskeletal  ROM R/L:  WFL.    Strength R/L:  4-/5, significant impairment in function and endurance.    Neuro:  WFL      Mobility:  Rolling L/R:  SBA  Supine <-> sit:  SBA with cues for sequencing  Transfers: deferred due to respiratory status. See cardiopulmonary section for details  Sitting balance:  fair+ pt sat EOB CGA progressing to SBA.    Standing balance:  NT.    Gait: NT    [facility] 6 Clicks Inpatient Mobility:  AM-PAC Inpatient Mobility Raw Score : 11    Safety: patient left supine in bed with alarm on, spouse present, call light within reach, RN notified, gait belt used.    Assessment:  Pt is a 68 y.o. male admitted to the hospital for acute respiratory failure with hypoxia. Pt is typically mod I with RW for all ambulation and transfers. Pt is currently performing bed mobility SBA and unable to transfer or ambulate secondary to respiratory status. Pt is presenting with decreased endurance, impaired transfers, impaired gait, impaired strength, impaired balance. Pt would benefit from continued acute care PT as well as SNF placement upon discharge to continue to address impairments.    Complexity: moderate    Prognosis: Good, no significant barriers to participation at this time.     General Plan: 3-5 times per week       Equipment: TBD at next level of care    Goals:  Short Term Goals  Time Frame for Short Term Goals: 1 week  Short Term Goal 1: pt to complete all bed mobility mod I  Short Term Goal 2: pt to complete 
neoplasm of overlapping sites of stomach (HCC)    Screening for viral disease    Malignant neoplasm of body of stomach (HCC)    Failure to thrive in adult    Severe malnutrition (HCC)    Nausea vomiting and diarrhea    Secondary esophageal varices with bleeding (HCC)    Generalized weakness    Esophageal dysphagia    Chemotherapy induced neutropenia (HCC)    Acute respiratory failure with hypoxia (HCC)       Measurements:  Wound 02/24/24 Buttocks (Active)   Wound Image   02/26/24 0940   Wound Etiology Pressure Stage 2 02/26/24 0940   Dressing Status Clean;Dry;Intact;New dressing applied 02/26/24 1136   Wound Cleansed Cleansed with saline 02/26/24 0940   Dressing/Treatment Collagen;Silicone border 02/26/24 0940   Wound Length (cm) 2 cm 02/26/24 0940   Wound Width (cm) 0.2 cm 02/26/24 0940   Wound Depth (cm) 0.1 cm 02/26/24 0940   Wound Surface Area (cm^2) 0.4 cm^2 02/26/24 0940   Wound Volume (cm^3) 0.04 cm^3 02/26/24 0940   Distance Tunneling (cm) 0 cm 02/26/24 0940   Tunneling Position ___ O'Clock 0 02/26/24 0940   Undermining Starts ___ O'Clock 0 02/26/24 0940   Undermining Ends___ O'Clock 0 02/26/24 0940   Undermining Maxium Distance (cm) 0 02/26/24 0940   Wound Assessment Pink/red 02/26/24 0940   Drainage Amount Small (< 25%) 02/26/24 0940   Drainage Description Serosanguinous 02/26/24 0940   Odor None 02/26/24 0940   Heather-wound Assessment Intact 02/26/24 0940   Margins Defined edges 02/26/24 0940   Wound Thickness Description not for Pressure Injury Partial thickness 02/26/24 0940   Number of days: 2       Response to treatment:  Well tolerated by patient.     Pain Assessment:  Severity:  none  Quality of pain:   Wound Pain Timing/Severity:   Premedicated: no    Plan:     Plan of Care: Wound 02/24/24 Buttocks-Dressing/Treatment: Collagen, Silicone border    Patient in bed wife at bedside ok with wound care eval. Pt has a pressure injury stage 2  to sacrum. Cleansed with NS measured and pictured. Applied

## 2024-02-28 LAB
ANION GAP SERPL CALCULATED.3IONS-SCNC: 4 MMOL/L (ref 7–16)
BASOPHILS ABSOLUTE: 0 K/CU MM
BASOPHILS RELATIVE PERCENT: 0.4 % (ref 0–1)
BUN SERPL-MCNC: 28 MG/DL (ref 6–23)
CALCIUM SERPL-MCNC: 9.3 MG/DL (ref 8.3–10.6)
CHLORIDE BLD-SCNC: 93 MMOL/L (ref 99–110)
CO2: 40 MMOL/L (ref 21–32)
CREAT SERPL-MCNC: 0.6 MG/DL (ref 0.9–1.3)
CULTURE: NORMAL
CULTURE: NORMAL
DIFFERENTIAL TYPE: ABNORMAL
EOSINOPHILS ABSOLUTE: 0.5 K/CU MM
EOSINOPHILS RELATIVE PERCENT: 10.6 % (ref 0–3)
GFR SERPL CREATININE-BSD FRML MDRD: >60 ML/MIN/1.73M2
GLUCOSE SERPL-MCNC: 110 MG/DL (ref 70–99)
HCT VFR BLD CALC: 31.9 % (ref 42–52)
HEMOGLOBIN: 9.7 GM/DL (ref 13.5–18)
IMMATURE NEUTROPHIL %: 0.2 % (ref 0–0.43)
LYMPHOCYTES ABSOLUTE: 0.5 K/CU MM
LYMPHOCYTES RELATIVE PERCENT: 10.2 % (ref 24–44)
Lab: NORMAL
Lab: NORMAL
MCH RBC QN AUTO: 28.4 PG (ref 27–31)
MCHC RBC AUTO-ENTMCNC: 30.4 % (ref 32–36)
MCV RBC AUTO: 93.5 FL (ref 78–100)
MONOCYTES ABSOLUTE: 0.4 K/CU MM
MONOCYTES RELATIVE PERCENT: 9.1 % (ref 0–4)
NUCLEATED RBC %: 0 %
PDW BLD-RTO: 19.6 % (ref 11.7–14.9)
PLATELET # BLD: 99 K/CU MM (ref 140–440)
PMV BLD AUTO: 9.7 FL (ref 7.5–11.1)
POTASSIUM SERPL-SCNC: 3.6 MMOL/L (ref 3.5–5.1)
PRO-BNP: 856.1 PG/ML
PROCALCITONIN SERPL-MCNC: 0.14 NG/ML
RBC # BLD: 3.41 M/CU MM (ref 4.6–6.2)
SEGMENTED NEUTROPHILS ABSOLUTE COUNT: 3.2 K/CU MM
SEGMENTED NEUTROPHILS RELATIVE PERCENT: 69.5 % (ref 36–66)
SODIUM BLD-SCNC: 137 MMOL/L (ref 135–145)
SPECIMEN: NORMAL
SPECIMEN: NORMAL
TOTAL IMMATURE NEUTOROPHIL: 0.01 K/CU MM
TOTAL NUCLEATED RBC: 0 K/CU MM
WBC # BLD: 4.6 K/CU MM (ref 4–10.5)

## 2024-02-28 PROCEDURE — 97112 NEUROMUSCULAR REEDUCATION: CPT

## 2024-02-28 PROCEDURE — 6370000000 HC RX 637 (ALT 250 FOR IP): Performed by: STUDENT IN AN ORGANIZED HEALTH CARE EDUCATION/TRAINING PROGRAM

## 2024-02-28 PROCEDURE — 2580000003 HC RX 258: Performed by: INTERNAL MEDICINE

## 2024-02-28 PROCEDURE — 2140000000 HC CCU INTERMEDIATE R&B

## 2024-02-28 PROCEDURE — 36415 COLL VENOUS BLD VENIPUNCTURE: CPT

## 2024-02-28 PROCEDURE — 97530 THERAPEUTIC ACTIVITIES: CPT

## 2024-02-28 PROCEDURE — 83880 ASSAY OF NATRIURETIC PEPTIDE: CPT

## 2024-02-28 PROCEDURE — 84145 PROCALCITONIN (PCT): CPT

## 2024-02-28 PROCEDURE — 85025 COMPLETE CBC W/AUTO DIFF WBC: CPT

## 2024-02-28 PROCEDURE — 6360000002 HC RX W HCPCS: Performed by: INTERNAL MEDICINE

## 2024-02-28 PROCEDURE — 6360000002 HC RX W HCPCS: Performed by: STUDENT IN AN ORGANIZED HEALTH CARE EDUCATION/TRAINING PROGRAM

## 2024-02-28 PROCEDURE — 80048 BASIC METABOLIC PNL TOTAL CA: CPT

## 2024-02-28 PROCEDURE — 94761 N-INVAS EAR/PLS OXIMETRY MLT: CPT

## 2024-02-28 PROCEDURE — 6370000000 HC RX 637 (ALT 250 FOR IP): Performed by: INTERNAL MEDICINE

## 2024-02-28 PROCEDURE — 99232 SBSQ HOSP IP/OBS MODERATE 35: CPT | Performed by: PHYSICIAN ASSISTANT

## 2024-02-28 PROCEDURE — 2700000000 HC OXYGEN THERAPY PER DAY

## 2024-02-28 RX ADMIN — POLYETHYLENE GLYCOL (3350) 17 G: 17 POWDER, FOR SOLUTION ORAL at 09:58

## 2024-02-28 RX ADMIN — CARVEDILOL 3.12 MG: 6.25 TABLET, FILM COATED ORAL at 09:58

## 2024-02-28 RX ADMIN — PREDNISONE 10 MG: 10 TABLET ORAL at 09:57

## 2024-02-28 RX ADMIN — CEFEPIME 2000 MG: 2 INJECTION, POWDER, FOR SOLUTION INTRAVENOUS at 21:04

## 2024-02-28 RX ADMIN — ACETAMINOPHEN 650 MG: 325 TABLET ORAL at 06:41

## 2024-02-28 RX ADMIN — PANTOPRAZOLE SODIUM 40 MG: 40 TABLET, DELAYED RELEASE ORAL at 09:57

## 2024-02-28 RX ADMIN — FERROUS SULFATE TAB 325 MG (65 MG ELEMENTAL FE) 325 MG: 325 (65 FE) TAB at 09:58

## 2024-02-28 RX ADMIN — SPIRONOLACTONE 25 MG: 50 TABLET ORAL at 09:57

## 2024-02-28 RX ADMIN — SODIUM CHLORIDE, PRESERVATIVE FREE 10 ML: 5 INJECTION INTRAVENOUS at 09:59

## 2024-02-28 RX ADMIN — PROMETHAZINE HYDROCHLORIDE 25 MG: 25 TABLET ORAL at 16:59

## 2024-02-28 RX ADMIN — ENOXAPARIN SODIUM 40 MG: 100 INJECTION SUBCUTANEOUS at 09:58

## 2024-02-28 RX ADMIN — DULOXETINE HYDROCHLORIDE 30 MG: 30 CAPSULE, DELAYED RELEASE ORAL at 21:00

## 2024-02-28 RX ADMIN — CEFEPIME 2000 MG: 2 INJECTION, POWDER, FOR SOLUTION INTRAVENOUS at 06:46

## 2024-02-28 RX ADMIN — CARVEDILOL 3.12 MG: 6.25 TABLET, FILM COATED ORAL at 22:25

## 2024-02-28 RX ADMIN — PANTOPRAZOLE SODIUM 40 MG: 40 TABLET, DELAYED RELEASE ORAL at 21:00

## 2024-02-28 RX ADMIN — CEFEPIME 2000 MG: 2 INJECTION, POWDER, FOR SOLUTION INTRAVENOUS at 12:44

## 2024-02-28 RX ADMIN — FUROSEMIDE 40 MG: 10 INJECTION, SOLUTION INTRAMUSCULAR; INTRAVENOUS at 09:57

## 2024-02-28 RX ADMIN — SODIUM CHLORIDE, PRESERVATIVE FREE 10 ML: 5 INJECTION INTRAVENOUS at 21:00

## 2024-02-28 RX ADMIN — SENNOSIDES 8.6 MG: 8.6 TABLET, FILM COATED ORAL at 21:00

## 2024-02-28 RX ADMIN — FUROSEMIDE 40 MG: 10 INJECTION, SOLUTION INTRAMUSCULAR; INTRAVENOUS at 15:13

## 2024-02-28 RX ADMIN — FUROSEMIDE 40 MG: 10 INJECTION, SOLUTION INTRAMUSCULAR; INTRAVENOUS at 21:00

## 2024-02-28 ASSESSMENT — PAIN SCALES - WONG BAKER: WONGBAKER_NUMERICALRESPONSE: 0

## 2024-02-28 ASSESSMENT — PAIN DESCRIPTION - DESCRIPTORS: DESCRIPTORS: ACHING

## 2024-02-28 ASSESSMENT — PAIN DESCRIPTION - ORIENTATION: ORIENTATION: RIGHT;LEFT

## 2024-02-28 ASSESSMENT — PAIN - FUNCTIONAL ASSESSMENT: PAIN_FUNCTIONAL_ASSESSMENT: ACTIVITIES ARE NOT PREVENTED

## 2024-02-28 ASSESSMENT — PAIN DESCRIPTION - LOCATION: LOCATION: HEAD

## 2024-02-28 ASSESSMENT — PAIN SCALES - GENERAL: PAINLEVEL_OUTOF10: 3

## 2024-02-28 NOTE — PLAN OF CARE

## 2024-02-28 NOTE — CARE COORDINATION
Met with pt and spouse to discuss the PT/OT recommendations for SNF.  Pt refused.  Discussed HHC and pt is agreeable.  HHC list provided. They deny any other d/c needs at this time.  D/c plan is home with spouse and HHC.  HHC list provided.  CM to f/u tomorrow for HHC choice.  TE

## 2024-02-29 ENCOUNTER — APPOINTMENT (OUTPATIENT)
Dept: GENERAL RADIOLOGY | Age: 69
DRG: 871 | End: 2024-02-29
Payer: MEDICARE

## 2024-02-29 LAB
ANION GAP SERPL CALCULATED.3IONS-SCNC: 0 MMOL/L (ref 7–16)
BUN SERPL-MCNC: 28 MG/DL (ref 6–23)
CALCIUM SERPL-MCNC: 9.2 MG/DL (ref 8.3–10.6)
CHLORIDE BLD-SCNC: 94 MMOL/L (ref 99–110)
CO2: 42 MMOL/L (ref 21–32)
CREAT SERPL-MCNC: 0.6 MG/DL (ref 0.9–1.3)
GFR SERPL CREATININE-BSD FRML MDRD: >60 ML/MIN/1.73M2
GLUCOSE SERPL-MCNC: 110 MG/DL (ref 70–99)
POTASSIUM SERPL-SCNC: 3.8 MMOL/L (ref 3.5–5.1)
SODIUM BLD-SCNC: 136 MMOL/L (ref 135–145)

## 2024-02-29 PROCEDURE — 74018 RADEX ABDOMEN 1 VIEW: CPT

## 2024-02-29 PROCEDURE — 36591 DRAW BLOOD OFF VENOUS DEVICE: CPT

## 2024-02-29 PROCEDURE — 6360000002 HC RX W HCPCS: Performed by: INTERNAL MEDICINE

## 2024-02-29 PROCEDURE — 2140000000 HC CCU INTERMEDIATE R&B

## 2024-02-29 PROCEDURE — 6370000000 HC RX 637 (ALT 250 FOR IP): Performed by: STUDENT IN AN ORGANIZED HEALTH CARE EDUCATION/TRAINING PROGRAM

## 2024-02-29 PROCEDURE — 6370000000 HC RX 637 (ALT 250 FOR IP): Performed by: PHYSICIAN ASSISTANT

## 2024-02-29 PROCEDURE — 6370000000 HC RX 637 (ALT 250 FOR IP): Performed by: INTERNAL MEDICINE

## 2024-02-29 PROCEDURE — 99232 SBSQ HOSP IP/OBS MODERATE 35: CPT | Performed by: PHYSICIAN ASSISTANT

## 2024-02-29 PROCEDURE — 94761 N-INVAS EAR/PLS OXIMETRY MLT: CPT

## 2024-02-29 PROCEDURE — 2580000003 HC RX 258: Performed by: INTERNAL MEDICINE

## 2024-02-29 PROCEDURE — 2700000000 HC OXYGEN THERAPY PER DAY

## 2024-02-29 PROCEDURE — 6360000002 HC RX W HCPCS: Performed by: STUDENT IN AN ORGANIZED HEALTH CARE EDUCATION/TRAINING PROGRAM

## 2024-02-29 PROCEDURE — 80048 BASIC METABOLIC PNL TOTAL CA: CPT

## 2024-02-29 RX ORDER — GUAIFENESIN 600 MG/1
600 TABLET, EXTENDED RELEASE ORAL 2 TIMES DAILY
Status: DISCONTINUED | OUTPATIENT
Start: 2024-02-29 | End: 2024-03-03 | Stop reason: HOSPADM

## 2024-02-29 RX ORDER — ONDANSETRON 2 MG/ML
4 INJECTION INTRAMUSCULAR; INTRAVENOUS EVERY 6 HOURS PRN
Status: DISCONTINUED | OUTPATIENT
Start: 2024-02-29 | End: 2024-03-03 | Stop reason: HOSPADM

## 2024-02-29 RX ORDER — SODIUM CHLORIDE FOR INHALATION 3 %
4 VIAL, NEBULIZER (ML) INHALATION PRN
Status: DISCONTINUED | OUTPATIENT
Start: 2024-02-29 | End: 2024-03-03 | Stop reason: HOSPADM

## 2024-02-29 RX ADMIN — FERROUS SULFATE TAB 325 MG (65 MG ELEMENTAL FE) 325 MG: 325 (65 FE) TAB at 09:19

## 2024-02-29 RX ADMIN — GUAIFENESIN 600 MG: 600 TABLET, EXTENDED RELEASE ORAL at 20:28

## 2024-02-29 RX ADMIN — ENOXAPARIN SODIUM 40 MG: 100 INJECTION SUBCUTANEOUS at 09:19

## 2024-02-29 RX ADMIN — CEFEPIME 2000 MG: 2 INJECTION, POWDER, FOR SOLUTION INTRAVENOUS at 20:35

## 2024-02-29 RX ADMIN — FUROSEMIDE 40 MG: 10 INJECTION, SOLUTION INTRAMUSCULAR; INTRAVENOUS at 20:28

## 2024-02-29 RX ADMIN — DULOXETINE HYDROCHLORIDE 30 MG: 30 CAPSULE, DELAYED RELEASE ORAL at 20:27

## 2024-02-29 RX ADMIN — POLYETHYLENE GLYCOL (3350) 17 G: 17 POWDER, FOR SOLUTION ORAL at 09:19

## 2024-02-29 RX ADMIN — GUAIFENESIN 600 MG: 600 TABLET, EXTENDED RELEASE ORAL at 12:23

## 2024-02-29 RX ADMIN — SPIRONOLACTONE 25 MG: 50 TABLET ORAL at 09:18

## 2024-02-29 RX ADMIN — PANTOPRAZOLE SODIUM 40 MG: 40 TABLET, DELAYED RELEASE ORAL at 09:19

## 2024-02-29 RX ADMIN — FUROSEMIDE 40 MG: 10 INJECTION, SOLUTION INTRAMUSCULAR; INTRAVENOUS at 17:27

## 2024-02-29 RX ADMIN — CARVEDILOL 3.12 MG: 6.25 TABLET, FILM COATED ORAL at 20:27

## 2024-02-29 RX ADMIN — CEFEPIME 2000 MG: 2 INJECTION, POWDER, FOR SOLUTION INTRAVENOUS at 03:19

## 2024-02-29 RX ADMIN — PANTOPRAZOLE SODIUM 40 MG: 40 TABLET, DELAYED RELEASE ORAL at 20:27

## 2024-02-29 RX ADMIN — CARVEDILOL 3.12 MG: 6.25 TABLET, FILM COATED ORAL at 09:19

## 2024-02-29 RX ADMIN — CEFEPIME 2000 MG: 2 INJECTION, POWDER, FOR SOLUTION INTRAVENOUS at 12:22

## 2024-02-29 RX ADMIN — SENNOSIDES 8.6 MG: 8.6 TABLET, FILM COATED ORAL at 20:28

## 2024-02-29 RX ADMIN — PREDNISONE 10 MG: 10 TABLET ORAL at 09:19

## 2024-02-29 RX ADMIN — SODIUM CHLORIDE, PRESERVATIVE FREE 10 ML: 5 INJECTION INTRAVENOUS at 09:20

## 2024-02-29 RX ADMIN — SODIUM CHLORIDE: 9 INJECTION, SOLUTION INTRAVENOUS at 12:20

## 2024-02-29 RX ADMIN — FUROSEMIDE 40 MG: 10 INJECTION, SOLUTION INTRAMUSCULAR; INTRAVENOUS at 09:19

## 2024-02-29 ASSESSMENT — PAIN SCALES - GENERAL: PAINLEVEL_OUTOF10: 0

## 2024-02-29 ASSESSMENT — PAIN SCALES - WONG BAKER
WONGBAKER_NUMERICALRESPONSE: 0
WONGBAKER_NUMERICALRESPONSE: 0

## 2024-02-29 NOTE — PLAN OF CARE
Problem: Discharge Planning  Goal: Discharge to home or other facility with appropriate resources  2/28/2024 2240 by Megan Burkett RN  Outcome: Progressing  2/28/2024 1329 by Megan Gomes RN  Outcome: Progressing     Problem: Skin/Tissue Integrity  Goal: Absence of new skin breakdown  Description: 1.  Monitor for areas of redness and/or skin breakdown  2.  Assess vascular access sites hourly  3.  Every 4-6 hours minimum:  Change oxygen saturation probe site  4.  Every 4-6 hours:  If on nasal continuous positive airway pressure, respiratory therapy assess nares and determine need for appliance change or resting period.  2/28/2024 2240 by Megan Burkett RN  Outcome: Progressing  2/28/2024 1329 by Megan Gomes RN  Outcome: Progressing     Problem: Safety - Adult  Goal: Free from fall injury  2/28/2024 2240 by Megan Burkett RN  Outcome: Progressing  2/28/2024 1329 by Megan Gomes RN  Outcome: Progressing     Problem: Pain  Goal: Verbalizes/displays adequate comfort level or baseline comfort level  2/28/2024 2240 by Megan Burkett RN  Outcome: Progressing  2/28/2024 1329 by Megan Gomes RN  Outcome: Progressing     Problem: Nutrition Deficit:  Goal: Optimize nutritional status  2/28/2024 2240 by Megan Burkett RN  Outcome: Progressing  2/28/2024 1329 by Megan Gomes RN  Outcome: Progressing

## 2024-02-29 NOTE — CARE COORDINATION
Met with pt's wife at bedside for [facility] choice.  Pt is sleeping. Her #1 choice is [facility], #2 [facility], #3 [facility]. She asked this CM if pt could go to ARU.  CM explained to her that he does not meet ARU criteria per PT/OT notes. She states that he will not go to a nursing home.  CM discussed [facility] with her and she is agreeable for referral to be made.  She informed CM that pt will not be on chemo or radiation until he is stronger.  Referral made to Nursing Supervisor/Swing [facility]. Pt will require a pre-cert if approved for SB.  TE

## 2024-03-01 LAB
BASOPHILS ABSOLUTE: 0.1 K/CU MM
BASOPHILS RELATIVE PERCENT: 0.5 % (ref 0–1)
DIFFERENTIAL TYPE: ABNORMAL
EOSINOPHILS ABSOLUTE: 0.4 K/CU MM
EOSINOPHILS RELATIVE PERCENT: 3.9 % (ref 0–3)
HCT VFR BLD CALC: 37.1 % (ref 42–52)
HEMOGLOBIN: 11.5 GM/DL (ref 13.5–18)
IMMATURE NEUTROPHIL %: 0.4 % (ref 0–0.43)
LYMPHOCYTES ABSOLUTE: 0.9 K/CU MM
LYMPHOCYTES RELATIVE PERCENT: 9.5 % (ref 24–44)
MCH RBC QN AUTO: 28.8 PG (ref 27–31)
MCHC RBC AUTO-ENTMCNC: 31 % (ref 32–36)
MCV RBC AUTO: 92.8 FL (ref 78–100)
MONOCYTES ABSOLUTE: 0.9 K/CU MM
MONOCYTES RELATIVE PERCENT: 9.4 % (ref 0–4)
NUCLEATED RBC %: 0 %
PDW BLD-RTO: 19.6 % (ref 11.7–14.9)
PLATELET # BLD: 146 K/CU MM (ref 140–440)
PMV BLD AUTO: 10 FL (ref 7.5–11.1)
RBC # BLD: 4 M/CU MM (ref 4.6–6.2)
SEGMENTED NEUTROPHILS ABSOLUTE COUNT: 7.6 K/CU MM
SEGMENTED NEUTROPHILS RELATIVE PERCENT: 76.3 % (ref 36–66)
TOTAL IMMATURE NEUTOROPHIL: 0.04 K/CU MM
TOTAL NUCLEATED RBC: 0 K/CU MM
WBC # BLD: 9.9 K/CU MM (ref 4–10.5)

## 2024-03-01 PROCEDURE — 2700000000 HC OXYGEN THERAPY PER DAY

## 2024-03-01 PROCEDURE — 36591 DRAW BLOOD OFF VENOUS DEVICE: CPT

## 2024-03-01 PROCEDURE — 6370000000 HC RX 637 (ALT 250 FOR IP): Performed by: INTERNAL MEDICINE

## 2024-03-01 PROCEDURE — 6360000002 HC RX W HCPCS: Performed by: STUDENT IN AN ORGANIZED HEALTH CARE EDUCATION/TRAINING PROGRAM

## 2024-03-01 PROCEDURE — 2140000000 HC CCU INTERMEDIATE R&B

## 2024-03-01 PROCEDURE — 99232 SBSQ HOSP IP/OBS MODERATE 35: CPT | Performed by: INTERNAL MEDICINE

## 2024-03-01 PROCEDURE — 94664 DEMO&/EVAL PT USE INHALER: CPT

## 2024-03-01 PROCEDURE — 2580000003 HC RX 258: Performed by: INTERNAL MEDICINE

## 2024-03-01 PROCEDURE — 94640 AIRWAY INHALATION TREATMENT: CPT

## 2024-03-01 PROCEDURE — 6370000000 HC RX 637 (ALT 250 FOR IP): Performed by: STUDENT IN AN ORGANIZED HEALTH CARE EDUCATION/TRAINING PROGRAM

## 2024-03-01 PROCEDURE — 97535 SELF CARE MNGMENT TRAINING: CPT

## 2024-03-01 PROCEDURE — 97530 THERAPEUTIC ACTIVITIES: CPT

## 2024-03-01 PROCEDURE — 94761 N-INVAS EAR/PLS OXIMETRY MLT: CPT

## 2024-03-01 PROCEDURE — 6370000000 HC RX 637 (ALT 250 FOR IP): Performed by: PHYSICIAN ASSISTANT

## 2024-03-01 PROCEDURE — 6360000002 HC RX W HCPCS: Performed by: INTERNAL MEDICINE

## 2024-03-01 PROCEDURE — 85025 COMPLETE CBC W/AUTO DIFF WBC: CPT

## 2024-03-01 PROCEDURE — 92526 ORAL FUNCTION THERAPY: CPT

## 2024-03-01 RX ORDER — PROCHLORPERAZINE EDISYLATE 5 MG/ML
10 INJECTION INTRAMUSCULAR; INTRAVENOUS ONCE
Status: COMPLETED | OUTPATIENT
Start: 2024-03-01 | End: 2024-03-01

## 2024-03-01 RX ADMIN — SPIRONOLACTONE 25 MG: 50 TABLET ORAL at 09:41

## 2024-03-01 RX ADMIN — GUAIFENESIN 600 MG: 600 TABLET, EXTENDED RELEASE ORAL at 09:41

## 2024-03-01 RX ADMIN — CEFEPIME 2000 MG: 2 INJECTION, POWDER, FOR SOLUTION INTRAVENOUS at 12:02

## 2024-03-01 RX ADMIN — SODIUM CHLORIDE, PRESERVATIVE FREE 10 ML: 5 INJECTION INTRAVENOUS at 09:42

## 2024-03-01 RX ADMIN — ENOXAPARIN SODIUM 40 MG: 100 INJECTION SUBCUTANEOUS at 09:43

## 2024-03-01 RX ADMIN — SENNOSIDES 8.6 MG: 8.6 TABLET, FILM COATED ORAL at 21:52

## 2024-03-01 RX ADMIN — SODIUM CHLORIDE, PRESERVATIVE FREE 10 ML: 5 INJECTION INTRAVENOUS at 21:54

## 2024-03-01 RX ADMIN — GUAIFENESIN 600 MG: 600 TABLET, EXTENDED RELEASE ORAL at 21:52

## 2024-03-01 RX ADMIN — FUROSEMIDE 40 MG: 10 INJECTION, SOLUTION INTRAMUSCULAR; INTRAVENOUS at 09:42

## 2024-03-01 RX ADMIN — FUROSEMIDE 40 MG: 10 INJECTION, SOLUTION INTRAMUSCULAR; INTRAVENOUS at 21:53

## 2024-03-01 RX ADMIN — ONDANSETRON 4 MG: 2 INJECTION INTRAMUSCULAR; INTRAVENOUS at 11:00

## 2024-03-01 RX ADMIN — PANTOPRAZOLE SODIUM 40 MG: 40 TABLET, DELAYED RELEASE ORAL at 21:52

## 2024-03-01 RX ADMIN — CARVEDILOL 3.12 MG: 6.25 TABLET, FILM COATED ORAL at 09:42

## 2024-03-01 RX ADMIN — ALBUTEROL SULFATE 2 PUFF: 90 AEROSOL, METERED RESPIRATORY (INHALATION) at 07:45

## 2024-03-01 RX ADMIN — POLYETHYLENE GLYCOL (3350) 17 G: 17 POWDER, FOR SOLUTION ORAL at 09:42

## 2024-03-01 RX ADMIN — PANTOPRAZOLE SODIUM 40 MG: 40 TABLET, DELAYED RELEASE ORAL at 09:41

## 2024-03-01 RX ADMIN — PROCHLORPERAZINE EDISYLATE 10 MG: 5 INJECTION INTRAMUSCULAR; INTRAVENOUS at 11:58

## 2024-03-01 RX ADMIN — FUROSEMIDE 40 MG: 10 INJECTION, SOLUTION INTRAMUSCULAR; INTRAVENOUS at 15:56

## 2024-03-01 RX ADMIN — CEFEPIME 2000 MG: 2 INJECTION, POWDER, FOR SOLUTION INTRAVENOUS at 03:24

## 2024-03-01 RX ADMIN — CARVEDILOL 3.12 MG: 6.25 TABLET, FILM COATED ORAL at 21:53

## 2024-03-01 RX ADMIN — DULOXETINE HYDROCHLORIDE 30 MG: 30 CAPSULE, DELAYED RELEASE ORAL at 21:52

## 2024-03-01 RX ADMIN — SODIUM CHLORIDE: 9 INJECTION, SOLUTION INTRAVENOUS at 12:01

## 2024-03-01 RX ADMIN — PREDNISONE 10 MG: 10 TABLET ORAL at 09:42

## 2024-03-01 RX ADMIN — FERROUS SULFATE TAB 325 MG (65 MG ELEMENTAL FE) 325 MG: 325 (65 FE) TAB at 09:43

## 2024-03-01 ASSESSMENT — PAIN SCALES - GENERAL
PAINLEVEL_OUTOF10: 0
PAINLEVEL_OUTOF10: 4

## 2024-03-01 ASSESSMENT — PAIN DESCRIPTION - DESCRIPTORS: DESCRIPTORS: ACHING

## 2024-03-01 ASSESSMENT — PAIN - FUNCTIONAL ASSESSMENT: PAIN_FUNCTIONAL_ASSESSMENT: ACTIVITIES ARE NOT PREVENTED

## 2024-03-01 ASSESSMENT — PAIN SCALES - WONG BAKER: WONGBAKER_NUMERICALRESPONSE: 2

## 2024-03-01 ASSESSMENT — PAIN DESCRIPTION - PAIN TYPE: TYPE: ACUTE PAIN

## 2024-03-01 ASSESSMENT — PAIN DESCRIPTION - FREQUENCY: FREQUENCY: INTERMITTENT

## 2024-03-01 ASSESSMENT — PAIN DESCRIPTION - LOCATION: LOCATION: NECK

## 2024-03-01 ASSESSMENT — PAIN DESCRIPTION - ONSET: ONSET: ON-GOING

## 2024-03-01 NOTE — CARE COORDINATION
Nursing supervisor/Swing bed/[facility] notified CM that Dr Clement is going to discuss pt with Dr Ching before he can make a decision to accept or not.  TE

## 2024-03-01 NOTE — CARE COORDINATION
It pt's plan is for [facility] upon discharge, precert has been APPROVED:   Admission Service Details  Certification Number  [id number]  Status  CERTIFIED IN TOTAL    Service Type Code 1  AG - Skilled Nursing Care    Start Date - End Date  2024-02-29 - 2024-03-04

## 2024-03-02 LAB
ANION GAP SERPL CALCULATED.3IONS-SCNC: 5 MMOL/L (ref 7–16)
BUN SERPL-MCNC: 32 MG/DL (ref 6–23)
CALCIUM SERPL-MCNC: 9.7 MG/DL (ref 8.3–10.6)
CHLORIDE BLD-SCNC: 86 MMOL/L (ref 99–110)
CO2: 41 MMOL/L (ref 21–32)
CREAT SERPL-MCNC: 0.7 MG/DL (ref 0.9–1.3)
GFR SERPL CREATININE-BSD FRML MDRD: >60 ML/MIN/1.73M2
GLUCOSE SERPL-MCNC: 115 MG/DL (ref 70–99)
POTASSIUM SERPL-SCNC: 3.6 MMOL/L (ref 3.5–5.1)
SODIUM BLD-SCNC: 132 MMOL/L (ref 135–145)

## 2024-03-02 PROCEDURE — 6370000000 HC RX 637 (ALT 250 FOR IP): Performed by: INTERNAL MEDICINE

## 2024-03-02 PROCEDURE — 6360000002 HC RX W HCPCS: Performed by: STUDENT IN AN ORGANIZED HEALTH CARE EDUCATION/TRAINING PROGRAM

## 2024-03-02 PROCEDURE — 6370000000 HC RX 637 (ALT 250 FOR IP): Performed by: STUDENT IN AN ORGANIZED HEALTH CARE EDUCATION/TRAINING PROGRAM

## 2024-03-02 PROCEDURE — 2580000003 HC RX 258: Performed by: INTERNAL MEDICINE

## 2024-03-02 PROCEDURE — 6370000000 HC RX 637 (ALT 250 FOR IP): Performed by: PHYSICIAN ASSISTANT

## 2024-03-02 PROCEDURE — 2700000000 HC OXYGEN THERAPY PER DAY

## 2024-03-02 PROCEDURE — 6360000002 HC RX W HCPCS: Performed by: INTERNAL MEDICINE

## 2024-03-02 PROCEDURE — 2140000000 HC CCU INTERMEDIATE R&B

## 2024-03-02 PROCEDURE — 80048 BASIC METABOLIC PNL TOTAL CA: CPT

## 2024-03-02 RX ADMIN — FERROUS SULFATE TAB 325 MG (65 MG ELEMENTAL FE) 325 MG: 325 (65 FE) TAB at 09:42

## 2024-03-02 RX ADMIN — PANTOPRAZOLE SODIUM 40 MG: 40 TABLET, DELAYED RELEASE ORAL at 09:42

## 2024-03-02 RX ADMIN — GUAIFENESIN 600 MG: 600 TABLET, EXTENDED RELEASE ORAL at 20:32

## 2024-03-02 RX ADMIN — SODIUM CHLORIDE, PRESERVATIVE FREE 10 ML: 5 INJECTION INTRAVENOUS at 20:31

## 2024-03-02 RX ADMIN — SENNOSIDES 8.6 MG: 8.6 TABLET, FILM COATED ORAL at 20:32

## 2024-03-02 RX ADMIN — FUROSEMIDE 40 MG: 10 INJECTION, SOLUTION INTRAMUSCULAR; INTRAVENOUS at 09:43

## 2024-03-02 RX ADMIN — DULOXETINE HYDROCHLORIDE 30 MG: 30 CAPSULE, DELAYED RELEASE ORAL at 20:32

## 2024-03-02 RX ADMIN — PANTOPRAZOLE SODIUM 40 MG: 40 TABLET, DELAYED RELEASE ORAL at 20:32

## 2024-03-02 RX ADMIN — SPIRONOLACTONE 25 MG: 50 TABLET ORAL at 09:42

## 2024-03-02 RX ADMIN — SODIUM CHLORIDE, PRESERVATIVE FREE 10 ML: 5 INJECTION INTRAVENOUS at 09:43

## 2024-03-02 RX ADMIN — GUAIFENESIN 600 MG: 600 TABLET, EXTENDED RELEASE ORAL at 09:42

## 2024-03-02 RX ADMIN — ENOXAPARIN SODIUM 40 MG: 100 INJECTION SUBCUTANEOUS at 09:43

## 2024-03-02 RX ADMIN — CARVEDILOL 3.12 MG: 6.25 TABLET, FILM COATED ORAL at 20:31

## 2024-03-02 RX ADMIN — CARVEDILOL 3.12 MG: 6.25 TABLET, FILM COATED ORAL at 09:42

## 2024-03-02 RX ADMIN — PREDNISONE 10 MG: 10 TABLET ORAL at 09:43

## 2024-03-02 RX ADMIN — POLYETHYLENE GLYCOL (3350) 17 G: 17 POWDER, FOR SOLUTION ORAL at 09:43

## 2024-03-02 RX ADMIN — FUROSEMIDE 40 MG: 10 INJECTION, SOLUTION INTRAMUSCULAR; INTRAVENOUS at 14:03

## 2024-03-02 RX ADMIN — FUROSEMIDE 40 MG: 10 INJECTION, SOLUTION INTRAMUSCULAR; INTRAVENOUS at 21:05

## 2024-03-02 ASSESSMENT — PAIN SCALES - GENERAL
PAINLEVEL_OUTOF10: 0

## 2024-03-02 ASSESSMENT — PAIN SCALES - WONG BAKER
WONGBAKER_NUMERICALRESPONSE: 0
WONGBAKER_NUMERICALRESPONSE: 0

## 2024-03-02 NOTE — PLAN OF CARE
Problem: Discharge Planning  Goal: Discharge to home or other facility with appropriate resources  Outcome: Progressing  Flowsheets (Taken 3/2/2024 0800)  Discharge to home or other facility with appropriate resources:   Identify barriers to discharge with patient and caregiver   Arrange for needed discharge resources and transportation as appropriate   Identify discharge learning needs (meds, wound care, etc)   Arrange for interpreters to assist at discharge as needed   Refer to discharge planning if patient needs post-hospital services based on physician order or complex needs related to functional status, cognitive ability or social support system     Problem: Skin/Tissue Integrity  Goal: Absence of new skin breakdown  Description: 1.  Monitor for areas of redness and/or skin breakdown  2.  Assess vascular access sites hourly  3.  Every 4-6 hours minimum:  Change oxygen saturation probe site  4.  Every 4-6 hours:  If on nasal continuous positive airway pressure, respiratory therapy assess nares and determine need for appliance change or resting period.  Outcome: Progressing     Problem: Safety - Adult  Goal: Free from fall injury  Outcome: Progressing     Problem: Pain  Goal: Verbalizes/displays adequate comfort level or baseline comfort level  Outcome: Progressing     Problem: Nutrition Deficit:  Goal: Optimize nutritional status  Outcome: Progressing

## 2024-03-02 NOTE — CARE COORDINATION
Pt has been approved to go to [facility]ing [facility], However Toby from [facility] stated pt is not medically ready for the Swing Bed at this time and Dr. Clement will continue to follow.  LSW received a Hospice Consult.  LSW in room to talk with pt wife and other family.  LSW went over Hospices and gave them a list.  During conversation with family LSW discovered that family has been told that Hospice will stay with family during the day and have pt do PT/OT daily.  LSW explained that if they want Hospice then they are not waning anymore treatment and want to make the pt comfortable.  LSW explained that Hospice will be available them to them 24/7 however they will not be staying with wife and pt for long periods of time.  Pt wife stated they were hoping pt would get stronger to then possibly due other treatment.  LSW explained that if they want pt to get stronger then pt plan would be to go to [facility]ing [facility] or a SNF.  LSW explained that pt is medically stable to go to rehab at this time.  Family is interested in having a meeting with Hospice for information.  Wife requested [facility].  LSW made referral to [facility].  They will call wife to set up a informational meeting.      Hospice meeting with wife and family tomorrow at 1:00PM.

## 2024-03-03 ENCOUNTER — HOSPITAL ENCOUNTER (INPATIENT)
Age: 69
LOS: 11 days | DRG: 951 | End: 2024-03-14
Attending: STUDENT IN AN ORGANIZED HEALTH CARE EDUCATION/TRAINING PROGRAM | Admitting: STUDENT IN AN ORGANIZED HEALTH CARE EDUCATION/TRAINING PROGRAM
Payer: COMMERCIAL

## 2024-03-03 VITALS
RESPIRATION RATE: 20 BRPM | OXYGEN SATURATION: 92 % | DIASTOLIC BLOOD PRESSURE: 77 MMHG | WEIGHT: 138.89 LBS | BODY MASS INDEX: 21.8 KG/M2 | TEMPERATURE: 97.7 F | SYSTOLIC BLOOD PRESSURE: 115 MMHG | HEIGHT: 67 IN | HEART RATE: 89 BPM

## 2024-03-03 PROBLEM — C16.9 GASTRIC CARCINOMA (HCC): Status: ACTIVE | Noted: 2024-03-03

## 2024-03-03 PROBLEM — K62.5 RECTAL BLEEDING: Status: RESOLVED | Noted: 2022-11-22 | Resolved: 2024-03-03

## 2024-03-03 PROBLEM — D12.3 BENIGN NEOPLASM OF TRANSVERSE COLON: Status: RESOLVED | Noted: 2022-11-22 | Resolved: 2024-03-03

## 2024-03-03 PROBLEM — Z51.5 HOSPICE CARE: Status: ACTIVE | Noted: 2024-03-03

## 2024-03-03 PROBLEM — C17.0 MALIGNANT NEOPLASM OF DUODENUM (HCC): Status: RESOLVED | Noted: 2020-04-23 | Resolved: 2024-03-03

## 2024-03-03 PROBLEM — D12.2 BENIGN NEOPLASM OF ASCENDING COLON: Status: RESOLVED | Noted: 2022-11-22 | Resolved: 2024-03-03

## 2024-03-03 LAB
ANION GAP SERPL CALCULATED.3IONS-SCNC: 4 MMOL/L (ref 7–16)
BUN SERPL-MCNC: 27 MG/DL (ref 6–23)
CALCIUM SERPL-MCNC: 9.5 MG/DL (ref 8.3–10.6)
CHLORIDE BLD-SCNC: 85 MMOL/L (ref 99–110)
CO2: 42 MMOL/L (ref 21–32)
CREAT SERPL-MCNC: 0.6 MG/DL (ref 0.9–1.3)
GFR SERPL CREATININE-BSD FRML MDRD: >60 ML/MIN/1.73M2
GLUCOSE SERPL-MCNC: 179 MG/DL (ref 70–99)
MAGNESIUM: 2.1 MG/DL (ref 1.8–2.4)
POTASSIUM SERPL-SCNC: 3.7 MMOL/L (ref 3.5–5.1)
SODIUM BLD-SCNC: 131 MMOL/L (ref 135–145)

## 2024-03-03 PROCEDURE — 94761 N-INVAS EAR/PLS OXIMETRY MLT: CPT

## 2024-03-03 PROCEDURE — 2700000000 HC OXYGEN THERAPY PER DAY

## 2024-03-03 PROCEDURE — 6370000000 HC RX 637 (ALT 250 FOR IP): Performed by: STUDENT IN AN ORGANIZED HEALTH CARE EDUCATION/TRAINING PROGRAM

## 2024-03-03 PROCEDURE — 6360000002 HC RX W HCPCS: Performed by: INTERNAL MEDICINE

## 2024-03-03 PROCEDURE — 6370000000 HC RX 637 (ALT 250 FOR IP): Performed by: PHYSICIAN ASSISTANT

## 2024-03-03 PROCEDURE — 6360000002 HC RX W HCPCS: Performed by: STUDENT IN AN ORGANIZED HEALTH CARE EDUCATION/TRAINING PROGRAM

## 2024-03-03 PROCEDURE — 2580000003 HC RX 258: Performed by: STUDENT IN AN ORGANIZED HEALTH CARE EDUCATION/TRAINING PROGRAM

## 2024-03-03 PROCEDURE — 80048 BASIC METABOLIC PNL TOTAL CA: CPT

## 2024-03-03 PROCEDURE — 1250000000 HC SEMI PRIVATE HOSPICE R&B

## 2024-03-03 PROCEDURE — 83735 ASSAY OF MAGNESIUM: CPT

## 2024-03-03 PROCEDURE — 6370000000 HC RX 637 (ALT 250 FOR IP): Performed by: INTERNAL MEDICINE

## 2024-03-03 PROCEDURE — 2580000003 HC RX 258: Performed by: INTERNAL MEDICINE

## 2024-03-03 RX ORDER — ACETAMINOPHEN 650 MG/1
650 SUPPOSITORY RECTAL EVERY 6 HOURS PRN
Status: CANCELLED | OUTPATIENT
Start: 2024-03-03

## 2024-03-03 RX ORDER — SENNOSIDES A AND B 8.6 MG/1
1 TABLET, FILM COATED ORAL NIGHTLY
Status: DISCONTINUED | OUTPATIENT
Start: 2024-03-03 | End: 2024-03-06

## 2024-03-03 RX ORDER — ACETAMINOPHEN 325 MG/1
650 TABLET ORAL EVERY 6 HOURS PRN
Status: CANCELLED | OUTPATIENT
Start: 2024-03-03

## 2024-03-03 RX ORDER — BISACODYL 10 MG
10 SUPPOSITORY, RECTAL RECTAL DAILY PRN
Status: CANCELLED | OUTPATIENT
Start: 2024-03-03

## 2024-03-03 RX ORDER — FUROSEMIDE 10 MG/ML
40 INJECTION INTRAMUSCULAR; INTRAVENOUS 3 TIMES DAILY
Status: DISCONTINUED | OUTPATIENT
Start: 2024-03-03 | End: 2024-03-04

## 2024-03-03 RX ORDER — GUAIFENESIN 600 MG/1
600 TABLET, EXTENDED RELEASE ORAL 2 TIMES DAILY
Status: DISCONTINUED | OUTPATIENT
Start: 2024-03-03 | End: 2024-03-05

## 2024-03-03 RX ORDER — ALBUTEROL SULFATE 90 UG/1
2 AEROSOL, METERED RESPIRATORY (INHALATION) EVERY 4 HOURS PRN
Status: DISCONTINUED | OUTPATIENT
Start: 2024-03-03 | End: 2024-03-15 | Stop reason: HOSPADM

## 2024-03-03 RX ORDER — GLYCOPYRROLATE 0.2 MG/ML
0.2 INJECTION INTRAMUSCULAR; INTRAVENOUS EVERY 4 HOURS PRN
Status: CANCELLED | OUTPATIENT
Start: 2024-03-03

## 2024-03-03 RX ORDER — SODIUM CHLORIDE FOR INHALATION 3 %
4 VIAL, NEBULIZER (ML) INHALATION PRN
Status: DISCONTINUED | OUTPATIENT
Start: 2024-03-03 | End: 2024-03-15 | Stop reason: HOSPADM

## 2024-03-03 RX ORDER — HALOPERIDOL 5 MG/ML
1 INJECTION INTRAMUSCULAR
Status: CANCELLED | OUTPATIENT
Start: 2024-03-03

## 2024-03-03 RX ORDER — ACETAMINOPHEN 650 MG/1
650 SUPPOSITORY RECTAL EVERY 4 HOURS PRN
Status: CANCELLED | OUTPATIENT
Start: 2024-03-03

## 2024-03-03 RX ORDER — MORPHINE SULFATE 2 MG/ML
1 INJECTION, SOLUTION INTRAMUSCULAR; INTRAVENOUS
Status: DISCONTINUED | OUTPATIENT
Start: 2024-03-03 | End: 2024-03-06

## 2024-03-03 RX ORDER — HALOPERIDOL 5 MG/ML
0.5 INJECTION INTRAMUSCULAR
Status: CANCELLED | OUTPATIENT
Start: 2024-03-03

## 2024-03-03 RX ORDER — SPIRONOLACTONE 50 MG/1
25 TABLET, FILM COATED ORAL DAILY
Status: DISCONTINUED | OUTPATIENT
Start: 2024-03-04 | End: 2024-03-06

## 2024-03-03 RX ORDER — HALOPERIDOL 5 MG/ML
0.5 INJECTION INTRAMUSCULAR
Status: DISCONTINUED | OUTPATIENT
Start: 2024-03-03 | End: 2024-03-15 | Stop reason: HOSPADM

## 2024-03-03 RX ORDER — CARVEDILOL 6.25 MG/1
3.12 TABLET ORAL 2 TIMES DAILY
Status: DISCONTINUED | OUTPATIENT
Start: 2024-03-03 | End: 2024-03-06

## 2024-03-03 RX ORDER — ACETAMINOPHEN 325 MG/1
650 TABLET ORAL EVERY 6 HOURS PRN
Status: DISCONTINUED | OUTPATIENT
Start: 2024-03-03 | End: 2024-03-07

## 2024-03-03 RX ORDER — ACETAMINOPHEN 650 MG/1
650 SUPPOSITORY RECTAL EVERY 4 HOURS PRN
Status: DISCONTINUED | OUTPATIENT
Start: 2024-03-03 | End: 2024-03-05

## 2024-03-03 RX ORDER — MIDAZOLAM HYDROCHLORIDE 2 MG/2ML
0.5 INJECTION, SOLUTION INTRAMUSCULAR; INTRAVENOUS
Status: DISCONTINUED | OUTPATIENT
Start: 2024-03-03 | End: 2024-03-05

## 2024-03-03 RX ORDER — MIDAZOLAM HYDROCHLORIDE 2 MG/2ML
1 INJECTION, SOLUTION INTRAMUSCULAR; INTRAVENOUS
Status: CANCELLED | OUTPATIENT
Start: 2024-03-03

## 2024-03-03 RX ORDER — DULOXETIN HYDROCHLORIDE 30 MG/1
30 CAPSULE, DELAYED RELEASE ORAL DAILY
Status: CANCELLED | OUTPATIENT
Start: 2024-03-03

## 2024-03-03 RX ORDER — SODIUM CHLORIDE 0.9 % (FLUSH) 0.9 %
5-40 SYRINGE (ML) INJECTION PRN
Status: CANCELLED | OUTPATIENT
Start: 2024-03-03

## 2024-03-03 RX ORDER — PANTOPRAZOLE SODIUM 40 MG/1
40 TABLET, DELAYED RELEASE ORAL 2 TIMES DAILY
Status: CANCELLED | OUTPATIENT
Start: 2024-03-03

## 2024-03-03 RX ORDER — SENNOSIDES A AND B 8.6 MG/1
1 TABLET, FILM COATED ORAL NIGHTLY
Status: CANCELLED | OUTPATIENT
Start: 2024-03-03

## 2024-03-03 RX ORDER — MIDAZOLAM HYDROCHLORIDE 2 MG/2ML
1 INJECTION, SOLUTION INTRAMUSCULAR; INTRAVENOUS
Status: DISCONTINUED | OUTPATIENT
Start: 2024-03-03 | End: 2024-03-05

## 2024-03-03 RX ORDER — CARVEDILOL 6.25 MG/1
3.12 TABLET ORAL 2 TIMES DAILY
Status: CANCELLED | OUTPATIENT
Start: 2024-03-03

## 2024-03-03 RX ORDER — PANTOPRAZOLE SODIUM 40 MG/1
40 TABLET, DELAYED RELEASE ORAL 2 TIMES DAILY
Status: DISCONTINUED | OUTPATIENT
Start: 2024-03-03 | End: 2024-03-06

## 2024-03-03 RX ORDER — PREDNISONE 10 MG/1
10 TABLET ORAL DAILY
Status: DISCONTINUED | OUTPATIENT
Start: 2024-03-04 | End: 2024-03-06

## 2024-03-03 RX ORDER — ALBUTEROL SULFATE 90 UG/1
2 AEROSOL, METERED RESPIRATORY (INHALATION) EVERY 4 HOURS PRN
Status: CANCELLED | OUTPATIENT
Start: 2024-03-03

## 2024-03-03 RX ORDER — PREDNISONE 10 MG/1
10 TABLET ORAL DAILY
Status: CANCELLED | OUTPATIENT
Start: 2024-03-04

## 2024-03-03 RX ORDER — MIDAZOLAM HYDROCHLORIDE 2 MG/2ML
0.5 INJECTION, SOLUTION INTRAMUSCULAR; INTRAVENOUS
Status: CANCELLED | OUTPATIENT
Start: 2024-03-03

## 2024-03-03 RX ORDER — FUROSEMIDE 10 MG/ML
40 INJECTION INTRAMUSCULAR; INTRAVENOUS 3 TIMES DAILY
Status: CANCELLED | OUTPATIENT
Start: 2024-03-03

## 2024-03-03 RX ORDER — ONDANSETRON 2 MG/ML
4 INJECTION INTRAMUSCULAR; INTRAVENOUS EVERY 6 HOURS PRN
Status: CANCELLED | OUTPATIENT
Start: 2024-03-03

## 2024-03-03 RX ORDER — SPIRONOLACTONE 50 MG/1
25 TABLET, FILM COATED ORAL DAILY
Status: CANCELLED | OUTPATIENT
Start: 2024-03-04

## 2024-03-03 RX ORDER — MORPHINE SULFATE 2 MG/ML
2 INJECTION, SOLUTION INTRAMUSCULAR; INTRAVENOUS
Status: DISCONTINUED | OUTPATIENT
Start: 2024-03-03 | End: 2024-03-06

## 2024-03-03 RX ORDER — POLYETHYLENE GLYCOL 3350 17 G/17G
17 POWDER, FOR SOLUTION ORAL DAILY
Status: CANCELLED | OUTPATIENT
Start: 2024-03-04

## 2024-03-03 RX ORDER — ACETAMINOPHEN 650 MG/1
650 SUPPOSITORY RECTAL EVERY 6 HOURS PRN
Status: DISCONTINUED | OUTPATIENT
Start: 2024-03-03 | End: 2024-03-15 | Stop reason: HOSPADM

## 2024-03-03 RX ORDER — BISACODYL 10 MG
10 SUPPOSITORY, RECTAL RECTAL DAILY PRN
Status: DISCONTINUED | OUTPATIENT
Start: 2024-03-03 | End: 2024-03-15 | Stop reason: HOSPADM

## 2024-03-03 RX ORDER — SODIUM CHLORIDE FOR INHALATION 3 %
4 VIAL, NEBULIZER (ML) INHALATION PRN
Status: CANCELLED | OUTPATIENT
Start: 2024-03-03

## 2024-03-03 RX ORDER — DULOXETIN HYDROCHLORIDE 30 MG/1
30 CAPSULE, DELAYED RELEASE ORAL DAILY
Status: DISCONTINUED | OUTPATIENT
Start: 2024-03-03 | End: 2024-03-06

## 2024-03-03 RX ORDER — SODIUM CHLORIDE 0.9 % (FLUSH) 0.9 %
5-40 SYRINGE (ML) INJECTION EVERY 12 HOURS SCHEDULED
Status: CANCELLED | OUTPATIENT
Start: 2024-03-03

## 2024-03-03 RX ORDER — MORPHINE SULFATE 2 MG/ML
1 INJECTION, SOLUTION INTRAMUSCULAR; INTRAVENOUS
Status: CANCELLED | OUTPATIENT
Start: 2024-03-03

## 2024-03-03 RX ORDER — MORPHINE SULFATE 2 MG/ML
2 INJECTION, SOLUTION INTRAMUSCULAR; INTRAVENOUS
Status: CANCELLED | OUTPATIENT
Start: 2024-03-03

## 2024-03-03 RX ORDER — GUAIFENESIN 600 MG/1
600 TABLET, EXTENDED RELEASE ORAL 2 TIMES DAILY
Status: CANCELLED | OUTPATIENT
Start: 2024-03-03

## 2024-03-03 RX ORDER — SODIUM CHLORIDE 0.9 % (FLUSH) 0.9 %
5-40 SYRINGE (ML) INJECTION PRN
Status: DISCONTINUED | OUTPATIENT
Start: 2024-03-03 | End: 2024-03-15 | Stop reason: HOSPADM

## 2024-03-03 RX ORDER — POLYETHYLENE GLYCOL 3350 17 G/17G
17 POWDER, FOR SOLUTION ORAL DAILY
Status: DISCONTINUED | OUTPATIENT
Start: 2024-03-04 | End: 2024-03-06

## 2024-03-03 RX ORDER — ONDANSETRON 2 MG/ML
4 INJECTION INTRAMUSCULAR; INTRAVENOUS EVERY 6 HOURS PRN
Status: DISCONTINUED | OUTPATIENT
Start: 2024-03-03 | End: 2024-03-15 | Stop reason: HOSPADM

## 2024-03-03 RX ORDER — HALOPERIDOL 5 MG/ML
1 INJECTION INTRAMUSCULAR
Status: DISCONTINUED | OUTPATIENT
Start: 2024-03-03 | End: 2024-03-15 | Stop reason: HOSPADM

## 2024-03-03 RX ORDER — GLYCOPYRROLATE 0.2 MG/ML
0.2 INJECTION INTRAMUSCULAR; INTRAVENOUS EVERY 4 HOURS PRN
Status: DISCONTINUED | OUTPATIENT
Start: 2024-03-03 | End: 2024-03-15 | Stop reason: HOSPADM

## 2024-03-03 RX ORDER — SODIUM CHLORIDE 0.9 % (FLUSH) 0.9 %
5-40 SYRINGE (ML) INJECTION EVERY 12 HOURS SCHEDULED
Status: DISCONTINUED | OUTPATIENT
Start: 2024-03-03 | End: 2024-03-15 | Stop reason: HOSPADM

## 2024-03-03 RX ADMIN — SPIRONOLACTONE 25 MG: 50 TABLET ORAL at 08:20

## 2024-03-03 RX ADMIN — FERROUS SULFATE TAB 325 MG (65 MG ELEMENTAL FE) 325 MG: 325 (65 FE) TAB at 08:21

## 2024-03-03 RX ADMIN — PREDNISONE 10 MG: 10 TABLET ORAL at 08:20

## 2024-03-03 RX ADMIN — FUROSEMIDE 40 MG: 10 INJECTION, SOLUTION INTRAMUSCULAR; INTRAVENOUS at 08:20

## 2024-03-03 RX ADMIN — GUAIFENESIN 600 MG: 600 TABLET, EXTENDED RELEASE ORAL at 08:20

## 2024-03-03 RX ADMIN — DULOXETINE HYDROCHLORIDE 30 MG: 30 CAPSULE, DELAYED RELEASE ORAL at 21:18

## 2024-03-03 RX ADMIN — SODIUM CHLORIDE, PRESERVATIVE FREE 10 ML: 5 INJECTION INTRAVENOUS at 08:21

## 2024-03-03 RX ADMIN — CARVEDILOL 3.12 MG: 6.25 TABLET, FILM COATED ORAL at 08:20

## 2024-03-03 RX ADMIN — ENOXAPARIN SODIUM 40 MG: 100 INJECTION SUBCUTANEOUS at 08:21

## 2024-03-03 RX ADMIN — SODIUM CHLORIDE, PRESERVATIVE FREE 10 ML: 5 INJECTION INTRAVENOUS at 21:19

## 2024-03-03 RX ADMIN — GUAIFENESIN 600 MG: 600 TABLET, EXTENDED RELEASE ORAL at 21:18

## 2024-03-03 RX ADMIN — SENNOSIDES 8.6 MG: 8.6 TABLET, FILM COATED ORAL at 21:18

## 2024-03-03 RX ADMIN — PANTOPRAZOLE SODIUM 40 MG: 40 TABLET, DELAYED RELEASE ORAL at 08:20

## 2024-03-03 RX ADMIN — POLYETHYLENE GLYCOL (3350) 17 G: 17 POWDER, FOR SOLUTION ORAL at 08:20

## 2024-03-03 RX ADMIN — PANTOPRAZOLE SODIUM 40 MG: 40 TABLET, DELAYED RELEASE ORAL at 21:18

## 2024-03-03 ASSESSMENT — PAIN SCALES - GENERAL: PAINLEVEL_OUTOF10: 0

## 2024-03-03 NOTE — PLAN OF CARE
Problem: Discharge Planning  Goal: Discharge to home or other facility with appropriate resources  3/3/2024 1248 by Leanne Land RN  Outcome: Progressing  3/3/2024 1124 by Polly Lebron RN  Outcome: Progressing  Flowsheets (Taken 3/3/2024 0800)  Discharge to home or other facility with appropriate resources:   Identify barriers to discharge with patient and caregiver   Arrange for needed discharge resources and transportation as appropriate   Identify discharge learning needs (meds, wound care, etc)   Arrange for interpreters to assist at discharge as needed   Refer to discharge planning if patient needs post-hospital services based on physician order or complex needs related to functional status, cognitive ability or social support system     Problem: Skin/Tissue Integrity  Goal: Absence of new skin breakdown  Description: 1.  Monitor for areas of redness and/or skin breakdown  2.  Assess vascular access sites hourly  3.  Every 4-6 hours minimum:  Change oxygen saturation probe site  4.  Every 4-6 hours:  If on nasal continuous positive airway pressure, respiratory therapy assess nares and determine need for appliance change or resting period.  3/3/2024 1248 by Leanne Land RN  Outcome: Progressing  3/3/2024 1124 by Polly Lebron RN  Outcome: Progressing     Problem: Safety - Adult  Goal: Free from fall injury  3/3/2024 1248 by Leanne Land RN  Outcome: Progressing  3/3/2024 1124 by Polly Lebron RN  Outcome: Progressing     Problem: Pain  Goal: Verbalizes/displays adequate comfort level or baseline comfort level  3/3/2024 1248 by Leanne Land RN  Outcome: Progressing  3/3/2024 1124 by Polly Lebron RN  Outcome: Progressing     Problem: Nutrition Deficit:  Goal: Optimize nutritional status  3/3/2024 1248 by Leanne Land RN  Outcome: Progressing  3/3/2024 1124 by Polly Lebron RN  Outcome: Progressing

## 2024-03-03 NOTE — PLAN OF CARE
Problem: Discharge Planning  Goal: Discharge to home or other facility with appropriate resources  Outcome: Progressing  Flowsheets (Taken 3/3/2024 0800)  Discharge to home or other facility with appropriate resources:   Identify barriers to discharge with patient and caregiver   Arrange for needed discharge resources and transportation as appropriate   Identify discharge learning needs (meds, wound care, etc)   Arrange for interpreters to assist at discharge as needed   Refer to discharge planning if patient needs post-hospital services based on physician order or complex needs related to functional status, cognitive ability or social support system     Problem: Skin/Tissue Integrity  Goal: Absence of new skin breakdown  Description: 1.  Monitor for areas of redness and/or skin breakdown  2.  Assess vascular access sites hourly  3.  Every 4-6 hours minimum:  Change oxygen saturation probe site  4.  Every 4-6 hours:  If on nasal continuous positive airway pressure, respiratory therapy assess nares and determine need for appliance change or resting period.  Outcome: Progressing     Problem: Safety - Adult  Goal: Free from fall injury  Outcome: Progressing     Problem: Pain  Goal: Verbalizes/displays adequate comfort level or baseline comfort level  Outcome: Progressing     Problem: Nutrition Deficit:  Goal: Optimize nutritional status  Outcome: Progressing

## 2024-03-03 NOTE — PROGRESS NOTES
V2.0  [facility] [place] Hospitalist Progress Note      Name:  Arnie Martinez /Age/Sex: 1955  (68 y.o. male)   MRN & CSN:  [id number] & [id number] Encounter Date/Time: 3/3/2024 11:50 AM EST    Location:  [facility]-A PCP: Pat Nguyễn MD       Hospital Day: 10    Assessment and Plan:   Arnie Martinez is a 68 y.o. male with pmh of  Intestinal cancer, thrombocytopenia, MONGE cirrhosis  who presents with Acute respiratory failure with hypoxia (HCC)    # Acute hypoxic respiratory failure: on HFNC at 8 L O2 via NC likely 2/2 combination of volume overload and underlying aspiration pneumonia on cefepime, on Lasix 40 mg IV 3 times a day, 2550ml urine output in last 24 hours wean oxygen as tolerated currently on 8 L of O2 by high flow.    # Severe sepsis present on arrival: Likely secondary to pneumonia which can be related to aspiration as patient having some difficulty swallowing, SLP on board continue cefepime, obtained Pro-Mingo 0.144 trended down.    # Decompensated heart failure with preserved ejection fraction: Echo with EF of 60%, cardiology on board appreciate recommendations, continue Coreg.  Lasix 3 times daily    # Constipation: No bowel movement since a week was on bowel protocol obtain KUB which showed constipation, ordered enema continue to monitor    # Swelling of left upper extremity especially around the elbow: ultrasound showed no DVT, continue upper extremity elevation and continue to monitor.    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    # Thrombocytopenia: Likely secondary to above.    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  Hospitalist consulted    Per oncology note, they spoke to patient's family in regards to hospice, agreeable to have evaluation and to stop chemo  Hospice meeting today at 1 pm  Per FRANNIE 
    V2.0  [facility] [place]      Name:  Arnie Martinez /Age/Sex: 1955  (68 y.o. male)   MRN & CSN:  [id number] & [id number] Encounter Date/Time: 2024 11:50 AM EST    Location:  [address] [place] [address] PCP: Pat Nguyễn MD       Hospital Day: 3    Assessment and Plan:   Arnie Martinez is a 68 y.o. male with pmh of  Intestinal cancer, thrombocytopenia, MONGE cirrhosis  who presents with Acute respiratory failure with hypoxia (HCC)      Plan:  Acute hypoxic respiratory failure: on HFNC at 12 L O2 via NC likely 2/2 combination of volume overload and underlying aspiration pneumonia on cefepime, on Lasix 40 mg IV 3 times a day, 3.5 L urine output in last 24 hours wean oxygen as tolerated  Severe sepsis present on arrival: Likely secondary to pneumonia which can be related to aspiration as patient having some difficulty swallowing, SLP on board continue cefepime  Decompensated heart failure with preserved ejection fraction: Echo with EF of 60%, cardiology on board appreciate recommendations, continue Coreg  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: Continue Lasix will start spironolactone  Thrombocytopenia: Likely secondary to above  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, oncology consulted, start prednisone 10 mg daily for appetite stimulant    Diet ADULT DIET; Dysphagia - Minced and Moist; Low Fat/Low Chol/High Fiber/TERRI; 1800 ml; Please give ensure with every meal  ADULT ORAL NUTRITION SUPPLEMENT; Breakfast, Lunch, Dinner; Standard High Calorie/High Protein Oral Supplement   DVT Prophylaxis [] Lovenox, []  Heparin, [] SCDs, [] Ambulation,  [] Eliquis, [] Xarelto  [] Coumadin   Code Status Full Code   Disposition From: Home  Expected Disposition: TBD  Estimated Date of Discharge: 3 days  Patient requires continued admission due to hypoxic respiratory failure   Surrogate Decision Maker/ POA Wife     Subjective:     Chief 
    V2.0  [facility] [place] Hospitalist Progress Note      Name:  Arnie Martinez /Age/Sex: 1955  (68 y.o. male)   MRN & CSN:  [id number] & [id number] Encounter Date/Time: 2024 11:50 AM EST    Location:  [facility]A PCP: Pat Nguyễn MD       Hospital Day: 5    Assessment and Plan:   Arnie Martinez is a 68 y.o. male with pmh of  Intestinal cancer, thrombocytopenia, MONGE cirrhosis  who presents with Acute respiratory failure with hypoxia (HCC)    # Swelling of left upper extremity especially around the elbow: Obtain ultrasound to rule out DVT continue upper extremity elevation and continue to monitor.    # Acute hypoxic respiratory failure: on HFNC at 12 L O2 via NC likely 2/2 combination of volume overload and underlying aspiration pneumonia on cefepime, on Lasix 40 mg IV 3 times a day, 3.170 L urine output in last 24 hours wean oxygen as tolerated currently on 10 L of O2 by high flow.    # Severe sepsis present on arrival: Likely secondary to pneumonia which can be related to aspiration as patient having some difficulty swallowing, SLP on board continue cefepime, obtain Pro-Mingo.    # Decompensated heart failure with preserved ejection fraction: Echo with EF of 60%, cardiology on board appreciate recommendations, continue Coreg.    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    # Thrombocytopenia: Likely secondary to above.    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    Comment: Please note this report has been produced using speech recognition software and may contain errors related to that system including errors in grammar, punctuation, and spelling, as well as words and phrases that may be inappropriate. If there are any questions or concerns please feel free to contact the dictating provider for clarification. 
    V2.0  [facility] [place]      Name:  Arnie Martinez /Age/Sex: 1955  (68 y.o. male)   MRN & CSN:  [id number] & [id number] Encounter Date/Time: 2024 11:50 AM EST    Location:  [facility]A PCP: Pat Nguyễn MD       Hospital Day: 4    Assessment and Plan:   Arnie Martinez is a 68 y.o. male with pmh of  Intestinal cancer, thrombocytopenia, MONGE cirrhosis  who presents with Acute respiratory failure with hypoxia (HCC)      # Acute hypoxic respiratory failure: on HFNC at 12 L O2 via NC likely 2/2 combination of volume overload and underlying aspiration pneumonia on cefepime, on Lasix 40 mg IV 3 times a day, 3.170 L urine output in last 24 hours wean oxygen as tolerated currently on 10 L of O2 by high flow.    # Severe sepsis present on arrival: Likely secondary to pneumonia which can be related to aspiration as patient having some difficulty swallowing, SLP on board continue cefepime, obtain Pro-Mingo.    # Decompensated heart failure with preserved ejection fraction: Echo with EF of 60%, cardiology on board appreciate recommendations, continue Coreg.    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    # Thrombocytopenia: Likely secondary to above.    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    Comment: Please note this report has been produced using speech recognition software and may contain errors related to that system including errors in grammar, punctuation, and spelling, as well as words and phrases that may be inappropriate. If there are any questions or concerns please feel free to contact the dictating provider for clarification.       Diet ADULT DIET; Dysphagia - Minced and Moist; Low Fat/Low Chol/High Fiber/TERRI; 1800 ml; Please give ensure with every meal  ADULT ORAL NUTRITION SUPPLEMENT; 
   03/01/24 1623   Encounter Summary   Encounter Overview/Reason  Initial Encounter   Service Provided For: Patient   Referral/Consult From: [facility]   Support System Spouse;Children   Last Encounter  03/01/24   Complexity of Encounter Low   Begin Time 1500   End Time  1515   Total Time Calculated 15 min   Spiritual/Emotional needs   Type Spiritual Support   Grief, Loss, and Adjustments   Type Adjustment to illness   Assessment/Intervention/Outcome   Assessment Coping   Intervention Active listening;Empowerment;Nurtured Hope;Sustaining Presence/Ministry of presence   Outcome Coping;Encouraged;Engaged in conversation;Expressed feelings, needs, and concerns;Expressed Gratitude   Plan and Referrals   Plan/Referrals Continue Support (comment)       
  Physical Therapy Treatment Note  Name: Arnie Martinez MRN: [id number] :   1955   Date:  3/1/2024   Admission Date: 2024 Room:  [address] [place], [place] [address]A     Restrictions/Precautions:          general precautions, fall risk    Communication with other providers:  RN, CABRERA/SIMEON    Subjective:  Patient states:  \"I'll give it a try\"  Pain:   Location, Type, Intensity (0/10 to 10/10):  denies pain    Objective:    Observation:  pt supine in bed upon entry and agreeable to session. Initially attempted to sit EOB but then realized pt soiled and performed pericare instead.    Treatment, including education/measures:    Bed mobility: pt completed rolling to R x3 and to L x5 CGA/min A with cues for sequencing. Pt able to maintain sidelying CGA/min A. Pt dependent x2 to scoot higher in bed. SpO2 92% and higher throughout    Assessment / Impression:    Pt supine in bed at end of session with needs in reach and alarm on. Wife present.    Patient's tolerance of treatment:  fair   Adverse Reaction: n/a  Significant change in status and impact:  n/a  Barriers to improvement:  decreased endurance    Plan for Next Session:    Continue to address bed mobility and transfer training in future sessions    Time in:  1408  Time out:  1440  Timed treatment minutes:  25  Total treatment time:  32    Previously filed items:           Short Term Goals  Time Frame for Short Term Goals: 1 week  Short Term Goal 1: pt to complete all bed mobility mod I  Short Term Goal 2: pt to complete all STS transfers to/from bed, commode, and chair mod A  Short Term Goal 3: pt to ambulate 15' with LRAD min A    Electronically signed by:    Estefany Dale, PT  3/1/2024, 3:30 PM   
  Physical Therapy Treatment Note  Name: Arnie Martinez MRN: [id number] :   1955   Date:  2024   Admission Date: 2024 Room:  [address] [place], [place] [address]A     Restrictions/Precautions:          general precautions, fall risk    Communication with other providers:  RN, OT    Subjective:  Patient states:  \"Thank you for helping\"  Pain:   Location, Type, Intensity (0/10 to 10/10):  denies pain    Objective:    Observation:  pt supine in bed with spouse present, pt agreeable to session    Objective:   Oxygen: pt on 5L supine at rest. Upon sitting EOB, SpO2 dropped to 82%, pt gradually increased to 13L and SpO2 improved to 92% after ~4 minutes. For transfer, pt on 15L due to limited options on O2 tank. SpO2 dropped to 86% after transfer and improved to 94% after ~3 minutes. Pt then gradually weaned down to 6L but unable to maintain SpO2 >88%. Pt on 8L at end of session up in chair with SpO2 90-93%. Rn updated.    Treatment, including education/measures:    Bed mobility: pt completed supine to sitting EOB min A with assist at hips and cues for sequencing    Transfers: pt completed stand step transfer from EOB to chair mod A with cues for sequencing and increased time to complete    Balance: pt sat EOB ~15 minutes CGA progressing to SBA.       Assessment / Impression:    Pt up in chair at end of session with needs in reach, alarm on, and family present.    Patient's tolerance of treatment:  fair   Adverse Reaction: n/a  Significant change in status and impact:  n/a  Barriers to improvement: impaired SpO2 status with mobility    Plan for Next Session:    Continue to address transfer training and progress to gait training when SpO2 allows    Time in:  1519  Time out:  1555  Timed treatment minutes:  30  Total treatment time:  36    Previously filed items:           Short Term Goals  Time Frame for Short Term Goals: 1 week  Short Term Goal 1: pt to complete all bed mobility mod I  Short Term Goal 2: pt to complete all STS 
4 Eyes Skin Assessment     NAME:  Arnie Martinez  YOB: 1955  MEDICAL RECORD NUMBER:  [id number]    The patient is being assessed for  Admission    I agree that at least one RN has performed a thorough Head to Toe Skin Assessment on the patient. ALL assessment sites listed below have been assessed.      Areas assessed by both nurses:    Head, Face, Ears, Shoulders, Back, Chest, Arms, Elbows, Hands, Sacrum. Buttock, Coccyx, Ischium, Legs. Feet and Heels, and Under Medical Devices         Does the Patient have a Wound? Yes wound(s) were present on assessment. LDA wound assessment was Initiated and completed by RN       Mj Prevention initiated by RN: Yes  Wound Care Orders initiated by RN: Yes    Pressure Injury (Stage 3,4, Unstageable, DTI, NWPT, and Complex wounds) if present, place Wound referral order by RN under : Yes  Patient has blanchable coccyx and sacrum. A open area / slit on left side of sacrum. Mepilex applied     New Ostomies, if present place, Ostomy referral order under : No     Nurse 1 eSignature: Electronically signed by Joselin Blair RN on 2/24/24 at 1:42 AM EST    **SHARE this note so that the co-signing nurse can place an eSignature**    Nurse 2 eSignature: {Esignature:[id number]}    
Arnie Martinez was evaluated today and a DME order was entered for a wheeled walker with seat because he requires this to successfully complete daily living tasks of personal cares and ambulating.  A wheeled walker with seat is necessary due to the patient's unsteady gait, upper body weakness, inability to  and ambulation device, ambulating only short distances by pushing a walker, and the need to sit for a short time before resuming ambulation.  These tasks cannot be completed with a lesser ambulation device such as a cane, crutch, or standard walker.  The need for this equipment was discussed with the patient and he understands and is in agreement.       
Auto Cpap changed to PRN. He has not used it in several days.  
Comprehensive Nutrition Assessment    Type and Reason for Visit:  Initial, Positive Nutrition Screen, Wound    Nutrition Recommendations/Plan:   D/C cardiac restrictions  Continue diet as per SLP  Continue standard supplement tid, no vanilla     Malnutrition Assessment:  Malnutrition Status:  Severe malnutrition (02/27/24 1120)    Context:  Chronic Illness     Findings of the 6 clinical characteristics of malnutrition:  Energy Intake:  Mild decrease in energy intake   Weight Loss:  Greater than 20% over 1 year     Body Fat Loss:  Severe body fat loss Orbital, Triceps, Buccal region   Muscle Mass Loss:  Severe muscle mass loss Temples (temporalis), Clavicles (pectoralis & deltoids)  Fluid Accumulation:  No significant fluid accumulation     Strength:  Not Performed    Nutrition Assessment:    Pt admitted with SOB and sepsis. H/O severe malnutrition, esophageal dysphagia d/t esophageal varices and liver cirrhosis. SO reports unable to eat meats d/t esophageal dysphagia, unhappy with Minced and Moist diet, consuming 1-25% of meals, but is drinking strawberry Ensure from home. Will liberalize diet to allow more choices. has lost 27% over the past yr and continues to meet criteria for severe malnutrition this admission. Will follow as high nutrition risk.    Nutrition Related Findings:    BUN 25, Glu 135   Wound Type: Pressure Injury, Stage II       Current Nutrition Intake & Therapies:    Average Meal Intake: 1-25%  Average Supplements Intake: %  ADULT DIET; Dysphagia - Minced and Moist; Low Fat/Low Chol/High Fiber/TERRI; 1800 ml; Please give ensure with every meal  ADULT ORAL NUTRITION SUPPLEMENT; Breakfast, Lunch, Dinner; Standard High Calorie/High Protein Oral Supplement    Anthropometric Measures:  Height: 170.2 cm (5' 7\")  Ideal Body Weight (IBW): 148 lbs (67 kg)    Admission Body Weight: 72.7 kg (160 lb 4.4 oz)  Current Body Weight: 70.5 kg (155 lb 6.8 oz),   IBW.    Current BMI (kg/m2): 24.3  Usual Body 
Does not have any trouble with thin liquids, but is having a lot of trouble eating food with any texture. Spouse give patient ensures at home.  Dr. Cormier notified to see if we can get a speech evaluation and ensures ordered.   
Occupational Therapy    Occupational Therapy Treatment Note    Name: Arnie Martinez MRN: [id number] :   1955   Date:  3/1/2024   Admission Date: 2024 Room:  [facility]     Per OTR/L Discharge Recommendation: SNF     Primary Problem:  [place]:  The primary encounter diagnosis was Acute respiratory failure with hypoxia (HCC). Diagnoses of Septicemia (HCC) and Swelling were also pertinent to this visit.    Restrictions/Precautions: general precautions, fall risk      Communication with other providers: RN approved session, co tx with PT Magali for tolerance    Subjective:  Patient states:  Pt agreeable to therapy session.   Pain:   Location, Type, Intensity (0/10 to 10/10):  No pain reported.     Objective:    Observation: Pt supine in bed upon arrival, wife at bed side.    Objective Measures:  Pt vitals WFL throughout session.     Treatment, including education:  Self Care Training:   Cues were given for safety, sequence, UE/LE placement, visual cues, and balance.    Activities performed today included UB/LB dressing tasks, toileting, hand hygiene at sink    Pt supine in bed upon arrival. Pt agreeable to EOB d/t increased nausea at this date. Pt completed partial sup to sit transfer with MIN A for trunk support, pt laid back down due to having bowel movement. Pt completed rolling to R and L side multiple times with CGA-MIN A for pericare, pt able to reach and grab bed rail to assist. Pt doffed gown with MOD A for clothing management. Pt completed pericare with DEP A. Pt donned undergarment with MAX A. Pt donned new gown with MOD A for clothing management. Pt required increased time for rolling, bed linen changing, and pericare. Pt unable to tolerate OOB treatment at this time. Pt boosted in bed with MAX A x2. Pt left in bed with call light, bed alarm, and all needs met prior to leaving room.     Assessment / Impression:    Patient's tolerance of treatment: fair   Adverse Reaction: None  Significant change 
Occupational Therapy    Occupational Therapy Treatment Note    Name: Arnie Martinez MRN: [id number] :   1955   Date:  2024   Admission Date: 2024 Room:  [address] [place] [address]     D/c rec:  SNF    Restrictions/Precautions:          general precautions, fall risk     Communication with other providers: RN cleared pt for therapy, Cotx with PT for safety and activity tolerance    Subjective:  Patient states:  \"I've been doing as much as I can for myself from this bed\"  Pain:   Location, Type, Intensity (0/10 to 10/10):  denies pain, reports nausea    Objective:    Observation: pt supine in bed upon arrival, agreeable to therapy   Objective Measures:  5L when supine in bed; PT Magali increased O2 to 13L when sitting EOB, 15L for transfer and was left on 8 L at end of session to stay at therapeutic levels (see PT note for further oxygen titration information)    Treatment, including education:  Therapeutic Activity Training:   Therapeutic activity training was instructed today.  Cues were given for safety, sequence, UE/LE placement, awareness, and balance.    Activities performed today included bed mobility training, sup-sit, sit-stand, SPT.    Supine to EOB transfer with min A for hip pivot, min verbal cues for hand placement and breathing during transfer. Sat EOB with CGA initially, progressed to SBA with min verbal cues, B hands on bed for support. Pt performed deep breathing with min verbal cues for pursed lips while sitting EOB to recover from bed mobility. Donned socks sitting EOB with max A to reach feet. Sit to stand from EOB with mod A for force production and balance safety, min verbal cues for hand placement. Stand step pivot from EOB to chair with mod A to maintain upright position and pivot hips to chair. Stand to sit with min A for controlled descent. Pt educated on role of OT, benefits of OT, and rationale for therapeutic intervention. Educated on need to be patient advocate, assist to fullest 
Occupational Therapy  [facility] ACUTE CARE OCCUPATIONAL THERAPY EVALUATION  Arnie Martinez, 1955, 3107/3107-A, 2/27/2024    Discharge Recommendation: Skilled Nursing Facility    History  [place]:  The primary encounter diagnosis was Acute respiratory failure with hypoxia (HCC). Diagnoses of Septicemia (HCC) and Swelling were also pertinent to this visit.  Patient  has a past medical history of Anemia, Cancer (HCC), Cirrhosis (HCC), Duodenal cancer (HCC), Esophageal varices (HCC), History of blood transfusion, History of external beam radiation therapy, HLD (hyperlipidemia), Hypertension, Nausea vomiting and diarrhea, Neuropathy, and Osteoarthritis.  Patient  has a past surgical history that includes other surgical history; Upper gastrointestinal endoscopy (12/11/2014); Cholecystectomy (2011); hernia repair (2011); Total knee arthroplasty (Left, 02/17/2020); Colonoscopy (03/19/2014); Colonoscopy (03/09/2016); Total knee arthroplasty (Right, 03/08/2021); Colonoscopy (N/A, 11/22/2022); Upper gastrointestinal endoscopy (N/A, 08/18/2023); Upper gastrointestinal endoscopy (N/A, 09/05/2023); Mediport insertion, single; Upper gastrointestinal endoscopy (N/A, 10/24/2023); and Upper gastrointestinal endoscopy (N/A, 12/11/2023).    Subjective:  Patient states:  \"I'm mad that I can't do more\"   Pain:  denies pain .    Communication: RN, CM, co-eval with PT   Restrictions: fall risk    Home Setup/Prior level of function   Lives With: Spouse  Type of Home: House  Home Layout: One level  Home Access: Stairs to enter with rails  Entrance Stairs - Number of Steps: 3  Bathroom Shower/Tub: Tub/Shower unit  Bathroom Toilet: Standard  Home Equipment: Cane, Walker, rolling  ADL Assistance: Independent  Homemaking Assistance: Independent  Homemaking Responsibilities: Yes  Ambulation Assistance: Independent (mod I with RW)  Transfer Assistance: Independent  Active : Yes  Occupation: Retired    Examination of body 
Patient Name:  Arnie Martinez  Patient :  1955  Patient MRN:  [id number]     Primary Oncologist: John Desir MD  Referring Provider: Pat Nguyễn MD     Date of Service: 2024     PROGRESS NOTE    HPI:   Arnie Martinez is a pleasant 68 y.o. male with medical history significant for poorly differentiated gastric carcinoma, not a surgical candidate, s/p radiation therapy, currently on chemo with FLOT regimen (received last dose on 2024), cirrhosis, pancytopenia, HTN and GERD who presented to [facility] ED on 24 with feeling weaker and weaker over the past few weeks, got to the point where he couldn't even transfer from bed in the AM today.     He also complained of progressively worsening SOB. He was found by EMS to have a SpO2 of 60%.       He was admitted for acute hypoxic respiratory failure, severe sepsis, community acquired pneumonia and possible heart failure.     I was called to evaluate him. He's still not eating much. He has tried marinol last few months without improvement.     Interval History   2024  Patient seen and examined this morning.  Feeling very tired today but no other complaints.  Did not eat much of his breakfast, appetite remains poor.  No fever/chills.  No CP.  SOB improved although worse with exertion.  Needs significant help getting back into bed, unable to tolerate much exertion.  Requiring 10L NC.   AM lab data reviewed with WBC 6.2, Hgb 9.6, Plt unavailable due to clumping. Na 133, Cr 0.7,.      2024 seen and examined.  Spouse is at bedside.  Awake and alert. + fatigue. No acute distress. Low appetite. On low dose prednisone. I encourage to have frequent meals.    2024 CT chest:   1. No evidence of pulmonary embolism with artifact limiting evaluation of the distal pulmonary arteries.  2. Extensive ground-glass opacities throughout both lungs, which could be related to cardiogenic pulmonary edema, ARDS, or diffuse infection.  3. Small bilateral pleural 
Patient is on 12L high flow nc, with any movement or lying down to reposition, patient oxygen saturation drops into low 80's, this nurse bumps oxygen up to 15L for patient to recover back up to above 90%, Respirations are in the 30's.  Spouse is asking for oncology to come see patient, Dr. Cormier notified.   
Patient seen and examined by myself    Cardiology consulted for elevated troponin    Patient with past medical history significant for Gastric cancer, liver cirrhosis/MONGE, pancytopenia, esophageal varices, hypertension    Patient currently on nonrebreather requiring 13 L of supplemental O2. No oxygen requirement at home. Accessory muscle use, tachypnea.  Rales and rhonchi noted bilaterally.     Plan:  Elevated troponin  Acute respiratory failure with concerns for pneumonia  Rule out PE    proBNP elevated, troponin elevated however non-ACS trend.  EKG nonischemic, similar to prior, some inferior T wave inversions noted.  CTPA pending, high suspicion for PE with history of malignancy    Patient does appear mildly volume overloaded, possible acute CHF    No prior cardiac workup noted.  Will attempt to obtain echocardiogram    Full note to follow by Dr. Christelle Alex PA-C 02/23/24 2:54 PM     
Pt is on 12 lpm HFNC,  Pt's SaO2 is 92% on 12 lpm HFNC    Pt tolerating well.    
Pt's SaO2 is 93% on 12 lpm HFNC.    Pt does not wear a Cpap @ home.    Pt is resting in bed    
[employer] student here from 7a-7p  
Speech-Language Pathology Department  Facility/Department: [address]   CLINICAL BEDSIDE SWALLOW EVALUATION    NAME: Arnie Martinez  : 1955  MRN: [id number]    ADMISSION DATE: 2024  ADMITTING DIAGNOSIS: has Splenomegaly; NAFLD (nonalcoholic fatty liver disease); Carcinoma of duodenum (HCC); Cirrhosis of liver without ascites (HCC); Port-A-Cath in place; Leukopenia; Primary malignant neoplasm of small intestine; Thrombocytopenic disorder; Fatigue; S/P total knee arthroplasty, right; Rectal bleeding; Hx of colonic polyps; Benign neoplasm of ascending colon; Benign neoplasm of transverse colon; Gastroesophageal reflux disease; Acute gastric ulcer with hemorrhage; GI bleed; Secondary esophageal varices without bleeding (HCC); Malignant neoplasm of overlapping sites of stomach (HCC); Screening for viral disease; Malignant neoplasm of body of stomach (HCC); Failure to thrive in adult; Severe malnutrition (HCC); Nausea vomiting and diarrhea; Secondary esophageal varices with bleeding (HCC); Generalized weakness; Esophageal dysphagia; Chemotherapy induced neutropenia (HCC); and Acute respiratory failure with hypoxia (HCC) on their problem list.      Impressions: Arnie Martinez was seen for a bedside swallowing evaluation after being admitted to [facility] with SOB and sepsis.  Pt was alert and cooperative throughout assessment.  Relevant medical hx includes severe malnutrition, esophageal dysphagia d/t esophageal varices and liver cirrhosis.  Pt's caregiver at bedside reports pt has been unable to eat meats d/t esophageal dysphagia.   For today's assessment pt was positioned upright in bed and presented with a clear vocal quality and weak volitional cough.  Oral mechanism examination was WFL with no focal orofacial weakness.  PO trials of puree, soft/regular solids and thin liquids by cup/straw sips.  Prolonged mastication and slow oral A-P transit was observed with trails of regular solids.  Pharyngeal swallow 
Swing referral received and all notes reviewed.  Per attending note on 8L NC after walking patient and quickly desaturates.  Not walking with PT/OT currently.  Appears he will be a while longer with IV diuresis before medically ready having read all clinical notes.  Please let swing program know when patient is closer to discharge to re-evaluate to make a decision as we do not want to effect the patient's length of stay.  
[facility]  DEPARTMENT OF SPEECH/LANGUAGE PATHOLOGY  DAILY PROGRESS NOTE  Arnie Martinez  3/1/2024  [id number]  Septicemia (HCC) [A41.9]  Acute respiratory failure with hypoxia (HCC) [J96.01]  Acute hypoxic respiratory failure (HCC) [J96.01]  Allergies   Allergen Reactions    Erythromycin      vomitting    Lorazepam Itching    Zolpidem Itching    Adhesive Tape Rash    Macrolides And Ketolides Rash    Remeron [Mirtazapine] Itching and Rash         Pt was seen this date for dysphagia treatment.       IMPRESSION AND RECOMMENDATIONS: Arnie Martinez was seen for dysphagia follow-up. Wife present throughout. Pt seated upright in bed, awake, fatigued. Wife reported pt has had poor intake and difficulty swallowing any solid foods, including minced/moist textures. She reported preference for liquids and thin purees at this time. Pt accepted limited PO trials of thin liquids via cup with adequate oropharyngeal swallow/no overt s/s aspiration. He was noted to vomit/expectorate yellowish secretions following trials. He denied any interest in further PO intake.    Recommend downgrade to pureed diet per pt/wife request. Pt appears very unlikely to meet nutritional needs PO given evident discomfort with very limited intake and history of poor PO intake. May consider long-term alternate route for nutrition vs hospice. SLP will monitor chart for further needs.      GOALS (current status in bold):  Short-term Goals  Timeframe for Short-term Goals: LOS or until goals are met  Goal 1: Pt will tolerate minced and moist solids/thin liquids without clinical evidence of aspiration 100% Partially meeting; limited trials d/t significant pt discomfort; downgrade to puree per pt's wife request  Goal 2: pt/caregivers will demonstrate comprehension of recommendations/POC Meeting, d/w wife at bedside        EDUCATION: recommendations/plan    PAIN RATING (0-10 Scale): does not rate, reports general discomfort  Time 
Please give ensure with every meal    Anthropometric Measures:  Height: 170.2 cm (5' 7\")  Ideal Body Weight (IBW): 148 lbs (67 kg)    Admission Body Weight: 72.7 kg (160 lb 4.4 oz)  Current Body Weight: 63.9 kg (140 lb 14 oz), 95.2 % IBW. Weight Source: Bed Scale  Current BMI (kg/m2): 22.1  Usual Body Weight: 96.6 kg (213 lb) (2/20/27)  % Weight Change (Calculated): -27                    BMI Categories: Normal Weight (BMI 22.0 to 24.9) age over 65    Estimated Daily Nutrient Needs:  Energy Requirements Based On: Kcal/kg  Weight Used for Energy Requirements: Current  Energy (kcal/day): 8147-8312 (30-35 kcal/kg)  Weight Used for Protein Requirements: Current  Protein (g/day): 85-99 (1.2-1.4 g/kg)  Method Used for Fluid Requirements: 1 ml/kcal  Fluid (ml/day): 7129-4115    Nutrition Diagnosis:   Severe malnutrition, In context of chronic illness related to catabolic illness, swallowing difficulty as evidenced by poor intake prior to admission, swallow study results, Criteria as identified in malnutrition assessment    Nutrition Interventions:   Food and/or Nutrient Delivery: Continue Current Diet, Continue Oral Nutrition Supplement, Start Tube Feeding (if POC)  Nutrition Education/Counseling: Education initiated (High [place] High Protein Nutrition Therapy and oral supplement coupons)  Coordination of Nutrition Care: Continue to monitor while inpatient, Feeding Assistance/Environment Change  Plan of Care discussed with: Pt, SO/PerfectServed Attending Re malnutrition    Goals:     Goals: PO intake 50% or greater       Nutrition Monitoring and Evaluation:   Behavioral-Environmental Outcomes: None Identified  Food/Nutrient Intake Outcomes: Food and Nutrient Intake, Supplement Intake, Diet Advancement/Tolerance  Physical Signs/Symptoms Outcomes: Biochemical Data, Chewing or Swallowing, GI Status, Meal Time Behavior, Nutrition Focused Physical Findings, Skin, Weight    Discharge Planning:    Continue Oral Nutrition Supplement, 
clarification.       Diet ADULT ORAL NUTRITION SUPPLEMENT; Breakfast, Lunch, Dinner; Standard High Calorie/High Protein Oral Supplement  ADULT DIET; Dysphagia - Minced and Moist; 1800 ml; Please give ensure with every meal   DVT Prophylaxis [x] Lovenox, []  Heparin, [] SCDs, [] Ambulation,  [] Eliquis, [] Xarelto  [] Coumadin   Code Status Full Code   Disposition From: Home  Expected Disposition: SNF  Estimated Date of Discharge: TBD  Patient requires continued admission due to hypoxic respiratory failure    Surrogate Decision Maker/ POA Wife     Subjective:     Chief Complaint: Shortness of Breath (Was hypoxic in the 60's on EMS arrival- arrives wearing 15LPM/ non-rebreather, cancer patient )     Patient seen and examined at bedside.  Patient states he still feeling very weak and tired and still have shortness of breath.  Patient's wife at bedside updated about patient condition.      Review of Systems:    Review of Systems as above      Objective:     Intake/Output Summary (Last 24 hours) at 2/28/2024 0837  Last data filed at 2/28/2024 0717  Gross per 24 hour   Intake 140 ml   Output 3700 ml   Net -3560 ml          Vitals:   Vitals:    02/28/24 0059   BP: 106/78   Pulse: 76   Resp: 11   Temp: 97.4 °F (36.3 °C)   SpO2: 95%       Physical Exam:   Physical Exam  Pulmonary:      Comments: Port on the left upper chest       Constitutional:       General: He is not in acute distress.     Appearance: He is overweight. He is ill-appearing. He is not toxic-appearing or diaphoretic.      Interventions: Face mask in place.   HENT:      Head: Normocephalic and atraumatic.      Right Ear: External ear normal.      Left Ear: External ear normal.      Nose: Nose normal.      Mouth/Throat:      Mouth: Mucous membranes are moist.      Pharynx: Oropharynx is clear.   Eyes:      General: No scleral icterus.        Right eye: No discharge.         Left eye: No discharge.      Conjunctiva/sclera: Conjunctivae normal.      Pupils: 
Smoking status: Never    Smokeless tobacco: Never   Vaping Use    Vaping Use: Never used   Substance and Sexual Activity    Alcohol use: No    Drug use: No    Sexual activity: Yes     Partners: Female     Social Determinants of Health     Food Insecurity: No Food Insecurity (2/2/2024)    Hunger Vital Sign     Worried About Running Out of Food in the Last Year: Never true     Ran Out of Food in the Last Year: Never true   Recent Concern: Food Insecurity - Food Insecurity Present (11/8/2023)    Hunger Vital Sign     Worried About Running Out of Food in the Last Year: Never true     Ran Out of Food in the Last Year: Sometimes true   Transportation Needs: No Transportation Needs (2/2/2024)    PRAPARE - Transportation     Lack of Transportation (Medical): No     Lack of Transportation (Non-Medical): No   Housing Stability: Low Risk  (2/2/2024)    Housing Stability Vital Sign     Unable to Pay for Housing in the Last Year: No     Number of Places Lived in the Last Year: 1     Unstable Housing in the Last Year: No       Family History:    Family History   Problem Relation Age of Onset    Vision Loss Mother     High Blood Pressure Mother     Arthritis Mother     Asthma Mother     Cancer Father     No Known Problems Sister     Arthritis Brother     High Blood Pressure Brother     Asthma Brother     Other Sister         Rare Blood disease    No Known Problems Sister     No Known Problems Daughter     No Known Problems Daughter         Allergies   Allergen Reactions    Erythromycin      vomitting    Lorazepam Itching    Zolpidem Itching    Adhesive Tape Rash    Macrolides And Ketolides Rash    Remeron [Mirtazapine] Itching and Rash       No current facility-administered medications on file prior to encounter.     Current Outpatient Medications on File Prior to Encounter   Medication Sig Dispense Refill    DULoxetine (CYMBALTA) 30 MG extended release capsule Take 1 capsule by mouth daily 90 capsule 1    carvedilol (COREG) 3.125 
present, soft, non-distended, non-tender,    MUSCULOSKELETAL: poor range of motion, normal tone  NEUROLOGIC: Motor skills grossly intact. CN II - XII grossly intact  SKIN: appears intact, no jaundice.   EXTREMITIES: no edema      Labs:  Hematology:  Lab Results   Component Value Date    WBC 4.6 02/28/2024    RBC 3.41 (L) 02/28/2024    HGB 9.7 (L) 02/28/2024    HCT 31.9 (L) 02/28/2024    MCV 93.5 02/28/2024    MCH 28.4 02/28/2024    MCHC 30.4 (L) 02/28/2024    RDW 19.6 (H) 02/28/2024    PLT 99 (L) 02/28/2024    MPV 9.7 02/28/2024    BANDSPCT 3 (L) 02/05/2024    SEGSPCT 69.5 (H) 02/28/2024    EOSRELPCT 10.6 (H) 02/28/2024    BASOPCT 0.4 02/28/2024    LYMPHOPCT 10.2 (L) 02/28/2024    MONOPCT 9.1 (H) 02/28/2024    BANDABS 0.20 02/05/2024    SEGSABS 3.2 02/28/2024    EOSABS 0.5 02/28/2024    BASOSABS 0.0 02/28/2024    LYMPHSABS 0.5 02/28/2024    MONOSABS 0.4 02/28/2024    DIFFTYPE AUTOMATED DIFFERENTIAL 02/28/2024    ANISOCYTOSIS 1+ 02/05/2024    POLYCHROM 1+ 02/05/2024    WBCMORP RARE 01/06/2024    PLTM OCCASIONAL LARGE PLATELETS 01/05/2024     Lab Results   Component Value Date    ESR 14 03/01/2021     Chemistry:  Lab Results   Component Value Date     02/29/2024    K 3.8 02/29/2024    CL 94 (L) 02/29/2024    CO2 42 (H) 02/29/2024    BUN 28 (H) 02/29/2024    CREATININE 0.6 (L) 02/29/2024    GLUCOSE 110 (H) 02/29/2024    CALCIUM 9.2 02/29/2024    PROT 5.1 (L) 02/23/2024    LABALBU 2.2 (L) 02/23/2024    BILITOT 2.0 (H) 02/23/2024    ALKPHOS 120 02/23/2024    AST 21 02/23/2024    ALT 10 02/23/2024    LABGLOM >60 02/29/2024    GFRAA >60 04/11/2022    AGRATIO 1.4 10/18/2022    GLOB 2.3 08/08/2022    PHOS 3.2 02/26/2024    MG 1.8 02/26/2024    POCCA 1.26 12/26/2023    POCGLU 179 (H) 12/26/2023     Lab Results   Component Value Date     07/10/2017     No results found for: \"LD\"  Lab Results   Component Value Date    TSHHS 0.351 01/05/2024     Immunology:  Lab Results   Component Value Date    PROT 5.1 (L) 
11/05/2019     No results found for: \"KAPPAUVOL\", \"LAMBDAUVOL\", \"KLFLCR\"  No results found for: \"B2M\"  Coagulation Panel:  Lab Results   Component Value Date    PROTIME 19.6 (H) 02/23/2024    INR 1.6 02/23/2024    APTT 42.2 (H) 02/23/2024     Anemia Panel:  Lab Results   Component Value Date    XRWDTQVT03 >2000 (H) 01/05/2024    FOLATE >20.0 (H) 01/05/2024     Tumor Markers:  Lab Results   Component Value Date    CEA 2.4 07/10/2017     30.1 11/06/2014    PSA 0.59 02/11/2015      Observations:  No data recorded     Assessment:  Gastric cancer - s/p radiation therapy. Chemo has been held since 1/9/24   Cirrhosis with splenomegaly    Plan:  Arnie Martinez is known to our service for poorly differentiated gastric carcinoma. He is not a surgical candidate, s/p radiation therapy, currently on chemo with FLOT regimen (received last dose on 1/9/2024). Chemo has been held d/t weakness.     Now, presented with pneumonia, severe sepsis, respiratory and heart failure. Stable but continues to require 10L supplemental oxygen.   CT CAP reviewed.  Reviewed area of concern in lung where malignancy could not be ruled out with patient. Given acute respiratory illness this is non-specific, will require follow-up imaging.    On low dose prednisone for appetite.  Continues to have very poor diet.  PT/OT working with pt however respiratory status poor.     Anemia stable.  Platelets noted to have clumping.     Cardiology is following.    Plan of care was developed in collaboration with attending physician Dr Jonathan Martino PA-C    Recent imaging and labs were reviewed and discussed with the patient.      
recover time.   CT CAP reviewed.  Reviewed area of concern in lung where malignancy could not be ruled out with patient. Given acute respiratory illness this is non-specific, will require follow-up imaging.    On low dose prednisone for appetite.  Continues to have very poor diet.  PT/OT working with pt however respiratory status poor.     Anemia stable.  Platelets noted to have clumping.     Cardiology is following.    3/1/2024 ee discussed about philosophy of hospice since I am concerned that he may never get any stronger.  They are agreeable to stop chemo therapy.  They are agreeable to have hospice evaluation  to get more information about hospice.    No BM for ~ one week.  2/19/2024 KUB as above.  He may have fleet enema.    Dr AARON Arellano will cover over the week end.    Will FU.    Jonathan Bedoya MD    Recent imaging and labs were reviewed and discussed with the patient.      
Small right and trace left pleural effusions.  Chronic interstitial lung disease with superimposed ground-glass densities bilaterally.  Distal paraesophageal portosystemic varices. Organs:   Hepatic cirrhosis and splenomegaly.  Calcified splenic and hepatic parenchymal granulomas.  Cholecystectomy.  Small 1.7 cm right renal parenchymal hypodensity which may represent a cyst though its attenuation is suboptimally evaluated due to motion/beam hardening artifacts.  Otherwise within normal limits. GI/Bowel:   Postoperative changes of the duodenum/distal stomach.  Colonic diverticulosis without acute diverticulitis.  Normal appendix.  Mild circumferential wall thickening of the ascending, proximal/mid transverse and mid sigmoid colonic segments.  No acute obstruction.  No pneumatosis.  No mesenteric venous gas.  No free air.  No drainable fluid collection identified.  Mild distal esophageal wall thickening. Pelvis:   Hysterectomy.  Free fluid.  Otherwise unremarkable. Peritoneum/Retroperitoneum:   Moderate to large amount of free fluid. Moderate atherosclerotic calcification of the abdominal aorta and major branch vessels.  Scattered portosystemic varices within the upper abdomen most pronounced in the gastrohepatic ligament region. Bones/Soft Tissues:   No acute fracture or aggressive osseous lesion. Moderate to severe multilevel disc disease.  Mild body wall anasarca. Moderate amount of ascites within the left inguinal canal.  Few small fluid containing ventral abdominal wall hernias.     1.  Nonspecific multifocal colitis which may be infectious/inflammatory in nature or related to portal enteropathy. 2.  Hepatic cirrhosis with portal hypertension and splenomegaly as well as moderate to large amount of ascites.  Mild anasarca. 3.  Bilateral pleural effusions with chronic interstitial lung disease and superimposed ground-glass densities bilaterally possibly representing atypical infection/pneumonitis versus organizing 
for exam:->sepsis.  abdominal fluid seen on CT chest Additional Contrast?->None Decision Support Exception - unselect if not a suspected or confirmed emergency medical condition->Emergency Medical Condition (MA) Reason for Exam: sepsis. abdominal fluid seen on CT chest Additional signs and symptoms: 75ml Isovue 370 FINDINGS: Lower Chest:   Small right and trace left pleural effusions.  Chronic interstitial lung disease with superimposed ground-glass densities bilaterally.  Distal paraesophageal portosystemic varices. Organs:   Hepatic cirrhosis and splenomegaly.  Calcified splenic and hepatic parenchymal granulomas.  Cholecystectomy.  Small 1.7 cm right renal parenchymal hypodensity which may represent a cyst though its attenuation is suboptimally evaluated due to motion/beam hardening artifacts.  Otherwise within normal limits. GI/Bowel:   Postoperative changes of the duodenum/distal stomach.  Colonic diverticulosis without acute diverticulitis.  Normal appendix.  Mild circumferential wall thickening of the ascending, proximal/mid transverse and mid sigmoid colonic segments.  No acute obstruction.  No pneumatosis.  No mesenteric venous gas.  No free air.  No drainable fluid collection identified.  Mild distal esophageal wall thickening. Pelvis:   Hysterectomy.  Free fluid.  Otherwise unremarkable. Peritoneum/Retroperitoneum:   Moderate to large amount of free fluid. Moderate atherosclerotic calcification of the abdominal aorta and major branch vessels.  Scattered portosystemic varices within the upper abdomen most pronounced in the gastrohepatic ligament region. Bones/Soft Tissues:   No acute fracture or aggressive osseous lesion. Moderate to severe multilevel disc disease.  Mild body wall anasarca. Moderate amount of ascites within the left inguinal canal.  Few small fluid containing ventral abdominal wall hernias.     1.  Nonspecific multifocal colitis which may be infectious/inflammatory in nature or related to 
PROVIDED HISTORY: Sepsis TECHNOLOGIST PROVIDED HISTORY: Reason for exam:->Sepsis Reason for Exam: sepsis FINDINGS: Left-sided Port-A-Cath. There is poor inspiration with crowding of the bronchovascular markings.  New atelectasis or infiltrate in the right lung base.  No pleural effusion or pneumothorax identified.  Stable chronic changes of the lung parenchyma. Cardiac and mediastinal silhouettes are within normal limits. Atherosclerotic calcifications involving the thoracic aorta.     New atelectasis or infiltrate in the right lung base.       Electronically signed by Jairo Ching MD on 3/1/2024 at 8:34 AM    
unselect if not a suspected or confirmed emergency medical condition->Emergency Medical Condition (MA) Reason for Exam: sepsis. abdominal fluid seen on CT chest Additional signs and symptoms: 75ml Isovue 370 FINDINGS: Lower Chest:   Small right and trace left pleural effusions.  Chronic interstitial lung disease with superimposed ground-glass densities bilaterally.  Distal paraesophageal portosystemic varices. Organs:   Hepatic cirrhosis and splenomegaly.  Calcified splenic and hepatic parenchymal granulomas.  Cholecystectomy.  Small 1.7 cm right renal parenchymal hypodensity which may represent a cyst though its attenuation is suboptimally evaluated due to motion/beam hardening artifacts.  Otherwise within normal limits. GI/Bowel:   Postoperative changes of the duodenum/distal stomach.  Colonic diverticulosis without acute diverticulitis.  Normal appendix.  Mild circumferential wall thickening of the ascending, proximal/mid transverse and mid sigmoid colonic segments.  No acute obstruction.  No pneumatosis.  No mesenteric venous gas.  No free air.  No drainable fluid collection identified.  Mild distal esophageal wall thickening. Pelvis:   Hysterectomy.  Free fluid.  Otherwise unremarkable. Peritoneum/Retroperitoneum:   Moderate to large amount of free fluid. Moderate atherosclerotic calcification of the abdominal aorta and major branch vessels.  Scattered portosystemic varices within the upper abdomen most pronounced in the gastrohepatic ligament region. Bones/Soft Tissues:   No acute fracture or aggressive osseous lesion. Moderate to severe multilevel disc disease.  Mild body wall anasarca. Moderate amount of ascites within the left inguinal canal.  Few small fluid containing ventral abdominal wall hernias.     1.  Nonspecific multifocal colitis which may be infectious/inflammatory in nature or related to portal enteropathy. 2.  Hepatic cirrhosis with portal hypertension and splenomegaly as well as moderate to

## 2024-03-04 PROCEDURE — 6370000000 HC RX 637 (ALT 250 FOR IP): Performed by: STUDENT IN AN ORGANIZED HEALTH CARE EDUCATION/TRAINING PROGRAM

## 2024-03-04 PROCEDURE — 2700000000 HC OXYGEN THERAPY PER DAY

## 2024-03-04 PROCEDURE — 94761 N-INVAS EAR/PLS OXIMETRY MLT: CPT

## 2024-03-04 PROCEDURE — 2580000003 HC RX 258: Performed by: STUDENT IN AN ORGANIZED HEALTH CARE EDUCATION/TRAINING PROGRAM

## 2024-03-04 PROCEDURE — 6360000002 HC RX W HCPCS: Performed by: STUDENT IN AN ORGANIZED HEALTH CARE EDUCATION/TRAINING PROGRAM

## 2024-03-04 PROCEDURE — 1250000000 HC SEMI PRIVATE HOSPICE R&B

## 2024-03-04 RX ORDER — FUROSEMIDE 10 MG/ML
40 INJECTION INTRAMUSCULAR; INTRAVENOUS DAILY
Status: DISCONTINUED | OUTPATIENT
Start: 2024-03-05 | End: 2024-03-07

## 2024-03-04 RX ADMIN — PANTOPRAZOLE SODIUM 40 MG: 40 TABLET, DELAYED RELEASE ORAL at 08:52

## 2024-03-04 RX ADMIN — MORPHINE SULFATE 2 MG: 2 INJECTION, SOLUTION INTRAMUSCULAR; INTRAVENOUS at 09:09

## 2024-03-04 RX ADMIN — CARVEDILOL 3.12 MG: 6.25 TABLET, FILM COATED ORAL at 08:52

## 2024-03-04 RX ADMIN — PREDNISONE 10 MG: 10 TABLET ORAL at 08:52

## 2024-03-04 RX ADMIN — GUAIFENESIN 600 MG: 600 TABLET, EXTENDED RELEASE ORAL at 08:52

## 2024-03-04 RX ADMIN — SPIRONOLACTONE 25 MG: 50 TABLET ORAL at 08:53

## 2024-03-04 RX ADMIN — CARVEDILOL 3.12 MG: 6.25 TABLET, FILM COATED ORAL at 21:04

## 2024-03-04 RX ADMIN — DULOXETINE HYDROCHLORIDE 30 MG: 30 CAPSULE, DELAYED RELEASE ORAL at 21:05

## 2024-03-04 RX ADMIN — PANTOPRAZOLE SODIUM 40 MG: 40 TABLET, DELAYED RELEASE ORAL at 21:05

## 2024-03-04 RX ADMIN — POLYETHYLENE GLYCOL (3350) 17 G: 17 POWDER, FOR SOLUTION ORAL at 08:53

## 2024-03-04 RX ADMIN — SODIUM CHLORIDE, PRESERVATIVE FREE 10 ML: 5 INJECTION INTRAVENOUS at 10:54

## 2024-03-04 RX ADMIN — MIDAZOLAM HYDROCHLORIDE 0.5 MG: 1 INJECTION, SOLUTION INTRAMUSCULAR; INTRAVENOUS at 21:04

## 2024-03-04 RX ADMIN — SENNOSIDES 8.6 MG: 8.6 TABLET, FILM COATED ORAL at 21:05

## 2024-03-04 ASSESSMENT — PAIN DESCRIPTION - ORIENTATION: ORIENTATION: RIGHT;LEFT;ANTERIOR;DISTAL

## 2024-03-04 ASSESSMENT — PAIN SCALES - WONG BAKER: WONGBAKER_NUMERICALRESPONSE: 0

## 2024-03-04 ASSESSMENT — PAIN DESCRIPTION - LOCATION: LOCATION: GENERALIZED

## 2024-03-04 ASSESSMENT — PAIN - FUNCTIONAL ASSESSMENT: PAIN_FUNCTIONAL_ASSESSMENT: ACTIVITIES ARE NOT PREVENTED

## 2024-03-04 ASSESSMENT — PAIN SCALES - GENERAL
PAINLEVEL_OUTOF10: 8
PAINLEVEL_OUTOF10: 8

## 2024-03-04 ASSESSMENT — PAIN DESCRIPTION - DESCRIPTORS: DESCRIPTORS: DISCOMFORT

## 2024-03-04 NOTE — H&P
HLD (hyperlipidemia)     Hypertension     Follows with PCP    Nausea vomiting and diarrhea 11/10/2023    Neuropathy     Bilat feet    Osteoarthritis     Bilat knees       Past Surgical History   has a past surgical history that includes other surgical history; Upper gastrointestinal endoscopy (12/11/2014); Cholecystectomy (2011); hernia repair (2011); Total knee arthroplasty (Left, 02/17/2020); Colonoscopy (03/19/2014); Colonoscopy (03/09/2016); Total knee arthroplasty (Right, 03/08/2021); Colonoscopy (N/A, 11/22/2022); Upper gastrointestinal endoscopy (N/A, 08/18/2023); Upper gastrointestinal endoscopy (N/A, 09/05/2023); Mediport insertion, single; Upper gastrointestinal endoscopy (N/A, 10/24/2023); and Upper gastrointestinal endoscopy (N/A, 12/11/2023).    Social History:   Social History     Socioeconomic History    Marital status:      Spouse name: None    Number of children: None    Years of education: None    Highest education level: None   Tobacco Use    Smoking status: Never    Smokeless tobacco: Never   Vaping Use    Vaping Use: Never used   Substance and Sexual Activity    Alcohol use: No    Drug use: No    Sexual activity: Yes     Partners: Female     Social Determinants of Health     Food Insecurity: No Food Insecurity (2/28/2024)    Hunger Vital Sign     Worried About Running Out of Food in the Last Year: Never true     Ran Out of Food in the Last Year: Never true   Transportation Needs: No Transportation Needs (2/28/2024)    PRAPARE - Transportation     Lack of Transportation (Medical): No     Lack of Transportation (Non-Medical): No   Housing Stability: Low Risk  (2/28/2024)    Housing Stability Vital Sign     Unable to Pay for Housing in the Last Year: No     Number of Places Lived in the Last Year: 1     Unstable Housing in the Last Year: No       Family History: family history includes Arthritis in his brother and mother; Asthma in his brother and mother; Cancer in his father; High Blood 
02/23/2024 11:08 AM    LABALBU 3.3 08/08/2022 07:34 AM    CALCIUM 9.5 03/03/2024 11:10 AM    BILITOT 2.0 02/23/2024 11:08 AM    ALKPHOS 120 02/23/2024 11:08 AM    AST 21 02/23/2024 11:08 AM    ALT 10 02/23/2024 11:08 AM       BMP (Last 3)  @LABRCNT(NA:3,K,CL:3,CO2:3,BUN:3,LABALBU:3,CREATININE:3,CALCIUM:3,GFRAA:3,LABGLOM:3,GLU:3)@     BMP  Lab Results   Component Value Date/Time     03/03/2024 11:10 AM    K 3.7 03/03/2024 11:10 AM    CL 85 03/03/2024 11:10 AM    CO2 42 03/03/2024 11:10 AM    BUN 27 03/03/2024 11:10 AM    LABALBU 2.2 02/23/2024 11:08 AM    LABALBU 3.3 08/08/2022 07:34 AM    CREATININE 0.6 03/03/2024 11:10 AM    CALCIUM 9.5 03/03/2024 11:10 AM    GFRAA >60 04/11/2022 09:28 AM    LABGLOM >60 03/03/2024 11:10 AM       Glucose: No results found for: \"GLU\"    Last 3 Glucose: No results for input(s): \"GLU\" in the last 72 hours.    POC Glucose:   Lab Results   Component Value Date/Time    POCGLU 179 12/26/2023 10:26 AM       Last 3 POC Glucose: @LABRCNT(POCGLU:3)    CBC:   Lab Results   Component Value Date/Time    WBC 9.9 03/01/2024 10:25 AM    RBC 4.00 03/01/2024 10:25 AM    HGB 11.5 03/01/2024 10:25 AM    HCT 37.1 03/01/2024 10:25 AM    MCV 92.8 03/01/2024 10:25 AM    MCH 28.8 03/01/2024 10:25 AM    MCHC 31.0 03/01/2024 10:25 AM    RDW 19.6 03/01/2024 10:25 AM     03/01/2024 10:25 AM    MPV 10.0 03/01/2024 10:25 AM       Last 3 CBC:   Recent Labs     03/01/24  1025   WBC 9.9   RBC 4.00*   HGB 11.5*   HCT 37.1*   MCV 92.8   MCH 28.8   MCHC 31.0*   RDW 19.6*      MPV 10.0       CBC with Differential:   Lab Results   Component Value Date/Time    WBC 9.9 03/01/2024 10:25 AM    RBC 4.00 03/01/2024 10:25 AM    HCT 37.1 03/01/2024 10:25 AM     03/01/2024 10:25 AM    MCV 92.8 03/01/2024 10:25 AM    MCH 28.8 03/01/2024 10:25 AM    MCHC 31.0 03/01/2024 10:25 AM    RDW 19.6 03/01/2024 10:25 AM    NRBC 4 01/05/2024 08:47 AM    SEGSPCT 76.3 03/01/2024 10:25 AM    BANDSPCT 3 02/05/2024 09:41

## 2024-03-05 PROCEDURE — 6370000000 HC RX 637 (ALT 250 FOR IP): Performed by: STUDENT IN AN ORGANIZED HEALTH CARE EDUCATION/TRAINING PROGRAM

## 2024-03-05 PROCEDURE — 6370000000 HC RX 637 (ALT 250 FOR IP): Performed by: FAMILY MEDICINE

## 2024-03-05 PROCEDURE — 2700000000 HC OXYGEN THERAPY PER DAY

## 2024-03-05 PROCEDURE — 2580000003 HC RX 258: Performed by: STUDENT IN AN ORGANIZED HEALTH CARE EDUCATION/TRAINING PROGRAM

## 2024-03-05 PROCEDURE — 94761 N-INVAS EAR/PLS OXIMETRY MLT: CPT

## 2024-03-05 PROCEDURE — 6360000002 HC RX W HCPCS: Performed by: FAMILY MEDICINE

## 2024-03-05 PROCEDURE — 1250000000 HC SEMI PRIVATE HOSPICE R&B

## 2024-03-05 PROCEDURE — 6360000002 HC RX W HCPCS: Performed by: STUDENT IN AN ORGANIZED HEALTH CARE EDUCATION/TRAINING PROGRAM

## 2024-03-05 RX ORDER — DIAZEPAM 5 MG/ML
2.5 INJECTION, SOLUTION INTRAMUSCULAR; INTRAVENOUS EVERY 4 HOURS PRN
Status: DISCONTINUED | OUTPATIENT
Start: 2024-03-05 | End: 2024-03-15 | Stop reason: HOSPADM

## 2024-03-05 RX ADMIN — MORPHINE SULFATE 2 MG: 2 INJECTION, SOLUTION INTRAMUSCULAR; INTRAVENOUS at 15:10

## 2024-03-05 RX ADMIN — FUROSEMIDE 40 MG: 10 INJECTION, SOLUTION INTRAMUSCULAR; INTRAVENOUS at 11:22

## 2024-03-05 RX ADMIN — MIDAZOLAM HYDROCHLORIDE 0.5 MG: 1 INJECTION, SOLUTION INTRAMUSCULAR; INTRAVENOUS at 00:28

## 2024-03-05 RX ADMIN — POLYETHYLENE GLYCOL (3350) 17 G: 17 POWDER, FOR SOLUTION ORAL at 10:56

## 2024-03-05 RX ADMIN — HALOPERIDOL LACTATE 0.5 MG: 5 INJECTION, SOLUTION INTRAMUSCULAR at 20:48

## 2024-03-05 RX ADMIN — MORPHINE SULFATE 2 MG: 2 INJECTION, SOLUTION INTRAMUSCULAR; INTRAVENOUS at 05:24

## 2024-03-05 RX ADMIN — HALOPERIDOL LACTATE 1 MG: 5 INJECTION, SOLUTION INTRAMUSCULAR at 11:22

## 2024-03-05 RX ADMIN — PANTOPRAZOLE SODIUM 40 MG: 40 TABLET, DELAYED RELEASE ORAL at 10:56

## 2024-03-05 RX ADMIN — SODIUM CHLORIDE, PRESERVATIVE FREE 10 ML: 5 INJECTION INTRAVENOUS at 11:07

## 2024-03-05 RX ADMIN — PREDNISONE 10 MG: 10 TABLET ORAL at 10:56

## 2024-03-05 RX ADMIN — CARVEDILOL 3.12 MG: 6.25 TABLET, FILM COATED ORAL at 10:55

## 2024-03-05 RX ADMIN — HALOPERIDOL LACTATE 1 MG: 5 INJECTION, SOLUTION INTRAMUSCULAR at 15:11

## 2024-03-05 RX ADMIN — SPIRONOLACTONE 25 MG: 50 TABLET ORAL at 10:55

## 2024-03-05 RX ADMIN — MORPHINE SULFATE 2 MG: 2 INJECTION, SOLUTION INTRAMUSCULAR; INTRAVENOUS at 11:21

## 2024-03-05 RX ADMIN — MORPHINE SULFATE 2 MG: 2 INJECTION, SOLUTION INTRAMUSCULAR; INTRAVENOUS at 20:49

## 2024-03-05 ASSESSMENT — PAIN SCALES - GENERAL
PAINLEVEL_OUTOF10: 9
PAINLEVEL_OUTOF10: 7
PAINLEVEL_OUTOF10: 9
PAINLEVEL_OUTOF10: 4
PAINLEVEL_OUTOF10: 4
PAINLEVEL_OUTOF10: 8
PAINLEVEL_OUTOF10: 7
PAINLEVEL_OUTOF10: 9

## 2024-03-05 ASSESSMENT — PAIN DESCRIPTION - LOCATION
LOCATION: ABDOMEN;BUTTOCKS
LOCATION: BACK

## 2024-03-05 ASSESSMENT — PAIN SCALES - WONG BAKER
WONGBAKER_NUMERICALRESPONSE: 0
WONGBAKER_NUMERICALRESPONSE: 4

## 2024-03-05 ASSESSMENT — PAIN - FUNCTIONAL ASSESSMENT
PAIN_FUNCTIONAL_ASSESSMENT: PREVENTS OR INTERFERES SOME ACTIVE ACTIVITIES AND ADLS

## 2024-03-05 ASSESSMENT — PAIN DESCRIPTION - ORIENTATION
ORIENTATION: UPPER

## 2024-03-05 ASSESSMENT — PAIN DESCRIPTION - DESCRIPTORS
DESCRIPTORS: ACHING
DESCRIPTORS: DISCOMFORT

## 2024-03-05 ASSESSMENT — PAIN DESCRIPTION - PAIN TYPE: TYPE: CHRONIC PAIN

## 2024-03-06 PROCEDURE — 2700000000 HC OXYGEN THERAPY PER DAY

## 2024-03-06 PROCEDURE — 6360000002 HC RX W HCPCS: Performed by: FAMILY MEDICINE

## 2024-03-06 PROCEDURE — 2580000003 HC RX 258: Performed by: STUDENT IN AN ORGANIZED HEALTH CARE EDUCATION/TRAINING PROGRAM

## 2024-03-06 PROCEDURE — 94761 N-INVAS EAR/PLS OXIMETRY MLT: CPT

## 2024-03-06 PROCEDURE — 6360000002 HC RX W HCPCS: Performed by: STUDENT IN AN ORGANIZED HEALTH CARE EDUCATION/TRAINING PROGRAM

## 2024-03-06 PROCEDURE — 1250000000 HC SEMI PRIVATE HOSPICE R&B

## 2024-03-06 RX ORDER — MORPHINE SULFATE 2 MG/ML
2 INJECTION, SOLUTION INTRAMUSCULAR; INTRAVENOUS EVERY 6 HOURS SCHEDULED
Status: DISCONTINUED | OUTPATIENT
Start: 2024-03-06 | End: 2024-03-15 | Stop reason: HOSPADM

## 2024-03-06 RX ORDER — MORPHINE SULFATE 2 MG/ML
2 INJECTION, SOLUTION INTRAMUSCULAR; INTRAVENOUS
Status: DISCONTINUED | OUTPATIENT
Start: 2024-03-06 | End: 2024-03-15 | Stop reason: HOSPADM

## 2024-03-06 RX ADMIN — SODIUM CHLORIDE, PRESERVATIVE FREE 10 ML: 5 INJECTION INTRAVENOUS at 22:02

## 2024-03-06 RX ADMIN — HALOPERIDOL LACTATE 1 MG: 5 INJECTION, SOLUTION INTRAMUSCULAR at 21:25

## 2024-03-06 RX ADMIN — MORPHINE SULFATE 2 MG: 2 INJECTION, SOLUTION INTRAMUSCULAR; INTRAVENOUS at 16:38

## 2024-03-06 RX ADMIN — MORPHINE SULFATE 2 MG: 2 INJECTION, SOLUTION INTRAMUSCULAR; INTRAVENOUS at 04:48

## 2024-03-06 RX ADMIN — SODIUM CHLORIDE, PRESERVATIVE FREE 10 ML: 5 INJECTION INTRAVENOUS at 10:15

## 2024-03-06 RX ADMIN — MORPHINE SULFATE 2 MG: 2 INJECTION, SOLUTION INTRAMUSCULAR; INTRAVENOUS at 12:26

## 2024-03-06 RX ADMIN — DIAZEPAM 2.5 MG: 5 INJECTION, SOLUTION INTRAMUSCULAR; INTRAVENOUS at 00:20

## 2024-03-06 RX ADMIN — FUROSEMIDE 40 MG: 10 INJECTION, SOLUTION INTRAMUSCULAR; INTRAVENOUS at 10:13

## 2024-03-06 RX ADMIN — DIAZEPAM 2.5 MG: 5 INJECTION, SOLUTION INTRAMUSCULAR; INTRAVENOUS at 22:08

## 2024-03-06 RX ADMIN — MORPHINE SULFATE 2 MG: 2 INJECTION, SOLUTION INTRAMUSCULAR; INTRAVENOUS at 00:20

## 2024-03-06 RX ADMIN — DIAZEPAM 2.5 MG: 5 INJECTION, SOLUTION INTRAMUSCULAR; INTRAVENOUS at 10:14

## 2024-03-06 RX ADMIN — GLYCOPYRROLATE 0.2 MG: 0.2 INJECTION INTRAMUSCULAR; INTRAVENOUS at 04:48

## 2024-03-06 RX ADMIN — HALOPERIDOL LACTATE 1 MG: 5 INJECTION, SOLUTION INTRAMUSCULAR at 10:13

## 2024-03-06 RX ADMIN — MORPHINE SULFATE 2 MG: 2 INJECTION, SOLUTION INTRAMUSCULAR; INTRAVENOUS at 10:14

## 2024-03-06 RX ADMIN — HALOPERIDOL LACTATE 0.5 MG: 5 INJECTION, SOLUTION INTRAMUSCULAR at 04:48

## 2024-03-06 RX ADMIN — DIAZEPAM 2.5 MG: 5 INJECTION, SOLUTION INTRAMUSCULAR; INTRAVENOUS at 16:36

## 2024-03-06 RX ADMIN — GLYCOPYRROLATE 0.2 MG: 0.2 INJECTION INTRAMUSCULAR; INTRAVENOUS at 00:20

## 2024-03-06 ASSESSMENT — PAIN SCALES - GENERAL
PAINLEVEL_OUTOF10: 7
PAINLEVEL_OUTOF10: 0
PAINLEVEL_OUTOF10: 0

## 2024-03-06 ASSESSMENT — PAIN DESCRIPTION - LOCATION: LOCATION: BACK

## 2024-03-06 ASSESSMENT — PAIN SCALES - WONG BAKER: WONGBAKER_NUMERICALRESPONSE: 0

## 2024-03-06 ASSESSMENT — PAIN DESCRIPTION - DESCRIPTORS: DESCRIPTORS: ACHING

## 2024-03-06 ASSESSMENT — PAIN DESCRIPTION - ORIENTATION: ORIENTATION: UPPER

## 2024-03-06 ASSESSMENT — PAIN - FUNCTIONAL ASSESSMENT: PAIN_FUNCTIONAL_ASSESSMENT: PREVENTS OR INTERFERES WITH ALL ACTIVE AND SOME PASSIVE ACTIVITIES

## 2024-03-07 PROCEDURE — 6360000002 HC RX W HCPCS: Performed by: STUDENT IN AN ORGANIZED HEALTH CARE EDUCATION/TRAINING PROGRAM

## 2024-03-07 PROCEDURE — 94761 N-INVAS EAR/PLS OXIMETRY MLT: CPT

## 2024-03-07 PROCEDURE — 2700000000 HC OXYGEN THERAPY PER DAY

## 2024-03-07 PROCEDURE — 1250000000 HC SEMI PRIVATE HOSPICE R&B

## 2024-03-07 PROCEDURE — 2580000003 HC RX 258: Performed by: STUDENT IN AN ORGANIZED HEALTH CARE EDUCATION/TRAINING PROGRAM

## 2024-03-07 PROCEDURE — 6360000002 HC RX W HCPCS: Performed by: FAMILY MEDICINE

## 2024-03-07 RX ORDER — FUROSEMIDE 10 MG/ML
40 INJECTION INTRAMUSCULAR; INTRAVENOUS DAILY
Status: COMPLETED | OUTPATIENT
Start: 2024-03-07 | End: 2024-03-07

## 2024-03-07 RX ORDER — HALOPERIDOL 5 MG/ML
1 INJECTION INTRAMUSCULAR EVERY 6 HOURS SCHEDULED
Status: DISCONTINUED | OUTPATIENT
Start: 2024-03-07 | End: 2024-03-15 | Stop reason: HOSPADM

## 2024-03-07 RX ADMIN — SODIUM CHLORIDE, PRESERVATIVE FREE 10 ML: 5 INJECTION INTRAVENOUS at 13:01

## 2024-03-07 RX ADMIN — MORPHINE SULFATE 2 MG: 2 INJECTION, SOLUTION INTRAMUSCULAR; INTRAVENOUS at 13:01

## 2024-03-07 RX ADMIN — MORPHINE SULFATE 2 MG: 2 INJECTION, SOLUTION INTRAMUSCULAR; INTRAVENOUS at 05:57

## 2024-03-07 RX ADMIN — SODIUM CHLORIDE, PRESERVATIVE FREE 10 ML: 5 INJECTION INTRAVENOUS at 20:21

## 2024-03-07 RX ADMIN — MORPHINE SULFATE 2 MG: 2 INJECTION, SOLUTION INTRAMUSCULAR; INTRAVENOUS at 00:01

## 2024-03-07 RX ADMIN — MORPHINE SULFATE 2 MG: 2 INJECTION, SOLUTION INTRAMUSCULAR; INTRAVENOUS at 08:58

## 2024-03-07 RX ADMIN — DIAZEPAM 2.5 MG: 5 INJECTION, SOLUTION INTRAMUSCULAR; INTRAVENOUS at 08:59

## 2024-03-07 RX ADMIN — DIAZEPAM 2.5 MG: 5 INJECTION, SOLUTION INTRAMUSCULAR; INTRAVENOUS at 21:43

## 2024-03-07 RX ADMIN — SODIUM CHLORIDE, PRESERVATIVE FREE 10 ML: 5 INJECTION INTRAVENOUS at 17:21

## 2024-03-07 RX ADMIN — FUROSEMIDE 40 MG: 10 INJECTION, SOLUTION INTRAMUSCULAR; INTRAVENOUS at 08:59

## 2024-03-07 RX ADMIN — HALOPERIDOL LACTATE 1 MG: 5 INJECTION, SOLUTION INTRAMUSCULAR at 13:02

## 2024-03-07 RX ADMIN — DIAZEPAM 2.5 MG: 5 INJECTION, SOLUTION INTRAMUSCULAR; INTRAVENOUS at 04:21

## 2024-03-07 RX ADMIN — SODIUM CHLORIDE, PRESERVATIVE FREE 10 ML: 5 INJECTION INTRAVENOUS at 08:59

## 2024-03-07 RX ADMIN — MORPHINE SULFATE 2 MG: 2 INJECTION, SOLUTION INTRAMUSCULAR; INTRAVENOUS at 04:20

## 2024-03-07 RX ADMIN — HALOPERIDOL LACTATE 1 MG: 5 INJECTION, SOLUTION INTRAMUSCULAR at 21:43

## 2024-03-07 RX ADMIN — HALOPERIDOL LACTATE 1 MG: 5 INJECTION, SOLUTION INTRAMUSCULAR at 04:20

## 2024-03-07 RX ADMIN — HALOPERIDOL LACTATE 1 MG: 5 INJECTION, SOLUTION INTRAMUSCULAR at 17:20

## 2024-03-07 RX ADMIN — MORPHINE SULFATE 2 MG: 2 INJECTION, SOLUTION INTRAMUSCULAR; INTRAVENOUS at 17:20

## 2024-03-07 ASSESSMENT — PAIN SCALES - WONG BAKER
WONGBAKER_NUMERICALRESPONSE: 2
WONGBAKER_NUMERICALRESPONSE: 0
WONGBAKER_NUMERICALRESPONSE: 2
WONGBAKER_NUMERICALRESPONSE: 0

## 2024-03-07 ASSESSMENT — PAIN DESCRIPTION - LOCATION
LOCATION: OTHER (COMMENT)
LOCATION: OTHER (COMMENT)

## 2024-03-07 ASSESSMENT — PAIN SCALES - GENERAL: PAINLEVEL_OUTOF10: 0

## 2024-03-07 ASSESSMENT — PAIN - FUNCTIONAL ASSESSMENT
PAIN_FUNCTIONAL_ASSESSMENT: PREVENTS OR INTERFERES SOME ACTIVE ACTIVITIES AND ADLS
PAIN_FUNCTIONAL_ASSESSMENT: PREVENTS OR INTERFERES SOME ACTIVE ACTIVITIES AND ADLS
PAIN_FUNCTIONAL_ASSESSMENT: PREVENTS OR INTERFERES WITH MANY ACTIVE NOT PASSIVE ACTIVITIES
PAIN_FUNCTIONAL_ASSESSMENT: PREVENTS OR INTERFERES WITH MANY ACTIVE NOT PASSIVE ACTIVITIES
PAIN_FUNCTIONAL_ASSESSMENT: PREVENTS OR INTERFERES SOME ACTIVE ACTIVITIES AND ADLS

## 2024-03-07 ASSESSMENT — PAIN DESCRIPTION - DESCRIPTORS: DESCRIPTORS: OTHER (COMMENT)

## 2024-03-08 PROCEDURE — 6360000002 HC RX W HCPCS: Performed by: FAMILY MEDICINE

## 2024-03-08 PROCEDURE — 2580000003 HC RX 258: Performed by: STUDENT IN AN ORGANIZED HEALTH CARE EDUCATION/TRAINING PROGRAM

## 2024-03-08 PROCEDURE — 6360000002 HC RX W HCPCS: Performed by: STUDENT IN AN ORGANIZED HEALTH CARE EDUCATION/TRAINING PROGRAM

## 2024-03-08 PROCEDURE — 94761 N-INVAS EAR/PLS OXIMETRY MLT: CPT

## 2024-03-08 PROCEDURE — 1250000000 HC SEMI PRIVATE HOSPICE R&B

## 2024-03-08 RX ADMIN — MORPHINE SULFATE 2 MG: 2 INJECTION, SOLUTION INTRAMUSCULAR; INTRAVENOUS at 06:02

## 2024-03-08 RX ADMIN — SODIUM CHLORIDE, PRESERVATIVE FREE 10 ML: 5 INJECTION INTRAVENOUS at 08:02

## 2024-03-08 RX ADMIN — HALOPERIDOL LACTATE 1 MG: 5 INJECTION, SOLUTION INTRAMUSCULAR at 13:45

## 2024-03-08 RX ADMIN — HALOPERIDOL LACTATE 1 MG: 5 INJECTION, SOLUTION INTRAMUSCULAR at 06:02

## 2024-03-08 RX ADMIN — MORPHINE SULFATE 2 MG: 2 INJECTION, SOLUTION INTRAMUSCULAR; INTRAVENOUS at 19:03

## 2024-03-08 RX ADMIN — MORPHINE SULFATE 2 MG: 2 INJECTION, SOLUTION INTRAMUSCULAR; INTRAVENOUS at 00:08

## 2024-03-08 RX ADMIN — DIAZEPAM 2.5 MG: 5 INJECTION, SOLUTION INTRAMUSCULAR; INTRAVENOUS at 13:44

## 2024-03-08 RX ADMIN — SODIUM CHLORIDE, PRESERVATIVE FREE 10 ML: 5 INJECTION INTRAVENOUS at 19:59

## 2024-03-08 RX ADMIN — MORPHINE SULFATE 2 MG: 2 INJECTION, SOLUTION INTRAMUSCULAR; INTRAVENOUS at 16:38

## 2024-03-08 RX ADMIN — MORPHINE SULFATE 2 MG: 2 INJECTION, SOLUTION INTRAMUSCULAR; INTRAVENOUS at 23:41

## 2024-03-08 RX ADMIN — HALOPERIDOL LACTATE 1 MG: 5 INJECTION, SOLUTION INTRAMUSCULAR at 00:08

## 2024-03-08 RX ADMIN — HALOPERIDOL LACTATE 1 MG: 5 INJECTION, SOLUTION INTRAMUSCULAR at 19:59

## 2024-03-08 RX ADMIN — HALOPERIDOL LACTATE 1 MG: 5 INJECTION, SOLUTION INTRAMUSCULAR at 23:41

## 2024-03-08 ASSESSMENT — PAIN SCALES - GENERAL: PAINLEVEL_OUTOF10: 0

## 2024-03-08 ASSESSMENT — PAIN SCALES - WONG BAKER
WONGBAKER_NUMERICALRESPONSE: 0
WONGBAKER_NUMERICALRESPONSE: 4
WONGBAKER_NUMERICALRESPONSE: 0

## 2024-03-08 ASSESSMENT — PAIN - FUNCTIONAL ASSESSMENT
PAIN_FUNCTIONAL_ASSESSMENT: PREVENTS OR INTERFERES WITH MANY ACTIVE NOT PASSIVE ACTIVITIES

## 2024-03-08 ASSESSMENT — PAIN DESCRIPTION - LOCATION
LOCATION: OTHER (COMMENT)

## 2024-03-08 ASSESSMENT — PAIN DESCRIPTION - ORIENTATION
ORIENTATION: OTHER (COMMENT)

## 2024-03-08 ASSESSMENT — PAIN DESCRIPTION - DESCRIPTORS
DESCRIPTORS: OTHER (COMMENT)

## 2024-03-09 PROCEDURE — 2580000003 HC RX 258: Performed by: STUDENT IN AN ORGANIZED HEALTH CARE EDUCATION/TRAINING PROGRAM

## 2024-03-09 PROCEDURE — 6360000002 HC RX W HCPCS: Performed by: FAMILY MEDICINE

## 2024-03-09 PROCEDURE — 94761 N-INVAS EAR/PLS OXIMETRY MLT: CPT

## 2024-03-09 PROCEDURE — 6360000002 HC RX W HCPCS: Performed by: STUDENT IN AN ORGANIZED HEALTH CARE EDUCATION/TRAINING PROGRAM

## 2024-03-09 PROCEDURE — 1250000000 HC SEMI PRIVATE HOSPICE R&B

## 2024-03-09 RX ADMIN — MORPHINE SULFATE 2 MG: 2 INJECTION, SOLUTION INTRAMUSCULAR; INTRAVENOUS at 06:21

## 2024-03-09 RX ADMIN — HALOPERIDOL LACTATE 1 MG: 5 INJECTION, SOLUTION INTRAMUSCULAR at 23:55

## 2024-03-09 RX ADMIN — HALOPERIDOL LACTATE 1 MG: 5 INJECTION, SOLUTION INTRAMUSCULAR at 12:14

## 2024-03-09 RX ADMIN — MORPHINE SULFATE 2 MG: 2 INJECTION, SOLUTION INTRAMUSCULAR; INTRAVENOUS at 17:49

## 2024-03-09 RX ADMIN — SODIUM CHLORIDE, PRESERVATIVE FREE 10 ML: 5 INJECTION INTRAVENOUS at 20:08

## 2024-03-09 RX ADMIN — MORPHINE SULFATE 2 MG: 2 INJECTION, SOLUTION INTRAMUSCULAR; INTRAVENOUS at 03:44

## 2024-03-09 RX ADMIN — HALOPERIDOL LACTATE 1 MG: 5 INJECTION, SOLUTION INTRAMUSCULAR at 06:22

## 2024-03-09 RX ADMIN — MORPHINE SULFATE 2 MG: 2 INJECTION, SOLUTION INTRAMUSCULAR; INTRAVENOUS at 12:13

## 2024-03-09 RX ADMIN — HALOPERIDOL LACTATE 1 MG: 5 INJECTION, SOLUTION INTRAMUSCULAR at 17:49

## 2024-03-09 RX ADMIN — MORPHINE SULFATE 2 MG: 2 INJECTION, SOLUTION INTRAMUSCULAR; INTRAVENOUS at 23:55

## 2024-03-09 RX ADMIN — HALOPERIDOL LACTATE 1 MG: 5 INJECTION, SOLUTION INTRAMUSCULAR at 03:44

## 2024-03-09 RX ADMIN — SODIUM CHLORIDE, PRESERVATIVE FREE 10 ML: 5 INJECTION INTRAVENOUS at 10:10

## 2024-03-09 ASSESSMENT — PAIN SCALES - WONG BAKER

## 2024-03-09 ASSESSMENT — PAIN DESCRIPTION - DESCRIPTORS
DESCRIPTORS: OTHER (COMMENT)

## 2024-03-09 ASSESSMENT — PAIN DESCRIPTION - LOCATION
LOCATION: OTHER (COMMENT)
LOCATION: FINGER (COMMENT WHICH ONE);OTHER (COMMENT)

## 2024-03-09 ASSESSMENT — PAIN DESCRIPTION - FREQUENCY
FREQUENCY: INTERMITTENT

## 2024-03-09 ASSESSMENT — PAIN DESCRIPTION - ORIENTATION
ORIENTATION: OTHER (COMMENT)

## 2024-03-09 ASSESSMENT — PAIN DESCRIPTION - ONSET: ONSET: ON-GOING

## 2024-03-09 ASSESSMENT — PAIN DESCRIPTION - PAIN TYPE
TYPE: CHRONIC PAIN

## 2024-03-09 ASSESSMENT — PAIN SCALES - GENERAL
PAINLEVEL_OUTOF10: 0

## 2024-03-09 ASSESSMENT — PAIN - FUNCTIONAL ASSESSMENT
PAIN_FUNCTIONAL_ASSESSMENT: PREVENTS OR INTERFERES SOME ACTIVE ACTIVITIES AND ADLS
PAIN_FUNCTIONAL_ASSESSMENT: PREVENTS OR INTERFERES SOME ACTIVE ACTIVITIES AND ADLS
PAIN_FUNCTIONAL_ASSESSMENT: INTOLERABLE, UNABLE TO DO ANY ACTIVE OR PASSIVE ACTIVITIES
PAIN_FUNCTIONAL_ASSESSMENT: PREVENTS OR INTERFERES SOME ACTIVE ACTIVITIES AND ADLS
PAIN_FUNCTIONAL_ASSESSMENT: PREVENTS OR INTERFERES WITH MANY ACTIVE NOT PASSIVE ACTIVITIES
PAIN_FUNCTIONAL_ASSESSMENT: PREVENTS OR INTERFERES WITH MANY ACTIVE NOT PASSIVE ACTIVITIES

## 2024-03-10 PROCEDURE — 6360000002 HC RX W HCPCS: Performed by: FAMILY MEDICINE

## 2024-03-10 PROCEDURE — 1250000000 HC SEMI PRIVATE HOSPICE R&B

## 2024-03-10 PROCEDURE — 2580000003 HC RX 258: Performed by: STUDENT IN AN ORGANIZED HEALTH CARE EDUCATION/TRAINING PROGRAM

## 2024-03-10 PROCEDURE — 94761 N-INVAS EAR/PLS OXIMETRY MLT: CPT

## 2024-03-10 PROCEDURE — 2700000000 HC OXYGEN THERAPY PER DAY

## 2024-03-10 RX ADMIN — MORPHINE SULFATE 2 MG: 2 INJECTION, SOLUTION INTRAMUSCULAR; INTRAVENOUS at 23:36

## 2024-03-10 RX ADMIN — HALOPERIDOL LACTATE 1 MG: 5 INJECTION, SOLUTION INTRAMUSCULAR at 23:35

## 2024-03-10 RX ADMIN — HALOPERIDOL LACTATE 1 MG: 5 INJECTION, SOLUTION INTRAMUSCULAR at 12:05

## 2024-03-10 RX ADMIN — MORPHINE SULFATE 2 MG: 2 INJECTION, SOLUTION INTRAMUSCULAR; INTRAVENOUS at 18:07

## 2024-03-10 RX ADMIN — HALOPERIDOL LACTATE 1 MG: 5 INJECTION, SOLUTION INTRAMUSCULAR at 06:10

## 2024-03-10 RX ADMIN — MORPHINE SULFATE 2 MG: 2 INJECTION, SOLUTION INTRAMUSCULAR; INTRAVENOUS at 06:10

## 2024-03-10 RX ADMIN — HALOPERIDOL LACTATE 1 MG: 5 INJECTION, SOLUTION INTRAMUSCULAR at 18:03

## 2024-03-10 RX ADMIN — SODIUM CHLORIDE, PRESERVATIVE FREE 10 ML: 5 INJECTION INTRAVENOUS at 07:59

## 2024-03-10 RX ADMIN — MORPHINE SULFATE 2 MG: 2 INJECTION, SOLUTION INTRAMUSCULAR; INTRAVENOUS at 12:05

## 2024-03-10 RX ADMIN — SODIUM CHLORIDE, PRESERVATIVE FREE 10 ML: 5 INJECTION INTRAVENOUS at 20:27

## 2024-03-10 ASSESSMENT — PAIN SCALES - GENERAL
PAINLEVEL_OUTOF10: 10
PAINLEVEL_OUTOF10: 10
PAINLEVEL_OUTOF10: 0
PAINLEVEL_OUTOF10: 0

## 2024-03-10 ASSESSMENT — PAIN - FUNCTIONAL ASSESSMENT
PAIN_FUNCTIONAL_ASSESSMENT: INTOLERABLE, UNABLE TO DO ANY ACTIVE OR PASSIVE ACTIVITIES
PAIN_FUNCTIONAL_ASSESSMENT: INTOLERABLE, UNABLE TO DO ANY ACTIVE OR PASSIVE ACTIVITIES

## 2024-03-10 ASSESSMENT — PAIN DESCRIPTION - LOCATION
LOCATION: GENERALIZED
LOCATION: GENERALIZED

## 2024-03-10 ASSESSMENT — PAIN SCALES - WONG BAKER
WONGBAKER_NUMERICALRESPONSE: 0

## 2024-03-10 ASSESSMENT — PAIN DESCRIPTION - ORIENTATION
ORIENTATION: RIGHT;LEFT;ANTERIOR;DISTAL
ORIENTATION: RIGHT;LEFT;ANTERIOR;DISTAL

## 2024-03-10 ASSESSMENT — PAIN DESCRIPTION - DESCRIPTORS
DESCRIPTORS: DISCOMFORT
DESCRIPTORS: DISCOMFORT

## 2024-03-11 PROCEDURE — 36591 DRAW BLOOD OFF VENOUS DEVICE: CPT

## 2024-03-11 PROCEDURE — 2580000003 HC RX 258: Performed by: STUDENT IN AN ORGANIZED HEALTH CARE EDUCATION/TRAINING PROGRAM

## 2024-03-11 PROCEDURE — 1250000000 HC SEMI PRIVATE HOSPICE R&B

## 2024-03-11 PROCEDURE — 6360000002 HC RX W HCPCS: Performed by: FAMILY MEDICINE

## 2024-03-11 PROCEDURE — 94761 N-INVAS EAR/PLS OXIMETRY MLT: CPT

## 2024-03-11 PROCEDURE — 2700000000 HC OXYGEN THERAPY PER DAY

## 2024-03-11 RX ADMIN — SODIUM CHLORIDE, PRESERVATIVE FREE 10 ML: 5 INJECTION INTRAVENOUS at 09:45

## 2024-03-11 RX ADMIN — MORPHINE SULFATE 2 MG: 2 INJECTION, SOLUTION INTRAMUSCULAR; INTRAVENOUS at 12:24

## 2024-03-11 RX ADMIN — HALOPERIDOL LACTATE 1 MG: 5 INJECTION, SOLUTION INTRAMUSCULAR at 05:34

## 2024-03-11 RX ADMIN — MORPHINE SULFATE 2 MG: 2 INJECTION, SOLUTION INTRAMUSCULAR; INTRAVENOUS at 05:35

## 2024-03-11 RX ADMIN — HALOPERIDOL LACTATE 1 MG: 5 INJECTION, SOLUTION INTRAMUSCULAR at 12:23

## 2024-03-11 RX ADMIN — HALOPERIDOL LACTATE 1 MG: 5 INJECTION, SOLUTION INTRAMUSCULAR at 18:05

## 2024-03-11 RX ADMIN — MORPHINE SULFATE 2 MG: 2 INJECTION, SOLUTION INTRAMUSCULAR; INTRAVENOUS at 18:05

## 2024-03-11 ASSESSMENT — PAIN SCALES - GENERAL: PAINLEVEL_OUTOF10: 0

## 2024-03-11 ASSESSMENT — PAIN - FUNCTIONAL ASSESSMENT: PAIN_FUNCTIONAL_ASSESSMENT: INTOLERABLE, UNABLE TO DO ANY ACTIVE OR PASSIVE ACTIVITIES

## 2024-03-11 ASSESSMENT — PAIN DESCRIPTION - DESCRIPTORS: DESCRIPTORS: OTHER (COMMENT)

## 2024-03-11 ASSESSMENT — PAIN DESCRIPTION - LOCATION: LOCATION: GENERALIZED

## 2024-03-11 NOTE — CONSULTS
Rounding completed.  Power medport de-accessed and re-accessed with new needle set.  Sterile dressing applied per protocol.  Line returns blood briskly, and flushes without abnormalities noted.  Patient tolerated well.

## 2024-03-12 PROCEDURE — 2580000003 HC RX 258: Performed by: STUDENT IN AN ORGANIZED HEALTH CARE EDUCATION/TRAINING PROGRAM

## 2024-03-12 PROCEDURE — 6360000002 HC RX W HCPCS: Performed by: FAMILY MEDICINE

## 2024-03-12 PROCEDURE — 1250000000 HC SEMI PRIVATE HOSPICE R&B

## 2024-03-12 PROCEDURE — 2700000000 HC OXYGEN THERAPY PER DAY

## 2024-03-12 PROCEDURE — 94761 N-INVAS EAR/PLS OXIMETRY MLT: CPT

## 2024-03-12 RX ADMIN — MORPHINE SULFATE 2 MG: 2 INJECTION, SOLUTION INTRAMUSCULAR; INTRAVENOUS at 09:38

## 2024-03-12 RX ADMIN — MORPHINE SULFATE 2 MG: 2 INJECTION, SOLUTION INTRAMUSCULAR; INTRAVENOUS at 07:13

## 2024-03-12 RX ADMIN — MORPHINE SULFATE 2 MG: 2 INJECTION, SOLUTION INTRAMUSCULAR; INTRAVENOUS at 12:01

## 2024-03-12 RX ADMIN — HALOPERIDOL LACTATE 1 MG: 5 INJECTION, SOLUTION INTRAMUSCULAR at 00:01

## 2024-03-12 RX ADMIN — MORPHINE SULFATE 2 MG: 2 INJECTION, SOLUTION INTRAMUSCULAR; INTRAVENOUS at 00:01

## 2024-03-12 RX ADMIN — SODIUM CHLORIDE, PRESERVATIVE FREE 10 ML: 5 INJECTION INTRAVENOUS at 00:01

## 2024-03-12 RX ADMIN — HALOPERIDOL LACTATE 1 MG: 5 INJECTION, SOLUTION INTRAMUSCULAR at 07:12

## 2024-03-12 RX ADMIN — SODIUM CHLORIDE, PRESERVATIVE FREE 10 ML: 5 INJECTION INTRAVENOUS at 09:40

## 2024-03-12 RX ADMIN — SODIUM CHLORIDE, PRESERVATIVE FREE 10 ML: 5 INJECTION INTRAVENOUS at 21:39

## 2024-03-12 RX ADMIN — HALOPERIDOL LACTATE 1 MG: 5 INJECTION, SOLUTION INTRAMUSCULAR at 12:00

## 2024-03-12 ASSESSMENT — PAIN SCALES - GENERAL
PAINLEVEL_OUTOF10: 5
PAINLEVEL_OUTOF10: 0

## 2024-03-12 ASSESSMENT — PAIN SCALES - WONG BAKER
WONGBAKER_NUMERICALRESPONSE: 0

## 2024-03-12 ASSESSMENT — PAIN DESCRIPTION - LOCATION
LOCATION: GENERALIZED
LOCATION: OTHER (COMMENT)
LOCATION: GENERALIZED

## 2024-03-12 ASSESSMENT — PAIN DESCRIPTION - FREQUENCY: FREQUENCY: INTERMITTENT

## 2024-03-12 ASSESSMENT — PAIN DESCRIPTION - ORIENTATION: ORIENTATION: OTHER (COMMENT)

## 2024-03-12 ASSESSMENT — PAIN DESCRIPTION - DESCRIPTORS: DESCRIPTORS: OTHER (COMMENT)

## 2024-03-13 PROCEDURE — 1250000000 HC SEMI PRIVATE HOSPICE R&B

## 2024-03-13 PROCEDURE — 6360000002 HC RX W HCPCS: Performed by: STUDENT IN AN ORGANIZED HEALTH CARE EDUCATION/TRAINING PROGRAM

## 2024-03-13 PROCEDURE — 2700000000 HC OXYGEN THERAPY PER DAY

## 2024-03-13 PROCEDURE — 94761 N-INVAS EAR/PLS OXIMETRY MLT: CPT

## 2024-03-13 PROCEDURE — 2580000003 HC RX 258: Performed by: STUDENT IN AN ORGANIZED HEALTH CARE EDUCATION/TRAINING PROGRAM

## 2024-03-13 PROCEDURE — 6360000002 HC RX W HCPCS: Performed by: FAMILY MEDICINE

## 2024-03-13 RX ADMIN — HALOPERIDOL LACTATE 1 MG: 5 INJECTION, SOLUTION INTRAMUSCULAR at 11:00

## 2024-03-13 RX ADMIN — MORPHINE SULFATE 2 MG: 2 INJECTION, SOLUTION INTRAMUSCULAR; INTRAVENOUS at 16:37

## 2024-03-13 RX ADMIN — HALOPERIDOL LACTATE 1 MG: 5 INJECTION, SOLUTION INTRAMUSCULAR at 08:20

## 2024-03-13 RX ADMIN — MORPHINE SULFATE 2 MG: 2 INJECTION, SOLUTION INTRAMUSCULAR; INTRAVENOUS at 08:19

## 2024-03-13 RX ADMIN — MORPHINE SULFATE 2 MG: 2 INJECTION, SOLUTION INTRAMUSCULAR; INTRAVENOUS at 11:00

## 2024-03-13 RX ADMIN — HALOPERIDOL LACTATE 1 MG: 5 INJECTION, SOLUTION INTRAMUSCULAR at 18:52

## 2024-03-13 RX ADMIN — HALOPERIDOL LACTATE 0.5 MG: 5 INJECTION, SOLUTION INTRAMUSCULAR at 16:37

## 2024-03-13 RX ADMIN — HALOPERIDOL LACTATE 0.5 MG: 5 INJECTION, SOLUTION INTRAMUSCULAR at 14:18

## 2024-03-13 RX ADMIN — MORPHINE SULFATE 2 MG: 2 INJECTION, SOLUTION INTRAMUSCULAR; INTRAVENOUS at 18:52

## 2024-03-13 RX ADMIN — MORPHINE SULFATE 2 MG: 2 INJECTION, SOLUTION INTRAMUSCULAR; INTRAVENOUS at 14:17

## 2024-03-13 RX ADMIN — SODIUM CHLORIDE, PRESERVATIVE FREE 10 ML: 5 INJECTION INTRAVENOUS at 11:07

## 2024-03-13 ASSESSMENT — PAIN SCALES - GENERAL
PAINLEVEL_OUTOF10: 0
PAINLEVEL_OUTOF10: 0

## 2024-03-13 ASSESSMENT — PAIN DESCRIPTION - LOCATION
LOCATION: GENERALIZED

## 2024-03-13 ASSESSMENT — PAIN SCALES - WONG BAKER
WONGBAKER_NUMERICALRESPONSE: 0

## 2024-03-14 VITALS
DIASTOLIC BLOOD PRESSURE: 47 MMHG | SYSTOLIC BLOOD PRESSURE: 65 MMHG | HEART RATE: 74 BPM | BODY MASS INDEX: 21.8 KG/M2 | WEIGHT: 138.89 LBS | RESPIRATION RATE: 8 BRPM | HEIGHT: 67 IN | TEMPERATURE: 97.3 F | OXYGEN SATURATION: 81 %

## 2024-03-14 PROCEDURE — 6360000002 HC RX W HCPCS: Performed by: STUDENT IN AN ORGANIZED HEALTH CARE EDUCATION/TRAINING PROGRAM

## 2024-03-14 PROCEDURE — 94761 N-INVAS EAR/PLS OXIMETRY MLT: CPT

## 2024-03-14 PROCEDURE — 6360000002 HC RX W HCPCS: Performed by: FAMILY MEDICINE

## 2024-03-14 PROCEDURE — 2580000003 HC RX 258: Performed by: STUDENT IN AN ORGANIZED HEALTH CARE EDUCATION/TRAINING PROGRAM

## 2024-03-14 RX ADMIN — SODIUM CHLORIDE, PRESERVATIVE FREE 10 ML: 5 INJECTION INTRAVENOUS at 00:08

## 2024-03-14 RX ADMIN — MORPHINE SULFATE 2 MG: 2 INJECTION, SOLUTION INTRAMUSCULAR; INTRAVENOUS at 13:32

## 2024-03-14 RX ADMIN — SODIUM CHLORIDE, PRESERVATIVE FREE 10 ML: 5 INJECTION INTRAVENOUS at 09:24

## 2024-03-14 RX ADMIN — HALOPERIDOL LACTATE 1 MG: 5 INJECTION, SOLUTION INTRAMUSCULAR at 05:58

## 2024-03-14 RX ADMIN — MORPHINE SULFATE 2 MG: 2 INJECTION, SOLUTION INTRAMUSCULAR; INTRAVENOUS at 00:07

## 2024-03-14 RX ADMIN — MORPHINE SULFATE 2 MG: 2 INJECTION, SOLUTION INTRAMUSCULAR; INTRAVENOUS at 18:40

## 2024-03-14 RX ADMIN — HALOPERIDOL LACTATE 1 MG: 5 INJECTION, SOLUTION INTRAMUSCULAR at 00:07

## 2024-03-14 RX ADMIN — HALOPERIDOL LACTATE 1 MG: 5 INJECTION, SOLUTION INTRAMUSCULAR at 18:40

## 2024-03-14 RX ADMIN — MORPHINE SULFATE 2 MG: 2 INJECTION, SOLUTION INTRAMUSCULAR; INTRAVENOUS at 05:58

## 2024-03-14 RX ADMIN — MORPHINE SULFATE 2 MG: 2 INJECTION, SOLUTION INTRAMUSCULAR; INTRAVENOUS at 16:06

## 2024-03-14 RX ADMIN — DIAZEPAM 2.5 MG: 5 INJECTION, SOLUTION INTRAMUSCULAR; INTRAVENOUS at 16:05

## 2024-03-14 RX ADMIN — HALOPERIDOL LACTATE 1 MG: 5 INJECTION, SOLUTION INTRAMUSCULAR at 13:32

## 2024-03-14 RX ADMIN — HALOPERIDOL LACTATE 0.5 MG: 5 INJECTION, SOLUTION INTRAMUSCULAR at 09:23

## 2024-03-14 RX ADMIN — MORPHINE SULFATE 2 MG: 2 INJECTION, SOLUTION INTRAMUSCULAR; INTRAVENOUS at 09:23

## 2024-03-14 NOTE — PLAN OF CARE
Problem: Discharge Planning  Goal: Discharge to home or other facility with appropriate resources  Outcome: Completed     Problem: Safety - Adult  Goal: Free from fall injury  Outcome: Progressing     Problem: Skin/Tissue Integrity  Goal: Absence of new skin breakdown  Description: 1.  Monitor for areas of redness and/or skin breakdown  2.  Assess vascular access sites hourly  3.  Every 4-6 hours minimum:  Change oxygen saturation probe site  4.  Every 4-6 hours:  If on nasal continuous positive airway pressure, respiratory therapy assess nares and determine need for appliance change or resting period.  Outcome: Progressing     Problem: Pain  Goal: Verbalizes/displays adequate comfort level or baseline comfort level  Outcome: Progressing     Problem: Dyspnea Due to End of Life  Goal: Demonstrate understanding of and ability to manage respiratory symptoms at end of life  Description: Patient  and or family/caregiver will verbalize recall of breathing strategies to maintain an effective breathing pattern during the inpatient hospice stay.        Outcome: HH/HSPC Not Progressing     Problem: Fever Due to End of Life  Goal: Demonstrate understanding of and ability to manage fever at end of life  Description: Patient and/or family/caregiver will verbalize recall the ability to manage fever at end of life during the inpatient hospice stay.  Outcome: HH/HSPC Not Progressing     Problem: Communication Deficit  Goal: Effectively communicate symptoms, needs, and concerns  Description: Patient  and/or family/caregiver will be able to communicate symptoms, needs, and concerns as evidenced by the use of language services during the inpatient hospice stay.    Outcome: HH/HSPC Not Progressing     
  Problem: Discharge Planning  Goal: Discharge to home or other facility with appropriate resources  Outcome: Progressing     Problem: Safety - Adult  Goal: Free from fall injury  Outcome: Progressing     Problem: Skin/Tissue Integrity  Goal: Absence of new skin breakdown  Description: 1.  Monitor for areas of redness and/or skin breakdown  2.  Assess vascular access sites hourly  3.  Every 4-6 hours minimum:  Change oxygen saturation probe site  4.  Every 4-6 hours:  If on nasal continuous positive airway pressure, respiratory therapy assess nares and determine need for appliance change or resting period.  Outcome: Progressing     Problem: Pain  Goal: Verbalizes/displays adequate comfort level or baseline comfort level  Outcome: Progressing     
  Problem: Discharge Planning  Goal: Discharge to home or other facility with appropriate resources  Outcome: Progressing  Flowsheets (Taken 3/10/2024 2024)  Discharge to home or other facility with appropriate resources: Identify barriers to discharge with patient and caregiver     Problem: Safety - Adult  Goal: Free from fall injury  Outcome: Progressing     Problem: Skin/Tissue Integrity  Goal: Absence of new skin breakdown  Description: 1.  Monitor for areas of redness and/or skin breakdown  2.  Assess vascular access sites hourly  3.  Every 4-6 hours minimum:  Change oxygen saturation probe site  4.  Every 4-6 hours:  If on nasal continuous positive airway pressure, respiratory therapy assess nares and determine need for appliance change or resting period.  Outcome: Progressing     Problem: Pain  Goal: Verbalizes/displays adequate comfort level or baseline comfort level  Outcome: Progressing     Problem: Dyspnea Due to End of Life  Goal: Demonstrate understanding of and ability to manage respiratory symptoms at end of life  Description: Patient  and or family/caregiver will verbalize recall of breathing strategies to maintain an effective breathing pattern during the inpatient hospice stay.        Outcome: Progressing     Problem: Fever Due to End of Life  Goal: Demonstrate understanding of and ability to manage fever at end of life  Description: Patient and/or family/caregiver will verbalize recall the ability to manage fever at end of life during the inpatient hospice stay.  Outcome: Progressing     Problem: Communication Deficit  Goal: Effectively communicate symptoms, needs, and concerns  Description: Patient  and/or family/caregiver will be able to communicate symptoms, needs, and concerns as evidenced by the use of language services during the inpatient hospice stay.    Outcome: Progressing     
  Problem: Discharge Planning  Goal: Discharge to home or other facility with appropriate resources  Outcome: Progressing  Flowsheets (Taken 3/9/2024 0919 by Oksana Shultz LPN)  Discharge to home or other facility with appropriate resources: Identify barriers to discharge with patient and caregiver     Problem: Safety - Adult  Goal: Free from fall injury  Outcome: Progressing     Problem: Skin/Tissue Integrity  Goal: Absence of new skin breakdown  Description: 1.  Monitor for areas of redness and/or skin breakdown  2.  Assess vascular access sites hourly  3.  Every 4-6 hours minimum:  Change oxygen saturation probe site  4.  Every 4-6 hours:  If on nasal continuous positive airway pressure, respiratory therapy assess nares and determine need for appliance change or resting period.  Outcome: Progressing     Problem: Pain  Goal: Verbalizes/displays adequate comfort level or baseline comfort level  Outcome: Progressing  Flowsheets (Taken 3/9/2024 0900 by Oksana Shultz LPN)  Verbalizes/displays adequate comfort level or baseline comfort level: Administer analgesics based on type and severity of pain and evaluate response     Problem: Dyspnea Due to End of Life  Goal: Demonstrate understanding of and ability to manage respiratory symptoms at end of life  Description: Patient  and or family/caregiver will verbalize recall of breathing strategies to maintain an effective breathing pattern during the inpatient hospice stay.        Outcome: Progressing     Problem: Fever Due to End of Life  Goal: Demonstrate understanding of and ability to manage fever at end of life  Description: Patient and/or family/caregiver will verbalize recall the ability to manage fever at end of life during the inpatient hospice stay.  Outcome: Progressing     Problem: Communication Deficit  Goal: Effectively communicate symptoms, needs, and concerns  Description: Patient  and/or family/caregiver will be able to communicate symptoms, needs, and 
  Problem: Dyspnea Due to End of Life  Goal: Demonstrate understanding of and ability to manage respiratory symptoms at end of life  Description: Patient  and or family/caregiver will verbalize recall of breathing strategies to maintain an effective breathing pattern during the inpatient hospice stay.        Outcome: Progressing     Problem: Fever Due to End of Life  Goal: Demonstrate understanding of and ability to manage fever at end of life  Description: Patient and/or family/caregiver will verbalize recall the ability to manage fever at end of life during the inpatient hospice stay.  Outcome: Progressing     Problem: Communication Deficit  Goal: Effectively communicate symptoms, needs, and concerns  Description: Patient  and/or family/caregiver will be able to communicate symptoms, needs, and concerns as evidenced by the use of language services during the inpatient hospice stay.    Outcome: Progressing        
  Problem: Safety - Adult  Goal: Free from fall injury  Outcome: Progressing     Problem: Skin/Tissue Integrity  Goal: Absence of new skin breakdown  Description: 1.  Monitor for areas of redness and/or skin breakdown  2.  Assess vascular access sites hourly  3.  Every 4-6 hours minimum:  Change oxygen saturation probe site  4.  Every 4-6 hours:  If on nasal continuous positive airway pressure, respiratory therapy assess nares and determine need for appliance change or resting period.  Outcome: Progressing     Problem: Pain  Goal: Verbalizes/displays adequate comfort level or baseline comfort level  Outcome: Progressing     Problem: Dyspnea Due to End of Life  Goal: Demonstrate understanding of and ability to manage respiratory symptoms at end of life  Description: Patient  and or family/caregiver will verbalize recall of breathing strategies to maintain an effective breathing pattern during the inpatient hospice stay.        Outcome: Progressing     Problem: Fever Due to End of Life  Goal: Demonstrate understanding of and ability to manage fever at end of life  Description: Patient and/or family/caregiver will verbalize recall the ability to manage fever at end of life during the inpatient hospice stay.  Outcome: Progressing     Problem: Communication Deficit  Goal: Effectively communicate symptoms, needs, and concerns  Description: Patient  and/or family/caregiver will be able to communicate symptoms, needs, and concerns as evidenced by the use of language services during the inpatient hospice stay.    Outcome: Progressing     
and determine need for appliance change or resting period.  3/7/2024 2129 by Saritha Wallace RN  Outcome: Not Progressing  3/7/2024 2128 by Saritha Wallace RN  Outcome: Not Progressing  3/7/2024 2128 by Saritha Wallace RN  Outcome: Progressing  3/7/2024 1629 by Malina Bellamy RN  Outcome: Progressing     Problem: Pain  Goal: Verbalizes/displays adequate comfort level or baseline comfort level  3/7/2024 2129 by Saritha Wallace RN  Outcome: Not Progressing  3/7/2024 2128 by Saritha Wallace RN  Outcome: Not Progressing  3/7/2024 2128 by Saritha Wallace RN  Outcome: Progressing  3/7/2024 1629 by Malina Bellamy RN  Outcome: Progressing

## 2024-03-15 NOTE — PROGRESS NOTES
03/04/24 1546   Encounter Summary   Encounter Overview/Reason  Follow-up   Service Provided For: Patient   Referral/Consult From: [facility], [place]   Support System Spouse;Children   Last Encounter  03/04/24   Complexity of Encounter Low   Begin Time 1515   End Time  1530   Total Time Calculated 15 min   Crisis   Type Family Care;Follow up   Spiritual/Emotional needs   Type Spiritual Support   Grief, Loss, and Adjustments   Type Adjustment to illness;Hospice;Grief and loss   Assessment/Intervention/Outcome   Assessment Coping;Peaceful   Intervention Active listening;Empowerment;Sustaining Presence/Ministry of presence   Outcome Coping;Encouraged;Engaged in conversation;Expressed Gratitude;Expressed feelings, needs, and concerns;Receptive   Plan and Referrals   Plan/Referrals Continue Support (comment)  (as needed)       
   03/04/24 1546   Encounter Summary   Encounter Overview/Reason  Follow-up   Service Provided For: Patient   Referral/Consult From: [facility]   Support System Spouse;Children   Last Encounter  03/04/24  (initla visit. Patient is Hospice. Large and good family support system. Patient did not express any needs at this time but appreciated this 's visit.)   Complexity of Encounter Low   Begin Time 1515   End Time  1530   Total Time Calculated 15 min   Crisis   Type Family Care;Follow up   Spiritual/Emotional needs   Type Spiritual Support   Grief, Loss, and Adjustments   Type Adjustment to illness;Hospice;Grief and loss   Assessment/Intervention/Outcome   Assessment Coping;Peaceful   Intervention Active listening;Empowerment;Sustaining Presence/Ministry of presence   Outcome Coping;Encouraged;Engaged in conversation;Expressed Gratitude;Expressed feelings, needs, and concerns;Receptive   Plan and Referrals   Plan/Referrals Continue Support (comment)  (as needed)       
4 Eyes Skin Assessment     NAME:  Arnie Martinez  YOB: 1955  MEDICAL RECORD NUMBER:  [id number]    The patient is being assessed for  Transfer to New Unit    I agree that at least one RN has performed a thorough Head to Toe Skin Assessment on the patient. ALL assessment sites listed below have been assessed.      Areas assessed by both nurses:    Head, Face, Ears, Shoulders, Back, Chest, Arms, Elbows, Hands, Sacrum. Buttock, Coccyx, Ischium, Legs. Feet and Heels, and Under Medical Devices         Does the Patient have a Wound? Yes wound(s) were present on assessment. LDA wound assessment was Initiated and completed by RN       Mj Prevention initiated by RN: Yes  Wound Care Orders initiated by RN: No    Pressure Injury (Stage 3,4, Unstageable, DTI, NWPT, and Complex wounds) if present, place Wound referral order by RN under : No    New Ostomies, if present place, Ostomy referral order under : No     Nurse 1 eSignature: Electronically signed by Carmen Sosa RN on 3/3/24 at 5:26 PM EST    **SHARE this note so that the co-signing nurse can place an eSignature**    Nurse 2 eSignature: Electronically signed by Florinda Sierra LPN on 3/3/24 at 5:33 PM EST   
[facility]    I was contacted by the patient's nurse who reports that the staff are unable to give Midazolam IV on 4 north. Collaboration with spouse regarding Lorazepam allergy with itching. Spouse does not think that he has ever had Diazepam and will order and monitor.    LINNETTE France MD   
[facility]  General Inpatient Hospice Progress Note    Date: 3/14/2024  Name: Arnie Martinez  MRN: [id number]  YOB: 1955   Patient's PCP: Pat Nguyễn MD  Consultants during acute care: Cardiology and Oncology  Acute care hospital admission: 2/23/2024 to 3/3/2024  Admit Date: 3/3/2024 to General Inpatient Hospice    Subjective:   The patient is resting peacefully and has received the comfort medications. The patient is unresponsive, hypotensive with very shallow respirations. There is no oral intake for over 1 week. I collaborated with the patient's nurse and hospice nurse. Discussed with the patient's spouse and her sister, Penny, at the bedside and provided support.         Objective:   Pain is managed with Morphine IV 2 mg scheduled every 6 hours plus prn with 3 prn dose in the past 24 hours, Glycopyrrolate IV is available for secretions, Haloperidol is scheduled every 6 hours plus prn with 3 prn doses in the past 24 hours, and Diazepam is available for anxiety with 0 doses in the past 24 hours.    Data reviewed 3/14/2024:   Transthoracic Echocardiogram 2/23/24    Left Ventricle: Normal left ventricular systolic function with a visually estimated EF of 55 - 60%.    Left Atrium: Left atrium is mildly dilated.    Pericardium: No pericardial effusion.    Extracardiac: Pleural effusion noted.    Image quality is suboptimal.     CT-scan of the brain 2/23/24: No acute intracranial abnormality.     CT-scan of abdomen and pelvis 2/23/24  1.  Nonspecific multifocal colitis which may be infectious/inflammatory in  nature or related to portal enteropathy.   2.  Hepatic cirrhosis with portal hypertension and splenomegaly as well as  moderate to large amount of ascites.  Mild anasarca.   3.  Bilateral pleural effusions with chronic interstitial lung disease and  superimposed ground-glass densities bilaterally possibly representing  atypical infection/pneumonitis versus organizing pneumonia 
[facility]  General Inpatient Hospice Progress Note    Date: 3/5/2024  Name: Arnie Martinez  MRN: [id number]  YOB: 1955   Patient's PCP: Pat Nguyễn MD  Consultants during acute care: Cardiology and Oncology  Acute care hospital admission: 2/23/2024 to 3/3/2024  Admit Date: 3/3/2024 to General Inpatient Hospice    Subjective:   The patient is drowsy but arousable, had complaints of back pain overnight but was reluctant to take Morphine, but when he did it helped. I encouraged the patient to use the Morphine if he needs it for pain. He is having trouble swallowing larger tablets. Oral intake is occasional bites of applesauce. The patient's spouse describes some picking at the air at times. Discussed with spouse, Angeles, at the bedside.  I collaborated with the patient's nurse and the hospice nurse.       Objective:   Pain is managed with Morphine IV prn with 2 doses in the past 24 hours, Glycopyrrolate IV is available for secretions, Haloperidol for nausea or delirium, and Midazolam 1 mg IV is available for anxiety with 2 doses in the past 24 hours.    Data reviewed 3/5/2024:   Transthoracic Echocardiogram 2/23/24    Left Ventricle: Normal left ventricular systolic function with a visually estimated EF of 55 - 60%.    Left Atrium: Left atrium is mildly dilated.    Pericardium: No pericardial effusion.    Extracardiac: Pleural effusion noted.    Image quality is suboptimal.     CT-scan of the brain 2/23/24: No acute intracranial abnormality.     CT-scan of abdomen and pelvis 2/23/24  1.  Nonspecific multifocal colitis which may be infectious/inflammatory in  nature or related to portal enteropathy.   2.  Hepatic cirrhosis with portal hypertension and splenomegaly as well as  moderate to large amount of ascites.  Mild anasarca.   3.  Bilateral pleural effusions with chronic interstitial lung disease and  superimposed ground-glass densities bilaterally possibly 
Patient time of death 1919. Family at bedside, Dr. France and house supervisor notified.  
[facility]  General Inpatient Hospice Progress Note    Date: 3/6/2024  Name: Arnie Martinez  MRN: [id number]  YOB: 1955   Patient's PCP: Pat Nguyễn MD  Consultants during acute care: Cardiology and Oncology  Acute care hospital admission: 2/23/2024 to 3/3/2024  Admit Date: 3/3/2024 to General Inpatient Hospice    Subjective:   The patient is less alert than yesterday, drowsy but arousable. He has upper airway noise. He is having trouble swallowing, and meds were held last evening. Oral intake is occasional bites of applesauce and spouse reports that she had to remove it yesterday. Discussed stopping oral meds, and agrees. The patient's spouse describes some picking at the air at times, worse when there is a lot of people in the room and she plans on limiting visitors today. I collaborated with the patient's nurse and the hospice nurse. The patient tolerated a dose of Diazepam (previous intolerance with Lorazepam).       Objective:   Pain is managed with Morphine IV prn with 5 x 2mg doses in the past 24 hours, Glycopyrrolate IV is available for secretions, Haloperidol for nausea or delirium, and Diazepam is available for anxiety with 1 dose in the past 24 hours.    Data reviewed 3/6/2024:   Transthoracic Echocardiogram 2/23/24    Left Ventricle: Normal left ventricular systolic function with a visually estimated EF of 55 - 60%.    Left Atrium: Left atrium is mildly dilated.    Pericardium: No pericardial effusion.    Extracardiac: Pleural effusion noted.    Image quality is suboptimal.     CT-scan of the brain 2/23/24: No acute intracranial abnormality.     CT-scan of abdomen and pelvis 2/23/24  1.  Nonspecific multifocal colitis which may be infectious/inflammatory in  nature or related to portal enteropathy.   2.  Hepatic cirrhosis with portal hypertension and splenomegaly as well as  moderate to large amount of ascites.  Mild anasarca.   3.  Bilateral pleural effusions with 
Pt now under hospice care. Wound care will follow peripherally. Please contact if issues arise in which wound care can assist. Electronically signed by Gerri Montes RN on 3/4/2024 at 8:00 AM    
Report called to Sara on 4 north at this time.    Leanne Land RN    
[facility]  General Inpatient Hospice Progress Note    Date: 3/9/2024  Name: Arnie Martinez  MRN: [id number]  YOB: 1955   Patient's PCP: Pat Nguyễn MD  Consultants during acute care: Cardiology and Oncology  Acute care hospital admission: 2/23/2024 to 3/3/2024  Admit Date: 3/3/2024 to General Inpatient Hospice    Subjective:   The patient remains unresponsive, restlessness is improved with scheduled Haloperidol and Morphine, although had an episode las night receiving prn dose with benefit. Breathing is shallow with periods of apnea. There is no oral intake. I collaborated with the patient's nurse. Discussed with the patient's spouse at the bedside who expresses no needs.        Objective:   Pain is managed with Morphine IV 2 mg scheduled every 6 hours plus prn with 3 prn doses in the past 24 hours, Glycopyrrolate IV is available for secretions, Haloperidol is scheduled every 6 hours plus prn with 2 prn doses in the past 24 hours, and Diazepam is available for anxiety with 1 dose in the past 24 hours.    Data reviewed 3/9/2024:   Transthoracic Echocardiogram 2/23/24    Left Ventricle: Normal left ventricular systolic function with a visually estimated EF of 55 - 60%.    Left Atrium: Left atrium is mildly dilated.    Pericardium: No pericardial effusion.    Extracardiac: Pleural effusion noted.    Image quality is suboptimal.     CT-scan of the brain 2/23/24: No acute intracranial abnormality.     CT-scan of abdomen and pelvis 2/23/24  1.  Nonspecific multifocal colitis which may be infectious/inflammatory in  nature or related to portal enteropathy.   2.  Hepatic cirrhosis with portal hypertension and splenomegaly as well as  moderate to large amount of ascites.  Mild anasarca.   3.  Bilateral pleural effusions with chronic interstitial lung disease and  superimposed ground-glass densities bilaterally possibly representing  atypical infection/pneumonitis versus organizing 
[facility]  General Inpatient Hospice Progress Note    Date: 3/13/2024  Name: Arnie Martinez  MRN: [id number]  YOB: 1955   Patient's PCP: Pat Nguyễn MD  Consultants during acute care: Cardiology and Oncology  Acute care hospital admission: 2/23/2024 to 3/3/2024  Admit Date: 3/3/2024 to General Inpatient Hospice    Subjective:   The patient is mildly restless with facial grimacing. Family reports that the patient was moving his arms earlier. The patient didn't receive comfort medications overnight, and I discussed this with the patient's nurse who will continue the scheduled and prn meds. The patient is unresponsive, BP is low, breathing is shallow with periods of apnea. There is no oral intake for over 1 week. I collaborated with the patient's nurse and hospice nurse. Discussed with the patient's spouse and her sister, Penny, at the bedside and provided support.         Objective:   Pain is managed with Morphine IV 2 mg scheduled every 6 hours (held overnight for undetermined reason) plus prn with 1 prn dose in the past 24 hours, Glycopyrrolate IV is available for secretions, Haloperidol is scheduled every 6 hours (held overnight for undetermined reason) plus prn with 0 prn doses in the past 24 hours, and Diazepam is available for anxiety with 0 doses in the past 24 hours.    Data reviewed 3/13/2024:   Transthoracic Echocardiogram 2/23/24    Left Ventricle: Normal left ventricular systolic function with a visually estimated EF of 55 - 60%.    Left Atrium: Left atrium is mildly dilated.    Pericardium: No pericardial effusion.    Extracardiac: Pleural effusion noted.    Image quality is suboptimal.     CT-scan of the brain 2/23/24: No acute intracranial abnormality.     CT-scan of abdomen and pelvis 2/23/24  1.  Nonspecific multifocal colitis which may be infectious/inflammatory in  nature or related to portal enteropathy.   2.  Hepatic cirrhosis with portal hypertension and 
D72.819    Thrombocytopenic disorder D69.59    Fatigue R53.83    S/P total knee arthroplasty, right Z96.651    Hx of colonic polyps Z86.010    Gastroesophageal reflux disease K21.9    Acute gastric ulcer with hemorrhage K25.0    GI bleed K92.2    Secondary esophageal varices without bleeding (HCC) I85.10    Malignant neoplasm of overlapping sites of stomach (HCC) C16.8    Screening for viral disease Z11.59    Malignant neoplasm of body of stomach (HCC) C16.2    Failure to thrive in adult R62.7    Severe malnutrition (HCC) E43    Nausea vomiting and diarrhea R11.2, R19.7    Secondary esophageal varices with bleeding (HCC) I85.11    Generalized weakness R53.1    Esophageal dysphagia R13.19    Chemotherapy induced neutropenia (HCC) D70.1, T45.1X5A    Acute respiratory failure with hypoxia (HCC) J96.01    Gastric carcinoma (HCC) C16.9    Hospice care Z51.5       LINNETTE France MD  3/7/2024   
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  DVT prophylaxis: none indicated due to Hospice/end of life care      Patient Active Problem List   Diagnosis Code    Splenomegaly R16.1    NAFLD (nonalcoholic fatty liver disease) K76.0    Carcinoma of duodenum (HCC) C17.0    Cirrhosis of liver without ascites (HCC) K74.60    Port-A-Cath in place Z95.828    Leukopenia D72.819    Thrombocytopenic disorder D69.59    Fatigue R53.83    S/P total knee arthroplasty, right Z96.651    Hx of colonic polyps Z86.010    Gastroesophageal reflux disease K21.9    Acute gastric ulcer with hemorrhage K25.0    GI bleed K92.2    Secondary esophageal varices without bleeding (HCC) I85.10    Malignant neoplasm of overlapping sites of stomach (HCC) C16.8    Screening for viral disease Z11.59    Malignant neoplasm of body of stomach (HCC) C16.2    Failure to thrive in adult R62.7    Severe malnutrition (HCC) E43    Nausea vomiting and diarrhea R11.2, R19.7    Secondary esophageal varices with bleeding (HCC) I85.11    Generalized weakness R53.1    Esophageal dysphagia R13.19    Chemotherapy induced neutropenia (HCC) D70.1, T45.1X5A    Acute respiratory failure with hypoxia (HCC) J96.01    Gastric carcinoma (HCC) C16.9    Hospice care Z51.5       LINNETTE France MD  3/12/2024   
disorder D69.59    Fatigue R53.83    S/P total knee arthroplasty, right Z96.651    Hx of colonic polyps Z86.010    Gastroesophageal reflux disease K21.9    Acute gastric ulcer with hemorrhage K25.0    GI bleed K92.2    Secondary esophageal varices without bleeding (HCC) I85.10    Malignant neoplasm of overlapping sites of stomach (HCC) C16.8    Screening for viral disease Z11.59    Malignant neoplasm of body of stomach (HCC) C16.2    Failure to thrive in adult R62.7    Severe malnutrition (HCC) E43    Nausea vomiting and diarrhea R11.2, R19.7    Secondary esophageal varices with bleeding (HCC) I85.11    Generalized weakness R53.1    Esophageal dysphagia R13.19    Chemotherapy induced neutropenia (HCC) D70.1, T45.1X5A    Acute respiratory failure with hypoxia (HCC) J96.01    Gastric carcinoma (HCC) C16.9    Hospice care Z51.5       LINNETTE France MD  3/8/2024   
prophylaxis: none indicated due to Hospice/end of life care      Patient Active Problem List   Diagnosis Code    Splenomegaly R16.1    NAFLD (nonalcoholic fatty liver disease) K76.0    Carcinoma of duodenum (HCC) C17.0    Cirrhosis of liver without ascites (HCC) K74.60    Port-A-Cath in place Z95.828    Leukopenia D72.819    Thrombocytopenic disorder D69.59    Fatigue R53.83    S/P total knee arthroplasty, right Z96.651    Hx of colonic polyps Z86.010    Gastroesophageal reflux disease K21.9    Acute gastric ulcer with hemorrhage K25.0    GI bleed K92.2    Secondary esophageal varices without bleeding (HCC) I85.10    Malignant neoplasm of overlapping sites of stomach (HCC) C16.8    Screening for viral disease Z11.59    Malignant neoplasm of body of stomach (HCC) C16.2    Failure to thrive in adult R62.7    Severe malnutrition (HCC) E43    Nausea vomiting and diarrhea R11.2, R19.7    Secondary esophageal varices with bleeding (HCC) I85.11    Generalized weakness R53.1    Esophageal dysphagia R13.19    Chemotherapy induced neutropenia (HCC) D70.1, T45.1X5A    Acute respiratory failure with hypoxia (HCC) J96.01    Gastric carcinoma (HCC) C16.9    Hospice care Z51.5       LINNETTE France MD  3/10/2024   
prophylaxis: none indicated due to Hospice/end of life care      Patient Active Problem List   Diagnosis Code    Splenomegaly R16.1    NAFLD (nonalcoholic fatty liver disease) K76.0    Carcinoma of duodenum (HCC) C17.0    Cirrhosis of liver without ascites (HCC) K74.60    Port-A-Cath in place Z95.828    Leukopenia D72.819    Thrombocytopenic disorder D69.59    Fatigue R53.83    S/P total knee arthroplasty, right Z96.651    Hx of colonic polyps Z86.010    Gastroesophageal reflux disease K21.9    Acute gastric ulcer with hemorrhage K25.0    GI bleed K92.2    Secondary esophageal varices without bleeding (HCC) I85.10    Malignant neoplasm of overlapping sites of stomach (HCC) C16.8    Screening for viral disease Z11.59    Malignant neoplasm of body of stomach (HCC) C16.2    Failure to thrive in adult R62.7    Severe malnutrition (HCC) E43    Nausea vomiting and diarrhea R11.2, R19.7    Secondary esophageal varices with bleeding (HCC) I85.11    Generalized weakness R53.1    Esophageal dysphagia R13.19    Chemotherapy induced neutropenia (HCC) D70.1, T45.1X5A    Acute respiratory failure with hypoxia (HCC) J96.01    Gastric carcinoma (HCC) C16.9    Hospice care Z51.5       LINNETTE France MD  3/11/2024

## 2024-03-15 NOTE — DISCHARGE SUMMARY
[facility]    Death Summary    Date: 3/15/2024  Name: Arnie Martinez  MRN: [id number]  YOB: 1955     Patient's PCP: Pat Nguyễn MD  Consultants during acute care: Cardiology and Oncology  Acute care hospital admission: 2/23/2024 to 3/3/2024  Admit Date: 3/3/2024 to General Inpatient Hospice  Date of Death: 3/14/2024  Time: 1919  Admitting Physician: Angel Stahl DO to General Inpatient Hospice   Discharge Physician: LINNETTE France MD  Consultation: none during General Inpatient Hospice stay    Invasive procedures: none during General Inpatient Hospice stay    Discharge Diagnoses:  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 .    Acute hypoxic respiratory failure secondary to aspiration PNA requiring HFNC, decreased from 15L upon acute care admission.  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%, stopped Lasix 3/7  MONGE cirrhosis (with esophageal varices and banding)  Thrombocytopenia due to cirrhosis  [facility]      Patient Active Problem List   Diagnosis Code    Splenomegaly R16.1    NAFLD (nonalcoholic fatty liver disease) K76.0    Carcinoma of duodenum (HCC) C17.0    Cirrhosis of liver without ascites (HCC) K74.60    Port-A-Cath in place Z95.828    Leukopenia D72.819    Thrombocytopenic disorder D69.59    Fatigue R53.83    S/P total knee arthroplasty, right Z96.651    Hx of colonic polyps Z86.010    Gastroesophageal reflux disease K21.9    Acute gastric ulcer with hemorrhage K25.0    GI bleed K92.2    Secondary esophageal varices without bleeding (HCC) I85.10    Malignant neoplasm of overlapping sites of stomach (HCC) C16.8    Screening for viral disease Z11.59    Malignant neoplasm of body of stomach (HCC) C16.2    Failure to thrive in adult R62.7    Severe

## 2024-10-09 NOTE — INTERVAL H&P NOTE
Update History & Physical    The patient's History and Physical of November 21, 2023 was reviewed with the patient and I examined the patient. There was no change. The surgical site was confirmed by the patient and me. Plan: The risks, benefits, expected outcome, and alternative to the recommended procedure have been discussed with the patient. Patient understands and wants to proceed with the procedure.      Electronically signed by Moon Patterson MD on 12/11/2023 at 8:15 AM kidney disease 5

## 2025-07-02 NOTE — ANESTHESIA PRE PROCEDURE
Department of Anesthesiology  Preprocedure Note       Name:  Myra Johansen   Age:  76 y.o.  :  1955                                          MRN:  [id number]         Date:  10/23/2023      Surgeon: Carlin Maradiaga):  Aidan Weinstein MD    Procedure: Procedure(s):  EGD ESOPHAGOGASTRODUODENOSCOPY    Medications prior to admission:   Prior to Admission medications    Medication Sig Start Date End Date Taking? Authorizing Provider   pantoprazole (PROTONIX) 40 MG tablet Take 1 tablet by mouth 2 times daily 10/8/23   Cadence Rayo MD   ondansetron (ZOFRAN) 8 MG tablet Take 1 tablet by mouth every 8 hours as needed for Nausea or Vomiting Take 1 tablet by mouth every 8 hours as needed for nausea or vomiting. 10/2/23   Jodee Herbert MD   promethazine (PHENERGAN) 25 MG tablet Take 1 tablet by mouth every 6 hours as needed for Nausea 10/2/23   Jodee Herbert MD   OLANZapine (ZYPREXA) 2.5 MG tablet Take 1 tablet by mouth nightly 10/2/23   Jodee Herbert MD   dexamethasone (DECADRON) 4 MG tablet Take 1 tablet by mouth in the morning with food for 2 days after the first chemo treatment. Then take 2 tablets by mouth in the morning with food the morning before each treatment and for 2 days after each treatment 10/2/23   Jodee Herbert MD   ondansetron (ZOFRAN-ODT) 8 MG TBDP disintegrating tablet Place 1 tablet under the tongue every 8 hours as needed for Nausea or Vomiting 9/11/23 10/12/23  Jodee Herbert MD   VITAMIN D PO Take by mouth daily    Sung Jackson MD   vitamin C (ASCORBIC ACID) 500 MG tablet Take 1 tablet by mouth daily    Sung Jackson MD   ferrous sulfate 325 (65 FE) MG tablet Take 1 tablet by mouth daily (with breakfast)    Sung Jackson MD   vitamin B-12 (CYANOCOBALAMIN) 1000 MCG tablet Take 2 tablets by mouth daily    Sung Jackson MD       Current medications:    No current facility-administered medications for this encounter.      Current Outpatient Medications   Medication complains of pain/discomfort

## (undated) DEVICE — CLIP INT L235CM WRK CHN DIA2.8MM OPN 11MM BRAID CATH ROT BX/10

## (undated) DEVICE — ENDOSCOPY KIT: Brand: MEDLINE INDUSTRIES, INC.

## (undated) DEVICE — TOWEL,OR,DSP,ST,BLUE,STD,6/PK,12PK/CS: Brand: MEDLINE

## (undated) DEVICE — SUTURE STRATAFIX SPRL SZ 1 L14IN ABSRB VLT L48CM CTX 1/2 SXPD2B405

## (undated) DEVICE — APPLICATOR MEDICATED 26 CC SOLUTION HI LT ORNG CHLORAPREP

## (undated) DEVICE — SURGICAL PROCEDURE PACK TOT KNEE LF

## (undated) DEVICE — AEGIS 1" DISK 4MM HOLE, PEEL OPEN: Brand: MEDLINE

## (undated) DEVICE — NEEDLE SCLERO 25GA L240CM OD0.51MM ID0.24MM EXTN L4MM SHTH

## (undated) DEVICE — KIT EVAC 0.13IN RECT TB DIA10FR 400CC PVC 3 SPR Y CONN DRN

## (undated) DEVICE — Device: Brand: POWER-FLO®

## (undated) DEVICE — 4-PORT MANIFOLD: Brand: NEPTUNE 2

## (undated) DEVICE — LIGATOR ENDOSCP L2.8MM DIA8.6-11.5MM MULT BND SPEEDBAND

## (undated) DEVICE — FAN SPRAY KIT: Brand: PULSAVAC®

## (undated) DEVICE — STRYKER PERFORMANCE SERIES SAGITTAL BLADE: Brand: STRYKER PERFORMANCE SERIES

## (undated) DEVICE — COVER,TABLE,44X90,STERILE: Brand: MEDLINE

## (undated) DEVICE — Z INACTIVE USE 2660664 SOLUTION IRRIG 3000ML 0.9% SOD CHL USP UROMATIC PLAS CONT

## (undated) DEVICE — TOTAL TRAY, 16FR 10ML SIL FOLEY, URN: Brand: MEDLINE

## (undated) DEVICE — PIN BNE FIX TEMP L140MM DIA4MM MAKO

## (undated) DEVICE — SPONGE LAP W18XL18IN WHT COT 4 PLY FLD STRUNG RADPQ DISP ST

## (undated) DEVICE — SNARE ENDOSCP L240CM SHTH DIA24MM LOOP W10MM POLYP RND REINF

## (undated) DEVICE — CLIP LIG L235CM RESOL 360 BX/20

## (undated) DEVICE — ZIMMER® STERILE DISPOSABLE TOURNIQUET CUFF WITH PLC, DUAL PORT, SINGLE BLADDER, 34 IN. (86 CM)

## (undated) DEVICE — MAT FLOOR ULTRA ABS 28X48IN

## (undated) DEVICE — ZIMMER® STERILE DISPOSABLE TOURNIQUET CUFF WITH PLC, DUAL PORT, SINGLE BLADDER, 30 IN. (76 CM)

## (undated) DEVICE — 6617 IOBAN II PATIENT ISOLATION DRAPE 5/BX,4BX/CS: Brand: STERI-DRAPE™ IOBAN™ 2

## (undated) DEVICE — SYRINGE MED 30ML STD CLR PLAS LUERLOCK TIP N CTRL DISP

## (undated) DEVICE — BLADE CLIPPER GEN PURP NS

## (undated) DEVICE — CHLORAPREP 26ML ORANGE

## (undated) DEVICE — BLADE SURG SAW STD S STL OSC W/ SERR EDGE DISP

## (undated) DEVICE — CORD RETRCT SIL

## (undated) DEVICE — FORCEPS BX L240CM JAW DIA2.8MM L CAP W/ NDL MIC MESH TOOTH

## (undated) DEVICE — SOLUTION IV 1000ML 0.9% SOD CHL FOR IRRIG PLAS CONT

## (undated) DEVICE — CLASSIC CLD THER SYS

## (undated) DEVICE — SUTURE STARTAFIX 3-0 PS-1 30CM

## (undated) DEVICE — ZIMMER® STERILE DISPOSABLE TOURNIQUET CUFF WITH PLC, DUAL PORT, SINGLE BLADDER, 24 IN. (61 CM)

## (undated) DEVICE — SUTURE ABSORBABLE MONOFILAMENT 3-0 PS1 12 IN UD MONOCRYL + SXMP1B101

## (undated) DEVICE — ALCOHOL RUBBING ISO 16OZ 70%

## (undated) DEVICE — KIT TRK KNEE PROC VIZADISC

## (undated) DEVICE — SYSTEM SKIN CLSR 22CM DERMBND PRINEO

## (undated) DEVICE — PAD CLD R HIP REG HOSE NONSTERILE

## (undated) DEVICE — DRAPE SHEET ULTRAGARD: Brand: MEDLINE

## (undated) DEVICE — BOWL BNE CEM MIX AND SPAT DISP

## (undated) DEVICE — ETHIBOND EXCEL SUT 30 INCHES75CM 2 GRN

## (undated) DEVICE — SUTURE VCRL SZ 2-0 L18IN ABSRB UD CT-1 L36MM 1/2 CIR J839D

## (undated) DEVICE — ELECTRODE ES AD CRDLSS PT RET REM POLYHESIVE

## (undated) DEVICE — 3M™ STERI-DRAPE™ U-DRAPE 1015: Brand: STERI-DRAPE™

## (undated) DEVICE — TUBING, SUCTION, 9/32" X 10', STRAIGHT: Brand: MEDLINE

## (undated) DEVICE — HOOD, PEEL-AWAY: Brand: FLYTE

## (undated) DEVICE — KIT DRP FOR RIO ROBOTIC ARM ASST SYS

## (undated) DEVICE — BLADE SURG SAW S STL NAR OSC W/ SERR EDGE DISP

## (undated) DEVICE — SINGLE PORT MANIFOLD: Brand: NEPTUNE 2

## (undated) DEVICE — HEMOSPRAY ENDOSCOPIC HEMOSTAT: Brand: HEMOSPRAY

## (undated) DEVICE — KIT INT FIX FEM TIB CKPT MAKOPLASTY

## (undated) DEVICE — BOOT POS LEG DEMAYO

## (undated) DEVICE — FORCEPS ENDOSCP L230CM WRK CHN 2.8CM SHTH 2.4MM JAW OPN

## (undated) DEVICE — SOLUTION IV IRRIG WATER 1000ML POUR BRL 2F7114

## (undated) DEVICE — SUTURE ETHBND SZ 1 L30IN NONABSORBABLE GRN OS-6 L36MM 1/2 X538H

## (undated) DEVICE — Device

## (undated) DEVICE — BLADE SAW W12.5XL70MM THK0.8MM CUT THK1.12MM S STL RECIP

## (undated) DEVICE — YANKAUER,FLEXIBLE HANDLE,REGLR CAPACITY: Brand: MEDLINE INDUSTRIES, INC.

## (undated) DEVICE — DUAL SPIKE Y-CONNECTOR SET, PACKAGED: Brand: PULSAVAC®

## (undated) DEVICE — FORCEPS BX 240CM JAW 3.2MM L CAP NDL MIC MESH TTH M00513372

## (undated) DEVICE — STERILE POLYISOPRENE POWDER-FREE SURGICAL GLOVES: Brand: PROTEXIS

## (undated) DEVICE — DRESSING TRNSPAR W2XL2.75IN FLM SHT SEMIPERMEABLE WIND

## (undated) DEVICE — GLOVE SURG SZ 65 CRM LTX FREE POLYISOPRENE POLYMER BEAD ANTI

## (undated) DEVICE — PENCIL ES CRD L10FT HND SWCHING ROCK SWCH W/ EDGE COAT BLDE